# Patient Record
Sex: FEMALE | Race: BLACK OR AFRICAN AMERICAN | NOT HISPANIC OR LATINO | Employment: OTHER
[De-identification: names, ages, dates, MRNs, and addresses within clinical notes are randomized per-mention and may not be internally consistent; named-entity substitution may affect disease eponyms.]

---

## 2017-11-12 ENCOUNTER — HEALTH MAINTENANCE LETTER (OUTPATIENT)
Age: 67
End: 2017-11-12

## 2018-11-19 ENCOUNTER — HEALTH MAINTENANCE LETTER (OUTPATIENT)
Age: 68
End: 2018-11-19

## 2019-05-14 ENCOUNTER — OFFICE VISIT (OUTPATIENT)
Dept: URGENT CARE | Facility: URGENT CARE | Age: 69
End: 2019-05-14
Payer: MEDICARE

## 2019-05-14 VITALS
OXYGEN SATURATION: 95 % | BODY MASS INDEX: 56.61 KG/M2 | DIASTOLIC BLOOD PRESSURE: 96 MMHG | HEART RATE: 67 BPM | WEIGHT: 293 LBS | SYSTOLIC BLOOD PRESSURE: 140 MMHG

## 2019-05-14 DIAGNOSIS — M10.9 ACUTE GOUT OF LEFT FOOT, UNSPECIFIED CAUSE: Primary | ICD-10-CM

## 2019-05-14 DIAGNOSIS — M79.672 LEFT FOOT PAIN: ICD-10-CM

## 2019-05-14 LAB
BASOPHILS # BLD AUTO: 0 10E9/L (ref 0–0.2)
BASOPHILS NFR BLD AUTO: 0.2 %
DIFFERENTIAL METHOD BLD: ABNORMAL
EOSINOPHIL # BLD AUTO: 0.1 10E9/L (ref 0–0.7)
EOSINOPHIL NFR BLD AUTO: 1.5 %
ERYTHROCYTE [DISTWIDTH] IN BLOOD BY AUTOMATED COUNT: 13.4 % (ref 10–15)
HCT VFR BLD AUTO: 35.7 % (ref 35–47)
HGB BLD-MCNC: 11.5 G/DL (ref 11.7–15.7)
LYMPHOCYTES # BLD AUTO: 2.2 10E9/L (ref 0.8–5.3)
LYMPHOCYTES NFR BLD AUTO: 25.9 %
MCH RBC QN AUTO: 28.2 PG (ref 26.5–33)
MCHC RBC AUTO-ENTMCNC: 32.2 G/DL (ref 31.5–36.5)
MCV RBC AUTO: 88 FL (ref 78–100)
MONOCYTES # BLD AUTO: 0.7 10E9/L (ref 0–1.3)
MONOCYTES NFR BLD AUTO: 7.6 %
NEUTROPHILS # BLD AUTO: 5.5 10E9/L (ref 1.6–8.3)
NEUTROPHILS NFR BLD AUTO: 64.8 %
PLATELET # BLD AUTO: 276 10E9/L (ref 150–450)
RBC # BLD AUTO: 4.08 10E12/L (ref 3.8–5.2)
URATE SERPL-MCNC: 8.2 MG/DL (ref 2.6–6)
WBC # BLD AUTO: 8.5 10E9/L (ref 4–11)

## 2019-05-14 PROCEDURE — 85025 COMPLETE CBC W/AUTO DIFF WBC: CPT | Performed by: PHYSICIAN ASSISTANT

## 2019-05-14 PROCEDURE — 84550 ASSAY OF BLOOD/URIC ACID: CPT | Performed by: PHYSICIAN ASSISTANT

## 2019-05-14 PROCEDURE — 99203 OFFICE O/P NEW LOW 30 MIN: CPT | Performed by: PHYSICIAN ASSISTANT

## 2019-05-14 PROCEDURE — 36415 COLL VENOUS BLD VENIPUNCTURE: CPT | Performed by: PHYSICIAN ASSISTANT

## 2019-05-14 RX ORDER — ATORVASTATIN CALCIUM 40 MG/1
40 TABLET, FILM COATED ORAL DAILY
Status: ON HOLD | COMMUNITY
Start: 2018-11-02

## 2019-05-14 RX ORDER — INDOMETHACIN 50 MG/1
50 CAPSULE ORAL
Qty: 30 CAPSULE | Refills: 0 | Status: ON HOLD | OUTPATIENT
Start: 2019-05-14 | End: 2024-07-28

## 2019-05-14 RX ORDER — AMLODIPINE BESYLATE 5 MG/1
5 TABLET ORAL
Status: ON HOLD | COMMUNITY
Start: 2018-11-30 | End: 2024-07-28

## 2019-05-14 NOTE — PROGRESS NOTES
Patient presents with:  Musculoskeletal Problem: L foot, x 5 days, swollen, discomfort, painful, pain level 8/10, denies bruising, falling     No recent travel    SUBJECTIVE:  Chief Complaint   Patient presents with     Musculoskeletal Problem     L foot, x 5 days, swollen, discomfort, painful, pain level 8/10, denies bruising, falling      Agatha Rodriguez is a 68 year old female presents with a chief complaint of left foot pain and swelling for the past 3 days.  Ibuprofen helps but then pain returns.    No trauma.   No fevers.    Feels warm.   Swells considerably during day.      Takes Maxide for HTN, has for some time.      SH: here with her PCA this evening.      FH: diabetes  Breast Cancer    Past Medical History:   Diagnosis Date     Asthma      CAD (coronary artery disease)     angina     GERD (gastroesophageal reflux disease)      Hypertension      Obesity      Primary osteoarthritis of both knees      Sleep apnea     uses CPAP     Patient Active Problem List   Diagnosis     Pain in a tooth or teeth     CAD (coronary artery disease)     Hypertension     GERD (gastroesophageal reflux disease)     Sleep apnea     Primary osteoarthritis of both knees     Obesity     Social History     Tobacco Use     Smoking status: Never Smoker     Smokeless tobacco: Never Used   Substance Use Topics     Alcohol use: No       ROS:  CONSTITUTIONAL:NEGATIVE for fever, chills, change in weight  INTEGUMENTARY/SKIN: NEGATIVE for worrisome rashes, moles or lesions  MUSCULOSKELETAL: as per HPI  NEURO: NEGATIVE for weakness, dizziness or paresthesias  ENDOCRINE: NEGATIVE for temperature intolerance, skin/hair changes  HEME/ALLERGY/IMMUNE: NEGATIVE for bleeding problems  Review of systems negative except as stated above.    EXAM:   BP (!) 140/96   Pulse 67   Wt (!) 356 lb 1.6 oz (161.5 kg)   SpO2 95%   Breastfeeding? No   BMI 56.61 kg/m    Gen: healthy,alert,no distress  Extremity: left foot: erythema and warmth at 1st MTP.      There is not compromise to the distal circulation.  Pulses are +2 and CRT is brisk  GENERAL APPEARANCE: healthy, alert and no distress  SKIN: no suspicious lesions or rashes  NEURO: Normal strength and tone, sensory exam grossly normal, mentation intact and speech normal    Results for orders placed or performed in visit on 05/14/19   Uric acid   Result Value Ref Range    Uric Acid 8.2 (H) 2.6 - 6.0 mg/dL   CBC with platelets differential   Result Value Ref Range    WBC 8.5 4.0 - 11.0 10e9/L    RBC Count 4.08 3.8 - 5.2 10e12/L    Hemoglobin 11.5 (L) 11.7 - 15.7 g/dL    Hematocrit 35.7 35.0 - 47.0 %    MCV 88 78 - 100 fl    MCH 28.2 26.5 - 33.0 pg    MCHC 32.2 31.5 - 36.5 g/dL    RDW 13.4 10.0 - 15.0 %    Platelet Count 276 150 - 450 10e9/L    % Neutrophils 64.8 %    % Lymphocytes 25.9 %    % Monocytes 7.6 %    % Eosinophils 1.5 %    % Basophils 0.2 %    Absolute Neutrophil 5.5 1.6 - 8.3 10e9/L    Absolute Lymphocytes 2.2 0.8 - 5.3 10e9/L    Absolute Monocytes 0.7 0.0 - 1.3 10e9/L    Absolute Eosinophils 0.1 0.0 - 0.7 10e9/L    Absolute Basophils 0.0 0.0 - 0.2 10e9/L    Diff Method Automated Method      (M10.9) Acute gout of left foot, unspecified cause  (primary encounter diagnosis)  Comment:   Plan: indomethacin (INDOCIN) 50 MG capsule,         acetaminophen-codeine (TYLENOL #3) 300-30 MG         tablet          Handout on gout given and reviewed.  Use codeine with caution.      (M79.672) Left foot pain  Comment:   Plan: Uric acid, CBC with platelets differential          Patient expresses understanding and agreement with the assessment and plan as above.

## 2019-05-15 NOTE — PATIENT INSTRUCTIONS
Patient Education     Gout    Gout is an inflammation of a joint due to a build-up of gout crystals in the joint fluid. This occurs when there is an excess of uric acid (a normal waste product) in the body. Uric acid builds up in the body when the kidneys are unable to filter enough of it from the blood. This may occur with age. It is also associated with kidney disease. Gout occurs more often in people with obesity, diabetes, high blood pressure, or high levels of fats in the blood. It may run in families. Gout tends to come and go. A flare up of gout is called an attack. Drinking alcohol or eating certain foods (such as shellfish or foods with additives such as high-fructose corn syrup) may increase uric acid levels in the blood and cause a gout attack.  During a gout attack, the affected joint may become a hot, red, swollen and painful. If you have had one attack of gout, you are likely to have another. An attack of gout can be treated with medicine. If these attacks become frequent, a daily medicine may be prescribed to help the kidneys remove uric acid from the body.  Home care  During a gout attack:    Rest painful joints. If gout affects the joints of your foot or leg, you may want to use crutches for the first few days to keep from bearing weight on the affected joint.    When sitting or lying down, raise the painful joint to a level higher than your heart.    Apply an ice pack (ice cubes in a plastic bag wrapped in a thin towel) over the injured area for 20 minutes every 1 to 2 hours the first day for pain relief. Continue this 3 to 4 times a day for swelling and pain.    Avoid alcohol and foods listed below (see Preventing attacks) during a gout attack. Drink extra fluid to help flush the uric acid through your kidneys.    If you were prescribed a medicine to treat gout, take it as your healthcare provider has instructed. Don't skip doses.    Take anti-inflammatory medicine as directed.     If pain  medicines have been prescribed, take them exactly as directed.    Preventing attacks    Minimize or avoid alcohol use. Excess alcohol intake can cause a gout attack.    Limit these foods and beverages:  ? Organ meats, such as kidneys and liver  ? Certain seafoods (anchovies, sardines, shrimp, scallops, herring, mackerel)  ? Wild game, meat extracts and meat gravies  ? Foods and beverages sweetened with high-fructose corn syrup, such as sodas    Eat a healthy diet including low-fat and nonfat dairy, whole grains, and vegetables.    If you are overweight, talk to your healthcare provider about a weight reduction plan. Avoid fasting or extreme low calorie diets (less than 900 calories per day). This will increase uric acid levels in the body.    If you have diabetes or high blood pressure, work with your doctor to manage these conditions.    Protect the joint from injury. Trauma can trigger a gout attack.  Follow-up care  Follow up with your healthcare provider, or as advised.   When to seek medical advice  Call your healthcare provider if you have any of the following:    Fever over 100.4 F (38. C) with worsening joint pain    Increasing redness around the joint    Pain developing in another joint    Repeated vomiting, abdominal pain, or blood in the vomit or stool (black or red color)  Date Last Reviewed: 3/1/2017    9908-1895 [employer]. [address] [place] [address], [place] [address]. All rights reserved. This information is not intended as a substitute for professional medical care. Always follow your healthcare professional's instructions.           Patient Education     Gout Diet  Gout is a painful condition caused by an excess of uric acid, a waste product made by the body. Uric acid forms crystals that collect in the joints. The immune response to these crystals brings on symptoms of joint pain and swelling. This is called a gout attack. Often, medications and diet changes are combined to manage gout.  Below are some guidelines for changing your diet to help you manage gout and prevent attacks. Your healthcare provider will help you determine the best eating plan for you.  Eating to manage gout  Weight loss for those who are overweight may help reduce gout attacks.  Eat less of these foods  Eating too many foods containing purines may raise the levels of uric acid in your body. This raises your risk for a gout attack. Try to limit these foods and drinks:    Alcohol, such as beer and red wine. You may be told to avoid alcohol completely.    Soft drinks that contain sugar or high fructose corn syrup    Certain fish, including anchovies, sardines, fish eggs, and herring    Shellfish    Certain meats, such as red meat, hot dogs, luncheon meats, and turkey    Organ meats, such as liver, kidneys, and sweetbreads    Legumes, such as dried beans and peas    Other high fat foods such as gravy, whole milk, and high fat cheeses    Vegetables such as asparagus, cauliflower, spinach, and mushrooms used to be thought to contribute to an increased risk for a gout attack, but recent studies show that high purine vegetables don't increase the risk for a gout attack.  Eat more of these foods  Other foods may be helpful for people with gout. Add some of these foods to your diet:    Cherries contain chemicals that may lower uric acid.    Omega fatty acids. These are found in some fatty fish such as salmon, certain oils (flax, olive, or nut), and nuts themselves. Omega fatty acids may help prevent inflammation due to gout.    Dairy products that are low-fat or fat-free, such as cheese and yogurt    Complex carbohydrate foods, including whole grains, brown rice, oats, and beans    Coffee, in moderation    Water, approximately 64 ounces per day  Follow-up care  Follow up with your healthcare provider as advised.  When to seek medical advice  Call your healthcare provider right away if any of these occur:    Return of gout symptoms,  usually at night:    Severe pain, swelling, and heat in a joint, especially the base of the big toe    Affected joint is hard to move    Skin of the affected joint is purple or red    Fever of 100.4 F (38 C) or higher    Pain that doesn't get better even with prescribed medicine   Date Last Reviewed: 6/1/2018 2000-2018 [employer]. [address] [place] [address]. All rights reserved. This information is not intended as a substitute for professional medical care. Always follow your healthcare professional's instructions.           Patient Education     Treating Gout Attacks     Raising the joint above the level of your heart can help reduce gout symptoms.     Gout is a disease that affects the joints. It is caused by excess uric acid in your blood that may lead to crystals forming in your joints. Left untreated, it can lead to painful foot and joint deformities and even kidney problems. But, by treating gout early, you can relieve pain and help prevent future problems. Gout can usually be treated with medicine and proper diet. In severe cases, surgery may be needed.  Gout attacks are painful and often happen more than once. Taking medicines may reduce pain and prevent attacks in the future. There are also some things you can do at home to relieve symptoms.  Medicines for gout  Your healthcare provider may prescribe a daily medicine to reduce levels of uric acid. Reducing your uric acid levels may help prevent gout attacks. Allopurinol is one commonly used medicine taken daily to reduce uric acid levels. Other daily medicines used to reduce uric acid levels include febuxostat, lesinurad, and probencid. Other medicines can help relieve pain and swelling during an acute attack. Medicines such as NSAIDs (nonsteroidal anti-inflammatory medicines), steroids, and colchicine may be prescribed for intermittent use to relieve an acute gout attack. Be sure to take your medicine as directed.  What you  can do  Below are some things you can do at home to relieve gout symptoms. Your healthcare provider may have other tips.    Rest the painful joint as much as you can.    Raise the painful joint so it is at a level higher than your heart.    Use ice for 10 minutes every 1 to 2 hours as possible.  How can I prevent gout?  With a little effort, you may be able to prevent gout attacks in the future. Here are some things you can do:    Don't eat foods high in purines  ? Certain meats (red meat, processed meat, turkey)  ? Organ meats (kidney, liver, sweetbread)  ? Shellfish (lobster, crab, shrimp, scallop, mussel)  ? Certain fish (anchovy, sardine, herring, mackerel)    Take any medicines prescribed by your healthcare provider.    Lose weight if you need to.    Reduce high fructose corn syrup in meals and drinks.    Reduce or cut out alcohol, particularly beer, but also red wine and spirits.    Control blood pressure, diabetes, and cholesterol.    Drink plenty of water to help flush uric acid from your body.  Date Last Reviewed: 4/1/2018 2000-2018 [employer]. [address] [place] [address], [place] [address]. All rights reserved. This information is not intended as a substitute for professional medical care. Always follow your healthcare professional's instructions.

## 2021-07-13 ENCOUNTER — IMMUNIZATION (OUTPATIENT)
Dept: NURSING | Facility: CLINIC | Age: 71
End: 2021-07-13
Payer: MEDICARE

## 2021-07-13 PROCEDURE — 0001A PR COVID VAC PFIZER DIL RECON 30 MCG/0.3 ML IM: CPT

## 2021-07-13 PROCEDURE — 91300 PR COVID VAC PFIZER DIL RECON 30 MCG/0.3 ML IM: CPT

## 2021-08-03 ENCOUNTER — IMMUNIZATION (OUTPATIENT)
Dept: NURSING | Facility: CLINIC | Age: 71
End: 2021-08-03
Attending: INTERNAL MEDICINE
Payer: MEDICARE

## 2021-08-03 PROCEDURE — 0002A PR COVID VAC PFIZER DIL RECON 30 MCG/0.3 ML IM: CPT

## 2021-08-03 PROCEDURE — 91300 PR COVID VAC PFIZER DIL RECON 30 MCG/0.3 ML IM: CPT

## 2024-07-27 ENCOUNTER — APPOINTMENT (OUTPATIENT)
Dept: GENERAL RADIOLOGY | Facility: CLINIC | Age: 74
DRG: 291 | End: 2024-07-27
Attending: EMERGENCY MEDICINE
Payer: MEDICARE

## 2024-07-27 ENCOUNTER — HOSPITAL ENCOUNTER (INPATIENT)
Facility: CLINIC | Age: 74
LOS: 1 days | Discharge: HOME | DRG: 291 | End: 2024-07-30
Attending: EMERGENCY MEDICINE | Admitting: INTERNAL MEDICINE
Payer: MEDICARE

## 2024-07-27 DIAGNOSIS — I51.89 DIASTOLIC DYSFUNCTION: ICD-10-CM

## 2024-07-27 DIAGNOSIS — I10 BENIGN ESSENTIAL HYPERTENSION: Primary | ICD-10-CM

## 2024-07-27 DIAGNOSIS — E04.1 THYROID NODULE: ICD-10-CM

## 2024-07-27 DIAGNOSIS — R60.0 BILATERAL LOWER EXTREMITY EDEMA: ICD-10-CM

## 2024-07-27 DIAGNOSIS — J45.901 EXACERBATION OF ASTHMA, UNSPECIFIED ASTHMA SEVERITY, UNSPECIFIED WHETHER PERSISTENT: ICD-10-CM

## 2024-07-27 DIAGNOSIS — I25.10 CAD (CORONARY ARTERY DISEASE): ICD-10-CM

## 2024-07-27 DIAGNOSIS — R06.02 SHORTNESS OF BREATH: ICD-10-CM

## 2024-07-27 LAB
ALBUMIN SERPL BCG-MCNC: 3.9 G/DL (ref 3.5–5.2)
ALP SERPL-CCNC: 68 U/L (ref 40–150)
ALT SERPL W P-5'-P-CCNC: 15 U/L (ref 0–50)
ANION GAP SERPL CALCULATED.3IONS-SCNC: 10 MMOL/L (ref 7–15)
AST SERPL W P-5'-P-CCNC: 24 U/L (ref 0–45)
ATRIAL RATE - MUSE: 69 BPM
BASOPHILS # BLD AUTO: 0 10E3/UL (ref 0–0.2)
BASOPHILS NFR BLD AUTO: 0 %
BILIRUB DIRECT SERPL-MCNC: <0.2 MG/DL (ref 0–0.3)
BILIRUB SERPL-MCNC: 0.2 MG/DL
BUN SERPL-MCNC: 18 MG/DL (ref 8–23)
CALCIUM SERPL-MCNC: 9.4 MG/DL (ref 8.8–10.4)
CHLORIDE SERPL-SCNC: 103 MMOL/L (ref 98–107)
CREAT SERPL-MCNC: 0.93 MG/DL (ref 0.51–0.95)
D DIMER PPP FEU-MCNC: 0.91 UG/ML FEU (ref 0–0.5)
DIASTOLIC BLOOD PRESSURE - MUSE: NORMAL MMHG
EGFRCR SERPLBLD CKD-EPI 2021: 65 ML/MIN/1.73M2
EOSINOPHIL # BLD AUTO: 0.1 10E3/UL (ref 0–0.7)
EOSINOPHIL NFR BLD AUTO: 1 %
ERYTHROCYTE [DISTWIDTH] IN BLOOD BY AUTOMATED COUNT: 15.9 % (ref 10–15)
GLUCOSE SERPL-MCNC: 121 MG/DL (ref 70–99)
HCO3 SERPL-SCNC: 29 MMOL/L (ref 22–29)
HCT VFR BLD AUTO: 33.4 % (ref 35–47)
HGB BLD-MCNC: 10.4 G/DL (ref 11.7–15.7)
HOLD SPECIMEN: NORMAL
IMM GRANULOCYTES # BLD: 0 10E3/UL
IMM GRANULOCYTES NFR BLD: 0 %
INTERPRETATION ECG - MUSE: NORMAL
LYMPHOCYTES # BLD AUTO: 2.5 10E3/UL (ref 0.8–5.3)
LYMPHOCYTES NFR BLD AUTO: 26 %
MCH RBC QN AUTO: 25.9 PG (ref 26.5–33)
MCHC RBC AUTO-ENTMCNC: 31.1 G/DL (ref 31.5–36.5)
MCV RBC AUTO: 83 FL (ref 78–100)
MONOCYTES # BLD AUTO: 0.8 10E3/UL (ref 0–1.3)
MONOCYTES NFR BLD AUTO: 8 %
NEUTROPHILS # BLD AUTO: 6.4 10E3/UL (ref 1.6–8.3)
NEUTROPHILS NFR BLD AUTO: 65 %
NRBC # BLD AUTO: 0 10E3/UL
NRBC BLD AUTO-RTO: 0 /100
NT-PROBNP SERPL-MCNC: 355 PG/ML (ref 0–900)
P AXIS - MUSE: 69 DEGREES
PLATELET # BLD AUTO: 227 10E3/UL (ref 150–450)
POTASSIUM SERPL-SCNC: 3.9 MMOL/L (ref 3.4–5.3)
PR INTERVAL - MUSE: 236 MS
PROT SERPL-MCNC: 7.3 G/DL (ref 6.4–8.3)
QRS DURATION - MUSE: 104 MS
QT - MUSE: 444 MS
QTC - MUSE: 475 MS
R AXIS - MUSE: -22 DEGREES
RBC # BLD AUTO: 4.01 10E6/UL (ref 3.8–5.2)
SODIUM SERPL-SCNC: 142 MMOL/L (ref 135–145)
SYSTOLIC BLOOD PRESSURE - MUSE: NORMAL MMHG
T AXIS - MUSE: 37 DEGREES
TROPONIN T SERPL HS-MCNC: 12 NG/L
VENTRICULAR RATE- MUSE: 69 BPM
WBC # BLD AUTO: 9.8 10E3/UL (ref 4–11)

## 2024-07-27 PROCEDURE — 94640 AIRWAY INHALATION TREATMENT: CPT

## 2024-07-27 PROCEDURE — 83880 ASSAY OF NATRIURETIC PEPTIDE: CPT | Performed by: EMERGENCY MEDICINE

## 2024-07-27 PROCEDURE — 84484 ASSAY OF TROPONIN QUANT: CPT | Performed by: EMERGENCY MEDICINE

## 2024-07-27 PROCEDURE — 71046 X-RAY EXAM CHEST 2 VIEWS: CPT

## 2024-07-27 PROCEDURE — 93005 ELECTROCARDIOGRAM TRACING: CPT

## 2024-07-27 PROCEDURE — 85025 COMPLETE CBC W/AUTO DIFF WBC: CPT | Performed by: EMERGENCY MEDICINE

## 2024-07-27 PROCEDURE — 999N000157 HC STATISTIC RCP TIME EA 10 MIN

## 2024-07-27 PROCEDURE — 36415 COLL VENOUS BLD VENIPUNCTURE: CPT | Performed by: EMERGENCY MEDICINE

## 2024-07-27 PROCEDURE — 85379 FIBRIN DEGRADATION QUANT: CPT | Performed by: EMERGENCY MEDICINE

## 2024-07-27 PROCEDURE — 250N000009 HC RX 250: Performed by: EMERGENCY MEDICINE

## 2024-07-27 PROCEDURE — 99285 EMERGENCY DEPT VISIT HI MDM: CPT | Mod: 25

## 2024-07-27 PROCEDURE — 80048 BASIC METABOLIC PNL TOTAL CA: CPT | Performed by: EMERGENCY MEDICINE

## 2024-07-27 PROCEDURE — 96374 THER/PROPH/DIAG INJ IV PUSH: CPT | Mod: 59

## 2024-07-27 PROCEDURE — 250N000011 HC RX IP 250 OP 636: Performed by: EMERGENCY MEDICINE

## 2024-07-27 PROCEDURE — 82040 ASSAY OF SERUM ALBUMIN: CPT | Performed by: EMERGENCY MEDICINE

## 2024-07-27 PROCEDURE — 80053 COMPREHEN METABOLIC PANEL: CPT | Performed by: EMERGENCY MEDICINE

## 2024-07-27 RX ORDER — IPRATROPIUM BROMIDE AND ALBUTEROL SULFATE 2.5; .5 MG/3ML; MG/3ML
3 SOLUTION RESPIRATORY (INHALATION) ONCE
Status: COMPLETED | OUTPATIENT
Start: 2024-07-27 | End: 2024-07-27

## 2024-07-27 RX ORDER — FUROSEMIDE 10 MG/ML
40 INJECTION INTRAMUSCULAR; INTRAVENOUS ONCE
Status: COMPLETED | OUTPATIENT
Start: 2024-07-27 | End: 2024-07-27

## 2024-07-27 RX ADMIN — FUROSEMIDE 40 MG: 10 INJECTION, SOLUTION INTRAMUSCULAR; INTRAVENOUS at 23:03

## 2024-07-27 RX ADMIN — IPRATROPIUM BROMIDE AND ALBUTEROL SULFATE 3 ML: .5; 3 SOLUTION RESPIRATORY (INHALATION) at 23:03

## 2024-07-27 ASSESSMENT — ACTIVITIES OF DAILY LIVING (ADL)
ADLS_ACUITY_SCORE: 35

## 2024-07-27 ASSESSMENT — COLUMBIA-SUICIDE SEVERITY RATING SCALE - C-SSRS
6. HAVE YOU EVER DONE ANYTHING, STARTED TO DO ANYTHING, OR PREPARED TO DO ANYTHING TO END YOUR LIFE?: NO
2. HAVE YOU ACTUALLY HAD ANY THOUGHTS OF KILLING YOURSELF IN THE PAST MONTH?: NO
1. IN THE PAST MONTH, HAVE YOU WISHED YOU WERE DEAD OR WISHED YOU COULD GO TO SLEEP AND NOT WAKE UP?: NO

## 2024-07-28 ENCOUNTER — APPOINTMENT (OUTPATIENT)
Dept: CARDIOLOGY | Facility: CLINIC | Age: 74
DRG: 291 | End: 2024-07-28
Attending: INTERNAL MEDICINE
Payer: MEDICARE

## 2024-07-28 ENCOUNTER — APPOINTMENT (OUTPATIENT)
Dept: CT IMAGING | Facility: CLINIC | Age: 74
DRG: 291 | End: 2024-07-28
Attending: EMERGENCY MEDICINE
Payer: MEDICARE

## 2024-07-28 PROBLEM — E04.1 THYROID NODULE: Status: ACTIVE | Noted: 2024-07-28

## 2024-07-28 PROBLEM — E66.01 MORBID OBESITY WITH BMI OF 50.0-59.9, ADULT (H): Status: ACTIVE | Noted: 2024-07-28

## 2024-07-28 PROBLEM — R94.31 PROLONGED Q-T INTERVAL ON ECG: Status: ACTIVE | Noted: 2024-07-28

## 2024-07-28 PROBLEM — I51.89 DIASTOLIC DYSFUNCTION: Status: ACTIVE | Noted: 2024-07-28

## 2024-07-28 PROBLEM — R06.02 SHORTNESS OF BREATH: Status: ACTIVE | Noted: 2024-07-28

## 2024-07-28 PROBLEM — R60.0 BILATERAL LOWER EXTREMITY EDEMA: Status: ACTIVE | Noted: 2024-07-28

## 2024-07-28 LAB
ANION GAP SERPL CALCULATED.3IONS-SCNC: 10 MMOL/L (ref 7–15)
BUN SERPL-MCNC: 15.7 MG/DL (ref 8–23)
CALCIUM SERPL-MCNC: 9.7 MG/DL (ref 8.8–10.4)
CHLORIDE SERPL-SCNC: 102 MMOL/L (ref 98–107)
CREAT SERPL-MCNC: 0.86 MG/DL (ref 0.51–0.95)
EGFRCR SERPLBLD CKD-EPI 2021: 71 ML/MIN/1.73M2
GLUCOSE BLDC GLUCOMTR-MCNC: 227 MG/DL (ref 70–99)
GLUCOSE SERPL-MCNC: 158 MG/DL (ref 70–99)
HCO3 SERPL-SCNC: 30 MMOL/L (ref 22–29)
LVEF ECHO: NORMAL
MAGNESIUM SERPL-MCNC: 1.8 MG/DL (ref 1.7–2.3)
POTASSIUM SERPL-SCNC: 3 MMOL/L (ref 3.4–5.3)
SODIUM SERPL-SCNC: 142 MMOL/L (ref 135–145)
TROPONIN T SERPL HS-MCNC: 13 NG/L
TSH SERPL DL<=0.005 MIU/L-ACNC: 3.13 UIU/ML (ref 0.3–4.2)

## 2024-07-28 PROCEDURE — 99223 1ST HOSP IP/OBS HIGH 75: CPT | Mod: AI | Performed by: INTERNAL MEDICINE

## 2024-07-28 PROCEDURE — G0378 HOSPITAL OBSERVATION PER HR: HCPCS

## 2024-07-28 PROCEDURE — 99207 PR APP CREDIT; MD BILLING SHARED VISIT: CPT | Performed by: PHYSICIAN ASSISTANT

## 2024-07-28 PROCEDURE — 36415 COLL VENOUS BLD VENIPUNCTURE: CPT | Performed by: INTERNAL MEDICINE

## 2024-07-28 PROCEDURE — 99222 1ST HOSP IP/OBS MODERATE 55: CPT | Mod: 25 | Performed by: INTERNAL MEDICINE

## 2024-07-28 PROCEDURE — 94640 AIRWAY INHALATION TREATMENT: CPT

## 2024-07-28 PROCEDURE — 250N000012 HC RX MED GY IP 250 OP 636 PS 637: Performed by: INTERNAL MEDICINE

## 2024-07-28 PROCEDURE — 250N000011 HC RX IP 250 OP 636: Performed by: INTERNAL MEDICINE

## 2024-07-28 PROCEDURE — 999N000157 HC STATISTIC RCP TIME EA 10 MIN

## 2024-07-28 PROCEDURE — 99418 PROLNG IP/OBS E/M EA 15 MIN: CPT | Performed by: INTERNAL MEDICINE

## 2024-07-28 PROCEDURE — 255N000002 HC RX 255 OP 636: Performed by: INTERNAL MEDICINE

## 2024-07-28 PROCEDURE — 93306 TTE W/DOPPLER COMPLETE: CPT | Mod: 26 | Performed by: INTERNAL MEDICINE

## 2024-07-28 PROCEDURE — 93005 ELECTROCARDIOGRAM TRACING: CPT

## 2024-07-28 PROCEDURE — 250N000009 HC RX 250: Performed by: INTERNAL MEDICINE

## 2024-07-28 PROCEDURE — 84443 ASSAY THYROID STIM HORMONE: CPT | Performed by: PHYSICIAN ASSISTANT

## 2024-07-28 PROCEDURE — 250N000011 HC RX IP 250 OP 636: Performed by: EMERGENCY MEDICINE

## 2024-07-28 PROCEDURE — 250N000013 HC RX MED GY IP 250 OP 250 PS 637: Performed by: INTERNAL MEDICINE

## 2024-07-28 PROCEDURE — 83735 ASSAY OF MAGNESIUM: CPT | Performed by: INTERNAL MEDICINE

## 2024-07-28 PROCEDURE — 82962 GLUCOSE BLOOD TEST: CPT

## 2024-07-28 PROCEDURE — 84484 ASSAY OF TROPONIN QUANT: CPT | Performed by: PHYSICIAN ASSISTANT

## 2024-07-28 PROCEDURE — 999N000208 ECHOCARDIOGRAM COMPLETE

## 2024-07-28 PROCEDURE — 94660 CPAP INITIATION&MGMT: CPT

## 2024-07-28 PROCEDURE — 250N000013 HC RX MED GY IP 250 OP 250 PS 637: Performed by: PHYSICIAN ASSISTANT

## 2024-07-28 PROCEDURE — 5A09357 ASSISTANCE WITH RESPIRATORY VENTILATION, LESS THAN 24 CONSECUTIVE HOURS, CONTINUOUS POSITIVE AIRWAY PRESSURE: ICD-10-PCS | Performed by: INTERNAL MEDICINE

## 2024-07-28 PROCEDURE — 80048 BASIC METABOLIC PNL TOTAL CA: CPT | Performed by: INTERNAL MEDICINE

## 2024-07-28 PROCEDURE — C8929 TTE W OR WO FOL WCON,DOPPLER: HCPCS

## 2024-07-28 PROCEDURE — 93010 ELECTROCARDIOGRAM REPORT: CPT | Performed by: INTERNAL MEDICINE

## 2024-07-28 PROCEDURE — 250N000009 HC RX 250: Performed by: EMERGENCY MEDICINE

## 2024-07-28 PROCEDURE — 71275 CT ANGIOGRAPHY CHEST: CPT | Mod: MA

## 2024-07-28 RX ORDER — LOSARTAN POTASSIUM 100 MG/1
100 TABLET ORAL DAILY
Status: DISCONTINUED | OUTPATIENT
Start: 2024-07-28 | End: 2024-07-30 | Stop reason: HOSPADM

## 2024-07-28 RX ORDER — ALBUTEROL SULFATE 0.83 MG/ML
2.5 SOLUTION RESPIRATORY (INHALATION) 4 TIMES DAILY
Status: DISCONTINUED | OUTPATIENT
Start: 2024-07-28 | End: 2024-07-30 | Stop reason: HOSPADM

## 2024-07-28 RX ORDER — LOSARTAN POTASSIUM 100 MG/1
100 TABLET ORAL DAILY
Status: ON HOLD | COMMUNITY

## 2024-07-28 RX ORDER — PROCHLORPERAZINE 25 MG
12.5 SUPPOSITORY, RECTAL RECTAL EVERY 12 HOURS PRN
Status: DISCONTINUED | OUTPATIENT
Start: 2024-07-28 | End: 2024-07-28

## 2024-07-28 RX ORDER — ACETAMINOPHEN 650 MG/1
650 SUPPOSITORY RECTAL EVERY 4 HOURS PRN
Status: DISCONTINUED | OUTPATIENT
Start: 2024-07-28 | End: 2024-07-30 | Stop reason: HOSPADM

## 2024-07-28 RX ORDER — ASPIRIN 81 MG/1
81 TABLET ORAL DAILY
Status: DISCONTINUED | OUTPATIENT
Start: 2024-07-28 | End: 2024-07-30 | Stop reason: HOSPADM

## 2024-07-28 RX ORDER — PROCHLORPERAZINE MALEATE 5 MG
5 TABLET ORAL EVERY 6 HOURS PRN
Status: DISCONTINUED | OUTPATIENT
Start: 2024-07-28 | End: 2024-07-28

## 2024-07-28 RX ORDER — ALBUTEROL SULFATE 90 UG/1
2 AEROSOL, METERED RESPIRATORY (INHALATION) EVERY 6 HOURS PRN
Status: ON HOLD | COMMUNITY

## 2024-07-28 RX ORDER — TRIAMTERENE AND HYDROCHLOROTHIAZIDE 75; 50 MG/1; MG/1
1 TABLET ORAL DAILY
Status: ON HOLD | COMMUNITY
End: 2024-07-30

## 2024-07-28 RX ORDER — DOCUSATE SODIUM 100 MG/1
100 CAPSULE, LIQUID FILLED ORAL 2 TIMES DAILY PRN
Status: DISCONTINUED | OUTPATIENT
Start: 2024-07-28 | End: 2024-07-30 | Stop reason: HOSPADM

## 2024-07-28 RX ORDER — MAGNESIUM HYDROXIDE/ALUMINUM HYDROXICE/SIMETHICONE 120; 1200; 1200 MG/30ML; MG/30ML; MG/30ML
30 SUSPENSION ORAL EVERY 4 HOURS PRN
Status: DISCONTINUED | OUTPATIENT
Start: 2024-07-28 | End: 2024-07-30 | Stop reason: HOSPADM

## 2024-07-28 RX ORDER — AMLODIPINE BESYLATE 10 MG/1
10 TABLET ORAL DAILY
Status: DISCONTINUED | OUTPATIENT
Start: 2024-07-28 | End: 2024-07-30 | Stop reason: HOSPADM

## 2024-07-28 RX ORDER — TRIAMTERENE AND HYDROCHLOROTHIAZIDE 75; 50 MG/1; MG/1
1 TABLET ORAL DAILY
Status: DISCONTINUED | OUTPATIENT
Start: 2024-07-28 | End: 2024-07-28

## 2024-07-28 RX ORDER — PROCHLORPERAZINE 25 MG
12.5 SUPPOSITORY, RECTAL RECTAL EVERY 12 HOURS PRN
Status: DISCONTINUED | OUTPATIENT
Start: 2024-07-28 | End: 2024-07-30 | Stop reason: HOSPADM

## 2024-07-28 RX ORDER — TRIAMTERENE AND HYDROCHLOROTHIAZIDE 75; 50 MG/1; MG/1
1 TABLET ORAL DAILY
Status: DISCONTINUED | OUTPATIENT
Start: 2024-07-29 | End: 2024-07-28

## 2024-07-28 RX ORDER — ONDANSETRON 4 MG/1
4 TABLET, ORALLY DISINTEGRATING ORAL EVERY 6 HOURS PRN
Status: DISCONTINUED | OUTPATIENT
Start: 2024-07-28 | End: 2024-07-28 | Stop reason: ALTCHOICE

## 2024-07-28 RX ORDER — ONDANSETRON 2 MG/ML
4 INJECTION INTRAMUSCULAR; INTRAVENOUS EVERY 6 HOURS PRN
Status: DISCONTINUED | OUTPATIENT
Start: 2024-07-28 | End: 2024-07-28 | Stop reason: ALTCHOICE

## 2024-07-28 RX ORDER — NIFEDIPINE 30 MG/1
30 TABLET, EXTENDED RELEASE ORAL DAILY
Status: DISCONTINUED | OUTPATIENT
Start: 2024-07-28 | End: 2024-07-28

## 2024-07-28 RX ORDER — AMOXICILLIN 250 MG
1 CAPSULE ORAL 2 TIMES DAILY PRN
Status: DISCONTINUED | OUTPATIENT
Start: 2024-07-28 | End: 2024-07-30 | Stop reason: HOSPADM

## 2024-07-28 RX ORDER — POLYETHYLENE GLYCOL 3350 17 G/17G
17 POWDER, FOR SOLUTION ORAL 2 TIMES DAILY PRN
Status: DISCONTINUED | OUTPATIENT
Start: 2024-07-28 | End: 2024-07-30 | Stop reason: HOSPADM

## 2024-07-28 RX ORDER — MULTIPLE VITAMINS W/ MINERALS TAB 9MG-400MCG
1 TAB ORAL DAILY
Status: DISCONTINUED | OUTPATIENT
Start: 2024-07-28 | End: 2024-07-30 | Stop reason: HOSPADM

## 2024-07-28 RX ORDER — NITROGLYCERIN 0.4 MG/1
0.4 TABLET SUBLINGUAL EVERY 5 MIN PRN
Status: DISCONTINUED | OUTPATIENT
Start: 2024-07-28 | End: 2024-07-30 | Stop reason: HOSPADM

## 2024-07-28 RX ORDER — POTASSIUM CHLORIDE 1500 MG/1
40 TABLET, EXTENDED RELEASE ORAL ONCE
Status: COMPLETED | OUTPATIENT
Start: 2024-07-28 | End: 2024-07-28

## 2024-07-28 RX ORDER — AMOXICILLIN 250 MG
2 CAPSULE ORAL 2 TIMES DAILY PRN
Status: DISCONTINUED | OUTPATIENT
Start: 2024-07-28 | End: 2024-07-30 | Stop reason: HOSPADM

## 2024-07-28 RX ORDER — CARVEDILOL 12.5 MG/1
12.5 TABLET ORAL 2 TIMES DAILY WITH MEALS
Status: DISCONTINUED | OUTPATIENT
Start: 2024-07-28 | End: 2024-07-28

## 2024-07-28 RX ORDER — NITROGLYCERIN 0.4 MG/1
TABLET SUBLINGUAL
Status: COMPLETED
Start: 2024-07-28 | End: 2024-07-28

## 2024-07-28 RX ORDER — PREDNISONE 20 MG/1
40 TABLET ORAL DAILY
Status: DISCONTINUED | OUTPATIENT
Start: 2024-07-28 | End: 2024-07-30 | Stop reason: HOSPADM

## 2024-07-28 RX ORDER — IOPAMIDOL 755 MG/ML
83 INJECTION, SOLUTION INTRAVASCULAR ONCE
Status: COMPLETED | OUTPATIENT
Start: 2024-07-28 | End: 2024-07-28

## 2024-07-28 RX ORDER — METOPROLOL SUCCINATE 100 MG/1
200 TABLET, EXTENDED RELEASE ORAL DAILY
Status: DISCONTINUED | OUTPATIENT
Start: 2024-07-28 | End: 2024-07-29

## 2024-07-28 RX ORDER — ALBUTEROL SULFATE 90 UG/1
2 AEROSOL, METERED RESPIRATORY (INHALATION) EVERY 6 HOURS PRN
Status: DISCONTINUED | OUTPATIENT
Start: 2024-07-28 | End: 2024-07-30 | Stop reason: HOSPADM

## 2024-07-28 RX ORDER — FUROSEMIDE 10 MG/ML
60 INJECTION INTRAMUSCULAR; INTRAVENOUS ONCE
Status: COMPLETED | OUTPATIENT
Start: 2024-07-28 | End: 2024-07-28

## 2024-07-28 RX ORDER — PROCHLORPERAZINE MALEATE 5 MG
5 TABLET ORAL EVERY 6 HOURS PRN
Status: DISCONTINUED | OUTPATIENT
Start: 2024-07-28 | End: 2024-07-30 | Stop reason: HOSPADM

## 2024-07-28 RX ORDER — METOPROLOL SUCCINATE 200 MG/1
200 TABLET, EXTENDED RELEASE ORAL DAILY
Status: ON HOLD | COMMUNITY
End: 2024-07-30

## 2024-07-28 RX ORDER — LIDOCAINE 40 MG/G
CREAM TOPICAL
Status: DISCONTINUED | OUTPATIENT
Start: 2024-07-28 | End: 2024-07-30 | Stop reason: HOSPADM

## 2024-07-28 RX ORDER — GLIPIZIDE 2.5 MG/1
2.5 TABLET, EXTENDED RELEASE ORAL DAILY
Status: ON HOLD | COMMUNITY

## 2024-07-28 RX ORDER — FUROSEMIDE 10 MG/ML
40 INJECTION INTRAMUSCULAR; INTRAVENOUS ONCE
Status: COMPLETED | OUTPATIENT
Start: 2024-07-28 | End: 2024-07-28

## 2024-07-28 RX ORDER — ACETAMINOPHEN 325 MG/1
650 TABLET ORAL EVERY 4 HOURS PRN
Status: DISCONTINUED | OUTPATIENT
Start: 2024-07-28 | End: 2024-07-30 | Stop reason: HOSPADM

## 2024-07-28 RX ORDER — LOSARTAN POTASSIUM 100 MG/1
100 TABLET ORAL DAILY
Status: DISCONTINUED | OUTPATIENT
Start: 2024-07-28 | End: 2024-07-28

## 2024-07-28 RX ORDER — PANTOPRAZOLE SODIUM 40 MG/1
40 TABLET, DELAYED RELEASE ORAL
Status: DISCONTINUED | OUTPATIENT
Start: 2024-07-29 | End: 2024-07-30 | Stop reason: HOSPADM

## 2024-07-28 RX ORDER — ATORVASTATIN CALCIUM 40 MG/1
40 TABLET, FILM COATED ORAL DAILY
Status: DISCONTINUED | OUTPATIENT
Start: 2024-07-28 | End: 2024-07-30 | Stop reason: HOSPADM

## 2024-07-28 RX ORDER — AMLODIPINE BESYLATE 10 MG/1
10 TABLET ORAL DAILY
Status: ON HOLD | COMMUNITY
End: 2024-10-01

## 2024-07-28 RX ADMIN — NITROGLYCERIN 0.4 MG: 0.4 TABLET SUBLINGUAL at 10:03

## 2024-07-28 RX ADMIN — FUROSEMIDE 60 MG: 10 INJECTION, SOLUTION INTRAMUSCULAR; INTRAVENOUS at 16:03

## 2024-07-28 RX ADMIN — AMLODIPINE BESYLATE 10 MG: 10 TABLET ORAL at 16:03

## 2024-07-28 RX ADMIN — ASPIRIN 81 MG: 81 TABLET, COATED ORAL at 16:03

## 2024-07-28 RX ADMIN — ALUMINUM HYDROXIDE, MAGNESIUM HYDROXIDE, AND SIMETHICONE 30 ML: 200; 200; 20 SUSPENSION ORAL at 10:36

## 2024-07-28 RX ADMIN — NITROGLYCERIN 0.4 MG: 0.4 TABLET SUBLINGUAL at 10:26

## 2024-07-28 RX ADMIN — Medication 1 TABLET: at 18:41

## 2024-07-28 RX ADMIN — HUMAN ALBUMIN MICROSPHERES AND PERFLUTREN 9 ML: 10; .22 INJECTION, SOLUTION INTRAVENOUS at 10:36

## 2024-07-28 RX ADMIN — SODIUM CHLORIDE 100 ML: 9 INJECTION, SOLUTION INTRAVENOUS at 00:36

## 2024-07-28 RX ADMIN — METOPROLOL SUCCINATE 200 MG: 100 TABLET, EXTENDED RELEASE ORAL at 18:40

## 2024-07-28 RX ADMIN — LOSARTAN POTASSIUM 100 MG: 100 TABLET, FILM COATED ORAL at 16:03

## 2024-07-28 RX ADMIN — ALBUTEROL SULFATE 2.5 MG: 2.5 SOLUTION RESPIRATORY (INHALATION) at 19:48

## 2024-07-28 RX ADMIN — ALBUTEROL SULFATE 2 PUFF: 108 INHALANT RESPIRATORY (INHALATION) at 09:01

## 2024-07-28 RX ADMIN — FUROSEMIDE 40 MG: 10 INJECTION, SOLUTION INTRAMUSCULAR; INTRAVENOUS at 09:00

## 2024-07-28 RX ADMIN — ATORVASTATIN CALCIUM 40 MG: 40 TABLET, FILM COATED ORAL at 18:41

## 2024-07-28 RX ADMIN — FLUTICASONE FUROATE 1 PUFF: 200 POWDER RESPIRATORY (INHALATION) at 18:42

## 2024-07-28 RX ADMIN — POTASSIUM CHLORIDE 40 MEQ: 1500 TABLET, EXTENDED RELEASE ORAL at 16:03

## 2024-07-28 RX ADMIN — ALBUTEROL SULFATE 2.5 MG: 2.5 SOLUTION RESPIRATORY (INHALATION) at 12:25

## 2024-07-28 RX ADMIN — NITROGLYCERIN 0.4 MG: 0.4 TABLET SUBLINGUAL at 10:16

## 2024-07-28 RX ADMIN — IOPAMIDOL 83 ML: 755 INJECTION, SOLUTION INTRAVENOUS at 00:36

## 2024-07-28 RX ADMIN — ALBUTEROL SULFATE 2 PUFF: 108 INHALANT RESPIRATORY (INHALATION) at 20:41

## 2024-07-28 RX ADMIN — PREDNISONE 40 MG: 20 TABLET ORAL at 12:14

## 2024-07-28 ASSESSMENT — ACTIVITIES OF DAILY LIVING (ADL)
ADLS_ACUITY_SCORE: 35
ADLS_ACUITY_SCORE: 29
ADLS_ACUITY_SCORE: 35
ADLS_ACUITY_SCORE: 29
ADLS_ACUITY_SCORE: 24
ADLS_ACUITY_SCORE: 35
ADLS_ACUITY_SCORE: 29
ADLS_ACUITY_SCORE: 35
ADLS_ACUITY_SCORE: 29
ADLS_ACUITY_SCORE: 35
ADLS_ACUITY_SCORE: 29

## 2024-07-28 NOTE — ED PROVIDER NOTES
Emergency Department Note      History of Present Illness     Chief Complaint   Leg Swelling      HPI   Agatha Rodriguez is a 73 year old female who presents with swelling of the bilateral lower legs and mild chest pain for approximately 3 days. Patient reports similar symptoms several years ago. Denies ever being diagnosed with congestive heart failure and denies taking Lasix. Reports taking hydrochlorothiazide. She explains having a recent medication change for her asthma inhalers. Notes her new inhalers do not help alleviate her symptoms as well as her old ones. She notes history of myocardial infarction, with no stent placed. She reports the swelling is worsening her chronic arthritic bilateral knee pain. Reports increased urination. Denies vomiting, diarrhea, or fever. Wears BIPAP at nighttime.     Independent Historian   None    Review of External Notes   Reviewed patient's office visit from 11/4/2022, patient has a history of persistent asthma, diabetes, hypertension, hyperlipidemia, previous MI, and osteoarthritis of both knees.    Past Medical History     Medical History and Problem List   Asthma  Coronary artery disease  GERD  Hypertension  Obesity  Osteoarthritis of both knees  Sleep apnea    Medications   Norvasc  Asa  Lipitor  Coreg  Colace  Indocin  Cozaar  Adalat  Nitrostat  Protonix    Surgical History   Hysterectomy    Physical Exam     Patient Vitals for the past 24 hrs:   BP Temp Temp src Pulse Resp SpO2 Weight   07/28/24 0142 (!) 181/118 -- -- 63 20 97 % --   07/27/24 2330 -- -- -- 68 29 100 % --   07/27/24 2315 (!) 184/100 -- -- 72 21 100 % --   07/27/24 2230 (!) 168/94 -- -- 63 (!) 33 95 % --   07/27/24 2145 (!) 168/104 -- -- 80 27 98 % --   07/27/24 2103 (!) 171/80 96.8  F (36  C) Temporal 80 24 96 % (!) 158.8 kg (350 lb)     Physical Exam  General: Well-nourished, resting comfortably when I enter the room  Eyes: Pupils equal, conjunctivae pink no scleral icterus or conjunctival  injection  ENT:  Moist mucus membranes  Respiratory:  Lungs clear to auscultation bilaterally, no crackles/rubs/wheezes.  Good air movement  CV: Normal rate and rhythm, no murmurs  GI:  Abdomen soft and non-distended.  No tenderness, guarding or rebound  Skin: Warm, dry.  No rashes or petechiae  Musculoskeletal: Bilateral peripheral edema or calf tenderness  Neuro: Alert and oriented to person/place/time  Psychiatric: Normal affect    Diagnostics     Lab Results   Labs Ordered and Resulted from Time of ED Arrival to Time of ED Departure   BASIC METABOLIC PANEL - Abnormal       Result Value    Sodium 142      Potassium 3.9      Chloride 103      Carbon Dioxide (CO2) 29      Anion Gap 10      Urea Nitrogen 18.0      Creatinine 0.93      GFR Estimate 65      Calcium 9.4      Glucose 121 (*)    CBC WITH PLATELETS AND DIFFERENTIAL - Abnormal    WBC Count 9.8      RBC Count 4.01      Hemoglobin 10.4 (*)     Hematocrit 33.4 (*)     MCV 83      MCH 25.9 (*)     MCHC 31.1 (*)     RDW 15.9 (*)     Platelet Count 227      % Neutrophils 65      % Lymphocytes 26      % Monocytes 8      % Eosinophils 1      % Basophils 0      % Immature Granulocytes 0      NRBCs per 100 WBC 0      Absolute Neutrophils 6.4      Absolute Lymphocytes 2.5      Absolute Monocytes 0.8      Absolute Eosinophils 0.1      Absolute Basophils 0.0      Absolute Immature Granulocytes 0.0      Absolute NRBCs 0.0     D DIMER QUANTITATIVE - Abnormal    D-Dimer Quantitative 0.91 (*)    TROPONIN T, HIGH SENSITIVITY - Normal    Troponin T, High Sensitivity 12     HEPATIC FUNCTION PANEL - Normal    Protein Total 7.3      Albumin 3.9      Bilirubin Total 0.2      Alkaline Phosphatase 68      AST 24      ALT 15      Bilirubin Direct <0.20     NT PROBNP INPATIENT - Normal    N terminal Pro BNP Inpatient 355         Imaging   CT Chest Pulmonary Embolism w Contrast   Final Result   IMPRESSION:   1.  Negative for pulmonary embolism.      2.  Mild mosaic attenuation  pattern in the lungs. This can be seen with air trapping in the setting of small airways disease but is nonspecific. Infiltrate or edema can also give this appearance.      3.  Cardiac enlargement. No effusions.      4.  Small hiatal hernia.      5.  Indeterminate 1.8 cm low-attenuation nodule left lobe of the thyroid gland. Routine follow-up thyroid ultrasound recommended for further evaluation.      Chest XR,  PA & LAT   Final Result   IMPRESSION: Heart size and pulmonary vascularity upper limits of normal with no bryant evidence for active CHF/volume overload. Moderate multilevel hypertrophic changes in the thoracic spine. No inflammatory infiltrates or pneumothorax.        EKG   ECG taken at 2117, ECG read at 2123  Sinus rhythm with 1st degree AV block with premature atrial complexes. Low voltage QRS. Borderline ECG.   Rate 69 bpm. IL interval 236 ms. QRS duration 104 ms. QT/QTc 444/475 ms. P-R-T axes 69 -22 37.    Independent Interpretation   Chest x-ray does not show any signs of consolidation.    ED Course      Medications Administered   Medications   furosemide (LASIX) injection 40 mg (40 mg Intravenous $Given 7/27/24 2303)   ipratropium - albuterol 0.5 mg/2.5 mg/3 mL (DUONEB) neb solution 3 mL (3 mLs Nebulization $Given 7/27/24 2303)   iopamidol (ISOVUE-370) solution 83 mL (83 mLs Intravenous $Given 7/28/24 0036)   Saline Flush (100 mLs Intravenous $Given 7/28/24 0036)       Procedures   Procedures     Discussion of Management   Spoke with Dr. Castellanos for admission    ED Course   ED Course as of 07/28/24 0144   Sat Jul 27, 2024 2144 I obtained history and examined the patient as noted above.     2310 I rechecked and updated the patient.     2353 I rechecked and updated the patient.        Optional/Additional Documentation  None    Medical Decision Making / Diagnosis     CMS Diagnoses: None    MIPS   MIPS:   CT for PE was ordered because the patient had an abnormal d-dimer.       LARRY Rodriguez is a  73 year old female with a history of osteoarthritis of both knees, asthma, diabetes, hypertension, hyperlipidemia, previous MI without stent placement presents to the emergency department with a complaint of lower extremity swelling with shortness of breath when she tries to lay down.  She reports that her asthma medication is not helping her symptoms.  She is on hydrochlorothiazide, but no Lasix.  No previous diagnosis of congestive heart failure.  On exam patient is sitting up in the bed with her legs dangling down.  Her bilateral legs do have pitting edema.  They are equal, no signs of DVT or cellulitis.  Patient is complaining of bilateral knee pain, she states she has arthritis and this is not uncommon for her.  This pain is her normal pain.her knee joints are not swollen, red, hot.  I have low suspicion for septic joint.  Lung sounds are clear, I do not hear any wheezing.  Workup is overall reassuring.  BNP is 355, she does not have a previous for comparison.  Kidney function is within acceptable limits.  She is not hypoxic.  She is not significantly anemic.  Chest x-ray shows heart size and pulmonary vascularity upper limits of normal with no bryant evidence for active CHF/volume overload.  Age-adjusted D-dimer is positive, CT PE scan is ordered.  No PE.  Small airway disease, which could also be edema was seen on CT.  Clinically the patient appears fluid overloaded with concern for CHF.  I am going to give her 40 mg of Lasix.  DuoNeb treatment did not help her symptoms.  I do think she needs an echocardiogram.  Patient is extremely tachypneic when she tries to lay down or when she gets out of the bed.  She has had a significant urine output since giving her the Lasix.  I did offer the patient outpatient echo and a prescription for Lasix.  Patient would like to stay in the hospital overnight for the echo and further diuresis.    Disposition   The patient was admitted to the hospital.     Diagnosis      ICD-10-CM    1. Shortness of breath  R06.02       2. Bilateral lower extremity edema  R60.0       3. Thyroid nodule  E04.1            Discharge Medications   New Prescriptions    No medications on file         Scribe Disclosure:  I, Lucretia Regan, am serving as a scribe at 9:59 PM on 7/27/2024 to document services personally performed by Kathleen Gale MD based on my observations and the provider's statements to me.        Kathleen Gale MD  07/28/24 [id number]

## 2024-07-28 NOTE — PROGRESS NOTES
RECEIVING UNIT ED HANDOFF REVIEW    ED Nurse Handoff Report was reviewed by: Yvonne Catalan RN on July 28, 2024 at 4:45 AM

## 2024-07-28 NOTE — PROGRESS NOTES
Admission    Patient arrives to room 611 via cart from ED.  Care plan note: Summary: SOB and LE Edema  DATE & TIME: 2300-0730    Cognitive Concerns/ Orientation : A&Ox4   BEHAVIOR & AGGRESSION TOOL COLOR: Green  ABNL VS/O2: vss on RA x hypertension   MOBILITY: [facility]  PAIN MANAGMENT: Denies pain this shift  DIET: combination diet   BOWEL/BLADDER: Ray B/B  ABNL LAB/BG: HGB 10.4, D-dimer 0.91  DRAIN/DEVICES: 2 PIV SL  TELEMETRY RHYTHM: Sinus rhythm with PAC  SKIN: WDL, dryness on legs   TESTS/PROCEDURES: Chest CT and X-ray  D/C DAY/GOALS/PLACE: pending improvement   OTHER IMPORTANT INFO: on BIPAP when sleeping Pt would like to have BIPAP on when sleeping today. Pt home med container in locker #2, should be sent home with her PCA when she leaves.        Inpatient nursing criteria listed below were met:    Did you put disposition on whiteboard and in sticky note: Yes  Full skin assessment done (add LDA if skin issue present). Initials of 2nd RN :Yes KB  Isolation education started/completed NA  Patient allergies verified with patient: NA  Fall Risk? (Care plan updated, Education given and documented) Yes  Primary Care Plan initiated: Yes  Home medications documented in belongings flowsheet: Yes  Patient belongings documented in belongings flowsheet: Yes  Reminder note (belongings/ medications) placed in discharge instructions:Yes  Admission profile/ required documentation complete: Yes  If patient is a 72 hour hold/Commitment are belongings removed from room and locked up? NA

## 2024-07-28 NOTE — CODE/RAPID RESPONSE
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    Chart briefly reviewed.  RRT called today by RN due to patient's complaints of chest discomfort.  However in further discussion with the patient she is actually had chest discomfort midsternal for greater than a week described as a heaviness or pressure, mild to moderate and at present it is not changed.  This been present since she arrived.  Use of inhalers has not helped nor diuresis.  She is dyspneic when walking back from the bathroom.  Chest symptoms are unchanged.  Not at particular anxious.  Mildly hypertensive 180/100s.  Ankle edema with question if amlodipine contributing.  Being treated for heart failure but unclear true etiology.    Working diagnosis/Impression:  GREEN, chest pressure, midsternal, >1wk  Hx of possible silent MI remote, >2005  HTN, poorly control  DM2, HLD,  DYLAN on BiPAP  Morbid obesity, BMI 53  Asthma  QTc mildly elevated 496.    Differential diagnosis considered following (not exclusive):  HF- Of note while it is thought likely she is heart failure, noting her ankle edema, her BNP is 355 and that may be falsely decreased to her morbid obesity.  CT chest PE does not show any coronary artery disease.  Lexiscan 2019 was negative for inducible ischemia.CT did have some small mosaic attenuation, possible mild  significant edema  ACS is unlikely.  ECG w/o significant anterior infarct signs outside of isolated minimal ST depression in aVL. No reciprocal changes.   Prizmetals angina? - hx angina per chart  PE-unlikely with negative CT chest PA  Pulmonary hypertension-in  DDx, await echo  Anxiety/mood-doubt   GERD possible, does take H2 blocker, imaging shows a small hiatal hernia.  Asthma-unlikely, not wheezing,  Hypothyroidism, doubt but does have a thyroid nodule, will check TSH.    INTERVENTIONS:  - ECG w/o acute ischemia  - Trop x1, trend  - Echo pending- will help  -Nitroglycerin trialed SL, x 2  -O2-on room air at this time  - consult Cards- D.w Cardiology Fellow  -Diuresis per cardiology/hospitalist  -Trial GI cocktail if above does not help  -PPI per hospitalist  -Albuterol prn wheeze, unclear needs QID if no whz  -Prednisone per hospitalist  - Check TSH , thyroid nodule w/u per PCP    At the end of the RRT, pt appears stable, unchanged chest symptoms.with BP stable but pending first nitro,     Disposition-stay in room, continue close monitoring    Discussed with Dr Kayley leggett/hospitalist attending provider in detail who concurs with the above plan     Code Status: Full Code    Allergies   No Known Allergies    Last 24H PRN:     albuterol (PROVENTIL HFA/VENTOLIN HFA) inhaler, 2 puff at 07/28/24 0901    nitroGLYcerin (NITROSTAT) sublingual tablet 0.4 mg, 0.4 mg at 07/28/24 1026    sodium chloride (PF) 0.9% PF flush 3 mL, 3 mL at 07/28/24 0909    Subjective:  Interval History     Patient affirms midsternal chest pain rated mild at most 5 out of 10.  Unchanged for at least a week.  Minimally increased at times.  She is dyspneic walking but that has not changed either.  Denies any other areas of chest pain.  No resting shortness of breath at this time.  No abdominal symptoms.  Ankle swelling is unchanged.  No other new symptoms were endorsed.    PMH further discussed-regarding CAD diagnosis, she reports that when she was in [place] prior to 2005 she was told she may have had an MI and an earlier EKG indicating a silent MI.  She has had no ACS MI events or previous symptoms otherwise.  Patient's BP is elevated but she denies any new symptoms contributing, does not feel anxious,  mild chest pain is unchanged and her breathing difficulties have not changed significantly here.  Patient does endorse a history of GERD but states that she takes famotidine at home.  Does not appear she is on a PPI. She has been taking her losartan carvedilol Imdur.  Though she has nitro at home available as needed chest pain, she has not taken it for at least a year.  She is on amlodipine with concern that is contributing to her edema.  Patient reports that she uses her albuterol MDI at home relatively frequently, some days only once or twice but other days up to 10 times in a day.  A few days over the past week.  But at present her breathing really has not worsened.     Exam::  Physical Exam   Vital Signs with Ranges:  Vitals:    07/28/24 0504 07/28/24 0739 07/28/24 0938 07/28/24 0950   BP: (!) 174/91 (!) 156/77 (!) 190/103 (!) 184/101   BP Location: Right arm Left arm Right arm Right arm   Patient Position: Sitting  Sitting Sitting   Cuff Size: Adult Regular  Adult Large Adult Large   Pulse: 71 65 74    Resp: 22 20     Temp: 97.9  F (36.6  C) 98.4  F (36.9  C)     TempSrc: Oral Oral     SpO2: 98% 94% 98%    Weight: (!) 153.9 kg (339 lb 4.8 oz)        Body mass index is 53.94 kg/m .    Lines, PIV , /102, recheck 184/104  Constitutional: vs as above and/or per EMR  General: Patient was in the restroom as I arrived, she walked back and was noted to be dyspneic but rapidly returned to baseline non labored breathing, adult pt sitting upright in bed without acute distress  HEENT: NC/AT,    Neck: w/o JVD visible (neck folds confound),  CV: S1S2, w/o obvious murmur  Resp: on room air, chest rise unlabored, lungs clr , w/o rhonchi, rales, whz or other abnormality   GI:bowel, soft, nontender, nondistended  Musculoskeletal: MAEW,  1-2+ diffuse lower edema  Skin: no obvious rashes on exposed skin  Lymph: defer  Neuro: non focal,  Psych: Calm, pleasant, interactive, good historian     Labs/Imaging  Data     CBC with  Diff:  Recent Labs   Lab Test 07/27/24 2117 05/14/19  1847   WBC 9.8 8.5   HGB 10.4* 11.5*   MCV 83 88    276        Comprehensive Metabolic Panel:  Recent Labs   Lab 07/27/24 2211 07/27/24 2117   NA  --  142   POTASSIUM  --  3.9   CHLORIDE  --  103   CO2  --  29   ANIONGAP  --  10   GLC  --  121*   BUN  --  18.0   CR  --  0.93   GFRESTIMATED  --  65   AVNI  --  9.4   PROTTOTAL 7.3  --    ALBUMIN 3.9  --    BILITOTAL 0.2  --    ALKPHOS 68  --    AST 24  --    ALT 15  --      ECG:   Interpreted by Branden Watson PA-C,   ECG Time obtained: 0 934  Sinus, with first-degree AV block , no ectopy, question if there is subtle 1 mm ST depression in aVL but no reciprocal changes.  No Q waves, 493 mildly elevated QTc interval,    Appears stable vs 7/27 comparison tracing,       IMAGING recent:       CT Chest Pulmonary Embolism w Contrast  Result Date: 7/28/2024  ... No CT evidence of right heart strain. LUNGS AND PLEURA: Mild mosaic attenuation pattern in the lungs. This is nonspecific but can be seen in the setting of small airways disease with air trapping. Infiltrate or edema can also give this appearance. No pleural effusions. ... CORONARY ARTERY CALCIFICATION: None.   IMPRESSION: 1.  Negative for pulmonary embolism. 2.  Mild mosaic attenuation pattern in the lungs. This can be seen with air trapping in the setting of small airways disease but is nonspecific. Infiltrate or edema can also give this appearance. 3.  Cardiac enlargement. No effusions. 4.  Small hiatal hernia. 5.  Indeterminate 1.8 cm low-attenuation nodule left lobe of the thyroid gland. Routine follow-up thyroid ultrasound recommended for further evaluation.    Chest XR,  PA & LAT: 7/27/2024  IMPRESSION: Heart size and pulmonary vascularity upper limits of normal with no bryant evidence for active CHF/volume overload. Moderate multilevel hypertrophic changes in the thoracic spine. No inflammatory infiltrates or pneumothorax.    Time Spent on  this Encounter   Separate than the time spent by the hospitalist around today, I spent 30 minutes on the unit/floor managing the care of this patient.  With above differential including acute chest pain possibly related to cardiac or respiratory cause.  Over 50% of my time was spent counseling the patient and/or coordinating care regarding services listed in this note.      [facility] House Officer/CARLY  Branden Watson PA-C    [facility]  Securely message with the Vocera Web Console (learn more here)  Text page via [employer] Paging/Directory

## 2024-07-28 NOTE — PLAN OF CARE
Summary: SOB and LE Edema  DATE & TIME: 2300-0730    Cognitive Concerns/ Orientation : A&Ox4   BEHAVIOR & AGGRESSION TOOL COLOR: Green  ABNL VS/O2: vss on RA x hypertension   MOBILITY: SBA BGW  PAIN MANAGMENT: Denies pain this shift  DIET: combination diet   BOWEL/BLADDER: Ray B/B up to bathroom  ABNL LAB/BG: HGB 10.4, D-dimer 0.91  DRAIN/DEVICES: 2 PIV SL  TELEMETRY RHYTHM: Sinus rhythm with PAC  SKIN: WDL, dryness on legs   TESTS/PROCEDURES: Chest CT and X-ray  D/C DAY/GOALS/PLACE: pending improvement   OTHER IMPORTANT INFO: on BIPAP when sleeping Pt would like to have BIPAP on when sleeping today. Pt home med container in locker #2, should be sent home with her PCA when she leaves.

## 2024-07-28 NOTE — PHARMACY-ADMISSION MEDICATION HISTORY
Pharmacy Intern Admission Medication History    Admission medication history is complete. The information provided in this note is only as accurate as the sources available at the time of the update.    Information Source(s): Patient and CareEverywhere/SureScripts via in-person    Pertinent Information:   Glipizide, metformin, and arnuity ellipta inhaler have all been delivered to her house, but the patient has not taken them yet.   Docusate calcium- patient did not recognize this name, however, reports she takes an OTC stool softener as needed  Acid controller- patient stated she takes a prescription acid reducer every day. Could not give me a name and unable to verify with surescripts.     Changes made to PTA medication list:  Added: Metoprolol, triamterene-hydrochlorothiazide, glipizide, metformin, albuterol inhaler, arnuity ellipta, amlodipine 10mg  Deleted: Amlodipine 5mg, albuterol nebulizer, beclomethasone, carvedilol, indomethacin, nifedipine, percocet, docusate sodium, protonix  Changed: Losartan 25 mg to 100 mg    Allergies reviewed with patient and updates made in EHR: yes    Medication History Completed By: Gayathri Corbett 7/28/2024 8:57 AM    PTA Med List   Medication Sig Last Dose    albuterol (PROAIR HFA/PROVENTIL HFA/VENTOLIN HFA) 108 (90 Base) MCG/ACT inhaler Inhale 2 puffs into the lungs every 6 hours as needed for shortness of breath, wheezing or cough Unknown at PRN    amLODIPine (NORVASC) 10 MG tablet Take 10 mg by mouth daily Past Week at Fri    aspirin 81 MG tablet Take 1 tablet by mouth daily. Past Week at Fri    atorvastatin (LIPITOR) 40 MG tablet Take 40 mg by mouth Past Week at Fri    Docusate Calcium (STOOL SOFTENER PO) Take by mouth as needed Unknown    Famotidine (ACID CONTROLLER PO) Take by mouth daily Past Week at Fri    fluticasone (ARNUITY ELLIPTA) 200 MCG/ACT inhaler Inhale 1 puff into the lungs daily  at Not yet started    glipiZIDE (GLUCOTROL XL) 2.5 MG 24 hr tablet Take 2.5 mg  by mouth daily  at Not yet started    ibuprofen (ADVIL,MOTRIN) 800 MG tablet Take 1 tablet by mouth every 8 hours as needed. Unknown at PRN    losartan (COZAAR) 100 MG tablet Take 100 mg by mouth daily Past Week at Fri    metFORMIN (GLUCOPHAGE) 500 MG tablet Take 500 mg by mouth 2 times daily (with meals) Past Week at Not yet started    metoprolol succinate ER (TOPROL XL) 200 MG 24 hr tablet Take 200 mg by mouth daily Past Week at Fri    Multiple Vitamin (MULTIVITAMIN OR) Take 1 tablet by mouth daily Past Week at Fri    nitroGLYCERIN (NITROSTAT) 0.4 MG SL tablet Place 1 tablet under the tongue every 5 minutes as needed for chest pain. Unknown at PRN    triamterene-HCTZ (MAXZIDE) 75-50 MG tablet Take 1 tablet by mouth daily Past Week at Fri

## 2024-07-28 NOTE — PROVIDER NOTIFICATION
MD Notification    Notified Person: MD    Notified Person Name: Dr. Zeny Zaldivar    Notification Date/Time: 7/28/24 0704    Notification Interaction: Edgardo  Purpose of Notification:   Pt C/O SOB and has some wheezing, Pt uses an albuterol inhaler at home for asthma and wheezing. Can she have an albuterol inhaler order while she's here    Orders Received: pending    Comments:

## 2024-07-28 NOTE — PLAN OF CARE
Goal Outcome Evaluation:    DATE & TIME: 7/28/24  [id number]  Cognitive Concerns/Orientation: A&Ox4   BEHAVIOR & AGGRESSION TOOL COLOR: Green  ABNL VS/O2: VSS on RA, ex HTN. BiPAP when sleeping.   LS dim, clear. +SOB/GREEN.  **RRT was called this AM for pt endorsing chest tightness/heaviness; improved with nitroglycerin SL** PRN Maalox also given (Hx GERD).  MOBILITY: SBA w/ walker. Denied dizziness or lightheadedness.  PAIN MANAGMENT: Denied pain.  DIET: 2g Na  BOWEL/BLADDER: Continent B/B. Up to bathroom, AUO, on IV lasix. LBM today.  ABNL LAB/BG: HGB 10.4, D-dimer 0.91. K 3.0. Troponin 13. TSH 3.13.  DRAIN/DEVICES: PIVx2 SL  TELEMETRY RHYTHM: SR 1st degree AV Block w/ freq PACs, infrequent PVCs, and nonspecific ST abnormality.  SKIN: Dryness. 2+/3+ BLE edema. Generalized edema.  TESTS/PROCEDURES: 12 lead EKG & ECHO done this AM for CP.  D/C DAY/GOALS/PLACE: Pending clinical stability.  OTHER IMPORTANT INFO: Hx asthma--DuoNebs and prednisone now scheduled. Pt home med container in locker #2, should be sent home with her PCA when she leaves.  PCA at bedside.

## 2024-07-28 NOTE — PROGRESS NOTES
No charge note today.  Chart reviewed.  Patient seen and examined.  Patient complains of ongoing shortness of breath with chest heaviness and leg swelling.  Chest CT done on admission showed negative for PE.  Mild mosaic attenuation pattern in the lungs.  This can be seen with air trapping in the setting of small airway disease but is nonspecific.  Infiltrate or edema can also give this appearance.  RRT was called this morning for chest heaviness which has been going on for several weeks chronic symptoms.  EKG was negative.  Cardiology consultation requested regarding further evaluation management  Patient's chest heaviness could be caused by her underlying asthma so DuoNebs 4 times daily and prednisone 40 mg p.o. daily for 5 days ordered    Continue current cares    Zeny Zaldivar MD   Page [phone](7AM-6PM)

## 2024-07-28 NOTE — ED NOTES
[facility]  ED Nurse Handoff Report    ED Chief complaint: Leg Swelling      ED Diagnosis:   Final diagnoses:   Shortness of breath   Bilateral lower extremity edema   Thyroid nodule       Code Status: deferred to admitting    Allergies: No Known Allergies    Patient Story:   States 2 days of CP also pain in both knees with swelling to bilateral lower leg swelling also past 2 days. C/o SOB worse when laying down. Had recent change to her home medications for asthma. States Hx of MI in past. Possible CHF. Denies blood thinners.     Focused Assessment:    Neuro: Alert, oriented x 3  Respiratory:diminished lung sounds, on room air   Cardiology:  wnl   Gastrointestinal: soft, non tender  Genitourinary/Renal:  wnl  Musculoskeletal: moves all extremities   Skin: Intact skin   Lines: 18g rac, 20g lac    Labs Ordered and Resulted from Time of ED Arrival to Time of ED Departure   BASIC METABOLIC PANEL - Abnormal       Result Value    Sodium 142      Potassium 3.9      Chloride 103      Carbon Dioxide (CO2) 29      Anion Gap 10      Urea Nitrogen 18.0      Creatinine 0.93      GFR Estimate 65      Calcium 9.4      Glucose 121 (*)    CBC WITH PLATELETS AND DIFFERENTIAL - Abnormal    WBC Count 9.8      RBC Count 4.01      Hemoglobin 10.4 (*)     Hematocrit 33.4 (*)     MCV 83      MCH 25.9 (*)     MCHC 31.1 (*)     RDW 15.9 (*)     Platelet Count 227      % Neutrophils 65      % Lymphocytes 26      % Monocytes 8      % Eosinophils 1      % Basophils 0      % Immature Granulocytes 0      NRBCs per 100 WBC 0      Absolute Neutrophils 6.4      Absolute Lymphocytes 2.5      Absolute Monocytes 0.8      Absolute Eosinophils 0.1      Absolute Basophils 0.0      Absolute Immature Granulocytes 0.0      Absolute NRBCs 0.0     D DIMER QUANTITATIVE - Abnormal    D-Dimer Quantitative 0.91 (*)    TROPONIN T, HIGH SENSITIVITY - Normal    Troponin T, High Sensitivity 12     HEPATIC FUNCTION PANEL - Normal    Protein Total 7.3       Albumin 3.9      Bilirubin Total 0.2      Alkaline Phosphatase 68      AST 24      ALT 15      Bilirubin Direct <0.20     NT PROBNP INPATIENT - Normal    N terminal Pro BNP Inpatient 355          CT Chest Pulmonary Embolism w Contrast   Final Result   IMPRESSION:   1.  Negative for pulmonary embolism.      2.  Mild mosaic attenuation pattern in the lungs. This can be seen with air trapping in the setting of small airways disease but is nonspecific. Infiltrate or edema can also give this appearance.      3.  Cardiac enlargement. No effusions.      4.  Small hiatal hernia.      5.  Indeterminate 1.8 cm low-attenuation nodule left lobe of the thyroid gland. Routine follow-up thyroid ultrasound recommended for further evaluation.      Chest XR,  PA & LAT   Final Result   IMPRESSION: Heart size and pulmonary vascularity upper limits of normal with no bryant evidence for active CHF/volume overload. Moderate multilevel hypertrophic changes in the thoracic spine. No inflammatory infiltrates or pneumothorax.            Treatments and/or interventions provided:      Medications   furosemide (LASIX) injection 40 mg (40 mg Intravenous $Given 7/27/24 2303)   ipratropium - albuterol 0.5 mg/2.5 mg/3 mL (DUONEB) neb solution 3 mL (3 mLs Nebulization $Given 7/27/24 2303)   iopamidol (ISOVUE-370) solution 83 mL (83 mLs Intravenous $Given 7/28/24 0036)   Saline Flush (100 mLs Intravenous $Given 7/28/24 0036)        Patient's response to treatments and/or interventions:   Resting comfortably    To be done/followed up on inpatient unit:    See any in-patient orders    Does this patient have any cognitive concerns?:  na    Activity level - Baseline/Home:    Stand with Assist    Activity Level - Current:     Stand with Assist    Patient's Preferred language: English     Needed?: No    Isolation: None  Infection: Not Applicable  Patient tested for COVID 19 prior to admission: NO    Bariatric?: Yes    Vital Signs:   Vitals:     07/27/24 2230 07/27/24 2315 07/27/24 2330 07/28/24 0142   BP: (!) 168/94 (!) 184/100  (!) 181/118   Pulse: 63 72 68 63   Resp: (!) 33 21 29 20   Temp:       TempSrc:       SpO2: 95% 100% 100% 97%   Weight:           Cardiac Rhythm:     Was the PSS-3 completed:   Yes    Family Comments: na    For the majority of the shift this patient's behavior was Green.   Behavioral interventions performed were na    ED NURSE PHONE NUMBER: [phone]

## 2024-07-28 NOTE — H&P
[facility]    History and Physical - Hospitalist Service       Date of Admission:  7/27/2024     Assessment & Plan      Agatha Rodriguez is a 73 year old female with a history of Htn, asthma, DYLAN, CAD, GERD who is admitted on 7/27/2024 with progressive bilateral leg swelling and shortness of breath.     Shortness of breath  Bilateral lower extremity edema  Symptoms have been much worse over the past few days, clinically seems consistent with CHF but note that BNP within normal limits at 355. CT shows mosaic attenuation of the lungs which could indicate edema. Note reported angina but I don't see a history of stent. Lexiscan 2019 was negative for inducible ischemia with an EF of 60%. Other considerations include lymphedema, pulmonary hypertension, RAD. CTPE study negative for PE.  -received lasix 40 mg IV in the ED, will repeat once in the morning  -TTE  -cardiac monitoring  -if work up negative may need to consider discontinuing amlodipine     Asthma  No wheezing on exam currently. She was having some wheezing earlier, question if this is wheezing from pulmonary edema.   -continue PTA Qvar    Hypertension  Angina  Dyslipidemia  -continue PTA coreg, losartan, Imdur and lipitor  -tentatively continue PTA amlodipine noting it can cause lower extremity edema    GERD  -continue PTA protonix    DYLAN  -Bipap per home settings    Anemia  Hgb of 10.4, hgb in 2021 normal around 12 with normal iron studies at that time. Colonoscopy in 2022 showed diverticulosis. No reports of blood loss.   -should follow up with PCP to trend and discuss further work up    1.8 cm nodule of left lobe of the thyroid  -recommended non urgent thyroid US for further evaluation       Diet:  2g sodium  DVT Prophylaxis: Pneumatic Compression Devices  Code Status:  Full, discussed with the patient    Disposition: Obs for TTE, likely discharge to home on Sunday unless needs to stay longer for diuresis with diagnosis of  CHF.    Clinically Significant Risk Factors Present on Admission                # Drug Induced Platelet Defect: home medication list includes an antiplatelet medication     # Hypertension: Noted on problem list    # Anemia: based on hgb <11                 Mariano Castellanos,   Hospitalist Service  [facility]  Securely message with [employer] (more info)  Text page via [facility] Paging/Directory     ______________________________________________________________________    Chief Complaint   Leg swelling and shortness of breath    History is obtained from the patient and ED physician    History of Present Illness   Agatha Rodriguez is a 73 year old female who has a history of asthma, CAD, Htn, DYLAN who presents to the ED with concerns of shortness of breath and leg swelling. Patient notes that she has intermittently had leg swelling in the past, at one point may have been told she had edema or lymphedema but no past history of CHF. She notes that over the past several days she has been having issues with progressive leg swelling and also some shortness of breath. She has been wheezing and has asthma but notes they recently gave her a different type of inhaler and she feels like it wasn't helping as much. She does not have a cough, nor fevers or chest pain. She has felt more short of breath, particularly when trying to get around her home. She does not take lasix.     In the ED she had a CXR that showed heart size and pulmonary vasculature was in the upper limits of normal. D-dimer elevated to 0.91 so CTPE study done showing mild mosaic attentuations pattern in the lungs which could be air trapping, infiltrate or edema. BNP with normal limits at 355. She was given 40 mg of IV lasix. She was also given a duoneb which did not help her symptoms significantly. She has not had a TTE done but I do see a lexiscan done in 2019 which was negative for inducible ischemic and had an EF of 60%.       Past  Medical History    Past Medical History:   Diagnosis Date    Asthma     CAD (coronary artery disease)     angina    GERD (gastroesophageal reflux disease)     Hypertension     Obesity     Primary osteoarthritis of both knees     Sleep apnea     uses CPAP       Past Surgical History   Past Surgical History:   Procedure Laterality Date    HYSTERECTOMY         Prior to Admission Medications   Prior to Admission Medications   Prescriptions Last Dose Informant Patient Reported? Taking?   Multiple Vitamin (MULTIVITAMIN OR)   Yes No   Sig: Take  by mouth daily.   NIFEdipine (ADALAT CC) 60 MG 24 hr tablet   No No   Sig: Take 1 tablet by mouth daily.   Patient not taking: Reported on 2019   Pantoprazole Sodium 40 MG tablet   No No   Sig: Take 1 packet by mouth daily.   Patient not taking: Reported on 2019   Respiratory Therapy Supplies (NEBULIZER COMPRESSOR) KIT   No No   Si each 4 times daily   Respiratory Therapy Supplies (NEBULIZER/ADULT MASK) KIT   No No   Si each 4 times daily   albuterol (2.5 MG/3ML) 0.083% nebulizer solution   No No   Sig: Take 1 vial (2.5 mg) by nebulization 4 times daily   amLODIPine (NORVASC) 5 MG tablet   Yes No   Sig: Take 5 mg by mouth   aspirin 81 MG tablet   No No   Sig: Take 1 tablet by mouth daily.   Patient not taking: Reported on 2019   atorvastatin (LIPITOR) 40 MG tablet   Yes No   Sig: Take 40 mg by mouth   beclomethasone (QVAR) 40 MCG/ACT inhaler   No No   Sig: Inhale 2 puffs into the lungs 2 times daily.   Patient not taking: Reported on 2019   carvedilol (COREG) 12.5 MG tablet   No No   Sig: Take 1 tablet by mouth 2 times daily (with meals).   docusate sodium (COLACE) 100 MG capsule   No No   Sig: Take 1 capsule by mouth 2 times daily.   ibuprofen (ADVIL,MOTRIN) 800 MG tablet   No No   Sig: Take 1 tablet by mouth every 8 hours as needed.   indomethacin (INDOCIN) 50 MG capsule   No No   Sig: Take 1 capsule (50 mg) by mouth 3 times daily (with meals)    losartan (COZAAR) 25 MG tablet   No No   Sig: Take 1 tablet by mouth daily.   Patient not taking: Reported on 5/14/2019   nitroGLYCERIN (NITROSTAT) 0.4 MG SL tablet   No No   Sig: Place 1 tablet under the tongue every 5 minutes as needed for chest pain.   Patient not taking: Reported on 5/14/2019   oxyCODONE-acetaminophen (PERCOCET) 5-325 MG per tablet   No No   Sig: Take 1 tablet by mouth every 6 hours as needed for pain.   Patient not taking: Reported on 5/14/2019      Facility-Administered Medications: None        Review of Systems    The 10 point Review of Systems is negative other than noted in the HPI or here.      Physical Exam   Vital Signs: Temp: 96.8  F (36  C) Temp src: Temporal BP: (!) 156/71 Pulse: 66   Resp: 20 SpO2: 94 % O2 Device: None (Room air)    Weight: 350 lbs 0 oz    Gen: lying in bed, appears mildly short of breath  CV: RRR, no m/r/g  Pulm: no wheezing, no appreciable crackles  GI: +BS, soft, NT/ND  Lymph: 2+ pitting edema of BLE      Medical Decision Making       60 MINUTES SPENT BY ME on the date of service doing chart review, history, exam, documentation & further activities per the note.      Data     I have personally reviewed the following data over the past 24 hrs:    9.8  \   10.4 (L)   / 227     142 103 18.0 /  121 (H)   3.9 29 0.93 \     ALT: 15 AST: 24 AP: 68 TBILI: 0.2   ALB: 3.9 TOT PROTEIN: 7.3 LIPASE: N/A     Trop: 12 BNP: 355     INR:  N/A PTT:  N/A   D-dimer:  0.91 (H) Fibrinogen:  N/A       Imaging results reviewed over the past 24 hrs:   Recent Results (from the past 24 hour(s))   Chest XR,  PA & LAT    Narrative    EXAM: XR CHEST 2 VIEWS  LOCATION: [facility]  DATE: 7/27/2024    INDICATION: chest pain, shortness of breath  COMPARISON: None.      Impression    IMPRESSION: Heart size and pulmonary vascularity upper limits of normal with no bryant evidence for active CHF/volume overload. Moderate multilevel hypertrophic changes in the thoracic spine.  No inflammatory infiltrates or pneumothorax.   CT Chest Pulmonary Embolism w Contrast    Narrative    EXAM: CT CHEST PULMONARY EMBOLISM W CONTRAST  LOCATION: [facility]  DATE: 7/28/2024    INDICATION: Positive D-dimer, shortness of breath.  COMPARISON: None.  TECHNIQUE: CT chest pulmonary angiogram during arterial phase injection of IV contrast. Multiplanar reformats and MIP reconstructions were performed. Dose reduction techniques were used.   CONTRAST: 83 mL Isovue 370.    FINDINGS:  ANGIOGRAM CHEST: Pulmonary arteries are normal caliber and negative for pulmonary emboli. Thoracic aorta is negative for dissection. No CT evidence of right heart strain.    LUNGS AND PLEURA: Mild mosaic attenuation pattern in the lungs. This is nonspecific but can be seen in the setting of small airways disease with air trapping. Infiltrate or edema can also give this appearance. No pleural effusions.    MEDIASTINUM/AXILLAE: No adenopathy. Cardiac enlargement. No pericardial effusion. Normal caliber thoracic aorta. Small hiatal hernia. 1.8 cm low-attenuation nodule left lobe of the thyroid.    CORONARY ARTERY CALCIFICATION: None.    UPPER ABDOMEN: Small benign-appearing cyst in the right hepatic lobe.    MUSCULOSKELETAL: Moderate hypertrophic and degenerative changes in the spine.      Impression    IMPRESSION:  1.  Negative for pulmonary embolism.    2.  Mild mosaic attenuation pattern in the lungs. This can be seen with air trapping in the setting of small airways disease but is nonspecific. Infiltrate or edema can also give this appearance.    3.  Cardiac enlargement. No effusions.    4.  Small hiatal hernia.    5.  Indeterminate 1.8 cm low-attenuation nodule left lobe of the thyroid gland. Routine follow-up thyroid ultrasound recommended for further evaluation.

## 2024-07-28 NOTE — ED TRIAGE NOTES
States 2 days of CP also pain in both knees with swelling to bilateral lower leg swelling also past 2 days. C/o SOB worse when laying down. Had recent change to her home medications for asthma. States Hx of MI in past. Possible CHF. Denies blood thinners.      Triage Assessment (Adult)       Row Name 07/27/24 8727          Triage Assessment    Airway WDL WDL        Respiratory WDL    Respiratory WDL WDL        Skin Circulation/Temperature WDL    Skin Circulation/Temperature WDL X  lower leg swelling        Cardiac WDL    Cardiac WDL X;chest pain        Peripheral/Neurovascular WDL    Peripheral Neurovascular WDL X  bilaterl lower leg swelling        Cognitive/Neuro/Behavioral WDL    Cognitive/Neuro/Behavioral WDL WDL

## 2024-07-28 NOTE — CONSULTS
[facility]    Electrophysiology Consultation     Date of Admission:  7/27/2024  Date of Consult: 07/28/24    Assessment & Plan   Agatha Rodriguez is a 73 year old female who was admitted on 7/27/2024. We were asked to see the patient for chest pain and shortness of breath.  Her presenting EKG shows sinus rhythm with no significant ST-T changes.  Chest x-ray and symptoms are consistent with heart failure exacerbation and fluid overload.  Echo from this morning shows preserved LV function, EF 60 to 65%, no wall motion abnormalities.    Acute diastolic heart failure exacerbation may be triggered by asthma, her new inhaler which does not seem to be working as well for her, and elevated blood pressures.  She reports a 20 pound weight gain likely due to fluid overload.  She has noted some improvement in her lower extremity edema since receiving Lasix.  Her blood pressures remain elevated this morning as she has not received any of her usual blood pressure medications.  Hypokalemia, K 3.0 this morning.  Will order replacement.      - Resume home BP medications  - Continue diuresis, will give additional IV furosemide 60 mg now  - Replace potassium  - Please monitor strict I/O and daily weight      Delgado Rainey MD          Code Status    Full Code    Reason for Consult   Reason for consult: Consult performed at the request of the attending physician, Mariano Castellanos DO, for evaluation of chest pain      Chief Complaint   Shortness of breath and lower extremity swelling    History is obtained from the patient and EPIC chart.      History of Present Illness   Agatha Rodriguez is a 73 year old female with type 2 diabetes, hypertension, hyperlipidemia, asthma, severe DYLAN on BiPAP, who presents with worsening shortness of breath and bilateral lower extremity edema.  Patient reports worsening symptoms over the past several days and has associated chest heaviness.  She reports a 20 pound weight gain over a  couple of days.  She has also been wheezing more and was given a different inhaler for her asthma which she felt was not helping as much.  She wonders if the change in her asthma medication may have exacerbated all of this.    She notes that her symptoms have been worse since she has been in the hospital last night and notes that the hospital BiPAP mask/machine is not working as well for her.  She does feel her leg swelling has improved since receiving Lasix, but is still not back to normal.  She is eager to go home because she feels even worse today after spending the night here and is frustrated that she has not received any of her usual medications.  She reports no recent illnesses.  She has been on stable blood pressure medications and notes that her blood pressures have been normal at home.  Her home blood pressure medications include metoprolol succinate 200 mg daily, losartan 100 mg daily, amlodipine 10 mg daily, triamterene-hydrochlorothiazide 75-50 mg daily.  She notes that she has been on Lasix in the past but has not needed to take it recently.    Blood pressures have been elevated since admission, up to 190/103 this morning.  Her presenting chest x-ray showed pulmonary congestion.  CT chest was negative for PE, but did show possible air trapping, infiltrate or edema.  Was given IV Lasix 40 mg.    She had a stress test through [facility] in 2019 for evaluation of atypical chest pain which was negative for ischemia.            Past Medical History   I have reviewed this patient's medical history and updated it with pertinent information if needed.   Past Medical History:   Diagnosis Date    Asthma     CAD (coronary artery disease)     angina    GERD (gastroesophageal reflux disease)     Hypertension     Myocardial infarction, silent (H) 2005    Dx w silent Mi in [place] ~ 2005    Obesity     Primary osteoarthritis of both knees     Sleep apnea     uses CPAP       Past Surgical History   I have reviewed  this patient's surgical history and updated it with pertinent information if needed.  Past Surgical History:   Procedure Laterality Date    HYSTERECTOMY         Prior to Admission Medications   Prior to Admission Medications   Prescriptions Last Dose Informant Patient Reported? Taking?   Docusate Calcium (STOOL SOFTENER PO) Unknown  Yes Yes   Sig: Take by mouth as needed   Famotidine (ACID CONTROLLER PO) Past Week at Fri  Yes Yes   Sig: Take by mouth daily   Multiple Vitamin (MULTIVITAMIN OR) Past Week at Fri  Yes Yes   Sig: Take 1 tablet by mouth daily   Respiratory Therapy Supplies (NEBULIZER COMPRESSOR) KIT   No No   Si each 4 times daily   Respiratory Therapy Supplies (NEBULIZER/ADULT MASK) KIT   No No   Si each 4 times daily   albuterol (PROAIR HFA/PROVENTIL HFA/VENTOLIN HFA) 108 (90 Base) MCG/ACT inhaler Unknown at PRN  Yes Yes   Sig: Inhale 2 puffs into the lungs every 6 hours as needed for shortness of breath, wheezing or cough   amLODIPine (NORVASC) 10 MG tablet Past Week at Fri  Yes Yes   Sig: Take 10 mg by mouth daily   aspirin 81 MG tablet Past Week at Fri  No Yes   Sig: Take 1 tablet by mouth daily.   atorvastatin (LIPITOR) 40 MG tablet Past Week at Fri  Yes Yes   Sig: Take 40 mg by mouth   fluticasone (ARNUITY ELLIPTA) 200 MCG/ACT inhaler  at Not yet started  Yes Yes   Sig: Inhale 1 puff into the lungs daily   glipiZIDE (GLUCOTROL XL) 2.5 MG 24 hr tablet  at Not yet started  Yes Yes   Sig: Take 2.5 mg by mouth daily   ibuprofen (ADVIL,MOTRIN) 800 MG tablet Unknown at PRN  No Yes   Sig: Take 1 tablet by mouth every 8 hours as needed.   losartan (COZAAR) 100 MG tablet Past Week at Fri  Yes Yes   Sig: Take 100 mg by mouth daily   metFORMIN (GLUCOPHAGE) 500 MG tablet Past Week at Not yet started  Yes Yes   Sig: Take 500 mg by mouth 2 times daily (with meals)   metoprolol succinate ER (TOPROL XL) 200 MG 24 hr tablet Past Week at Fri  Yes Yes   Sig: Take 200 mg by mouth daily   nitroGLYCERIN  (NITROSTAT) 0.4 MG SL tablet Unknown at PRN  No Yes   Sig: Place 1 tablet under the tongue every 5 minutes as needed for chest pain.   triamterene-HCTZ (MAXZIDE) 75-50 MG tablet Past Week at Fri  Yes Yes   Sig: Take 1 tablet by mouth daily      Facility-Administered Medications: None         Medications   Current Facility-Administered Medications   Medication Dose Route Frequency Provider Last Rate Last Admin     Current Facility-Administered Medications   Medication Dose Route Frequency Provider Last Rate Last Admin    sodium chloride (PF) 0.9% PF flush 3 mL  3 mL Intracatheter Q8H Mariano Castellanos DO           Allergies   No Known Allergies    Social History   I have updated and reviewed the following Social History Narrative:   Social History     Social History Narrative    Not on file        Family History   I have reviewed this patient's family history and updated it with pertinent information if needed.   Family History   Problem Relation Age of Onset    Diabetes Mother     Breast Cancer Mother        Review of Systems   A complete 10-point review of systems was done and is negative with the exceptions noted in HPI.      Physical Exam   Temp: 98.4  F (36.9  C) Temp src: Oral BP: (!) 184/101 Pulse: 74   Resp: 20 SpO2: 98 % O2 Device: None (Room air)    Vital Signs with Ranges  Temp:  [96.8  F (36  C)-98.4  F (36.9  C)] 98.4  F (36.9  C)  Pulse:  [61-80] 74  Resp:  [20-33] 20  BP: (126-190)/() 184/101  SpO2:  [94 %-100 %] 98 %  339 lbs 4.8 oz    General: Pleasant, no acute distress  Resp: Breathing comfortably on RA  Card: Regular rate and rhythm   Extremities: Bilateral LE edema   Neuro: Awake, alert, answers questions appropriately       Diagnostics:  I personally reviewed the patient's EKG, lab work, and pertinent imaging    Recent Labs   Lab Test 07/27/24  2117 05/14/19  1847   WBC 9.8 8.5   HGB 10.4* 11.5*   HCT 33.4* 35.7   MCV 83 88    276     --    CO2 29  --    BUN 18.0  --    CR 0.93   --        Last Comprehensive Metabolic Panel:  Lab Results   Component Value Date     07/27/2024    POTASSIUM 3.9 07/27/2024    CHLORIDE 103 07/27/2024    CO2 29 07/27/2024    ANIONGAP 10 07/27/2024     (H) 07/27/2024    BUN 18.0 07/27/2024    CR 0.93 07/27/2024    GFRESTIMATED 65 07/27/2024    AVNI 9.4 07/27/2024         EKG  7/28/2024-sinus rhythm with occasional PACs, rate 77 bpm  EKG 7/27/2024-sinus rhythm with first-degree AV block, rate 69 bpm.  No significant ST-T changes.      Stress test 3/8/2019 report from [facility]  Stress results:   There was resting hypertension with an appropriate blood   pressure response to stress.   There was no chest pain during stress.   ECG conclusions:   The stress ECG was negative for ischemia.   Perfusion imaging:   There were no perfusion defects.   Gated SPECT:   The calculated left ventricular ejection fraction was 60 %.   Left ventricular ejection fraction was within normal limits   by visual estimate.   There was no diagnostic evidence for left ventricular   regional abnormality.   Impressions   Normal study. Myocardial perfusion imaging was normal at   rest and with stress. Left ventricular systolic function   was normal. Based on this study, the annual cardiovascular   mortality rate is low (less than 1%).       Results for orders placed or performed during the hospital encounter of 07/27/24 (from the past 24 hour(s))   CBC with platelets + differential    Narrative    The following orders were created for panel order CBC with platelets + differential.  Procedure                               Abnormality         Status                     ---------                               -----------         ------                     CBC with platelets and d...[[id number]]  Abnormal            Final result                 Please view results for these tests on the individual orders.   Basic metabolic panel   Result Value Ref Range    Sodium 142 135 - 145 mmol/L     Potassium 3.9 3.4 - 5.3 mmol/L    Chloride 103 98 - 107 mmol/L    Carbon Dioxide (CO2) 29 22 - 29 mmol/L    Anion Gap 10 7 - 15 mmol/L    Urea Nitrogen 18.0 8.0 - 23.0 mg/dL    Creatinine 0.93 0.51 - 0.95 mg/dL    GFR Estimate 65 >60 mL/min/1.73m2    Calcium 9.4 8.8 - 10.4 mg/dL    Glucose 121 (H) 70 - 99 mg/dL   [place] Draw    Narrative    The following orders were created for panel order [place] Draw.  Procedure                               Abnormality         Status                     ---------                               -----------         ------                     Extra Blue Top Tube[[id number]]                              Final result                 Please view results for these tests on the individual orders.   CBC with platelets and differential   Result Value Ref Range    WBC Count 9.8 4.0 - 11.0 10e3/uL    RBC Count 4.01 3.80 - 5.20 10e6/uL    Hemoglobin 10.4 (L) 11.7 - 15.7 g/dL    Hematocrit 33.4 (L) 35.0 - 47.0 %    MCV 83 78 - 100 fL    MCH 25.9 (L) 26.5 - 33.0 pg    MCHC 31.1 (L) 31.5 - 36.5 g/dL    RDW 15.9 (H) 10.0 - 15.0 %    Platelet Count 227 150 - 450 10e3/uL    % Neutrophils 65 %    % Lymphocytes 26 %    % Monocytes 8 %    % Eosinophils 1 %    % Basophils 0 %    % Immature Granulocytes 0 %    NRBCs per 100 WBC 0 <1 /100    Absolute Neutrophils 6.4 1.6 - 8.3 10e3/uL    Absolute Lymphocytes 2.5 0.8 - 5.3 10e3/uL    Absolute Monocytes 0.8 0.0 - 1.3 10e3/uL    Absolute Eosinophils 0.1 0.0 - 0.7 10e3/uL    Absolute Basophils 0.0 0.0 - 0.2 10e3/uL    Absolute Immature Granulocytes 0.0 <=0.4 10e3/uL    Absolute NRBCs 0.0 10e3/uL   Extra Blue Top Tube   Result Value Ref Range    Hold Specimen JI    D dimer quantitative   Result Value Ref Range    D-Dimer Quantitative 0.91 (H) 0.00 - 0.50 ug/mL FEU    Narrative    This D-dimer assay is intended for use in conjunction with a clinical pretest probability assessment model to exclude pulmonary embolism (PE) and deep venous thrombosis (DVT) in  outpatients suspected of PE or DVT. The cut-off value is 0.50 ug/mL FEU.    For patients 50 years of age or older, the application of age-adjusted cut-off values for D-Dimer may increase the specificity without significant effect on sensitivity. The literature suggested calculation age adjusted cut-off in ug/L = age in years x 10 ug/L. The results in this laboratory are reported as ug/mL rather than ug/L. The calculation for age adjusted cut off in ug/mL= age in years x 0.01 ug/mL. For example, the cut off for a 76 year old male is 76 x 0.01 ug/mL = 0.76 ug/mL (760 ug/L).    M Glenny et al. Age adjusted D-dimer cut-off levels to rule out pulmonary embolism: The ADJUST-PE Study. RUSTY 2014;311:6548-5355.; HJ Haresh et al. Diagnostic accuracy of conventional or age adjusted D-dimer cutoff values in older patients with suspected venous thromboembolism. Systemic review and meta-analysis. BMJ 2013:346:f2492.   EKG 12 lead   Result Value Ref Range    Systolic Blood Pressure  mmHg    Diastolic Blood Pressure  mmHg    Ventricular Rate 69 BPM    Atrial Rate 69 BPM    WA Interval 236 ms    QRS Duration 104 ms     ms    QTc 475 ms    P Axis 69 degrees    R AXIS -22 degrees    T Axis 37 degrees    Interpretation ECG       Sinus rhythm with 1st degree A-V block with Premature atrial complexes  Low voltage QRS  Borderline ECG  When compared with ECG of 17-Feb-2012 12:23,  Premature atrial complexes are now Present  Nonspecific T wave abnormality, improved in Lateral leads  Confirmed by GENERATED REPORT, COMPUTER ([id number]),  Sailaja Cerrato ([id number]) on 7/27/2024 11:36:10 PM     Chest XR,  PA & LAT    Narrative    EXAM: XR CHEST 2 VIEWS  LOCATION: [facility]  DATE: 7/27/2024    INDICATION: chest pain, shortness of breath  COMPARISON: None.      Impression    IMPRESSION: Heart size and pulmonary vascularity upper limits of normal with no bryant evidence for active CHF/volume overload. Moderate  multilevel hypertrophic changes in the thoracic spine. No inflammatory infiltrates or pneumothorax.   Troponin T, High Sensitivity (now)   Result Value Ref Range    Troponin T, High Sensitivity 12 <=14 ng/L   Hepatic panel   Result Value Ref Range    Protein Total 7.3 6.4 - 8.3 g/dL    Albumin 3.9 3.5 - 5.2 g/dL    Bilirubin Total 0.2 <=1.2 mg/dL    Alkaline Phosphatase 68 40 - 150 U/L    AST 24 0 - 45 U/L    ALT 15 0 - 50 U/L    Bilirubin Direct <0.20 0.00 - 0.30 mg/dL   Nt probnp inpatient   Result Value Ref Range    N terminal Pro BNP Inpatient 355 0 - 900 pg/mL   CT Chest Pulmonary Embolism w Contrast    Narrative    EXAM: CT CHEST PULMONARY EMBOLISM W CONTRAST  LOCATION: [facility]  DATE: 7/28/2024    INDICATION: Positive D-dimer, shortness of breath.  COMPARISON: None.  TECHNIQUE: CT chest pulmonary angiogram during arterial phase injection of IV contrast. Multiplanar reformats and MIP reconstructions were performed. Dose reduction techniques were used.   CONTRAST: 83 mL Isovue 370.    FINDINGS:  ANGIOGRAM CHEST: Pulmonary arteries are normal caliber and negative for pulmonary emboli. Thoracic aorta is negative for dissection. No CT evidence of right heart strain.    LUNGS AND PLEURA: Mild mosaic attenuation pattern in the lungs. This is nonspecific but can be seen in the setting of small airways disease with air trapping. Infiltrate or edema can also give this appearance. No pleural effusions.    MEDIASTINUM/AXILLAE: No adenopathy. Cardiac enlargement. No pericardial effusion. Normal caliber thoracic aorta. Small hiatal hernia. 1.8 cm low-attenuation nodule left lobe of the thyroid.    CORONARY ARTERY CALCIFICATION: None.    UPPER ABDOMEN: Small benign-appearing cyst in the right hepatic lobe.    MUSCULOSKELETAL: Moderate hypertrophic and degenerative changes in the spine.      Impression    IMPRESSION:  1.  Negative for pulmonary embolism.    2.  Mild mosaic attenuation pattern in  the lungs. This can be seen with air trapping in the setting of small airways disease but is nonspecific. Infiltrate or edema can also give this appearance.    3.  Cardiac enlargement. No effusions.    4.  Small hiatal hernia.    5.  Indeterminate 1.8 cm low-attenuation nodule left lobe of the thyroid gland. Routine follow-up thyroid ultrasound recommended for further evaluation.   EKG 12-lead, tracing only   Result Value Ref Range    Systolic Blood Pressure  mmHg    Diastolic Blood Pressure  mmHg    Ventricular Rate 77 BPM    Atrial Rate 77 BPM    GA Interval 240 ms    QRS Duration 102 ms     ms    QTc 493 ms    P Axis 54 degrees    R AXIS -3 degrees    T Axis 98 degrees    Interpretation ECG       Sinus rhythm with 1st degree A-V block with Premature atrial complexes  Possible Anterior infarct , age undetermined  Abnormal ECG  When compared with ECG of 27-Jul-2024 21:17,  Nonspecific T wave abnormality, worse in Lateral leads           Clinically Significant Risk Factors Present on Admission                # Drug Induced Platelet Defect: home medication list includes an antiplatelet medication   # Hypertension: Noted on problem list    # Anemia: based on hgb <11               Hypokalemia and Fluid overload, unspecified

## 2024-07-29 ENCOUNTER — APPOINTMENT (OUTPATIENT)
Dept: NUCLEAR MEDICINE | Facility: CLINIC | Age: 74
DRG: 291 | End: 2024-07-29
Attending: INTERNAL MEDICINE
Payer: MEDICARE

## 2024-07-29 ENCOUNTER — APPOINTMENT (OUTPATIENT)
Dept: CARDIOLOGY | Facility: CLINIC | Age: 74
DRG: 291 | End: 2024-07-29
Attending: INTERNAL MEDICINE
Payer: MEDICARE

## 2024-07-29 LAB
ANION GAP SERPL CALCULATED.3IONS-SCNC: 7 MMOL/L (ref 7–15)
ATRIAL RATE - MUSE: 67 BPM
ATRIAL RATE - MUSE: 77 BPM
BUN SERPL-MCNC: 15.6 MG/DL (ref 8–23)
CALCIUM SERPL-MCNC: 9.3 MG/DL (ref 8.8–10.4)
CHLORIDE SERPL-SCNC: 100 MMOL/L (ref 98–107)
CREAT SERPL-MCNC: 0.79 MG/DL (ref 0.51–0.95)
DIASTOLIC BLOOD PRESSURE - MUSE: NORMAL MMHG
DIASTOLIC BLOOD PRESSURE - MUSE: NORMAL MMHG
EGFRCR SERPLBLD CKD-EPI 2021: 79 ML/MIN/1.73M2
GLUCOSE SERPL-MCNC: 167 MG/DL (ref 70–99)
HCO3 SERPL-SCNC: 31 MMOL/L (ref 22–29)
INTERPRETATION ECG - MUSE: NORMAL
INTERPRETATION ECG - MUSE: NORMAL
P AXIS - MUSE: 54 DEGREES
P AXIS - MUSE: 65 DEGREES
POTASSIUM SERPL-SCNC: 3 MMOL/L (ref 3.4–5.3)
POTASSIUM SERPL-SCNC: 3.8 MMOL/L (ref 3.4–5.3)
PR INTERVAL - MUSE: 240 MS
PR INTERVAL - MUSE: 242 MS
QRS DURATION - MUSE: 102 MS
QRS DURATION - MUSE: 106 MS
QT - MUSE: 436 MS
QT - MUSE: 462 MS
QTC - MUSE: 488 MS
QTC - MUSE: 493 MS
R AXIS - MUSE: -3 DEGREES
R AXIS - MUSE: 14 DEGREES
SODIUM SERPL-SCNC: 138 MMOL/L (ref 135–145)
SYSTOLIC BLOOD PRESSURE - MUSE: NORMAL MMHG
SYSTOLIC BLOOD PRESSURE - MUSE: NORMAL MMHG
T AXIS - MUSE: 67 DEGREES
T AXIS - MUSE: 98 DEGREES
VENTRICULAR RATE- MUSE: 67 BPM
VENTRICULAR RATE- MUSE: 77 BPM

## 2024-07-29 PROCEDURE — G1010 CDSM STANSON: HCPCS | Performed by: INTERNAL MEDICINE

## 2024-07-29 PROCEDURE — 258N000003 HC RX IP 258 OP 636: Performed by: INTERNAL MEDICINE

## 2024-07-29 PROCEDURE — 120N000001 HC R&B MED SURG/OB

## 2024-07-29 PROCEDURE — 78452 HT MUSCLE IMAGE SPECT MULT: CPT | Mod: ME

## 2024-07-29 PROCEDURE — 93016 CV STRESS TEST SUPVJ ONLY: CPT | Performed by: INTERNAL MEDICINE

## 2024-07-29 PROCEDURE — 99232 SBSQ HOSP IP/OBS MODERATE 35: CPT | Mod: FS | Performed by: NURSE PRACTITIONER

## 2024-07-29 PROCEDURE — 84132 ASSAY OF SERUM POTASSIUM: CPT | Performed by: INTERNAL MEDICINE

## 2024-07-29 PROCEDURE — 250N000009 HC RX 250: Performed by: INTERNAL MEDICINE

## 2024-07-29 PROCEDURE — 80048 BASIC METABOLIC PNL TOTAL CA: CPT | Performed by: INTERNAL MEDICINE

## 2024-07-29 PROCEDURE — A9500 TC99M SESTAMIBI: HCPCS | Performed by: INTERNAL MEDICINE

## 2024-07-29 PROCEDURE — 343N000001 HC RX 343: Performed by: INTERNAL MEDICINE

## 2024-07-29 PROCEDURE — 999N000157 HC STATISTIC RCP TIME EA 10 MIN

## 2024-07-29 PROCEDURE — 94640 AIRWAY INHALATION TREATMENT: CPT | Mod: 76

## 2024-07-29 PROCEDURE — 36415 COLL VENOUS BLD VENIPUNCTURE: CPT | Performed by: INTERNAL MEDICINE

## 2024-07-29 PROCEDURE — 250N000013 HC RX MED GY IP 250 OP 250 PS 637: Performed by: INTERNAL MEDICINE

## 2024-07-29 PROCEDURE — 250N000013 HC RX MED GY IP 250 OP 250 PS 637: Performed by: NURSE PRACTITIONER

## 2024-07-29 PROCEDURE — 250N000011 HC RX IP 250 OP 636: Performed by: INTERNAL MEDICINE

## 2024-07-29 PROCEDURE — 999N000049 HC STATISTIC ECHO STRESS OR NM NPI

## 2024-07-29 PROCEDURE — 93010 ELECTROCARDIOGRAM REPORT: CPT | Performed by: INTERNAL MEDICINE

## 2024-07-29 PROCEDURE — 94640 AIRWAY INHALATION TREATMENT: CPT

## 2024-07-29 PROCEDURE — 250N000012 HC RX MED GY IP 250 OP 636 PS 637: Performed by: INTERNAL MEDICINE

## 2024-07-29 PROCEDURE — G0378 HOSPITAL OBSERVATION PER HR: HCPCS

## 2024-07-29 PROCEDURE — 93005 ELECTROCARDIOGRAM TRACING: CPT

## 2024-07-29 PROCEDURE — 99233 SBSQ HOSP IP/OBS HIGH 50: CPT | Performed by: INTERNAL MEDICINE

## 2024-07-29 PROCEDURE — 93018 CV STRESS TEST I&R ONLY: CPT | Performed by: INTERNAL MEDICINE

## 2024-07-29 PROCEDURE — 78452 HT MUSCLE IMAGE SPECT MULT: CPT | Mod: 26 | Performed by: INTERNAL MEDICINE

## 2024-07-29 RX ORDER — CAFFEINE 200 MG
200 TABLET ORAL
Status: ACTIVE | OUTPATIENT
Start: 2024-07-29 | End: 2024-07-29

## 2024-07-29 RX ORDER — AMINOPHYLLINE 25 MG/ML
50-100 INJECTION, SOLUTION INTRAVENOUS
Status: DISCONTINUED | OUTPATIENT
Start: 2024-07-29 | End: 2024-07-30 | Stop reason: HOSPADM

## 2024-07-29 RX ORDER — DIAZEPAM 5 MG
5 TABLET ORAL EVERY 30 MIN PRN
Status: CANCELLED | OUTPATIENT
Start: 2024-07-29

## 2024-07-29 RX ORDER — CARVEDILOL 25 MG/1
25 TABLET ORAL 2 TIMES DAILY WITH MEALS
Status: DISCONTINUED | OUTPATIENT
Start: 2024-07-29 | End: 2024-07-30 | Stop reason: HOSPADM

## 2024-07-29 RX ORDER — POTASSIUM CHLORIDE 1500 MG/1
40 TABLET, EXTENDED RELEASE ORAL ONCE
Status: COMPLETED | OUTPATIENT
Start: 2024-07-29 | End: 2024-07-29

## 2024-07-29 RX ORDER — CAFFEINE CITRATE 20 MG/ML
60 SOLUTION INTRAVENOUS
Status: ACTIVE | OUTPATIENT
Start: 2024-07-29 | End: 2024-07-29

## 2024-07-29 RX ORDER — HYDRALAZINE HYDROCHLORIDE 50 MG/1
50 TABLET, FILM COATED ORAL EVERY 8 HOURS SCHEDULED
Status: DISCONTINUED | OUTPATIENT
Start: 2024-07-29 | End: 2024-07-30 | Stop reason: HOSPADM

## 2024-07-29 RX ORDER — REGADENOSON 0.08 MG/ML
0.4 INJECTION, SOLUTION INTRAVENOUS ONCE
Status: COMPLETED | OUTPATIENT
Start: 2024-07-29 | End: 2024-07-29

## 2024-07-29 RX ORDER — NITROGLYCERIN 0.4 MG/1
0.4 TABLET SUBLINGUAL EVERY 5 MIN PRN
Status: CANCELLED | OUTPATIENT
Start: 2024-07-29 | End: 2024-07-29

## 2024-07-29 RX ORDER — POTASSIUM CHLORIDE 1500 MG/1
20 TABLET, EXTENDED RELEASE ORAL ONCE
Status: COMPLETED | OUTPATIENT
Start: 2024-07-29 | End: 2024-07-29

## 2024-07-29 RX ORDER — LIDOCAINE 40 MG/G
CREAM TOPICAL
Status: CANCELLED | OUTPATIENT
Start: 2024-07-29

## 2024-07-29 RX ORDER — LORAZEPAM 0.5 MG/1
0.5 TABLET ORAL EVERY 10 MIN PRN
Status: CANCELLED | OUTPATIENT
Start: 2024-07-29

## 2024-07-29 RX ORDER — HYDRALAZINE HYDROCHLORIDE 25 MG/1
25 TABLET, FILM COATED ORAL EVERY 8 HOURS SCHEDULED
Status: DISCONTINUED | OUTPATIENT
Start: 2024-07-29 | End: 2024-07-29

## 2024-07-29 RX ORDER — SODIUM CHLORIDE 9 MG/ML
INJECTION, SOLUTION INTRAVENOUS CONTINUOUS
Status: ACTIVE | OUTPATIENT
Start: 2024-07-29 | End: 2024-07-29

## 2024-07-29 RX ORDER — LIDOCAINE 40 MG/G
CREAM TOPICAL
Status: DISCONTINUED | OUTPATIENT
Start: 2024-07-29 | End: 2024-07-29

## 2024-07-29 RX ORDER — FUROSEMIDE 20 MG
20 TABLET ORAL DAILY
Status: DISCONTINUED | OUTPATIENT
Start: 2024-07-29 | End: 2024-07-30 | Stop reason: HOSPADM

## 2024-07-29 RX ORDER — NITROGLYCERIN 0.4 MG/1
0.4 TABLET SUBLINGUAL EVERY 5 MIN PRN
Status: DISCONTINUED | OUTPATIENT
Start: 2024-07-29 | End: 2024-07-29

## 2024-07-29 RX ORDER — ALBUTEROL SULFATE 90 UG/1
2 AEROSOL, METERED RESPIRATORY (INHALATION) EVERY 5 MIN PRN
Status: DISCONTINUED | OUTPATIENT
Start: 2024-07-29 | End: 2024-07-30 | Stop reason: HOSPADM

## 2024-07-29 RX ADMIN — ALBUTEROL SULFATE 2.5 MG: 2.5 SOLUTION RESPIRATORY (INHALATION) at 14:31

## 2024-07-29 RX ADMIN — PREDNISONE 40 MG: 20 TABLET ORAL at 11:22

## 2024-07-29 RX ADMIN — Medication 1 TABLET: at 11:22

## 2024-07-29 RX ADMIN — FUROSEMIDE 20 MG: 20 TABLET ORAL at 13:48

## 2024-07-29 RX ADMIN — REGADENOSON 0.4 MG: 0.08 INJECTION, SOLUTION INTRAVENOUS at 10:46

## 2024-07-29 RX ADMIN — HYDRALAZINE HYDROCHLORIDE 25 MG: 25 TABLET ORAL at 13:48

## 2024-07-29 RX ADMIN — POTASSIUM CHLORIDE 40 MEQ: 1500 TABLET, EXTENDED RELEASE ORAL at 17:20

## 2024-07-29 RX ADMIN — POTASSIUM CHLORIDE 20 MEQ: 1500 TABLET, EXTENDED RELEASE ORAL at 18:31

## 2024-07-29 RX ADMIN — METOPROLOL SUCCINATE 200 MG: 100 TABLET, EXTENDED RELEASE ORAL at 11:22

## 2024-07-29 RX ADMIN — ASPIRIN 81 MG: 81 TABLET, COATED ORAL at 11:22

## 2024-07-29 RX ADMIN — CARVEDILOL 25 MG: 25 TABLET, FILM COATED ORAL at 18:31

## 2024-07-29 RX ADMIN — ALBUTEROL SULFATE 2.5 MG: 2.5 SOLUTION RESPIRATORY (INHALATION) at 20:31

## 2024-07-29 RX ADMIN — ATORVASTATIN CALCIUM 40 MG: 40 TABLET, FILM COATED ORAL at 18:31

## 2024-07-29 RX ADMIN — Medication 35.7 MILLICURIE: at 10:45

## 2024-07-29 RX ADMIN — ALBUTEROL SULFATE 2 PUFF: 108 INHALANT RESPIRATORY (INHALATION) at 02:15

## 2024-07-29 RX ADMIN — LOSARTAN POTASSIUM 100 MG: 100 TABLET, FILM COATED ORAL at 11:22

## 2024-07-29 RX ADMIN — HYDRALAZINE HYDROCHLORIDE 50 MG: 50 TABLET ORAL at 22:19

## 2024-07-29 RX ADMIN — SODIUM CHLORIDE: 9 INJECTION, SOLUTION INTRAVENOUS at 11:21

## 2024-07-29 RX ADMIN — AMLODIPINE BESYLATE 10 MG: 10 TABLET ORAL at 11:22

## 2024-07-29 RX ADMIN — ALBUTEROL SULFATE 2.5 MG: 2.5 SOLUTION RESPIRATORY (INHALATION) at 08:02

## 2024-07-29 ASSESSMENT — ACTIVITIES OF DAILY LIVING (ADL)
ADLS_ACUITY_SCORE: 29
EQUIPMENT_CURRENTLY_USED_AT_HOME: WALKER, ROLLING
ADLS_ACUITY_SCORE: 29
FALL_HISTORY_WITHIN_LAST_SIX_MONTHS: NO
ADLS_ACUITY_SCORE: 29
CHANGE_IN_FUNCTIONAL_STATUS_SINCE_ONSET_OF_CURRENT_ILLNESS/INJURY: NO
ADLS_ACUITY_SCORE: 29
DOING_ERRANDS_INDEPENDENTLY_DIFFICULTY: YES
ADLS_ACUITY_SCORE: 29

## 2024-07-29 NOTE — PLAN OF CARE
Summary: SOB and LE Edema  DATE & TIME: 7/28/24-7/29/24, [id number]  Cognitive Concerns/Orientation: A&Ox4   BEHAVIOR & AGGRESSION TOOL COLOR: Green  ABNL VS/O2: VSS on RA, ex HTN( BP meds started). BiPAP at bedtime. Patient refusing hospital BIPAP overnight. Patient will bring home BIPAP today. LS dim, expiratory wheezing. +SOB/GREEN.  MOBILITY: SBA w/ walker.   PAIN MANAGMENT: Denied pain.  DIET: 2g Na, no caffeine. NPO at 0400  BOWEL/BLADDER: Continent B/B. Up to bathroom. On strict intake and output.  ABNL LAB/BG:  K 3.0 recheck in AM  DRAIN/DEVICES: PIVx2 SL  TELEMETRY RHYTHM: NSR  SKIN: Dryness. 2+/3+ BLE edema.   TESTS/PROCEDURES: Plan for NM Lexiscan stress test tomorrow. NPO at least 3 hrs before procedure NPO from 0400.  D/C DAY/GOALS/PLACE: Pending clinical stability.  OTHER IMPORTANT INFO: Cardiology following. Hx asthma-. SOB/GREEN. On schedule neb treatment and prednisone. Patient PCA at bedside.

## 2024-07-29 NOTE — PLAN OF CARE
Goal Outcome Evaluation:  Summary: SOB and LE Edema  DATE & TIME: 7/29/24, [id number]  Cognitive Concerns/Orientation: A&Ox4   BEHAVIOR & AGGRESSION TOOL COLOR: Green  ABNL VS/O2: VSS on RA, ex HTN - on scheduled BP meds. BiPAP at bedtime. Patient refusing hospital BIPAP overnight.   MOBILITY: SBA w/ walker.   PAIN MANAGMENT: Denied pain.  DIET: 2g Na. Patient may need to be NPO a few hours prior to second part of stress test tomorrow. AM nurse tomorrow to call nuclear med to see as nuclear med was closed for the day.   BOWEL/BLADDER: Continent B/B. Up to bathroom. On strict intake and output.  ABNL LAB/BG:  K 3.0 - to be replaced and recheck @ 2200   DRAIN/DEVICES: R PIV SL   TELEMETRY RHYTHM: SR w. 1st degree AV block   SKIN: Dryness. 2+/3+ BLE edema.   TESTS/PROCEDURES: 2 day NM Lexiscan stress test started today  D/C DAY/GOALS/PLACE: Pending clinical stability.  OTHER IMPORTANT INFO: Cardiology following. Hx asthma-. SOB/GREEN. On schedule neb treatment and prednisone. Lasix started. Patient PCA at bedside. Patient needs standing weight

## 2024-07-29 NOTE — PROVIDER NOTIFICATION
"Patient has Lexiscan stress test schedule tomorrow. patient is on Aspirin. Paged Dr Mckinley via  requesting him to hold Aspirin.  Dr Mckinley replied back, \"Aspirin does not need to be held'.  "

## 2024-07-29 NOTE — UTILIZATION REVIEW
Admission Status; Secondary Review Determination    Under the authority of the Utilization Management Committee, the utilization review process indicated a secondary review on the above patient. The review outcome is based on review of the medical records, discussions with staff, and applying clinical experience noted on the date of the review.    (x) Inpatient Status Appropriate - This patient's medical care is consistent with medical management for inpatient care and reasonable inpatient medical practice.    RATIONALE FOR DETERMINATION    A 73-year-old female with a history of hypertension, asthma, DYLAN, CAD, and GERD was admitted on 7/27/2024 with progressive bilateral leg swelling and shortness of breath. CT shows mosaic attenuation suggestive of edema, and a CTPE study was negative for PE. TTE revealed preserved LV function with EF 60-65% and no wall motion abnormalities. She also has asthma, hypertension, dyslipidemia, GERD, DYLAN, anemia (Hgb 10.4), and a 1.8 cm thyroid nodule. She reports a 20-pound weight gain likely due to fluid overload. Hypokalemia (K 3.0) was noted, and potassium replacement is ordered. She continues to have chest heaviness requiring RRT yesterday. She is on IV diuresis, and is being  followed by cardiology service with a plan for Lexiscan today.     At the time of admission with the information available to the attending physician more than 2 nights Hospital complex care was anticipated, based on patient risk of adverse outcome if treated as outpatient and complex care required. Inpatient admission is appropriate based on the Medicare guideline     Dr. Zaldivar informed of the change in status.     This document was produced using voice recognition software    The information on this document is developed by the utilization review team in order for the business office to ensure compliance. This only denotes the appropriateness of proper admission status and does not reflect the quality of  care rendered.    The definitions of Inpatient Status and Observation Status used in making the determination above are those provided in the CMS Coverage Manual, Chapter 1 and Chapter 6, section 70.4.    Sincerely,      Flavio Gonzalez MD FACP  Utilization Management    [facility].

## 2024-07-29 NOTE — PROGRESS NOTES
Lexiscan Stress: Pt tolerated well. VSS. Pt denied chest pain or pressure prior to injection. After injection pt reported chest pressure 3/10, similar to recent chest pressure. Pressure subsided one minute into recovery.    1100 Pt stable post stress test.

## 2024-07-29 NOTE — PROGRESS NOTES
[facility]    Medicine Progress Note - Hospitalist Service    Date of Admission:  7/27/2024    Assessment & Plan   Agatha Rodriguez is a 73 year old female with a history of Htn, asthma, DYLAN, CAD, GERD who is admitted on 7/27/2024 with progressive bilateral leg swelling and shortness of breath.      Shortness of breath  Possible asthma exacerbation  Possible acute diastolic CHF exacerbation  Bilateral lower extremity edema  Symptoms have been much worse over the past few days, clinically seems consistent with CHF but note that BNP within normal limits at 355. CT shows mosaic attenuation of the lungs which could indicate edema. Note reported angina but I don't see a history of stent. Lexiscan 2019 was negative for inducible ischemia with an EF of 60%. Other considerations include lymphedema, pulmonary hypertension, RAD. CTPE study negative for PE.  -received lasix 40 mg IV in the ED, will repeat once in the morning  Cardiology consultation requested.  Patient was given 60 mg of Lasix IV on 7/28/2024  Serial tropes remain normal 12-13  TSH normal at 3.13  Patient was complaining of chest heaviness with her history of asthma so she was started on DuoNebs 4 times daily and prednisone 40 mg p.o. daily for 5 days  Lexiscan stress test ordered to evaluate for ischemic heart disease.  Lexiscan test has  to be done over 2 days.  First part done on 7/29/2024  Echo showed EF normal at 60 to 65%.  No regional wall motion abnormalities.  No significant valvular disease.  It showed grade 1 LV diastolic dysfunction  Start patient on Lasix 20 mg p.o. daily on 7/29/2024    Patient's needs to discharge on a nebulizer and DuoNeb solution prescription on discharge    Hypertensive urgency  Blood pressure is running high systolics in the 160s  Restart PTA meds Toprol- mg p.o. daily, Norvasc 10 mg p.o. daily  Added hydralazine 25 mg 3 times daily for better blood pressure control on 7/29/2024  Lasix 20 mg  p.o. daily     Possible asthma exacerbation   -continue PTA Qvar  DuoNebs 4 times daily  Prednisone 40 mg p.o. daily for 5 days ordered  Needs to discharge with a nebulizer     Hypertension  Angina  Dyslipidemia  -continue PTA Toprol-XL   continue PTA amlodipine   Added hydralazine 25 mg 3 times daily     GERD  -continue PTA protonix     DYLAN  -Bipap per home settings     Anemia  Hgb of 10.4, hgb in 2021 normal around 12 with normal iron studies at that time. Colonoscopy in 2022 showed diverticulosis. No reports of blood loss.   -should follow up with PCP to trend and discuss further work up     1.8 cm nodule of left lobe of the thyroid  -recommended non urgent thyroid US for further evaluation    Disposition: Obs for TTE, likely discharge to home on Sunday unless needs to stay longer for diuresis with diagnosis of CHF.     Clinically Significant Risk Factors Present on Admission                 # Drug Induced Platelet Defect: home medication list includes an antiplatelet medication      # Hypertension: Noted on problem list    # Anemia: based on hgb <11                   Diet: 2 Gram Sodium Diet    DVT Prophylaxis: Pneumatic Compression Devices  Becker Catheter: Not present  Lines: None     Cardiac Monitoring: ACTIVE order. Indication: Acute decompensated heart failure (48 hours)  Code Status: Full Code      Clinically Significant Risk Factors Present on Admission        # Hypokalemia: Lowest K = 3 mmol/L in last 2 days, will replace as needed         # Drug Induced Platelet Defect: home medication list includes an antiplatelet medication   # Hypertension: Noted on problem list                    Disposition Plan     Medically Ready for Discharge: Anticipated in 2-4 Days after Lexiscan stress test 8 days done and respiratory status is better       Discussed with bedside RN patient      Zeny Zaldivar MD  Hospitalist Service  [facility]  Securely message with [employer] (more info)  Text page via  [employer] Paging/Directory   ______________________________________________________________________    Interval History   Patient is resting comfortably in bed.  Show denies any shortness of breath or chest heaviness currently.  Underwent Lexiscan stress test first part.  Needs to have second part tomorrow.  Blood pressure is running on the higher side.  No other acute issues    Physical Exam   Vital Signs: Temp: 98.3  F (36.8  C) Temp src: Oral BP: (!) 161/84 Pulse: 61   Resp: 19 SpO2: 100 % O2 Device: None (Room air)    Weight: 351 lbs 10.14 oz    General Appearance: Alert awake, not in acute distress  Respiratory: Clear to auscultation bilaterally  Cardiovascular: Normal rate rhythm regular  GI: Soft, nontender nondistended bowel sounds positive  Skin: Warm and dry  Other: No clubbing cyanosis or edema    Medical Decision Making       52 MINUTES SPENT BY ME on the date of service doing chart review, history, exam, documentation & further activities per the note.      Data     I have personally reviewed the following data over the past 24 hrs:    N/A  \   N/A   / N/A     138 100 15.6 /  167 (H)   3.0 (L) 31 (H) 0.79 \     Trop: N/A BNP: N/A     TSH: N/A T4: N/A A1C: N/A       Imaging results reviewed over the past 24 hrs:   Recent Results (from the past 24 hour(s))   NM Lexiscan stress test (nuc card)   Result Value    Target     Baseline Systolic     Baseline Diastolic BP 95    Last Stress Systolic     Last Stress Diastolic BP 77    Baseline HR 69    Max HR  73    Max Predicted HR  49    Rate Pressure Product 11,242.0

## 2024-07-29 NOTE — PLAN OF CARE
Goal Outcome Evaluation:                      Summary: SOB and LE Edema  DATE & TIME: 7/28/24, pm shift  Cognitive Concerns/Orientation: A&Ox4   BEHAVIOR & AGGRESSION TOOL COLOR: Green  ABNL VS/O2: VSS on RA, ex HTN( BP meds started). BiPAP at bedtime. Patient refusing hospital BIPAP. Patient will bring home BIPAP tomorrow. LS dim, clear. +SOB/GREEN.  MOBILITY: SBA w/ walker.   PAIN MANAGMENT: Denied pain.  DIET: 2g Na, no caffeine.  BOWEL/BLADDER: Continent B/B. Up to bathroom. Gave one time dose of iv Lasix this shift. On strict intake and output.  ABNL LAB/BG:  K 3.0 ( gave one time dose of potassium ordered by MD. BMP check tomorrow).  DRAIN/DEVICES: PIVx2 SL  TELEMETRY RHYTHM: NSR  SKIN: Dryness. 2+/3+ BLE edema.   TESTS/PROCEDURES: Plan for NM Lexiscan stress test tomorrow. NPO at least 3 hrs before procedure.  D/C DAY/GOALS/PLACE: Pending clinical stability.  OTHER IMPORTANT INFO: Cardiology following. Hx asthma-. SOB/GREEN. On schedule neb treatment and prednisone. Patient PCA at bedside.

## 2024-07-29 NOTE — PROGRESS NOTES
[facility]  Cardiology Progress Note  Date of Service: 07/29/2024  Date of Admission: 7/27/2024  Primary Cardiologist: will be Dr. Rainey    Summary    Agatha Rodriguez is a 73 year old female with a history of Htn, asthma, DYLAN, CAD, GERD who is admitted on 7/27/2024 with progressive bilateral leg swelling and shortness of breath.   Cardiology was consulted for chest pain and shortness of breath.    Asssessment:    Shortness of breath  Bilateral lower extremity edema  HFpEF with exacerbation, EF 60-65%.  RRT 7/28 for chest pain - chest pain symptoms unchanged despite inhaler and diuresis.  Midsternal discomfort present for greater than 1-week (described as heaviness/pressure).  , may be falsely underestimated 2/2 obesity.  CXR and syx c/w HF exacerbation and fluid overload.  Last Lexiscan 2019 was negative for inducible ischemia with an EF of 60%.  CTPE study negative for PE.  Serial TN negative.  TTE 7/28: EF 60-65% without regional WMA, grade 1 DD.  Repeat nuclear stress test pending.  Hypertension, uncontrolled; on amlodipine, hydralazine, losartan, metoprolol  Dyslipidemia,  on Lipitor  GERD, on protonix  DYLAN, Bipap per home settings  Anemia, Hgb of 10.4, should follow up outpatient with PCP.  Morbid obesity, BMI 53     _____________________________________________________________    Plan:   2-day lexiscan stress test pending; results available tomorrow.  Blood pressure remains uncontrolled.    Recommend stopping Toprol XL in favor of Carvedilol 25 mg daily.  Increase Hydralazine to 50 mg TID.  _____________________________________________________________    Plan of care and the majority of MDM was formulated under direction and guidance of Dr. Ocampo.    Thank you for the opportunity to participate in the care of Ms. Agatha Jacobsoner.  Please feel free to reach out with any additional questions.    FABIEN Mustafa, CNP   Nurse Practitioner  [facility]  Care  Pager: [phone]  Phone: [phone]  Text Page (8am - 5pm, M-F)    Physical Exam   Temp: 98.3  F (36.8  C) Temp src: Oral BP: (!) 156/82 Pulse: 60   Resp: 19 SpO2: 98 % O2 Device: None (Room air)      Vitals:    07/28/24 0504 07/29/24 0623 07/29/24 1500   Weight: (!) 153.9 kg (339 lb 4.8 oz) (!) 159.5 kg (351 lb 10.1 oz) (!) 162 kg (357 lb 2.3 oz)     I/O last 3 completed shifts:  In: 895 [P.O.:895]  Out: 800 [Urine:800]    Constitutional: Appears stated age, well nourished, NAD.  Neck: Supple. Carotid pulses full and equal.  Respiratory: Non-labored. Lungs clear.  Cardiovascular: RRR, normal S1 and S2. No M/G/R.  Vascular: Peripheral pulses intact and symmetric bilaterally.  Trace ankle edema.  Skin: Warm and dry. No rashes, cyanosis, edema  Neurologic: No gross focal deficits. Alert, awake, and oriented to all spheres.  Psychiatric: Affect appropriate. Mentation normal.    Data   Recent Labs   Lab 07/29/24  1010 07/28/24  1820 07/28/24  1008 07/27/24  2211 07/27/24  2117   WBC  --   --   --   --  9.8   HGB  --   --   --   --  10.4*   MCV  --   --   --   --  83   PLT  --   --   --   --  227     --  142  --  142   POTASSIUM 3.0*  --  3.0*  --  3.9   CHLORIDE 100  --  102  --  103   CO2 31*  --  30*  --  29   BUN 15.6  --  15.7  --  18.0   CR 0.79  --  0.86  --  0.93   ANIONGAP 7  --  10  --  10   AVNI 9.3  --  9.7  --  9.4   * 227* 158*  --  121*   ALBUMIN  --   --   --  3.9  --    PROTTOTAL  --   --   --  7.3  --    BILITOTAL  --   --   --  0.2  --    ALKPHOS  --   --   --  68  --    ALT  --   --   --  15  --    AST  --   --   --  24  --        Recent Labs   Lab 07/27/24 2117   WBC 9.8   HGB 10.4*   HCT 33.4*   MCV 83        Recent Labs   Lab 07/29/24  1010 07/28/24  1820 07/28/24  1008 07/27/24 2117     --  142 142   POTASSIUM 3.0*  --  3.0* 3.9   CHLORIDE 100  --  102 103   CO2 31*  --  30* 29   ANIONGAP 7  --  10 10   * 227* 158* 121*   BUN 15.6  --  15.7 18.0   CR  0.79  --  0.86 0.93   GFRESTIMATED 79  --  71 65   AVNI 9.3  --  9.7 9.4        Patient Active Problem List   Diagnosis    Pain in a tooth or teeth    CAD (coronary artery disease)    Hypertension    GERD (gastroesophageal reflux disease)    Sleep apnea    Primary osteoarthritis of both knees    Obesity    Shortness of breath    Thyroid nodule    Bilateral lower extremity edema    Myocardial infarction, silent (H)    Morbid obesity with BMI of 50.0-59.9, adult (H)    Prolonged Q-T interval on ECG    Diastolic dysfunction     Medications   Current Facility-Administered Medications   Medication Dose Route Frequency Provider Last Rate Last Admin     Current Facility-Administered Medications   Medication Dose Route Frequency Provider Last Rate Last Admin    albuterol (PROVENTIL) neb solution 2.5 mg  2.5 mg Nebulization 4x Daily Zeny Zaldivar MD   2.5 mg at 07/29/24 1431    amLODIPine (NORVASC) tablet 10 mg  10 mg Oral Daily Delgado Rainey MD   10 mg at 07/29/24 1122    aspirin EC tablet 81 mg  81 mg Oral Daily Delgado Rainey MD   81 mg at 07/29/24 1122    atorvastatin (LIPITOR) tablet 40 mg  40 mg Oral Daily Zeny Zaldivar MD   40 mg at 07/28/24 1841    fluticasone (ARNUITY ELLIPTA) 200 MCG/ACT inhaler 1 puff  1 puff Inhalation Daily Zeny Zaldivar MD   1 puff at 07/28/24 1842    furosemide (LASIX) tablet 20 mg  20 mg Oral Daily Zeny Zaldivar MD   20 mg at 07/29/24 1348    hydrALAZINE (APRESOLINE) tablet 25 mg  25 mg Oral Q8H SOSA Zeny Zaldivar MD   25 mg at 07/29/24 1348    losartan (COZAAR) tablet 100 mg  100 mg Oral Daily Zeny Zaldivar MD   100 mg at 07/29/24 1122    metoprolol succinate ER (TOPROL XL) 24 hr tablet 200 mg  200 mg Oral Daily Delgado Rainey MD   200 mg at 07/29/24 1122    multivitamin w/minerals (THERA-VIT-M) tablet 1 tablet  1 tablet Oral Daily Zeny Zaldivar MD   1 tablet at 07/29/24 1122    pantoprazole (PROTONIX) EC tablet 40 mg  40 mg Oral QAM AC Zeny Zaldivar MD        potassium chloride liv ER  (KLOR-CON M20) CR tablet 20 mEq  20 mEq Oral Once Zeny Zaldivar MD        potassium chloride liv ER (KLOR-CON M20) CR tablet 40 mEq  40 mEq Oral Once Zeny Zaldivar MD        predniSONE (DELTASONE) tablet 40 mg  40 mg Oral Daily Zeny Zaldivar MD   40 mg at 24 1122    sodium chloride (PF) 0.9% PF flush 3 mL  3 mL Intracatheter Q8H Anthony Castellanos DO   3 mL at 24 205       Data   Last 24 hours labs personally reviewed.  Echo:   Recent Results (from the past 4320 hour(s))   Echocardiogram Complete   Result Value    LVEF  60-65%    Narrative    [id number]  [id number]  [id number]^JAYNA^ANTHONY     [facility]  Echocardiography Laboratory  [address] [place], [place] [address]     Name: ION KIMBROUGH  MRN: [id number]  : 1950  Study Date: 2024 10:02 AM  Age: 73 yrs  Gender: Female  Patient Location: [facility]  Reason For Study: CHF  Ordering Physician: MD Adwoa Snyder  Performed By: Renuka Rolle     BSA: 2.5 m2  Height: 66 in  Weight: 339 lb  HR: 78  BP: 174/91 mmHg  ______________________________________________________________________________  Procedure  Complete Portable Echo Adult. Optison (NDC #0211-4381) given intravenously.  ______________________________________________________________________________  Interpretation Summary     Technically difficult imaging  Left ventricular systolic function is normal.  The visual ejection fraction is 60-65%.  The left ventricle is normal in size.  No regional wall motion abnormalities noted.  Doppler interrogation does not demonstrate signficant setnosis or  insufficiency involving cardiac valves     No old studies for compariosn  ______________________________________________________________________________  Left Ventricle  The left ventricle is normal in size. There is normal left ventricular wall  thickness. Left ventricular systolic function is normal. The visual ejection  fraction is 60-65%. Grade I or early diastolic  dysfunction. No regional wall  motion abnormalities noted.     Mitral Valve  Calcified mitral apparatus. There is no mitral regurgitation noted. There is  no mitral valve stenosis.     Tricuspid Valve  The tricuspid valve is not well visualized, but is grossly normal. No  tricuspid regurgitation. Right ventricular systolic pressure could not be  approximated due to inadequate tricuspid regurgitation. There is no tricuspid  stenosis.     Aortic Valve  The aortic valve is not well visualized. No aortic regurgitation is present.  No aortic stenosis is present.     Pulmonic Valve  The pulmonic valve is not well visualized.     Vessels  The aortic root is normal size. Normal size ascending aorta. Inferior vena  cava not well visualized for estimation of right atrial pressure.     Pericardium  The pericardium appears normal. There is no pleural effusion.     Rhythm  Sinus rhythm was noted.     ______________________________________________________________________________  MMode/2D Measurements & Calculations  IVSd: 1.0 cm  LVIDd: 4.8 cm  LVIDs: 3.0 cm  LVPWd: 1.0 cm  FS: 38.1 %  LV mass(C)d: 170.2 grams  LV mass(C)dI: 68.0 grams/m2  Ao root diam: 3.0 cm  LA dimension: 4.1 cm  asc Aorta Diam: 3.4 cm     LA/Ao: 1.3  LVOT diam: 2.3 cm  LVOT area: 4.1 cm2  Ao root diam index Ht(cm/m): 1.8  Ao root diam index BSA (cm/m2): 1.2  Asc Ao diam index BSA (cm/m2): 1.4  Asc Ao diam index Ht(cm/m): 2.0  LA Volume (BP): 109.0 ml  LA Volume Index (BP): 43.6 ml/m2  RWT: 0.42     Doppler Measurements & Calculations  MV E max khang: 82.3 cm/sec  MV A max khang: 92.2 cm/sec  MV E/A: 0.89  MV dec time: 0.25 sec  Ao V2 max: 164.0 cm/sec  Ao max P.0 mmHg  Ao V2 mean: 114.0 cm/sec  Ao mean P.0 mmHg  Ao V2 VTI: 34.1 cm  KRISTA(I,D): 3.0 cm2  KRISTA(V,D): 2.9 cm2  LV V1 max P.4 mmHg  LV V1 max: 116.0 cm/sec  LV V1 VTI: 24.7 cm  SV(LVOT): 101.6 ml  SI(LVOT): 40.6 ml/m2  AV Khang Ratio (DI): 0.71  KRISTA Index (cm2/m2): 1.2  E/E' avg: 15.0  Lateral  E/e': 8.9  [facility] E/e': 21.0  RV S Khang: 12.5 cm/sec     ______________________________________________________________________________  Report approved by: Dr. Harvey Alexander 07/28/2024 11:02 AM

## 2024-07-30 ENCOUNTER — APPOINTMENT (OUTPATIENT)
Dept: NUCLEAR MEDICINE | Facility: CLINIC | Age: 74
DRG: 291 | End: 2024-07-30
Attending: INTERNAL MEDICINE
Payer: MEDICARE

## 2024-07-30 VITALS
SYSTOLIC BLOOD PRESSURE: 131 MMHG | BODY MASS INDEX: 47.09 KG/M2 | TEMPERATURE: 97.2 F | RESPIRATION RATE: 17 BRPM | DIASTOLIC BLOOD PRESSURE: 64 MMHG | WEIGHT: 293 LBS | HEIGHT: 66 IN | OXYGEN SATURATION: 98 % | HEART RATE: 60 BPM

## 2024-07-30 LAB
ANION GAP SERPL CALCULATED.3IONS-SCNC: 6 MMOL/L (ref 7–15)
BUN SERPL-MCNC: 15.6 MG/DL (ref 8–23)
CALCIUM SERPL-MCNC: 9.9 MG/DL (ref 8.8–10.4)
CHLORIDE SERPL-SCNC: 102 MMOL/L (ref 98–107)
CREAT SERPL-MCNC: 0.84 MG/DL (ref 0.51–0.95)
CV BLOOD PRESSURE: 55 MMHG
CV STRESS MAX HR HE: 73
EGFRCR SERPLBLD CKD-EPI 2021: 73 ML/MIN/1.73M2
ERYTHROCYTE [DISTWIDTH] IN BLOOD BY AUTOMATED COUNT: 15.6 % (ref 10–15)
GLUCOSE SERPL-MCNC: 136 MG/DL (ref 70–99)
HCO3 SERPL-SCNC: 32 MMOL/L (ref 22–29)
HCT VFR BLD AUTO: 35.7 % (ref 35–47)
HGB BLD-MCNC: 11.1 G/DL (ref 11.7–15.7)
MCH RBC QN AUTO: 25.7 PG (ref 26.5–33)
MCHC RBC AUTO-ENTMCNC: 31.1 G/DL (ref 31.5–36.5)
MCV RBC AUTO: 83 FL (ref 78–100)
NUC STRESS EJECTION FRACTION: 50 %
PLATELET # BLD AUTO: 319 10E3/UL (ref 150–450)
POTASSIUM SERPL-SCNC: 3.7 MMOL/L (ref 3.4–5.3)
RATE PRESSURE PRODUCT: NORMAL
RBC # BLD AUTO: 4.32 10E6/UL (ref 3.8–5.2)
SODIUM SERPL-SCNC: 140 MMOL/L (ref 135–145)
STRESS ECHO BASELINE DIASTOLIC HE: 95
STRESS ECHO BASELINE HR: 69 BPM
STRESS ECHO BASELINE SYSTOLIC BP: 158
STRESS ECHO CALCULATED PERCENT HR: 49 %
STRESS ECHO LAST STRESS DIASTOLIC BP: 77
STRESS ECHO LAST STRESS SYSTOLIC BP: 154
STRESS ECHO TARGET HR: 148
STRESS/REST PERFUSION RATIO: 1.02
WBC # BLD AUTO: 9.9 10E3/UL (ref 4–11)

## 2024-07-30 PROCEDURE — 343N000001 HC RX 343: Performed by: INTERNAL MEDICINE

## 2024-07-30 PROCEDURE — 99232 SBSQ HOSP IP/OBS MODERATE 35: CPT | Mod: 25 | Performed by: NURSE PRACTITIONER

## 2024-07-30 PROCEDURE — 85041 AUTOMATED RBC COUNT: CPT | Performed by: INTERNAL MEDICINE

## 2024-07-30 PROCEDURE — 36415 COLL VENOUS BLD VENIPUNCTURE: CPT | Performed by: INTERNAL MEDICINE

## 2024-07-30 PROCEDURE — A9500 TC99M SESTAMIBI: HCPCS | Performed by: INTERNAL MEDICINE

## 2024-07-30 PROCEDURE — 250N000012 HC RX MED GY IP 250 OP 636 PS 637: Performed by: INTERNAL MEDICINE

## 2024-07-30 PROCEDURE — 250N000013 HC RX MED GY IP 250 OP 250 PS 637: Performed by: NURSE PRACTITIONER

## 2024-07-30 PROCEDURE — 94640 AIRWAY INHALATION TREATMENT: CPT

## 2024-07-30 PROCEDURE — 250N000013 HC RX MED GY IP 250 OP 250 PS 637: Performed by: INTERNAL MEDICINE

## 2024-07-30 PROCEDURE — 80048 BASIC METABOLIC PNL TOTAL CA: CPT | Performed by: INTERNAL MEDICINE

## 2024-07-30 PROCEDURE — 250N000009 HC RX 250: Performed by: INTERNAL MEDICINE

## 2024-07-30 PROCEDURE — 99239 HOSP IP/OBS DSCHRG MGMT >30: CPT | Performed by: INTERNAL MEDICINE

## 2024-07-30 PROCEDURE — 96376 TX/PRO/DX INJ SAME DRUG ADON: CPT

## 2024-07-30 PROCEDURE — 999N000157 HC STATISTIC RCP TIME EA 10 MIN

## 2024-07-30 PROCEDURE — 99207 PR NO BILLABLE SERVICE THIS VISIT: CPT | Performed by: INTERNAL MEDICINE

## 2024-07-30 RX ORDER — CARVEDILOL 25 MG/1
25 TABLET ORAL 2 TIMES DAILY WITH MEALS
Qty: 90 TABLET | Refills: 0 | Status: ON HOLD | OUTPATIENT
Start: 2024-07-30

## 2024-07-30 RX ORDER — ALBUTEROL SULFATE 0.83 MG/ML
2.5 SOLUTION RESPIRATORY (INHALATION) EVERY 4 HOURS PRN
Qty: 90 ML | Refills: 0 | Status: ON HOLD | OUTPATIENT
Start: 2024-07-30

## 2024-07-30 RX ORDER — PREDNISONE 20 MG/1
40 TABLET ORAL DAILY
Qty: 4 TABLET | Refills: 0 | Status: SHIPPED | OUTPATIENT
Start: 2024-07-31 | End: 2024-08-02

## 2024-07-30 RX ORDER — HYDRALAZINE HYDROCHLORIDE 50 MG/1
50 TABLET, FILM COATED ORAL EVERY 8 HOURS
Qty: 90 TABLET | Refills: 0 | Status: ON HOLD | OUTPATIENT
Start: 2024-07-30

## 2024-07-30 RX ORDER — FUROSEMIDE 20 MG
20 TABLET ORAL DAILY
Qty: 90 TABLET | Refills: 0 | Status: ON HOLD | OUTPATIENT
Start: 2024-07-31

## 2024-07-30 RX ADMIN — Medication 42.8 MILLICURIE: at 07:05

## 2024-07-30 RX ADMIN — CARVEDILOL 25 MG: 25 TABLET, FILM COATED ORAL at 09:39

## 2024-07-30 RX ADMIN — PREDNISONE 40 MG: 20 TABLET ORAL at 09:38

## 2024-07-30 RX ADMIN — SENNOSIDES AND DOCUSATE SODIUM 1 TABLET: 8.6; 5 TABLET ORAL at 14:14

## 2024-07-30 RX ADMIN — PANTOPRAZOLE SODIUM 40 MG: 40 TABLET, DELAYED RELEASE ORAL at 09:38

## 2024-07-30 RX ADMIN — AMLODIPINE BESYLATE 10 MG: 10 TABLET ORAL at 09:40

## 2024-07-30 RX ADMIN — Medication 1 TABLET: at 09:38

## 2024-07-30 RX ADMIN — ASPIRIN 81 MG: 81 TABLET, COATED ORAL at 09:38

## 2024-07-30 RX ADMIN — LOSARTAN POTASSIUM 100 MG: 100 TABLET, FILM COATED ORAL at 09:40

## 2024-07-30 RX ADMIN — ALBUTEROL SULFATE 2.5 MG: 2.5 SOLUTION RESPIRATORY (INHALATION) at 11:31

## 2024-07-30 RX ADMIN — FLUTICASONE FUROATE 1 PUFF: 200 POWDER RESPIRATORY (INHALATION) at 09:47

## 2024-07-30 RX ADMIN — FUROSEMIDE 20 MG: 20 TABLET ORAL at 09:39

## 2024-07-30 ASSESSMENT — ACTIVITIES OF DAILY LIVING (ADL)
ADLS_ACUITY_SCORE: 29

## 2024-07-30 NOTE — PROGRESS NOTES
[facility]    Medicine Progress Note - Hospitalist Service    Date of Admission:  7/27/2024    Assessment & Plan   Agatha Rodriguez is a 73 year old female with a history of Htn, asthma, DYLAN, CAD, GERD who is admitted on 7/27/2024 with progressive bilateral leg swelling and shortness of breath.      Shortness of breath  Possible asthma exacerbation  Possible acute diastolic CHF exacerbation  Bilateral lower extremity edema  Symptoms have been much worse over the past few days, clinically seems consistent with CHF but note that BNP within normal limits at 355. CT shows mosaic attenuation of the lungs which could indicate edema. Note reported angina but I don't see a history of stent. Lexiscan 2019 was negative for inducible ischemia with an EF of 60%. Other considerations include lymphedema, pulmonary hypertension, RAD. CTPE study negative for PE.  -received lasix 40 mg IV in the ED, will repeat once in the morning  Cardiology consultation requested.  Patient was given 60 mg of Lasix IV on 7/28/2024  Serial tropes remain normal 12-13  TSH normal at 3.13  Patient was complaining of chest heaviness with her history of asthma so she was started on DuoNebs 4 times daily and prednisone 40 mg p.o. daily for 5 days  Lexiscan stress test ordered to evaluate for ischemic heart disease.  Lexiscan test completed on 7/30/2024.  Results currently pending  Echo showed EF normal at 60 to 65%.  No regional wall motion abnormalities.  No significant valvular disease.  It showed grade 1 LV diastolic dysfunction  Start patient on Lasix 20 mg p.o. daily on 7/29/2024    Patient's needs to discharge on a nebulizer and DuoNeb solution prescription on discharge    Cardiology switched her Toprol-XL to Coreg 25 mg p.o. twice daily, hydralazine dosage increased to 50 mg 3 times daily for better blood pressure control    Hypertensive urgency  Blood pressure is running high systolics in the 160s  Restart PTA meds  Toprol- mg p.o. daily, Norvasc 10 mg p.o. daily  Added hydralazine 25 mg 3 times daily for better blood pressure control on 7/29/2024  Lasix 20 mg p.o. daily  Hydralazine dosage increased to 50 mg 3 times daily.  Toprol-XL switched to Coreg 25 mg p.o. twice daily per cardiology  Blood pressure better controlled now     Possible asthma exacerbation   -continue PTA Qvar  DuoNebs 4 times daily  Prednisone 40 mg p.o. daily for 5 days ordered  Needs to discharge with a nebulizer     Hypertension  Angina  Dyslipidemia  -continue current regimen as per cardiology   continue PTA amlodipine   Added hydralazine 50 mg 3 times daily.  Switch Toprol-XL to Coreg     GERD  -continue PTA protonix     DYLAN  -Bipap per home settings     Anemia  Hgb of 10.4, hgb in 2021 normal around 12 with normal iron studies at that time. Colonoscopy in 2022 showed diverticulosis. No reports of blood loss.   -should follow up with PCP to trend and discuss further work up     1.8 cm nodule of left lobe of the thyroid  -recommended non urgent thyroid US for further evaluation    Disposition: Obs for TTE, likely discharge to home on Sunday unless needs to stay longer for diuresis with diagnosis of CHF.     Clinically Significant Risk Factors Present on Admission                 # Drug Induced Platelet Defect: home medication list includes an antiplatelet medication      # Hypertension: Noted on problem list    # Anemia: based on hgb <11                   Diet: 2 Gram Sodium Diet    DVT Prophylaxis: Pneumatic Compression Devices  Becker Catheter: Not present  Lines: None     Cardiac Monitoring: ACTIVE order. Indication: Acute decompensated heart failure (48 hours)  Code Status: Full Code      Clinically Significant Risk Factors        # Hypokalemia: Lowest K = 3 mmol/L in last 2 days, will replace as needed           # Hypertension: Noted on problem list            # Severe Obesity: Estimated body mass index is 57.12 kg/m  as calculated from the  "following:    Height as of this encounter: 1.68 m (5' 6.14\").    Weight as of this encounter: 161.2 kg (355 lb 6.4 oz)., PRESENT ON ADMISSION            Disposition Plan     Medically Ready for Discharge: When okay with cardiology.  Once the Lexiscan stress test results are available.     Discussed with bedside RN patient      Zeny Zaldivar MD  Hospitalist Service  [facility]  Securely message with [employer] (more info)  Text page via [employer] Paging/Directory   ______________________________________________________________________    Interval History   Patient is resting comfortably in bed.   denies any shortness of breath or chest heaviness currently.  Underwent Lexiscan stress test results pending.  .  Blood pressure better controlled now.  No other acute issues    Physical Exam   Vital Signs: Temp: 97.2  F (36.2  C) Temp src: Oral BP: 131/64 Pulse: 60   Resp: 17 SpO2: 98 % O2 Device: None (Room air)    Weight: 355 lbs 6.4 oz    General Appearance: Alert awake, not in acute distress  Respiratory: Clear to auscultation bilaterally  Cardiovascular: Normal rate rhythm regular  GI: Soft, nontender nondistended bowel sounds positive  Skin: Warm and dry  Other: No clubbing cyanosis or edema    Medical Decision Making       52 MINUTES SPENT BY ME on the date of service doing chart review, history, exam, documentation & further activities per the note.      Data     I have personally reviewed the following data over the past 24 hrs:    9.9  \   11.1 (L)   / 319     140 102 15.6 /  136 (H)   3.7 32 (H) 0.84 \       Imaging results reviewed over the past 24 hrs:   Recent Results (from the past 24 hour(s))   NM Lexiscan stress test (nuc card)   Result Value    Target     Baseline Systolic     Baseline Diastolic BP 95    Last Stress Systolic     Last Stress Diastolic BP 77    Baseline HR 69    Max HR  73    Max Predicted HR  49    Rate Pressure Product 11,242.0     "

## 2024-07-30 NOTE — PROGRESS NOTES
DATE & TIME:7/30/24, 8319-3270  Cognitive Concerns/ Orientation:A/O x4  BEHAVIOR & AGGRESSION TOOL COLOR:Green, calm and cooperative  ABNL VS/O2:VSS on RA ex htn  MOBILITY:SBA with walker  PAIN MANAGMENT:Denies  DIET:2g Na-NPO currently  BOWEL/BLADDER:Continent of B/B  DRAIN/DEVICES:R PIV SL  TELEMETRY RHYTHM:SR with first degree AV block  SKIN:  2+ BLE edema  TESTS/PROCEDURES:Lexiscan stress test today  D/C DATE: Pending  OTHER IMPORTANT INFO: Patient PCA at bedside.

## 2024-07-30 NOTE — PROGRESS NOTES
[facility]  Cardiology Progress Note  Date of Service: 07/30/2024  Date of Admission: 7/27/2024  Primary Cardiologist: will be Dr. Ocampo    Summary    Agatha Rodriguez is a 73 year old female with a history of Htn, asthma, DYLAN, CAD, GERD who is admitted on 7/27/2024 with progressive bilateral leg swelling and shortness of breath.   Cardiology was consulted for chest pain and shortness of breath.    Interval: Patient feeling well today.  No further episodes of chest pressure reported.  She has been up ambulating in the halls. BP improved.  Discussed results of nuclear study.    Asssessment:    Shortness of breath  Bilateral lower extremity edema  HFpEF with exacerbation, EF 60-65%.  RRT 7/28 for chest pain - chest pain symptoms unchanged despite inhaler and diuresis.  Midsternal discomfort present for greater than 1-week (described as heaviness/pressure).  , may be falsely underestimated 2/2 obesity.  CXR and syx c/w HF exacerbation and fluid overload.  Serial TN negative.  TTE 7/28: EF 60-65% without regional WMA, grade 1 DD.  Repeat nuclear stress test with possible area of small non-transmural infarct with mild gloria-infarct ischemia.  Specificity reduced 2/2 body habitus and suboptimal imaging quality.  Hypertension, improving; on amlodipine, hydralazine, losartan, carvedilol  Dyslipidemia,  on Lipitor  GERD, on protonix  DYLAN, Bipap per home settings  Anemia, Hgb of 10.4, should follow up outpatient with PCP.  Morbid obesity, BMI 53  _____________________________________________________________    Plan:   Patient doing well.  Discussed findings of Lexiscan stress test, and offered additional testing with outpatient stress MRI for further evaluation, and she would like to proceed.  Will arrange outpatient stress cardiac MRI.  At discharge, please send on the following cardiac medication regimen:  Amlodipine 10 mg/d.  Carvedilol 25 mg/d.  Furosemide 20 mg/d.  Hydralazine 50 mg  TID.  Losartan 100 mg/d.  Atorvastatin 40 mg/d.  Outpatient cardiology clinic follow up will be arranged.  Will discuss cMRI results then.  Ok to discharge to home today from a cardiac standpoint.  _____________________________________________________________    Plan of care and the majority of MDM was formulated under direction and guidance of Dr. Ocampo.    35 Minutes spent in coordination of care, and discussion with the patient and/or family regarding diagnostic results, prognosis, symptom management, risks and benefits of management options, and development of the plan of care of above.    Thank you for the opportunity to participate in the care of Ms. Agatha Rodriguez.  Please feel free to reach out with any additional questions.    FABIEN Mustafa, CNP   Nurse Practitioner  [facility]  Pager: [phone]  Phone: [phone]  Text Page (8am - 5pm, M-F)    Physical Exam   Temp: 97.2  F (36.2  C) Temp src: Oral BP: 131/64 Pulse: 60   Resp: 17 SpO2: 98 % O2 Device: None (Room air)      Vitals:    07/29/24 0623 07/29/24 1500 07/30/24 0144   Weight: (!) 159.5 kg (351 lb 10.1 oz) (!) 162 kg (357 lb 2.3 oz) (!) 161.2 kg (355 lb 6.4 oz)     I/O last 3 completed shifts:  In: 560 [P.O.:560]  Out: 450 [Urine:450]    Constitutional: Appears stated age, well nourished, NAD.  Neck: Supple. Carotid pulses full and equal.  Respiratory: Non-labored. Lungs clear.  Cardiovascular: RRR, normal S1 and S2. No M/G/R.  Vascular: Peripheral pulses intact and symmetric bilaterally.  Trace ankle edema.  Skin: Warm and dry. No rashes, cyanosis, edema  Neurologic: No gross focal deficits. Alert, awake, and oriented to all spheres.  Psychiatric: Affect appropriate. Mentation normal.    Data   Recent Labs   Lab 07/30/24  1030 07/29/24  2216 07/29/24  1010 07/28/24  1820 07/28/24  1008 07/27/24 2211 07/27/24 2117   WBC 9.9  --   --   --   --   --  9.8   HGB 11.1*  --   --   --   --   --  10.4*   MCV 83  --   --    --   --   --  83     --   --   --   --   --  227     --  138  --  142  --  142   POTASSIUM 3.7 3.8 3.0*  --  3.0*  --  3.9   CHLORIDE 102  --  100  --  102  --  103   CO2 32*  --  31*  --  30*  --  29   BUN 15.6  --  15.6  --  15.7  --  18.0   CR 0.84  --  0.79  --  0.86  --  0.93   ANIONGAP 6*  --  7  --  10  --  10   AVNI 9.9  --  9.3  --  9.7  --  9.4   *  --  167* 227* 158*  --  121*   ALBUMIN  --   --   --   --   --  3.9  --    PROTTOTAL  --   --   --   --   --  7.3  --    BILITOTAL  --   --   --   --   --  0.2  --    ALKPHOS  --   --   --   --   --  68  --    ALT  --   --   --   --   --  15  --    AST  --   --   --   --   --  24  --        Recent Labs   Lab 07/30/24  1030 07/27/24  2117   WBC 9.9 9.8   HGB 11.1* 10.4*   HCT 35.7 33.4*   MCV 83 83    227     Recent Labs   Lab 07/30/24  1030 07/29/24  2216 07/29/24  1010 07/28/24  1820 07/28/24  1008     --  138  --  142   POTASSIUM 3.7 3.8 3.0*  --  3.0*   CHLORIDE 102  --  100  --  102   CO2 32*  --  31*  --  30*   ANIONGAP 6*  --  7  --  10   *  --  167* 227* 158*   BUN 15.6  --  15.6  --  15.7   CR 0.84  --  0.79  --  0.86   GFRESTIMATED 73  --  79  --  71   AVNI 9.9  --  9.3  --  9.7        Patient Active Problem List   Diagnosis    Pain in a tooth or teeth    CAD (coronary artery disease)    Hypertension    GERD (gastroesophageal reflux disease)    Sleep apnea    Primary osteoarthritis of both knees    Obesity    Shortness of breath    Thyroid nodule    Bilateral lower extremity edema    Myocardial infarction, silent (H)    Morbid obesity with BMI of 50.0-59.9, adult (H)    Prolonged Q-T interval on ECG    Diastolic dysfunction     Medications   Current Facility-Administered Medications   Medication Dose Route Frequency Provider Last Rate Last Admin     Current Facility-Administered Medications   Medication Dose Route Frequency Provider Last Rate Last Admin    albuterol (PROVENTIL) neb solution 2.5 mg  2.5 mg  Nebulization 4x Daily Zeny Zaldivar MD   2.5 mg at 24 1131    amLODIPine (NORVASC) tablet 10 mg  10 mg Oral Daily Delgado Rainey MD   10 mg at 24 0940    aspirin EC tablet 81 mg  81 mg Oral Daily Delgado Rainey MD   81 mg at 24 0938    atorvastatin (LIPITOR) tablet 40 mg  40 mg Oral Daily Zeny Zaldivar MD   40 mg at 24 1831    carvedilol (COREG) tablet 25 mg  25 mg Oral BID w/meals Bisi Tran APRN CNP   25 mg at 24 0939    fluticasone (ARNUITY ELLIPTA) 200 MCG/ACT inhaler 1 puff  1 puff Inhalation Daily Zeny Zaldivar MD   1 puff at 24 0947    furosemide (LASIX) tablet 20 mg  20 mg Oral Daily Zeny Zaldivar MD   20 mg at 24 0939    hydrALAZINE (APRESOLINE) tablet 50 mg  50 mg Oral Q8H [facility] Bisi Tran APRN CNP   50 mg at 24 2219    losartan (COZAAR) tablet 100 mg  100 mg Oral Daily Zeny Zaldivar MD   100 mg at 24 0940    multivitamin w/minerals (THERA-VIT-M) tablet 1 tablet  1 tablet Oral Daily Zeny Zaldivar MD   1 tablet at 24 0938    pantoprazole (PROTONIX) EC tablet 40 mg  40 mg Oral QAM AC Zeny Zaldivar MD   40 mg at 24 0938    predniSONE (DELTASONE) tablet 40 mg  40 mg Oral Daily Zeny Zaldivar MD   40 mg at 24 0938    sodium chloride (PF) 0.9% PF flush 3 mL  3 mL Intracatheter Q8H Anthony Castellanos DO   3 mL at 24 2220       Data   Last 24 hours labs personally reviewed.  Echo:   Recent Results (from the past 4320 hour(s))   Echocardiogram Complete   Result Value    LVEF  60-65%    Narrative    [id number]  [id number]  [id number]  [id number]^JAYNA^ANTHONY     [facility]  Echocardiography Laboratory  [address] [place] [id number]     Name: ION KIMBROUGH  MRN: [id number]  : 1950  Study Date: 2024 10:02 AM  Age: 73 yrs  Gender: Female  Patient Location: [facility]  Reason For Study: CHF  Ordering Physician: MD Adwoa Snyder  Performed By: Renuka Rolle     BSA: 2.5 m2  Height: 66  in  Weight: 339 lb  HR: 78  BP: 174/91 mmHg  ______________________________________________________________________________  Procedure  Complete Portable Echo Adult. Optison (NDC #[id number]) given intravenously.  ______________________________________________________________________________  Interpretation Summary     Technically difficult imaging  Left ventricular systolic function is normal.  The visual ejection fraction is 60-65%.  The left ventricle is normal in size.  No regional wall motion abnormalities noted.  Doppler interrogation does not demonstrate signficant setnosis or  insufficiency involving cardiac valves     No old studies for compariosn  ______________________________________________________________________________  Left Ventricle  The left ventricle is normal in size. There is normal left ventricular wall  thickness. Left ventricular systolic function is normal. The visual ejection  fraction is 60-65%. Grade I or early diastolic dysfunction. No regional wall  motion abnormalities noted.     Mitral Valve  Calcified mitral apparatus. There is no mitral regurgitation noted. There is  no mitral valve stenosis.     Tricuspid Valve  The tricuspid valve is not well visualized, but is grossly normal. No  tricuspid regurgitation. Right ventricular systolic pressure could not be  approximated due to inadequate tricuspid regurgitation. There is no tricuspid  stenosis.     Aortic Valve  The aortic valve is not well visualized. No aortic regurgitation is present.  No aortic stenosis is present.     Pulmonic Valve  The pulmonic valve is not well visualized.     Vessels  The aortic root is normal size. Normal size ascending aorta. Inferior vena  cava not well visualized for estimation of right atrial pressure.     Pericardium  The pericardium appears normal. There is no pleural effusion.     Rhythm  Sinus rhythm was noted.      ______________________________________________________________________________  MMode/2D Measurements & Calculations  IVSd: 1.0 cm  LVIDd: 4.8 cm  LVIDs: 3.0 cm  LVPWd: 1.0 cm  FS: 38.1 %  LV mass(C)d: 170.2 grams  LV mass(C)dI: 68.0 grams/m2  Ao root diam: 3.0 cm  LA dimension: 4.1 cm  asc Aorta Diam: 3.4 cm     LA/Ao: 1.3  LVOT diam: 2.3 cm  LVOT area: 4.1 cm2  Ao root diam index Ht(cm/m): 1.8  Ao root diam index BSA (cm/m2): 1.2  Asc Ao diam index BSA (cm/m2): 1.4  Asc Ao diam index Ht(cm/m): 2.0  LA Volume (BP): 109.0 ml  LA Volume Index (BP): 43.6 ml/m2  RWT: 0.42     Doppler Measurements & Calculations  MV E max khang: 82.3 cm/sec  MV A max khang: 92.2 cm/sec  MV E/A: 0.89  MV dec time: 0.25 sec  Ao V2 max: 164.0 cm/sec  Ao max P.0 mmHg  Ao V2 mean: 114.0 cm/sec  Ao mean P.0 mmHg  Ao V2 VTI: 34.1 cm  KRISTA(I,D): 3.0 cm2  KRISTA(V,D): 2.9 cm2  LV V1 max P.4 mmHg  LV V1 max: 116.0 cm/sec  LV V1 VTI: 24.7 cm  SV(LVOT): 101.6 ml  SI(LVOT): 40.6 ml/m2  AV Khang Ratio (DI): 0.71  KRISTA Index (cm2/m2): 1.2  E/E' avg: 15.0  Lateral E/e': 8.9  Medial E/e': 21.0  RV S Khang: 12.5 cm/sec     ______________________________________________________________________________  Report approved by: Dr. Harvey Alexander 2024 11:02 AM

## 2024-07-30 NOTE — PLAN OF CARE
Discharge    Patient discharged to home via wheelchair with PCA and self.    Care plan note  DATE & TIME:7/30/24, [id number]  Cognitive Concerns/ Orientation: A & O x 4  BEHAVIOR & AGGRESSION TOOL COLOR: Green, calm and cooperative.  ABNL VS/O2: VSS on RA, used appropriate size BP cuff today.   MOBILITY: IND w/ walker.   PAIN MANAGMENT: Denies  DIET: 2 gm Sodium   BOWEL/BLADDER: Continent of B/B  DRAIN/DEVICES: PIV SL and removed for discharge.   TELEMETRY RHYTHM: SR with first degree AV block  SKIN: +2  BLE edema  TESTS/PROCEDURES: Lexiscan stress test competed today.   D/C DATE: This afternoon.   OTHER IMPORTANT INFO: Patient PCA present at end of shift. Stable, no changes. Doing well. Set up home nebulizer machine for pt. Discharged with no questions.     Listed belongings gathered and given to patient (including from security/pharmacy). Yes  Care Plan and Patient education resolved: Yes  Prescriptions if needed, hard copies sent with patient  NA  Medication Bin checked and emptied on discharge Yes  SW/care coordinator/charge RN aware of discharge: Yes

## 2024-07-30 NOTE — PLAN OF CARE
Summary: SOB and LE Edema  DATE & TIME: 7/29/24, [id number]  Cognitive Concerns/Orientation: A&Ox4, calm and cooperative, pleasant  BEHAVIOR & AGGRESSION TOOL COLOR: Green  ABNL VS/O2: VSS on RA, ex HTN (SBP 140s-160s) - on scheduled BP meds. BiPAP at bedtime. Patient refusing hospital BIPAP overnight.   MOBILITY: IND w/ walker.in room, SBA w/walker in hallways  PAIN MANAGMENT: Denied pain.  DIET: 2g Na, good appetite  BOWEL/BLADDER: Continent B/B. Up to bathroom. On strict intake and output.  ABNL LAB/BG:  K 3.0, replaced, recheck  DRAIN/DEVICES: R PIV SL   TELEMETRY RHYTHM: SR w. 1st degree AV block   SKIN: Dryness. 2+ BLE edema  TESTS/PROCEDURES: Lexiscan stress test done in AM (had day 1/2)  D/C DAY/GOALS/PLACE: Pending clinical stability.  OTHER IMPORTANT INFO: Cardiology following. Hx asthma-. SOB/GREEN. On schedule neb treatment and prednisone. Lasix started. Patient PCA at bedside. Started on new BP meds,

## 2024-07-31 ENCOUNTER — TELEPHONE (OUTPATIENT)
Dept: CARDIOLOGY | Facility: CLINIC | Age: 74
End: 2024-07-31
Payer: MEDICARE

## 2024-07-31 NOTE — TELEPHONE ENCOUNTER
Patient was admitted to [facility] on 7/27/24 with progressive bilateral leg swelling and shortness of breath. Cardiology was consulted for chest pain and shortness of breath.     PMH: HTN, asthma, DYLAN, CAD, GERD.    7/28/24: Echo showed EF of 60-65% without regional WMA, grade 1 DD.    7/30/24: NM Lexiscan with possible area of small non-transmural infarct with mild gloria-infarct ischemia. Specificity reduced 2/2 body habitus and suboptimal imaging quality.    Will arrange outpatient stress cardiac MRI.     Pt was started on Coreg, Lasix, Hydralazine, Prednisone. PTA Metoprolol and Maxzide were discontinued at time of discharge.    Pt is scheduled for a cMRI on 8/22/24 at 0930, and an OV on 9/20/24 at 0830 with CARLY Ilana Reinoso at our [place] Office.    Writer attempted to call pt for a cardiology post discharge phone call, but no answer. VM left to call back with any non emergent cardiac related or medication questions. Reminder left of appt's as scheduled above. Writer's phone number was provided. GALE Phan RN.

## 2024-07-31 NOTE — DISCHARGE SUMMARY
[facility]  Hospitalist Discharge Summary      Date of Admission:  7/27/2024  Date of Discharge:  7/30/2024  5:57 PM  Discharging Provider: Zeny Zaldivar MD  Discharge Service: Hospitalist Service    Discharge Diagnoses   Agatha Rodriguez is a 73 year old female with a history of Htn, asthma, DYLAN, CAD, GERD who is admitted on 7/27/2024 with progressive bilateral leg swelling and shortness of breath.      Shortness of breath  Possible asthma exacerbation  Possible acute diastolic CHF exacerbation  Bilateral lower extremity edema  Symptoms have been much worse over the past few days, clinically seems consistent with CHF but note that BNP within normal limits at 355. CT shows mosaic attenuation of the lungs which could indicate edema. Note reported angina but I don't see a history of stent. Lexiscan 2019 was negative for inducible ischemia with an EF of 60%. Other considerations include lymphedema, pulmonary hypertension, RAD. CTPE study negative for PE.  -received lasix 40 mg IV in the ED, will repeat once in the morning  Cardiology consultation requested.  Patient was given 60 mg of Lasix IV on 7/28/2024  Serial tropes remain normal 12-13  TSH normal at 3.13  Patient was complaining of chest heaviness with her history of asthma so she was started on DuoNebs 4 times daily and prednisone 40 mg p.o. daily for 5 days  Lexiscan stress test ordered to evaluate for ischemic heart disease.  Lexiscan test completed on 7/30/2024.    Echo showed EF normal at 60 to 65%.  No regional wall motion abnormalities.  No significant valvular disease.  It showed grade 1 LV diastolic dysfunction  Start patient on Lasix 20 mg p.o. daily on 7/29/2024     Patient's needs to discharge on a nebulizer and DuoNeb solution prescription on discharge     Cardiology switched her Toprol-XL to Coreg 25 mg p.o. twice daily, hydralazine dosage increased to 50 mg 3 times daily for better blood pressure control. BP well  controlled now     Chanel scan results showed abnormal stress test on 7/30/24     The nuclear stress test is probably abnormal. Specificity reduced due to suboptimal study quality related to patient body habitus.    There is a small area of nontransmural infarction in the distal anteroseptal and apex segment(s) of the left ventricle associated with a small area of a mild degree of gloria-infarct ischemia.    Stress to rest cavity ratio is 1.02.  TID is absent.    Left ventricular function is low normal.    The left ventricular ejection fraction at rest is 55%.  The left ventricular ejection fraction at stress is 50%.    LV cavity size enlarged.    There is no prior study for comparison.      Cardiology reviewed the stress test results with patient and they recommended outpatient cardiac MRI stress test for further evaluation.  Shortness of breath much improved currently.  Patient feels so much better since admission and is planning on discharge in stable condition with outpatient follow-up with cardiology closely     Hypertensive urgency  Blood pressure is running high systolics in the 160s  Restart PTA meds Toprol- mg p.o. daily, Norvasc 10 mg p.o. daily  Added hydralazine 25 mg 3 times daily for better blood pressure control on 7/29/2024  Lasix 20 mg p.o. daily  Hydralazine dosage increased to 50 mg 3 times daily.  Toprol-XL switched to Coreg 25 mg p.o. twice daily per cardiology  Blood pressure better controlled now     Possible asthma exacerbation   -continue PTA Qvar  DuoNebs 4 times daily  Prednisone 40 mg p.o. daily for 5 days ordered  Needs to discharge with a nebulizer  Symptoms much improved currently     Hypertension  Angina  Dyslipidemia  -continue current regimen as per cardiology   continue PTA amlodipine   Added hydralazine 50 mg 3 times daily.  Switch Toprol-XL to Coreg     GERD  -continue PTA protonix     DYLAN  -Bipap per home settings     Anemia  Hgb of 10.4, hgb in 2021 normal around 12 with  "normal iron studies at that time. Colonoscopy in 2022 showed diverticulosis. No reports of blood loss.   -should follow up with PCP to trend and discuss further work up     1.8 cm nodule of left lobe of the thyroid  -recommended non urgent thyroid US for further evaluation     Disposition: Obs for TTE, likely discharge to home on Sunday unless needs to stay longer for diuresis with diagnosis of CHF.     Clinically Significant Risk Factors Present on Admission                 # Drug Induced Platelet Defect: home medication list includes an antiplatelet medication      # Hypertension: Noted on problem list    # Anemia: based on hgb <11                    Diet: 2 Gram Sodium Diet    DVT Prophylaxis: Pneumatic Compression Devices  Becker Catheter: Not present  Lines: None     Cardiac Monitoring: ACTIVE order. Indication: Acute decompensated heart failure (48 hours)  Code Status: Full Code          Clinically Significant Risk Factors         # Hypokalemia: Lowest K = 3 mmol/L in last 2 days, will replace as needed           # Hypertension: Noted on problem list             # Severe Obesity: Estimated body mass index is 57.12 kg/m  as calculated from the following:    Height as of this encounter: 1.68 m (5' 6.14\").    Weight as of this encounter: 161.2 kg (355 lb 6.4 oz)., PRESENT ON ADMISSION          Discharged to home in stable condition       Clinically Significant Risk Factors     # Severe Obesity: Estimated body mass index is 57.12 kg/m  as calculated from the following:    Height as of this encounter: 1.68 m (5' 6.14\").    Weight as of this encounter: 161.2 kg (355 lb 6.4 oz).       Follow-ups Needed After Discharge   Follow-up Appointments     Follow-up and recommended labs and tests       Follow up with primary care provider, Radha Earl, within 7 days for   hospital follow- up.  The following labs/tests are recommended: CBC, BMP   in 1week.            Unresulted Labs Ordered in the Past 30 Days of this Admission  "      No orders found from 6/27/2024 to 7/28/2024.            Discharge Disposition   Discharged to home  Condition at discharge: Stable        Consultations This Hospital Stay   CARDIOLOGY IP CONSULT    Code Status   Full code    Time Spent on this Encounter   I, Zeny Zaldivar MD, personally saw the patient today and spent greater than 30 minutes discharging this patient.       Zeny Zaldivar MD  [place] [address] [facility]  [address] [place] [address]  Phone: [phone]  ______________________________________________________________________    Physical Exam   Vital Signs:                    Weight: 355 lbs 6.4 oz  General Appearance:  Alert awake, not in acute distress  Respiratory: Clear to auscultation bilaterally  Cardiovascular: Normal rate rhythm regular  GI: Soft, nontender nondistended bowel sounds positive  Skin: Warm and dry  Other:  No clubbing cyanosis or edema          Primary Care Physician   Radha Earl    Discharge Orders      Follow-Up with Cardiology CARLY      Reason for your hospital stay    SOB secondary to fluid overload and asthma     Follow-up and recommended labs and tests     Follow up with primary care provider, Radha Earl, within 7 days for hospital follow- up.  The following labs/tests are recommended: CBC, BMP in 1week.     Activity    Your activity upon discharge: activity as tolerated     Miscellaneous DME Order    DME Documentation:   Describe the reason for need to support medical necessity: Portable Nebulizer    I, the undersigned, certify that the above prescribed supplies are medically necessary for this patient and is both reasonable and necessary in reference to accepted standards of medical and necessary in reference to accepted standards of medical practice in the treatment of this patient's condition and is not prescribed as a convenience.     Diet    Follow this diet upon discharge: Orders Placed This Encounter      2 Gram Sodium Diet      MRI Cardiac w/contrast and stress       Significant Results and Procedures   Most Recent 3 CBC's:  Recent Labs   Lab Test 07/30/24  1030 07/27/24 2117 05/14/19  1847   WBC 9.9 9.8 8.5   HGB 11.1* 10.4* 11.5*   MCV 83 83 88    227 276     Most Recent 3 BMP's:  Recent Labs   Lab Test 07/30/24  1030 07/29/24  2216 07/29/24  1010 07/28/24  1820 07/28/24  1008     --  138  --  142   POTASSIUM 3.7 3.8 3.0*  --  3.0*   CHLORIDE 102  --  100  --  102   CO2 32*  --  31*  --  30*   BUN 15.6  --  15.6  --  15.7   CR 0.84  --  0.79  --  0.86   ANIONGAP 6*  --  7  --  10   AVNI 9.9  --  9.3  --  9.7   *  --  167* 227* 158*     Most Recent 2 LFT's:  Recent Labs   Lab Test 07/27/24  2211   AST 24   ALT 15   ALKPHOS 68   BILITOTAL 0.2     Most Recent 3 INR's:No lab results found.  Most Recent INR's and Anticoagulation Dosing History:  Anticoagulation Dose History           No data to display              Most Recent 3 Creatinines:  Recent Labs   Lab Test 07/30/24 1030 07/29/24  1010 07/28/24  1008   CR 0.84 0.79 0.86     Most Recent 3 Hemoglobins:  Recent Labs   Lab Test 07/30/24  1030 07/27/24 2117 05/14/19  1847   HGB 11.1* 10.4* 11.5*     Most Recent 3 Troponin's:No lab results found.  Most Recent 3 BNP's:  Recent Labs   Lab Test 07/27/24  2211   NTBNPI 355     Most Recent D-dimer:  Recent Labs   Lab Test 07/27/24 2117   DD 0.91*     Most Recent Cholesterol Panel:No lab results found.  7-Day Micro Results       No results found for the last 168 hours.          Most Recent TSH and T4:  Recent Labs   Lab Test 07/28/24  1008   TSH 3.13     Most Recent Hemoglobin A1c:No lab results found.  Most Recent 6 glucoses:  Recent Labs   Lab Test 07/30/24  1030 07/29/24  1010 07/28/24  1820 07/28/24  1008 07/27/24  2117   * 167* 227* 158* 121*     Most Recent Urinalysis:No lab results found.  Most Recent ABG:No lab results found.  Most Recent ESR & CRP:No lab results found.  Most Recent Anemia Panel:  Recent  Labs   Lab Test 07/30/24  1030   WBC 9.9   HGB 11.1*   HCT 35.7   MCV 83        Most Recent CPK:No lab results found.,   Results for orders placed or performed during the hospital encounter of 07/27/24   Chest XR,  PA & LAT    Narrative    EXAM: XR CHEST 2 VIEWS  LOCATION: [facility]  DATE: 7/27/2024    INDICATION: chest pain, shortness of breath  COMPARISON: None.      Impression    IMPRESSION: Heart size and pulmonary vascularity upper limits of normal with no bryant evidence for active CHF/volume overload. Moderate multilevel hypertrophic changes in the thoracic spine. No inflammatory infiltrates or pneumothorax.   CT Chest Pulmonary Embolism w Contrast    Narrative    EXAM: CT CHEST PULMONARY EMBOLISM W CONTRAST  LOCATION: [facility]  DATE: 7/28/2024    INDICATION: Positive D-dimer, shortness of breath.  COMPARISON: None.  TECHNIQUE: CT chest pulmonary angiogram during arterial phase injection of IV contrast. Multiplanar reformats and MIP reconstructions were performed. Dose reduction techniques were used.   CONTRAST: 83 mL Isovue 370.    FINDINGS:  ANGIOGRAM CHEST: Pulmonary arteries are normal caliber and negative for pulmonary emboli. Thoracic aorta is negative for dissection. No CT evidence of right heart strain.    LUNGS AND PLEURA: Mild mosaic attenuation pattern in the lungs. This is nonspecific but can be seen in the setting of small airways disease with air trapping. Infiltrate or edema can also give this appearance. No pleural effusions.    MEDIASTINUM/AXILLAE: No adenopathy. Cardiac enlargement. No pericardial effusion. Normal caliber thoracic aorta. Small hiatal hernia. 1.8 cm low-attenuation nodule left lobe of the thyroid.    CORONARY ARTERY CALCIFICATION: None.    UPPER ABDOMEN: Small benign-appearing cyst in the right hepatic lobe.    MUSCULOSKELETAL: Moderate hypertrophic and degenerative changes in the spine.      Impression     IMPRESSION:  1.  Negative for pulmonary embolism.    2.  Mild mosaic attenuation pattern in the lungs. This can be seen with air trapping in the setting of small airways disease but is nonspecific. Infiltrate or edema can also give this appearance.    3.  Cardiac enlargement. No effusions.    4.  Small hiatal hernia.    5.  Indeterminate 1.8 cm low-attenuation nodule left lobe of the thyroid gland. Routine follow-up thyroid ultrasound recommended for further evaluation.   Echocardiogram Complete     Value    LVEF  60-65%    Narrative    [id number]  [id number]  [id number]^JAYNA^ANTHONY     [facility]  Echocardiography Laboratory  [address] [place], [place] [address]     Name: ION KIMBROUGH  MRN: [id number]  : 1950  Study Date: 2024 10:02 AM  Age: 73 yrs  Gender: Female  Patient Location: [facility]  Reason For Study: CHF  Ordering Physician: MD Adwoa Snyder  Performed By: Renuka Rolle     BSA: 2.5 m2  Height: 66 in  Weight: 339 lb  HR: 78  BP: 174/91 mmHg  ______________________________________________________________________________  Procedure  Complete Portable Echo Adult. Optison (NDC #[id number]) given intravenously.  ______________________________________________________________________________  Interpretation Summary     Technically difficult imaging  Left ventricular systolic function is normal.  The visual ejection fraction is 60-65%.  The left ventricle is normal in size.  No regional wall motion abnormalities noted.  Doppler interrogation does not demonstrate signficant setnosis or  insufficiency involving cardiac valves     No old studies for compariosn  ______________________________________________________________________________  Left Ventricle  The left ventricle is normal in size. There is normal left ventricular wall  thickness. Left ventricular systolic function is normal. The visual ejection  fraction is 60-65%. Grade I or early diastolic dysfunction. No  regional wall  motion abnormalities noted.     Mitral Valve  Calcified mitral apparatus. There is no mitral regurgitation noted. There is  no mitral valve stenosis.     Tricuspid Valve  The tricuspid valve is not well visualized, but is grossly normal. No  tricuspid regurgitation. Right ventricular systolic pressure could not be  approximated due to inadequate tricuspid regurgitation. There is no tricuspid  stenosis.     Aortic Valve  The aortic valve is not well visualized. No aortic regurgitation is present.  No aortic stenosis is present.     Pulmonic Valve  The pulmonic valve is not well visualized.     Vessels  The aortic root is normal size. Normal size ascending aorta. Inferior vena  cava not well visualized for estimation of right atrial pressure.     Pericardium  The pericardium appears normal. There is no pleural effusion.     Rhythm  Sinus rhythm was noted.     ______________________________________________________________________________  MMode/2D Measurements & Calculations  IVSd: 1.0 cm  LVIDd: 4.8 cm  LVIDs: 3.0 cm  LVPWd: 1.0 cm  FS: 38.1 %  LV mass(C)d: 170.2 grams  LV mass(C)dI: 68.0 grams/m2  Ao root diam: 3.0 cm  LA dimension: 4.1 cm  asc Aorta Diam: 3.4 cm     LA/Ao: 1.3  LVOT diam: 2.3 cm  LVOT area: 4.1 cm2  Ao root diam index Ht(cm/m): 1.8  Ao root diam index BSA (cm/m2): 1.2  Asc Ao diam index BSA (cm/m2): 1.4  Asc Ao diam index Ht(cm/m): 2.0  LA Volume (BP): 109.0 ml  LA Volume Index (BP): 43.6 ml/m2  RWT: 0.42     Doppler Measurements & Calculations  MV E max khang: 82.3 cm/sec  MV A max khang: 92.2 cm/sec  MV E/A: 0.89  MV dec time: 0.25 sec  Ao V2 max: 164.0 cm/sec  Ao max P.0 mmHg  Ao V2 mean: 114.0 cm/sec  Ao mean P.0 mmHg  Ao V2 VTI: 34.1 cm  KRISTA(I,D): 3.0 cm2  KRISTA(V,D): 2.9 cm2  LV V1 max P.4 mmHg  LV V1 max: 116.0 cm/sec  LV V1 VTI: 24.7 cm  SV(LVOT): 101.6 ml  SI(LVOT): 40.6 ml/m2  AV Khang Ratio (DI): 0.71  KRISTA Index (cm2/m2): 1.2  E/E' avg: 15.0  Lateral E/e': 8.9  Medial  E/e': 21.0  RV S Khang: 12.5 cm/sec     ______________________________________________________________________________  Report approved by: Dr. Harvey Alexander 07/28/2024 11:02 AM         NM Lexiscan stress test (nuc card)     Value    Target     Baseline Systolic     Baseline Diastolic BP 95    Last Stress Systolic     Last Stress Diastolic BP 77    Baseline HR 69    Max HR  73    Max Predicted HR  49    Rate Pressure Product 11,242.0    BP 55    Left Ventricular EF 50    Stress/rest perfusion ratio 1.02    Narrative      The nuclear stress test is probably abnormal. Specificity reduced due   to suboptimal study quality related to patient body habitus.    There is a small area of nontransmural infarction in the distal   anteroseptal and apex segment(s) of the left ventricle associated with a   small area of a mild degree of gloria-infarct ischemia.    Stress to rest cavity ratio is 1.02.  TID is absent.    Left ventricular function is low normal.    The left ventricular ejection fraction at rest is 55%.  The left   ventricular ejection fraction at stress is 50%.    LV cavity size enlarged.    There is no prior study for comparison.         Discharge Medications   Discharge Medication List as of 7/30/2024  4:00 PM        START taking these medications    Details   albuterol (PROVENTIL) (2.5 MG/3ML) 0.083% neb solution Take 1 vial (2.5 mg) by nebulization every 4 hours as needed for shortness of breath, wheezing or cough, Disp-90 mL, R-0, E-Prescribe      carvedilol (COREG) 25 MG tablet Take 1 tablet (25 mg) by mouth 2 times daily (with meals), Disp-90 tablet, R-0, E-PrescribeFuture refills by PCP Dr. Radha Earl with phone number [phone].      furosemide (LASIX) 20 MG tablet Take 1 tablet (20 mg) by mouth daily, Disp-90 tablet, R-0, E-Prescribe      hydrALAZINE (APRESOLINE) 50 MG tablet Take 1 tablet (50 mg) by mouth every 8 hours, Disp-90 tablet, R-0, E-Prescribe      predniSONE (DELTASONE) 20  MG tablet Take 2 tablets (40 mg) by mouth daily for 2 days, Disp-4 tablet, R-0, E-Prescribe           CONTINUE these medications which have NOT CHANGED    Details   albuterol (PROAIR HFA/PROVENTIL HFA/VENTOLIN HFA) 108 (90 Base) MCG/ACT inhaler Inhale 2 puffs into the lungs every 6 hours as needed for shortness of breath, wheezing or cough, Historical      amLODIPine (NORVASC) 10 MG tablet Take 10 mg by mouth daily, Historical      aspirin 81 MG tablet Take 1 tablet by mouth daily., 1 tablet = 81 mg, Oral, DAILY Starting 2/17/2012, Until Discontinued, Disp-100 tablet, R-3, Fax      atorvastatin (LIPITOR) 40 MG tablet Take 40 mg by mouth daily, Historical      Docusate Calcium (STOOL SOFTENER PO) Take by mouth as needed, Historical      Famotidine (ACID CONTROLLER PO) Take by mouth daily, Historical      fluticasone (ARNUITY ELLIPTA) 200 MCG/ACT inhaler Inhale 1 puff into the lungs daily, Historical      glipiZIDE (GLUCOTROL XL) 2.5 MG 24 hr tablet Take 2.5 mg by mouth daily, Historical      ibuprofen (ADVIL,MOTRIN) 800 MG tablet Take 1 tablet by mouth every 8 hours as needed., 800 mg, Oral, EVERY 8 HOURS PRN Starting 2/17/2012, Until Discontinued, Disp-100 tablet, R-1, Fax      losartan (COZAAR) 100 MG tablet Take 100 mg by mouth daily, Historical      metFORMIN (GLUCOPHAGE) 500 MG tablet Take 500 mg by mouth 2 times daily (with meals), Historical      Multiple Vitamin (MULTIVITAMIN OR) Take 1 tablet by mouth daily, Historical      nitroGLYCERIN (NITROSTAT) 0.4 MG SL tablet Place 1 tablet under the tongue every 5 minutes as needed for chest pain., 0.4 mg, Sublingual, EVERY 5 MIN PRN Starting 2/17/2012, Until Discontinued, Disp-90 tablet, R-12, Fax      !! Respiratory Therapy Supplies (NEBULIZER COMPRESSOR) KIT 1 each 4 times daily, Disp-1 kit, R-0, Local Print      !! Respiratory Therapy Supplies (NEBULIZER/ADULT MASK) KIT 1 each 4 times daily, Disp-1 kit, R-0, Local Print       !! - Potential duplicate medications  found. Please discuss with provider.        STOP taking these medications       metoprolol succinate ER (TOPROL XL) 200 MG 24 hr tablet Comments:   Reason for Stopping:         triamterene-HCTZ (MAXZIDE) 75-50 MG tablet Comments:   Reason for Stopping:             Allergies   No Known Allergies

## 2024-09-16 ENCOUNTER — HOSPITAL ENCOUNTER (EMERGENCY)
Facility: CLINIC | Age: 74
Discharge: HOME | End: 2024-09-17
Attending: EMERGENCY MEDICINE | Admitting: EMERGENCY MEDICINE
Payer: MEDICARE

## 2024-09-16 DIAGNOSIS — E87.6 HYPOKALEMIA: ICD-10-CM

## 2024-09-16 DIAGNOSIS — K21.9 GASTROESOPHAGEAL REFLUX DISEASE, UNSPECIFIED WHETHER ESOPHAGITIS PRESENT: ICD-10-CM

## 2024-09-16 PROCEDURE — 93005 ELECTROCARDIOGRAM TRACING: CPT

## 2024-09-16 PROCEDURE — 99285 EMERGENCY DEPT VISIT HI MDM: CPT | Mod: 25

## 2024-09-16 ASSESSMENT — COLUMBIA-SUICIDE SEVERITY RATING SCALE - C-SSRS
1. IN THE PAST MONTH, HAVE YOU WISHED YOU WERE DEAD OR WISHED YOU COULD GO TO SLEEP AND NOT WAKE UP?: NO
6. HAVE YOU EVER DONE ANYTHING, STARTED TO DO ANYTHING, OR PREPARED TO DO ANYTHING TO END YOUR LIFE?: NO
2. HAVE YOU ACTUALLY HAD ANY THOUGHTS OF KILLING YOURSELF IN THE PAST MONTH?: NO

## 2024-09-17 ENCOUNTER — APPOINTMENT (OUTPATIENT)
Dept: GENERAL RADIOLOGY | Facility: CLINIC | Age: 74
End: 2024-09-17
Attending: EMERGENCY MEDICINE
Payer: MEDICARE

## 2024-09-17 VITALS
RESPIRATION RATE: 18 BRPM | HEART RATE: 80 BPM | OXYGEN SATURATION: 95 % | DIASTOLIC BLOOD PRESSURE: 80 MMHG | SYSTOLIC BLOOD PRESSURE: 190 MMHG | TEMPERATURE: 98.7 F

## 2024-09-17 LAB
ALBUMIN SERPL BCG-MCNC: 3.9 G/DL (ref 3.5–5.2)
ALP SERPL-CCNC: 65 U/L (ref 40–150)
ALT SERPL W P-5'-P-CCNC: 13 U/L (ref 0–50)
ANION GAP SERPL CALCULATED.3IONS-SCNC: 10 MMOL/L (ref 7–15)
AST SERPL W P-5'-P-CCNC: 18 U/L (ref 0–45)
ATRIAL RATE - MUSE: 75 BPM
BASOPHILS # BLD AUTO: 0 10E3/UL (ref 0–0.2)
BASOPHILS NFR BLD AUTO: 0 %
BILIRUB SERPL-MCNC: 1.2 MG/DL
BUN SERPL-MCNC: 10.7 MG/DL (ref 8–23)
CALCIUM SERPL-MCNC: 9.4 MG/DL (ref 8.8–10.4)
CHLORIDE SERPL-SCNC: 100 MMOL/L (ref 98–107)
CREAT SERPL-MCNC: 0.87 MG/DL (ref 0.51–0.95)
DIASTOLIC BLOOD PRESSURE - MUSE: NORMAL MMHG
EGFRCR SERPLBLD CKD-EPI 2021: 70 ML/MIN/1.73M2
EOSINOPHIL # BLD AUTO: 0.1 10E3/UL (ref 0–0.7)
EOSINOPHIL NFR BLD AUTO: 1 %
ERYTHROCYTE [DISTWIDTH] IN BLOOD BY AUTOMATED COUNT: 14.6 % (ref 10–15)
GLUCOSE SERPL-MCNC: 130 MG/DL (ref 70–99)
HCO3 SERPL-SCNC: 30 MMOL/L (ref 22–29)
HCT VFR BLD AUTO: 33.6 % (ref 35–47)
HGB BLD-MCNC: 10.2 G/DL (ref 11.7–15.7)
IMM GRANULOCYTES # BLD: 0 10E3/UL
IMM GRANULOCYTES NFR BLD: 0 %
INTERPRETATION ECG - MUSE: NORMAL
LIPASE SERPL-CCNC: 17 U/L (ref 13–60)
LYMPHOCYTES # BLD AUTO: 1.8 10E3/UL (ref 0.8–5.3)
LYMPHOCYTES NFR BLD AUTO: 19 %
MCH RBC QN AUTO: 24.9 PG (ref 26.5–33)
MCHC RBC AUTO-ENTMCNC: 30.4 G/DL (ref 31.5–36.5)
MCV RBC AUTO: 82 FL (ref 78–100)
MONOCYTES # BLD AUTO: 1 10E3/UL (ref 0–1.3)
MONOCYTES NFR BLD AUTO: 11 %
NEUTROPHILS # BLD AUTO: 6.5 10E3/UL (ref 1.6–8.3)
NEUTROPHILS NFR BLD AUTO: 69 %
NRBC # BLD AUTO: 0 10E3/UL
NRBC BLD AUTO-RTO: 0 /100
NT-PROBNP SERPL-MCNC: 719 PG/ML (ref 0–900)
P AXIS - MUSE: 41 DEGREES
PLATELET # BLD AUTO: 342 10E3/UL (ref 150–450)
POTASSIUM SERPL-SCNC: 2.9 MMOL/L (ref 3.4–5.3)
PR INTERVAL - MUSE: 230 MS
PROT SERPL-MCNC: 7.2 G/DL (ref 6.4–8.3)
QRS DURATION - MUSE: 100 MS
QT - MUSE: 430 MS
QTC - MUSE: 480 MS
R AXIS - MUSE: -29 DEGREES
RBC # BLD AUTO: 4.09 10E6/UL (ref 3.8–5.2)
SODIUM SERPL-SCNC: 140 MMOL/L (ref 135–145)
SYSTOLIC BLOOD PRESSURE - MUSE: NORMAL MMHG
T AXIS - MUSE: 70 DEGREES
TROPONIN T SERPL HS-MCNC: 12 NG/L
VENTRICULAR RATE- MUSE: 75 BPM
WBC # BLD AUTO: 9.4 10E3/UL (ref 4–11)

## 2024-09-17 PROCEDURE — 85025 COMPLETE CBC W/AUTO DIFF WBC: CPT | Performed by: EMERGENCY MEDICINE

## 2024-09-17 PROCEDURE — 250N000009 HC RX 250: Performed by: EMERGENCY MEDICINE

## 2024-09-17 PROCEDURE — 84484 ASSAY OF TROPONIN QUANT: CPT | Performed by: EMERGENCY MEDICINE

## 2024-09-17 PROCEDURE — 83690 ASSAY OF LIPASE: CPT | Performed by: EMERGENCY MEDICINE

## 2024-09-17 PROCEDURE — 83880 ASSAY OF NATRIURETIC PEPTIDE: CPT | Performed by: EMERGENCY MEDICINE

## 2024-09-17 PROCEDURE — 96374 THER/PROPH/DIAG INJ IV PUSH: CPT

## 2024-09-17 PROCEDURE — 94640 AIRWAY INHALATION TREATMENT: CPT

## 2024-09-17 PROCEDURE — 250N000013 HC RX MED GY IP 250 OP 250 PS 637: Performed by: EMERGENCY MEDICINE

## 2024-09-17 PROCEDURE — 250N000011 HC RX IP 250 OP 636: Performed by: EMERGENCY MEDICINE

## 2024-09-17 PROCEDURE — 80053 COMPREHEN METABOLIC PANEL: CPT | Performed by: EMERGENCY MEDICINE

## 2024-09-17 PROCEDURE — 96375 TX/PRO/DX INJ NEW DRUG ADDON: CPT

## 2024-09-17 PROCEDURE — 71046 X-RAY EXAM CHEST 2 VIEWS: CPT

## 2024-09-17 PROCEDURE — 36415 COLL VENOUS BLD VENIPUNCTURE: CPT | Performed by: EMERGENCY MEDICINE

## 2024-09-17 RX ORDER — IPRATROPIUM BROMIDE AND ALBUTEROL SULFATE 2.5; .5 MG/3ML; MG/3ML
3 SOLUTION RESPIRATORY (INHALATION) ONCE
Status: COMPLETED | OUTPATIENT
Start: 2024-09-17 | End: 2024-09-17

## 2024-09-17 RX ORDER — FAMOTIDINE 20 MG/1
20 TABLET, FILM COATED ORAL DAILY
Qty: 30 TABLET | Refills: 0 | Status: ON HOLD | OUTPATIENT
Start: 2024-09-17 | End: 2024-10-17

## 2024-09-17 RX ORDER — METHYLPREDNISOLONE SODIUM SUCCINATE 125 MG/2ML
125 INJECTION, POWDER, LYOPHILIZED, FOR SOLUTION INTRAMUSCULAR; INTRAVENOUS ONCE
Status: COMPLETED | OUTPATIENT
Start: 2024-09-17 | End: 2024-09-17

## 2024-09-17 RX ORDER — FUROSEMIDE 10 MG/ML
60 INJECTION INTRAMUSCULAR; INTRAVENOUS ONCE
Status: COMPLETED | OUTPATIENT
Start: 2024-09-17 | End: 2024-09-17

## 2024-09-17 RX ORDER — MAGNESIUM HYDROXIDE/ALUMINUM HYDROXICE/SIMETHICONE 120; 1200; 1200 MG/30ML; MG/30ML; MG/30ML
15 SUSPENSION ORAL ONCE
Status: COMPLETED | OUTPATIENT
Start: 2024-09-17 | End: 2024-09-17

## 2024-09-17 RX ORDER — FAMOTIDINE 20 MG/1
20 TABLET, FILM COATED ORAL ONCE
Status: COMPLETED | OUTPATIENT
Start: 2024-09-17 | End: 2024-09-17

## 2024-09-17 RX ORDER — POTASSIUM CHLORIDE 1500 MG/1
20 TABLET, EXTENDED RELEASE ORAL ONCE
Status: COMPLETED | OUTPATIENT
Start: 2024-09-17 | End: 2024-09-17

## 2024-09-17 RX ADMIN — FUROSEMIDE 60 MG: 10 INJECTION, SOLUTION INTRAMUSCULAR; INTRAVENOUS at 00:25

## 2024-09-17 RX ADMIN — POTASSIUM CHLORIDE 20 MEQ: 1500 TABLET, EXTENDED RELEASE ORAL at 01:52

## 2024-09-17 RX ADMIN — IPRATROPIUM BROMIDE AND ALBUTEROL SULFATE 3 ML: .5; 3 SOLUTION RESPIRATORY (INHALATION) at 00:23

## 2024-09-17 RX ADMIN — FAMOTIDINE 20 MG: 20 TABLET, FILM COATED ORAL at 02:51

## 2024-09-17 RX ADMIN — METHYLPREDNISOLONE SODIUM SUCCINATE 125 MG: 125 INJECTION, POWDER, FOR SOLUTION INTRAMUSCULAR; INTRAVENOUS at 00:24

## 2024-09-17 RX ADMIN — ALUMINUM HYDROXIDE, MAGNESIUM HYDROXIDE, AND SIMETHICONE 15 ML: 200; 200; 20 SUSPENSION ORAL at 01:58

## 2024-09-17 ASSESSMENT — ACTIVITIES OF DAILY LIVING (ADL)
ADLS_ACUITY_SCORE: 38

## 2024-09-17 NOTE — ED TRIAGE NOTES
"Patient BIBA for eval of chest pain that has caused SOB. Pt states she hasn't felt well in \"3 weeks\". Pt states she has 9.5/10 chest pain that is causing her SOB. Pt arrives tachypneic, hypoxic, and hypertensive. 5L Oxymask applied. Per pt, she was just hospitalized in July for \"the same thing\".     Triage Assessment (Adult)       Row Name 09/16/24 [id number]          Triage Assessment    Airway WDL WDL        Respiratory WDL    Respiratory WDL X;rhythm/pattern     Rhythm/Pattern, Respiratory pursed lip breathing;shallow;shortness of breath        Skin Circulation/Temperature WDL    Skin Circulation/Temperature WDL WDL        Cardiac WDL    Cardiac WDL X;chest pain  hypertensive     Cardiac Rhythm NSR        Chest Pain Assessment    Chest Pain Location midsternal;anterior chest, right     Chest Pain Radiation abdomen     Character pressure;sharp     Precipitating Factors at rest     Alleviating Factors nothing        Peripheral/Neurovascular WDL    Peripheral Neurovascular WDL X  edema bilateral extremities        Cognitive/Neuro/Behavioral WDL    Cognitive/Neuro/Behavioral WDL WDL                     "

## 2024-09-17 NOTE — ED NOTES
Bed: ED01  Expected date:   Expected time:   Means of arrival:   Comments:  Jeanette [age]F chest pain, difficulty breathing, duoneb given ETA [id number]

## 2024-09-17 NOTE — DISCHARGE INSTRUCTIONS
Your potassium was slightly low so we gave you potassium replacement pills.  Your chest pain may be related to acid reflux.  We want you to start a medication to help reduce acid production in the stomach.  We want you to follow-up with your primary care provider to be reevaluated for this and have your potassium levels rechecked.  Please return the emergency department if you develop any new or concerning symptoms.

## 2024-09-17 NOTE — ED PROVIDER NOTES
Emergency Department Note      History of Present Illness     Chief Complaint   Shortness of Breath and Chest Pain    HPI   Agatha Rodriguez is a 74 year old female with a history of asthma, CAD, hypertension and GERD presenting with chest pain and shortness of breath all day yesterday (9/16/24) that is worsening. Agatha notes feeling abnormal for the past three weeks. The patient is barely able to walk across the room. She has nebulizer treatments and inhalers at home. No oxygen use at home. The patient notes an intermittent dry cough with clearing clear mucus. She endorses fatigue, constipation, diarrhea. The patient denies fever, nausea, vomiting, bloody stool, dysuria, and hematuria. The patient reports history of a hysterectomy, but denies history of a cholecystectomy. Of note, the patient takes furosemide, but notes the swelling has not improved. No current blood thinner use.    Independent Historian   PCA as detailed above.    Review of External Notes   I reviewed the discharge summary note from 7/30/24.     Past Medical History     Medical History and Problem List   Asthma  Coronary artery disease  GERD  Hypertension  Obesity  Osteoarthritis of both knees  Sleep apnea     Medications   Norvasc  Asa  Lipitor  Coreg  Colace  Indocin  Cozaar  Adalat  Nitrostat  Protonix     Surgical History   Hysterectomy    Physical Exam     Patient Vitals for the past 24 hrs:   BP Temp Temp src Pulse Resp SpO2   09/17/24 0040 -- -- -- 80 -- 96 %   09/17/24 0035 (!) 146/120 -- -- -- -- --   09/17/24 0002 -- -- -- -- (!) 34 99 %   09/16/24 2357 -- 98.7  F (37.1  C) Temporal -- -- --   09/16/24 2356 (!) 176/135 -- -- 72 (!) 32 (!) 83 %     Physical Exam  General: No acute distress  Head: No obvious trauma to head.  Ears, Nose, Throat:  External ears normal.  Nose normal.  No pharyngeal erythema, swelling or exudate.  Midline uvula. Moist mucus membranes.  Eyes:  Conjunctivae clear.   Neck: Normal range of motion.  Neck  supple.   CV: Regular rate and rhythm.  No murmurs.      Respiratory: No wheezing or crackles. On 5 left via oxymask. Decreased breath sounds diffusely.   Gastrointestinal: Soft.  No distension. There is no tenderness.  There is no rigidity, no rebound and no guarding.   Musculoskeletal: Normal range of motion. Non tender extremities to palpations. 2+ pitting edema in the bilateral lower extremities up to the knee.   Neuro: Alert. Moving all extremities appropriately.  Normal speech.    Skin: Skin is warm and dry.  No rash noted.   Psych: Normal mood and affect. Behavior is normal.      Diagnostics     Lab Results   Labs Ordered and Resulted from Time of ED Arrival to Time of ED Departure   COMPREHENSIVE METABOLIC PANEL - Abnormal       Result Value    Sodium 140      Potassium 2.9 (*)     Carbon Dioxide (CO2) 30 (*)     Anion Gap 10      Urea Nitrogen 10.7      Creatinine 0.87      GFR Estimate 70      Calcium 9.4      Chloride 100      Glucose 130 (*)     Alkaline Phosphatase 65      AST 18      ALT 13      Protein Total 7.2      Albumin 3.9      Bilirubin Total 1.2     CBC WITH PLATELETS AND DIFFERENTIAL - Abnormal    WBC Count 9.4      RBC Count 4.09      Hemoglobin 10.2 (*)     Hematocrit 33.6 (*)     MCV 82      MCH 24.9 (*)     MCHC 30.4 (*)     RDW 14.6      Platelet Count 342      % Neutrophils 69      % Lymphocytes 19      % Monocytes 11      % Eosinophils 1      % Basophils 0      % Immature Granulocytes 0      NRBCs per 100 WBC 0      Absolute Neutrophils 6.5      Absolute Lymphocytes 1.8      Absolute Monocytes 1.0      Absolute Eosinophils 0.1      Absolute Basophils 0.0      Absolute Immature Granulocytes 0.0      Absolute NRBCs 0.0     LIPASE - Normal    Lipase 17     TROPONIN T, HIGH SENSITIVITY - Normal    Troponin T, High Sensitivity 12     NT PROBNP INPATIENT - Normal    N terminal Pro BNP Inpatient 719       Imaging   Chest XR,  PA & LAT   Final Result   IMPRESSION: No consolidation, edema,  pleural effusion, or pneumothorax. Mildly enlarged cardiac silhouette, stable.        EKG   ECG taken at 0000, ECG read at 0229  Sinus rhythm with 1st degree AV block with occasional premature ventricular complexes and premature atrial complexes  Possible left atrial enlargement   Borderline ECG   No significant change as compared to prior, dated 7/29/24.  Rate 75 bpm. IL interval 230 ms. QRS duration 100 ms. QT/QTc 430/480 ms. P-R-T axes 41 -29 70.    Independent Interpretation   I independently interpreted the chest XR, negative for infiltrates, pleural effusion or pneumothorax.     ED Course      Medications Administered   Medications   famotidine (PEPCID) tablet 20 mg (has no administration in time range)   furosemide (LASIX) injection 60 mg (60 mg Intravenous $Given 9/17/24 0025)   ipratropium - albuterol 0.5 mg/2.5 mg/3 mL (DUONEB) neb solution 3 mL (3 mLs Nebulization $Given 9/17/24 0023)   methylPREDNISolone Na Suc (solu-MEDROL) injection 125 mg (125 mg Intravenous $Given 9/17/24 0024)   potassium chloride liv ER (KLOR-CON M20) CR tablet 20 mEq (20 mEq Oral $Given 9/17/24 0152)   alum & mag hydroxide-simethicone (MAALOX) suspension 15 mL (15 mLs Oral $Given 9/17/24 0158)     Procedures   None    Discussion of Management   None    ED Course   ED Course as of 09/17/24 0231   Tue Sep 17, 2024   0013 I obtained the history and examined the patient as noted above.      0149 I rechecked and updated the patient.      0219 I rechecked and updated the patient. She felt improved after receiving a GI cocktail.       Additional Documentation  None    Medical Decision Making / Diagnosis     CMS Diagnoses: None    MIPS       None    MDM   Agatha Rodriguez is a 74 year old female presenting with shortness of breath, chest pain and lower extremity edema.  She is given a DuoNeb treatment and Solu-Medrol.  EKG shows no ischemic changes.  Troponin is not elevated.  Potassium is low and she is given oral repletion.  BNP is not  elevated.  She is able to be weaned to room air.  She continues to endorse chest pain.  She is given a GI cocktail and her chest pain improves.  Seems that her symptoms are at least partially related to acid reflux.  She is given a prescription for Pepcid.  She continues to remain stable on room air.  She is instructed to follow-up with her primary care provider.  Return precautions are given and she verbalizes understanding.  She is discharged home in stable condition.        Disposition   The patient was discharged.     Diagnosis     ICD-10-CM    1. Gastroesophageal reflux disease, unspecified whether esophagitis present  K21.9       2. Hypokalemia  E87.6          Discharge Medications   New Prescriptions    BECLOMETHASONE HFA (QVAR REDIHALER) 40 MCG/ACT INHALER    Inhale 1 puff into the lungs 2 times daily.    FAMOTIDINE (PEPCID) 20 MG TABLET    Take 1 tablet (20 mg) by mouth daily.     Scribe Disclosure:  Kathleen CEBALLOS, am serving as a scribe at 12:08 AM on 9/17/2024 to document services personally performed by Adan Suggs MD based on my observations and the provider's statements to me.        Adan Suggs MD  09/18/24 [id number]

## 2024-09-30 ENCOUNTER — ANCILLARY PROCEDURE (OUTPATIENT)
Dept: GENERAL RADIOLOGY | Facility: CLINIC | Age: 74
End: 2024-09-30
Attending: PHYSICIAN ASSISTANT
Payer: MEDICARE

## 2024-09-30 ENCOUNTER — OFFICE VISIT (OUTPATIENT)
Dept: URGENT CARE | Facility: URGENT CARE | Age: 74
End: 2024-09-30
Payer: MEDICARE

## 2024-09-30 ENCOUNTER — HOSPITAL ENCOUNTER (INPATIENT)
Facility: CLINIC | Age: 74
End: 2024-09-30
Attending: EMERGENCY MEDICINE | Admitting: HOSPITALIST
Payer: MEDICARE

## 2024-09-30 VITALS
WEIGHT: 293 LBS | SYSTOLIC BLOOD PRESSURE: 166 MMHG | BODY MASS INDEX: 57.05 KG/M2 | TEMPERATURE: 97.5 F | OXYGEN SATURATION: 96 % | DIASTOLIC BLOOD PRESSURE: 81 MMHG | HEART RATE: 83 BPM

## 2024-09-30 DIAGNOSIS — R60.9 DEPENDENT EDEMA: ICD-10-CM

## 2024-09-30 DIAGNOSIS — T88.7XXA MEDICATION SIDE EFFECTS: ICD-10-CM

## 2024-09-30 DIAGNOSIS — R06.02 SHORTNESS OF BREATH: ICD-10-CM

## 2024-09-30 DIAGNOSIS — R60.1 ANASARCA: ICD-10-CM

## 2024-09-30 DIAGNOSIS — J90 PLEURAL EFFUSION: ICD-10-CM

## 2024-09-30 DIAGNOSIS — I50.9 CONGESTIVE HEART FAILURE, UNSPECIFIED HF CHRONICITY, UNSPECIFIED HEART FAILURE TYPE (H): ICD-10-CM

## 2024-09-30 DIAGNOSIS — R06.02 SOB (SHORTNESS OF BREATH): Primary | ICD-10-CM

## 2024-09-30 DIAGNOSIS — I51.89 DIASTOLIC DYSFUNCTION: Primary | ICD-10-CM

## 2024-09-30 DIAGNOSIS — I50.31 ACUTE DIASTOLIC HEART FAILURE (H): ICD-10-CM

## 2024-09-30 DIAGNOSIS — N18.2 CKD (CHRONIC KIDNEY DISEASE) STAGE 2, GFR 60-89 ML/MIN: ICD-10-CM

## 2024-09-30 DIAGNOSIS — R59.1 LYMPHADENOPATHY: ICD-10-CM

## 2024-09-30 DIAGNOSIS — I10 BENIGN ESSENTIAL HYPERTENSION: ICD-10-CM

## 2024-09-30 DIAGNOSIS — R52 PAIN: ICD-10-CM

## 2024-09-30 DIAGNOSIS — E87.6 HYPOKALEMIA: ICD-10-CM

## 2024-09-30 DIAGNOSIS — I48.3 TYPICAL ATRIAL FLUTTER (H): ICD-10-CM

## 2024-09-30 DIAGNOSIS — J93.9 PNEUMOTHORAX, UNSPECIFIED TYPE: ICD-10-CM

## 2024-09-30 DIAGNOSIS — I48.91 ATRIAL FIBRILLATION, UNSPECIFIED TYPE (H): ICD-10-CM

## 2024-09-30 DIAGNOSIS — G47.00 INSOMNIA, UNSPECIFIED TYPE: ICD-10-CM

## 2024-09-30 DIAGNOSIS — J98.09 MAINSTEM BRONCHIAL STENOSIS: ICD-10-CM

## 2024-09-30 DIAGNOSIS — R06.02 SOB (SHORTNESS OF BREATH): ICD-10-CM

## 2024-09-30 LAB
ALBUMIN SERPL-MCNC: 3.8 G/DL (ref 3.4–5)
ALBUMIN UR-MCNC: 100 MG/DL
ALP SERPL-CCNC: 62 U/L (ref 40–150)
ALT SERPL W P-5'-P-CCNC: 28 U/L (ref 0–50)
ANION GAP SERPL CALCULATED.3IONS-SCNC: 3 MMOL/L (ref 3–14)
APPEARANCE UR: CLEAR
AST SERPL W P-5'-P-CCNC: 36 U/L (ref 0–45)
BACTERIA #/AREA URNS HPF: ABNORMAL /HPF
BASOPHILS # BLD AUTO: 0 10E3/UL (ref 0–0.2)
BASOPHILS NFR BLD AUTO: 0 %
BILIRUB SERPL-MCNC: 0.6 MG/DL (ref 0.2–1.3)
BILIRUB UR QL STRIP: NEGATIVE
BUN SERPL-MCNC: 15 MG/DL (ref 7–30)
CALCIUM SERPL-MCNC: 9.9 MG/DL (ref 8.5–10.1)
CHLORIDE BLD-SCNC: 107 MMOL/L (ref 94–109)
CO2 SERPL-SCNC: 34 MMOL/L (ref 20–32)
COLOR UR AUTO: YELLOW
CREAT SERPL-MCNC: 1.3 MG/DL (ref 0.52–1.04)
EGFRCR SERPLBLD CKD-EPI 2021: 43 ML/MIN/1.73M2
EOSINOPHIL # BLD AUTO: 0 10E3/UL (ref 0–0.7)
EOSINOPHIL NFR BLD AUTO: 0 %
ERYTHROCYTE [DISTWIDTH] IN BLOOD BY AUTOMATED COUNT: 15.2 % (ref 10–15)
FLUAV RNA SPEC QL NAA+PROBE: NEGATIVE
FLUBV RNA RESP QL NAA+PROBE: NEGATIVE
GLUCOSE BLD-MCNC: 150 MG/DL (ref 70–99)
GLUCOSE UR STRIP-MCNC: NEGATIVE MG/DL
HCT VFR BLD AUTO: 37.4 % (ref 35–47)
HGB BLD-MCNC: 11.1 G/DL (ref 11.7–15.7)
HGB UR QL STRIP: NEGATIVE
IMM GRANULOCYTES # BLD: 0 10E3/UL
IMM GRANULOCYTES NFR BLD: 0 %
KETONES UR STRIP-MCNC: NEGATIVE MG/DL
LEUKOCYTE ESTERASE UR QL STRIP: NEGATIVE
LYMPHOCYTES # BLD AUTO: 3 10E3/UL (ref 0.8–5.3)
LYMPHOCYTES NFR BLD AUTO: 30 %
MCH RBC QN AUTO: 24.3 PG (ref 26.5–33)
MCHC RBC AUTO-ENTMCNC: 29.7 G/DL (ref 31.5–36.5)
MCV RBC AUTO: 82 FL (ref 78–100)
MONOCYTES # BLD AUTO: 0.9 10E3/UL (ref 0–1.3)
MONOCYTES NFR BLD AUTO: 9 %
NEUTROPHILS # BLD AUTO: 5.9 10E3/UL (ref 1.6–8.3)
NEUTROPHILS NFR BLD AUTO: 60 %
NITRATE UR QL: NEGATIVE
NT-PROBNP SERPL-MCNC: 1759 PG/ML (ref 0–900)
PH UR STRIP: 6 [PH] (ref 5–7)
PLATELET # BLD AUTO: 407 10E3/UL (ref 150–450)
POTASSIUM BLD-SCNC: 3.5 MMOL/L (ref 3.4–5.3)
PROT SERPL-MCNC: 8.1 G/DL (ref 6.8–8.8)
RBC # BLD AUTO: 4.57 10E6/UL (ref 3.8–5.2)
RBC #/AREA URNS AUTO: ABNORMAL /HPF
RSV RNA SPEC NAA+PROBE: NEGATIVE
SARS-COV-2 RNA RESP QL NAA+PROBE: NEGATIVE
SODIUM SERPL-SCNC: 144 MMOL/L (ref 135–145)
SP GR UR STRIP: 1.02 (ref 1–1.03)
SQUAMOUS #/AREA URNS AUTO: ABNORMAL /LPF
TROPONIN T SERPL HS-MCNC: 16 NG/L
UROBILINOGEN UR STRIP-ACNC: 0.2 E.U./DL
WBC # BLD AUTO: 9.8 10E3/UL (ref 4–11)
WBC #/AREA URNS AUTO: ABNORMAL /HPF
YEAST #/AREA URNS HPF: ABNORMAL /HPF

## 2024-09-30 PROCEDURE — 99223 1ST HOSP IP/OBS HIGH 75: CPT | Mod: AI | Performed by: HOSPITALIST

## 2024-09-30 PROCEDURE — 87637 SARSCOV2&INF A&B&RSV AMP PRB: CPT | Performed by: EMERGENCY MEDICINE

## 2024-09-30 PROCEDURE — 81001 URINALYSIS AUTO W/SCOPE: CPT | Performed by: PHYSICIAN ASSISTANT

## 2024-09-30 PROCEDURE — 36415 COLL VENOUS BLD VENIPUNCTURE: CPT | Performed by: EMERGENCY MEDICINE

## 2024-09-30 PROCEDURE — 80053 COMPREHEN METABOLIC PANEL: CPT | Performed by: PHYSICIAN ASSISTANT

## 2024-09-30 PROCEDURE — 250N000011 HC RX IP 250 OP 636: Performed by: EMERGENCY MEDICINE

## 2024-09-30 PROCEDURE — 96374 THER/PROPH/DIAG INJ IV PUSH: CPT

## 2024-09-30 PROCEDURE — 210N000002 HC R&B HEART CARE

## 2024-09-30 PROCEDURE — 36415 COLL VENOUS BLD VENIPUNCTURE: CPT | Performed by: PHYSICIAN ASSISTANT

## 2024-09-30 PROCEDURE — 83880 ASSAY OF NATRIURETIC PEPTIDE: CPT | Performed by: PHYSICIAN ASSISTANT

## 2024-09-30 PROCEDURE — 99207 PR INPT ADMISSION FROM CLINIC: CPT | Performed by: PHYSICIAN ASSISTANT

## 2024-09-30 PROCEDURE — 85025 COMPLETE CBC W/AUTO DIFF WBC: CPT | Performed by: PHYSICIAN ASSISTANT

## 2024-09-30 PROCEDURE — 93005 ELECTROCARDIOGRAM TRACING: CPT

## 2024-09-30 PROCEDURE — 250N000009 HC RX 250: Performed by: EMERGENCY MEDICINE

## 2024-09-30 PROCEDURE — 99285 EMERGENCY DEPT VISIT HI MDM: CPT | Mod: 25

## 2024-09-30 PROCEDURE — 71046 X-RAY EXAM CHEST 2 VIEWS: CPT | Mod: TC | Performed by: RADIOLOGY

## 2024-09-30 PROCEDURE — 94640 AIRWAY INHALATION TREATMENT: CPT

## 2024-09-30 PROCEDURE — 84484 ASSAY OF TROPONIN QUANT: CPT | Performed by: EMERGENCY MEDICINE

## 2024-09-30 PROCEDURE — 83880 ASSAY OF NATRIURETIC PEPTIDE: CPT | Performed by: EMERGENCY MEDICINE

## 2024-09-30 RX ORDER — IPRATROPIUM BROMIDE AND ALBUTEROL SULFATE 2.5; .5 MG/3ML; MG/3ML
3 SOLUTION RESPIRATORY (INHALATION) ONCE
Status: COMPLETED | OUTPATIENT
Start: 2024-09-30 | End: 2024-09-30

## 2024-09-30 RX ORDER — FUROSEMIDE 10 MG/ML
40 INJECTION INTRAMUSCULAR; INTRAVENOUS ONCE
Status: COMPLETED | OUTPATIENT
Start: 2024-09-30 | End: 2024-09-30

## 2024-09-30 RX ORDER — FERROUS SULFATE 325(65) MG
325 TABLET ORAL
Status: ON HOLD | COMMUNITY
Start: 2024-09-02

## 2024-09-30 RX ORDER — POTASSIUM CHLORIDE 1.5 G/1.58G
20 POWDER, FOR SOLUTION ORAL 2 TIMES DAILY
Qty: 5 PACKET | Refills: 0 | Status: SHIPPED | OUTPATIENT
Start: 2024-09-30 | End: 2024-09-30

## 2024-09-30 RX ORDER — POTASSIUM CHLORIDE 750 MG/1
1 CAPSULE, EXTENDED RELEASE ORAL DAILY
Status: ON HOLD | COMMUNITY
Start: 2024-09-02

## 2024-09-30 RX ORDER — POTASSIUM CHLORIDE 1.5 G/1.58G
20 POWDER, FOR SOLUTION ORAL 2 TIMES DAILY
Qty: 3 PACKET | Refills: 0 | Status: ON HOLD | OUTPATIENT
Start: 2024-09-30

## 2024-09-30 RX ORDER — TRAMADOL HYDROCHLORIDE 50 MG/1
TABLET ORAL
Status: ON HOLD | COMMUNITY
Start: 2024-01-26 | End: 2024-10-01

## 2024-09-30 RX ORDER — FUROSEMIDE 40 MG
40 TABLET ORAL DAILY
Qty: 5 TABLET | Refills: 0 | Status: SHIPPED | OUTPATIENT
Start: 2024-09-30 | End: 2024-09-30

## 2024-09-30 RX ORDER — TRIAMTERENE AND HYDROCHLOROTHIAZIDE 75; 50 MG/1; MG/1
1 TABLET ORAL DAILY
Status: ON HOLD | COMMUNITY
Start: 2024-04-08

## 2024-09-30 RX ORDER — FUROSEMIDE 40 MG
40 TABLET ORAL DAILY
Qty: 3 TABLET | Refills: 0 | Status: ON HOLD | OUTPATIENT
Start: 2024-09-30

## 2024-09-30 RX ADMIN — IPRATROPIUM BROMIDE AND ALBUTEROL SULFATE 3 ML: .5; 3 SOLUTION RESPIRATORY (INHALATION) at 22:31

## 2024-09-30 RX ADMIN — FUROSEMIDE 40 MG: 10 INJECTION, SOLUTION INTRAMUSCULAR; INTRAVENOUS at 23:54

## 2024-09-30 ASSESSMENT — COLUMBIA-SUICIDE SEVERITY RATING SCALE - C-SSRS
2. HAVE YOU ACTUALLY HAD ANY THOUGHTS OF KILLING YOURSELF IN THE PAST MONTH?: NO
1. IN THE PAST MONTH, HAVE YOU WISHED YOU WERE DEAD OR WISHED YOU COULD GO TO SLEEP AND NOT WAKE UP?: NO
6. HAVE YOU EVER DONE ANYTHING, STARTED TO DO ANYTHING, OR PREPARED TO DO ANYTHING TO END YOUR LIFE?: NO

## 2024-09-30 ASSESSMENT — ACTIVITIES OF DAILY LIVING (ADL)
ADLS_ACUITY_SCORE: 38
ADLS_ACUITY_SCORE: 38

## 2024-10-01 ENCOUNTER — APPOINTMENT (OUTPATIENT)
Dept: CARDIOLOGY | Facility: CLINIC | Age: 74
DRG: 291 | End: 2024-10-01
Attending: HOSPITALIST
Payer: MEDICARE

## 2024-10-01 ENCOUNTER — APPOINTMENT (OUTPATIENT)
Dept: OCCUPATIONAL THERAPY | Facility: CLINIC | Age: 74
DRG: 291 | End: 2024-10-01
Attending: HOSPITALIST
Payer: MEDICARE

## 2024-10-01 LAB
ANION GAP SERPL CALCULATED.3IONS-SCNC: 12 MMOL/L (ref 7–15)
ATRIAL RATE - MUSE: 365 BPM
BASOPHILS # BLD AUTO: 0.1 10E3/UL (ref 0–0.2)
BASOPHILS NFR BLD AUTO: 1 %
BUN SERPL-MCNC: 15.5 MG/DL (ref 8–23)
CALCIUM SERPL-MCNC: 9.4 MG/DL (ref 8.8–10.4)
CHLORIDE SERPL-SCNC: 103 MMOL/L (ref 98–107)
CREAT SERPL-MCNC: 0.95 MG/DL (ref 0.51–0.95)
DIASTOLIC BLOOD PRESSURE - MUSE: NORMAL MMHG
EGFRCR SERPLBLD CKD-EPI 2021: 63 ML/MIN/1.73M2
EOSINOPHIL # BLD AUTO: 0.1 10E3/UL (ref 0–0.7)
EOSINOPHIL NFR BLD AUTO: 1 %
ERYTHROCYTE [DISTWIDTH] IN BLOOD BY AUTOMATED COUNT: 15.2 % (ref 10–15)
ERYTHROCYTE [DISTWIDTH] IN BLOOD BY AUTOMATED COUNT: 15.5 % (ref 10–15)
GLUCOSE BLDC GLUCOMTR-MCNC: 107 MG/DL (ref 70–99)
GLUCOSE BLDC GLUCOMTR-MCNC: 137 MG/DL (ref 70–99)
GLUCOSE BLDC GLUCOMTR-MCNC: 139 MG/DL (ref 70–99)
GLUCOSE BLDC GLUCOMTR-MCNC: 176 MG/DL (ref 70–99)
GLUCOSE BLDC GLUCOMTR-MCNC: 99 MG/DL (ref 70–99)
GLUCOSE SERPL-MCNC: 144 MG/DL (ref 70–99)
HCO3 SERPL-SCNC: 29 MMOL/L (ref 22–29)
HCT VFR BLD AUTO: 34.6 % (ref 35–47)
HCT VFR BLD AUTO: 34.8 % (ref 35–47)
HGB BLD-MCNC: 10.2 G/DL (ref 11.7–15.7)
HGB BLD-MCNC: 10.5 G/DL (ref 11.7–15.7)
IMM GRANULOCYTES # BLD: 0 10E3/UL
IMM GRANULOCYTES NFR BLD: 0 %
INTERPRETATION ECG - MUSE: NORMAL
LVEF ECHO: NORMAL
LYMPHOCYTES # BLD AUTO: 2 10E3/UL (ref 0.8–5.3)
LYMPHOCYTES NFR BLD AUTO: 24 %
MAGNESIUM SERPL-MCNC: 1.5 MG/DL (ref 1.7–2.3)
MAGNESIUM SERPL-MCNC: 2 MG/DL (ref 1.7–2.3)
MCH RBC QN AUTO: 24.1 PG (ref 26.5–33)
MCH RBC QN AUTO: 24.8 PG (ref 26.5–33)
MCHC RBC AUTO-ENTMCNC: 29.3 G/DL (ref 31.5–36.5)
MCHC RBC AUTO-ENTMCNC: 30.3 G/DL (ref 31.5–36.5)
MCV RBC AUTO: 82 FL (ref 78–100)
MCV RBC AUTO: 82 FL (ref 78–100)
MONOCYTES # BLD AUTO: 0.8 10E3/UL (ref 0–1.3)
MONOCYTES NFR BLD AUTO: 10 %
NEUTROPHILS # BLD AUTO: 5.3 10E3/UL (ref 1.6–8.3)
NEUTROPHILS NFR BLD AUTO: 64 %
NRBC # BLD AUTO: 0 10E3/UL
NRBC BLD AUTO-RTO: 0 /100
NT-PROBNP SERPL-MCNC: 1523 PG/ML (ref 0–900)
P AXIS - MUSE: NORMAL DEGREES
PLATELET # BLD AUTO: 310 10E3/UL (ref 150–450)
PLATELET # BLD AUTO: 319 10E3/UL (ref 150–450)
POTASSIUM SERPL-SCNC: 3 MMOL/L (ref 3.4–5.3)
POTASSIUM SERPL-SCNC: 3.4 MMOL/L (ref 3.4–5.3)
PR INTERVAL - MUSE: NORMAL MS
QRS DURATION - MUSE: 96 MS
QT - MUSE: 324 MS
QTC - MUSE: 420 MS
R AXIS - MUSE: -12 DEGREES
RBC # BLD AUTO: 4.23 10E6/UL (ref 3.8–5.2)
RBC # BLD AUTO: 4.24 10E6/UL (ref 3.8–5.2)
SODIUM SERPL-SCNC: 144 MMOL/L (ref 135–145)
SYSTOLIC BLOOD PRESSURE - MUSE: NORMAL MMHG
T AXIS - MUSE: 127 DEGREES
TROPONIN T SERPL HS-MCNC: 15 NG/L
TROPONIN T SERPL HS-MCNC: 17 NG/L
TROPONIN T SERPL HS-MCNC: 17 NG/L
TSH SERPL DL<=0.005 MIU/L-ACNC: 2.85 UIU/ML (ref 0.3–4.2)
VENTRICULAR RATE- MUSE: 101 BPM
WBC # BLD AUTO: 8.3 10E3/UL (ref 4–11)
WBC # BLD AUTO: 8.8 10E3/UL (ref 4–11)

## 2024-10-01 PROCEDURE — 999N000128 HC STATISTIC PERIPHERAL IV START W/O US GUIDANCE

## 2024-10-01 PROCEDURE — 97165 OT EVAL LOW COMPLEX 30 MIN: CPT | Mod: GO | Performed by: OCCUPATIONAL THERAPIST

## 2024-10-01 PROCEDURE — 99223 1ST HOSP IP/OBS HIGH 75: CPT | Mod: 25 | Performed by: INTERNAL MEDICINE

## 2024-10-01 PROCEDURE — 210N000002 HC R&B HEART CARE

## 2024-10-01 PROCEDURE — 85027 COMPLETE CBC AUTOMATED: CPT | Performed by: INTERNAL MEDICINE

## 2024-10-01 PROCEDURE — 99233 SBSQ HOSP IP/OBS HIGH 50: CPT | Performed by: HOSPITALIST

## 2024-10-01 PROCEDURE — 83735 ASSAY OF MAGNESIUM: CPT | Performed by: HOSPITALIST

## 2024-10-01 PROCEDURE — 84443 ASSAY THYROID STIM HORMONE: CPT | Performed by: HOSPITALIST

## 2024-10-01 PROCEDURE — 93306 TTE W/DOPPLER COMPLETE: CPT | Mod: 26 | Performed by: INTERNAL MEDICINE

## 2024-10-01 PROCEDURE — 80048 BASIC METABOLIC PNL TOTAL CA: CPT | Performed by: HOSPITALIST

## 2024-10-01 PROCEDURE — 97535 SELF CARE MNGMENT TRAINING: CPT | Mod: GO | Performed by: OCCUPATIONAL THERAPIST

## 2024-10-01 PROCEDURE — 250N000013 HC RX MED GY IP 250 OP 250 PS 637: Performed by: HOSPITALIST

## 2024-10-01 PROCEDURE — 84132 ASSAY OF SERUM POTASSIUM: CPT | Performed by: HOSPITALIST

## 2024-10-01 PROCEDURE — 84484 ASSAY OF TROPONIN QUANT: CPT | Performed by: EMERGENCY MEDICINE

## 2024-10-01 PROCEDURE — 36415 COLL VENOUS BLD VENIPUNCTURE: CPT | Performed by: EMERGENCY MEDICINE

## 2024-10-01 PROCEDURE — 85025 COMPLETE CBC W/AUTO DIFF WBC: CPT | Performed by: HOSPITALIST

## 2024-10-01 PROCEDURE — 250N000011 HC RX IP 250 OP 636: Performed by: INTERNAL MEDICINE

## 2024-10-01 PROCEDURE — 250N000011 HC RX IP 250 OP 636: Performed by: HOSPITALIST

## 2024-10-01 PROCEDURE — 250N000009 HC RX 250: Performed by: HOSPITALIST

## 2024-10-01 PROCEDURE — 84484 ASSAY OF TROPONIN QUANT: CPT | Performed by: HOSPITALIST

## 2024-10-01 PROCEDURE — 36415 COLL VENOUS BLD VENIPUNCTURE: CPT | Performed by: HOSPITALIST

## 2024-10-01 PROCEDURE — 255N000002 HC RX 255 OP 636: Performed by: HOSPITALIST

## 2024-10-01 PROCEDURE — 999N000208 ECHOCARDIOGRAM COMPLETE

## 2024-10-01 RX ORDER — GUAIFENESIN/DEXTROMETHORPHAN 100-10MG/5
10 SYRUP ORAL EVERY 4 HOURS PRN
Status: DISPENSED | OUTPATIENT
Start: 2024-10-01

## 2024-10-01 RX ORDER — METOPROLOL TARTRATE 1 MG/ML
5 INJECTION, SOLUTION INTRAVENOUS EVERY 4 HOURS PRN
Status: ACTIVE | OUTPATIENT
Start: 2024-10-01

## 2024-10-01 RX ORDER — ALBUTEROL SULFATE 90 UG/1
2 AEROSOL, METERED RESPIRATORY (INHALATION) EVERY 6 HOURS PRN
Status: ACTIVE | OUTPATIENT
Start: 2024-10-01

## 2024-10-01 RX ORDER — MAGNESIUM SULFATE HEPTAHYDRATE 40 MG/ML
4 INJECTION, SOLUTION INTRAVENOUS ONCE
Status: COMPLETED | OUTPATIENT
Start: 2024-10-01 | End: 2024-10-01

## 2024-10-01 RX ORDER — ACETAMINOPHEN 325 MG/1
650 TABLET ORAL EVERY 4 HOURS PRN
Status: DISPENSED | OUTPATIENT
Start: 2024-10-01

## 2024-10-01 RX ORDER — FAMOTIDINE 20 MG/1
20 TABLET, FILM COATED ORAL DAILY
Status: DISPENSED | OUTPATIENT
Start: 2024-10-01

## 2024-10-01 RX ORDER — FUROSEMIDE 10 MG/ML
20 INJECTION INTRAMUSCULAR; INTRAVENOUS ONCE
Status: COMPLETED | OUTPATIENT
Start: 2024-10-01 | End: 2024-10-01

## 2024-10-01 RX ORDER — DEXTROSE MONOHYDRATE 25 G/50ML
25-50 INJECTION, SOLUTION INTRAVENOUS
Status: ACTIVE | OUTPATIENT
Start: 2024-10-01

## 2024-10-01 RX ORDER — HYDRALAZINE HYDROCHLORIDE 50 MG/1
50 TABLET, FILM COATED ORAL EVERY 8 HOURS
Status: DISPENSED | OUTPATIENT
Start: 2024-10-01

## 2024-10-01 RX ORDER — AMLODIPINE BESYLATE 10 MG/1
10 TABLET ORAL DAILY
Status: DISCONTINUED | OUTPATIENT
Start: 2024-10-01 | End: 2024-10-02

## 2024-10-01 RX ORDER — LEVALBUTEROL INHALATION SOLUTION 1.25 MG/3ML
1.25 SOLUTION RESPIRATORY (INHALATION)
Status: DISPENSED | OUTPATIENT
Start: 2024-10-01

## 2024-10-01 RX ORDER — POTASSIUM CHLORIDE 1500 MG/1
20 TABLET, EXTENDED RELEASE ORAL 2 TIMES DAILY
Status: DISCONTINUED | OUTPATIENT
Start: 2024-10-01 | End: 2024-10-03

## 2024-10-01 RX ORDER — LIDOCAINE 40 MG/G
CREAM TOPICAL
Status: ACTIVE | OUTPATIENT
Start: 2024-10-01

## 2024-10-01 RX ORDER — AMOXICILLIN 250 MG
2 CAPSULE ORAL 2 TIMES DAILY PRN
Status: DISCONTINUED | OUTPATIENT
Start: 2024-10-01 | End: 2024-10-03

## 2024-10-01 RX ORDER — ACETAMINOPHEN 650 MG/1
650 SUPPOSITORY RECTAL EVERY 4 HOURS PRN
Status: ACTIVE | OUTPATIENT
Start: 2024-10-01

## 2024-10-01 RX ORDER — ONDANSETRON 2 MG/ML
4 INJECTION INTRAMUSCULAR; INTRAVENOUS EVERY 6 HOURS PRN
Status: DISCONTINUED | OUTPATIENT
Start: 2024-10-01 | End: 2024-10-05

## 2024-10-01 RX ORDER — TRAMADOL HYDROCHLORIDE 50 MG/1
50 TABLET ORAL ONCE
Status: COMPLETED | OUTPATIENT
Start: 2024-10-01 | End: 2024-10-01

## 2024-10-01 RX ORDER — CARVEDILOL 25 MG/1
25 TABLET ORAL 2 TIMES DAILY WITH MEALS
Status: DISCONTINUED | OUTPATIENT
Start: 2024-10-01 | End: 2024-10-04

## 2024-10-01 RX ORDER — POTASSIUM CHLORIDE 1500 MG/1
20 TABLET, EXTENDED RELEASE ORAL ONCE
Status: COMPLETED | OUTPATIENT
Start: 2024-10-01 | End: 2024-10-01

## 2024-10-01 RX ORDER — ONDANSETRON 4 MG/1
4 TABLET, ORALLY DISINTEGRATING ORAL EVERY 6 HOURS PRN
Status: DISCONTINUED | OUTPATIENT
Start: 2024-10-01 | End: 2024-10-05

## 2024-10-01 RX ORDER — LANOLIN ALCOHOL/MO/W.PET/CERES
3 CREAM (GRAM) TOPICAL
Status: DISPENSED | OUTPATIENT
Start: 2024-10-01

## 2024-10-01 RX ORDER — POTASSIUM CHLORIDE 1500 MG/1
20 TABLET, EXTENDED RELEASE ORAL 2 TIMES DAILY
Status: DISCONTINUED | OUTPATIENT
Start: 2024-10-01 | End: 2024-10-01

## 2024-10-01 RX ORDER — POTASSIUM CHLORIDE 1.5 G/1.58G
20 POWDER, FOR SOLUTION ORAL 2 TIMES DAILY
Status: DISCONTINUED | OUTPATIENT
Start: 2024-10-01 | End: 2024-10-01

## 2024-10-01 RX ORDER — ATORVASTATIN CALCIUM 40 MG/1
40 TABLET, FILM COATED ORAL DAILY
Status: DISPENSED | OUTPATIENT
Start: 2024-10-01

## 2024-10-01 RX ORDER — NICOTINE POLACRILEX 4 MG
15-30 LOZENGE BUCCAL
Status: ACTIVE | OUTPATIENT
Start: 2024-10-01

## 2024-10-01 RX ORDER — POTASSIUM CHLORIDE 1500 MG/1
40 TABLET, EXTENDED RELEASE ORAL ONCE
Status: COMPLETED | OUTPATIENT
Start: 2024-10-01 | End: 2024-10-01

## 2024-10-01 RX ORDER — FUROSEMIDE 10 MG/ML
60 INJECTION INTRAMUSCULAR; INTRAVENOUS
Status: DISCONTINUED | OUTPATIENT
Start: 2024-10-01 | End: 2024-10-06

## 2024-10-01 RX ORDER — HEPARIN SODIUM 10000 [USP'U]/100ML
0-5000 INJECTION, SOLUTION INTRAVENOUS CONTINUOUS
Status: DISCONTINUED | OUTPATIENT
Start: 2024-10-01 | End: 2024-10-04

## 2024-10-01 RX ORDER — CALCIUM CARBONATE 500 MG/1
1000 TABLET, CHEWABLE ORAL 4 TIMES DAILY PRN
Status: DISPENSED | OUTPATIENT
Start: 2024-10-01

## 2024-10-01 RX ORDER — AMOXICILLIN 250 MG
1 CAPSULE ORAL 2 TIMES DAILY PRN
Status: DISCONTINUED | OUTPATIENT
Start: 2024-10-01 | End: 2024-10-03

## 2024-10-01 RX ADMIN — POTASSIUM CHLORIDE 20 MEQ: 1500 TABLET, EXTENDED RELEASE ORAL at 16:09

## 2024-10-01 RX ADMIN — POTASSIUM CHLORIDE 40 MEQ: 1500 TABLET, EXTENDED RELEASE ORAL at 13:37

## 2024-10-01 RX ADMIN — FUROSEMIDE 20 MG: 10 INJECTION, SOLUTION INTRAMUSCULAR; INTRAVENOUS at 03:35

## 2024-10-01 RX ADMIN — ATORVASTATIN CALCIUM 40 MG: 40 TABLET, FILM COATED ORAL at 08:49

## 2024-10-01 RX ADMIN — LEVALBUTEROL HYDROCHLORIDE 1.25 MG: 1.25 SOLUTION RESPIRATORY (INHALATION) at 08:49

## 2024-10-01 RX ADMIN — HEPARIN SODIUM 1200 UNITS/HR: 10000 INJECTION, SOLUTION INTRAVENOUS at 16:47

## 2024-10-01 RX ADMIN — FAMOTIDINE 20 MG: 20 TABLET, FILM COATED ORAL at 08:49

## 2024-10-01 RX ADMIN — TRAMADOL HYDROCHLORIDE 50 MG: 50 TABLET, COATED ORAL at 02:47

## 2024-10-01 RX ADMIN — GUAIFENESIN SYRUP AND DEXTROMETHORPHAN 10 ML: 100; 10 SYRUP ORAL at 02:47

## 2024-10-01 RX ADMIN — FLUTICASONE FUROATE 1 PUFF: 200 POWDER RESPIRATORY (INHALATION) at 10:25

## 2024-10-01 RX ADMIN — MAGNESIUM SULFATE HEPTAHYDRATE 4 G: 40 INJECTION, SOLUTION INTRAVENOUS at 13:37

## 2024-10-01 RX ADMIN — CARVEDILOL 25 MG: 25 TABLET, FILM COATED ORAL at 18:13

## 2024-10-01 RX ADMIN — HYDRALAZINE HYDROCHLORIDE 50 MG: 50 TABLET ORAL at 08:49

## 2024-10-01 RX ADMIN — FUROSEMIDE 60 MG: 10 INJECTION, SOLUTION INTRAMUSCULAR; INTRAVENOUS at 08:48

## 2024-10-01 RX ADMIN — GUAIFENESIN SYRUP AND DEXTROMETHORPHAN 10 ML: 100; 10 SYRUP ORAL at 18:21

## 2024-10-01 RX ADMIN — HUMAN ALBUMIN MICROSPHERES AND PERFLUTREN 9 ML: 10; .22 INJECTION, SOLUTION INTRAVENOUS at 16:22

## 2024-10-01 RX ADMIN — HYDRALAZINE HYDROCHLORIDE 50 MG: 50 TABLET ORAL at 16:09

## 2024-10-01 RX ADMIN — POTASSIUM CHLORIDE 20 MEQ: 1.5 POWDER, FOR SOLUTION ORAL at 08:49

## 2024-10-01 RX ADMIN — AMLODIPINE BESYLATE 10 MG: 10 TABLET ORAL at 08:52

## 2024-10-01 RX ADMIN — FUROSEMIDE 60 MG: 10 INJECTION, SOLUTION INTRAMUSCULAR; INTRAVENOUS at 13:38

## 2024-10-01 RX ADMIN — POTASSIUM CHLORIDE 20 MEQ: 1500 TABLET, EXTENDED RELEASE ORAL at 20:12

## 2024-10-01 RX ADMIN — CARVEDILOL 25 MG: 25 TABLET, FILM COATED ORAL at 08:49

## 2024-10-01 ASSESSMENT — ACTIVITIES OF DAILY LIVING (ADL)
ADLS_ACUITY_SCORE: 44
ADLS_ACUITY_SCORE: 38
ADLS_ACUITY_SCORE: 36
ADLS_ACUITY_SCORE: 45
ADLS_ACUITY_SCORE: 44
ADLS_ACUITY_SCORE: 44
ADLS_ACUITY_SCORE: 38
ADLS_ACUITY_SCORE: 38
ADLS_ACUITY_SCORE: 44
DEPENDENT_IADLS:: CLEANING;COOKING;LAUNDRY;SHOPPING;MEAL PREPARATION;TRANSPORTATION
ADLS_ACUITY_SCORE: 44
ADLS_ACUITY_SCORE: 38
ADLS_ACUITY_SCORE: 44
ADLS_ACUITY_SCORE: 45
ADLS_ACUITY_SCORE: 45
ADLS_ACUITY_SCORE: 35
ADLS_ACUITY_SCORE: 44
ADLS_ACUITY_SCORE: 44
ADLS_ACUITY_SCORE: 38

## 2024-10-01 NOTE — ED PROVIDER NOTES
Emergency Department Note      History of Present Illness     Chief Complaint   Shortness of Breath      HPI   Agatha Rodriguez is a 74 year old female with history of asthma, hyperlipidemia, hypertension, type 2 diabetes and CAD who presents for shortness of breath. She reports off and on shortness of breath for 2 months that is worse today. She also had onset of cough today that has worsened throughout the day. Went to  today and chest xray should fluid in the lungs. Reports increased shortness of breath with movement. Reports tight chest pain that feels sore from work of breathing. Reports this feels different than asthma. Denies nausea, vomiting, diarrhea, fever, bloody cough, recent surgery, long travel, new leg pain, use of blood thinners. Reports normal leg swelling but less than usual today. Reports normal weight to be 340-350 but is 376 in the ED. Reports being seen on the 17th without a diagnoses. Was given nebulizer from previous hospital visit.       Independent Historian   None      Past Medical History     Medical History and Problem List   Cataracts (B)  Diverticulitis of large intestine  Gout  Hld  Type 2 diabetes  CAD  GERD  Hypertension   Morbid obesity  MI, silent  DYLAN  Asthma  Lower extremity edema (B)  Diastaltic dysfunction  Primary osteoarthritis of both knees  Thyroid nodule    Medications   Albuterol  Norvasc  Aspirin  Lipitor  Coreg  Stool softener  Pepcid  Ferosul  Arnuity ellipta  Lasix  Glucotrol  Apresoline  Cozaar  Glucophage  Multi Vit  Nitrostat  Klor-con  Nebulizer compressor  Ultram  Maxzide     Surgical History   Hysterectomy     Physical Exam     Patient Vitals for the past 24 hrs:   BP Temp Temp src Pulse Resp SpO2 Weight   10/01/24 0028 -- -- -- 99 21 98 % --   10/01/24 0025 -- -- -- 86 26 98 % --   10/01/24 0024 -- -- -- 97 24 97 % --   09/30/24 2354 -- -- -- 94 30 96 % --   09/30/24 2353 -- -- -- 84 10 95 % --   09/30/24 2352 -- -- -- 96 15 96 % --   09/30/24 2225 (!)  148/106 -- -- 102 26 92 % --   09/30/24 2200 -- -- -- -- -- -- (!) 170.7 kg (376 lb 6.4 oz)   09/30/24 2158 (!) 143/90 97.8  F (36.6  C) Oral 87 24 96 % --     Physical Exam  Eyes:  The pupils are equal and round    Conjunctivae and sclerae are normal  ENT:    The nose is normal    Pinnae are normal    The oropharynx is normal  CV:  Irregular rhythm    Edema in bilateral lower extremities  Resp:  Lungs are clear    Non-labored    No rales    No wheezing   GI:  Abdomen is soft, there is no rigidity    No distension    No rebound tenderness   MS:  Normal muscular tone    No asymmetric leg swelling  Skin:  No rash or acute skin lesions noted  Neuro:   Awake, alert.      Speech is normal and fluent.    Face is symmetric.     Moves all extremities      Diagnostics     Lab Results   Labs Ordered and Resulted from Time of ED Arrival to Time of ED Departure   TROPONIN T, HIGH SENSITIVITY - Abnormal       Result Value    Troponin T, High Sensitivity 16 (*)    NT PROBNP INPATIENT - Abnormal    N terminal Pro BNP Inpatient 1,759 (*)    TROPONIN T, HIGH SENSITIVITY - Abnormal    Troponin T, High Sensitivity 15 (*)    INFLUENZA A/B, RSV, & SARS-COV2 PCR - Normal    Influenza A PCR Negative      Influenza B PCR Negative      RSV PCR Negative      SARS CoV2 PCR Negative         Imaging   Clinic XRAY CHEST, See EMR    EKG   ECG taken at 2210, ECG read at 2227  Atrial flutter with variable AV block   Nonspecific T wave abnormality  Abnormal ECG  Rate 101 bpm. PA interval * ms. QRS duration 96 ms. QT/QTc 324/420 ms. P-R-T axes * -12 127.    Independent Interpretation   None    ED Course      Medications Administered   Medications   ipratropium - albuterol 0.5 mg/2.5 mg/3 mL (DUONEB) neb solution 3 mL (3 mLs Nebulization $Given 9/30/24 2231)   furosemide (LASIX) injection 40 mg (40 mg Intravenous $Given 9/30/24 2354)       Procedures   Procedures     Discussion of Management   Admitting Hospitalist, Dr. Siddiqui    ED Course   ED  Course as of 10/01/24 0105   Mon Sep 30, 2024   2217 I obtained history and examined the patient as noted above     6016 I rechecked the patient and explained findings.       Additional Documentation  None    Medical Decision Making / Diagnosis     CMS Diagnoses: None    MIPS       None    MDM   Agatha Rodriguez is a 74 year old female who presents to the emergency department with concerns about difficulty breathing.  Patient notes that symptoms have been progressing over the past couple of months, but worse over the past few days.  She is seen in the emergency department about 2 weeks ago.  She went to urgent care earlier in the day today where she is found to have pleural effusions and referred to the emergency department.  Here her weight has been found to be 376 pounds.  She reports her baseline is near 340 pounds.  She has edema in her lower extremities.  There are some crackles at her bases.  EKG shows atrial flutter.  No history of atrial flutter noted.  BNP is elevated when compared to prior.  Creatinine is also somewhat elevated.  Discussed with hospitalist and most of her symptoms seem to coincide more with the CHF style exacerbations so we will initiate Lasix.  40 mg ordered IV initially.  Patient was given a nebulizer here and noted that it did help her while she was receiving the nebulizer but did not noticed any persistent benefit.  COVID testing was negative.  White blood cell count was normal.  Troponin is mildly elevated but given duration of symptoms low suspicion for ACS.  Repeat troponin is pending.  Patient admitted to the cardiac telemetry unit.    Disposition   The patient was admitted to the hospital.     Diagnosis     ICD-10-CM    1. Anasarca  R60.1       2. Acute diastolic heart failure (H)  I50.31       3. Typical atrial flutter (H)  I48.3              Scribe Disclosure:  I, Chase Cordon, am serving as a scribe at 10:22 PM on 9/30/2024 to document services personally performed by  Kavin hPelan MD based on my observations and the provider's statements to me.        Kavin Phelan MD  10/01/24 0109

## 2024-10-01 NOTE — PROGRESS NOTES
Assessment & Plan     SOB (shortness of breath)    Patient was hospitalized in July with CHF and leg swelling, SOB  She was in the ED 2 weeks ago for the same thing    She is having fluid overload with leg edema and SOB    Chest xray Pos for pleural effusions bilaterally Joe Alarcon, CARLA PA-C      Congestive heart failure, unspecified HF chronicity, unspecified heart failure type (H)    Patient has chronic anemia  She has some CKD  She has fluid on her lungs and legs are tight and swollen  She is being sent to the ED now for IV lasix and possible admission        Dependent edema    She will need IV lasix and possible admission  Her legs are tight and swollen bilaterally to her knees  - furosemide (LASIX) 40 MG tablet  Dispense: 3 tablet; Refill: 0    Medication side effects    She has a hx of hypokalemia  Today this is normal    - potassium chloride (KLOR-CON) 20 MEQ packet  Dispense: 3 packet; Refill: 0    Pleural effusion    Sent to the ED for IV lasix     Due to patients worsening symptoms and new findings of bilateral pleural effusions she is being sent to the ED for IV lasix treatment    No follow-ups on file.    Joe Alarcon, [facility], PA-C  [facility] Snider is a 74 year old female who presents to clinic today for the following health issues:  Chief Complaint   Patient presents with    Shortness of Breath     Pt states she has been having shortness of breath since July and its getting worse. She has had CT DONE @7/28 AND CHEST XRAY 9/17.She was told to see cardiology and she wants to keep it with [facility].       HPI  Review of Systems  Constitutional, HEENT, cardiovascular, pulmonary, GI, , musculoskeletal, neuro, skin, endocrine and psych systems are negative, except as otherwise noted.      Objective    BP (!) 166/81   Pulse 83   Temp 97.5  F (36.4  C) (Tympanic)   Wt (!) 161 kg (355 lb)   SpO2 96%   BMI 57.05 kg/m    Physical Exam   GENERAL: alert  and no distress  EYES: Eyes grossly normal to inspection, PERRL and conjunctivae and sclerae normal  HENT: ear canals and TM's normal, nose and mouth without ulcers or lesions  NECK: no adenopathy, no asymmetry, masses, or scars  RESP: pos for decreased breath sounds  CV: regular rate and rhythm, normal S1 S2, no S3 or S4, no murmur, click or rub, no peripheral edema  ABDOMEN: soft, nontender, no hepatosplenomegaly, no masses and bowel sounds normal  MS: pos for bilateral leg edema, up to the knees bilaterally  SKIN: pos fro 2 + pitting edema up to her knees bilaterally  NEURO: Normal strength and tone, mentation intact and speech normal  PSYCH: mentation appears normal, affect normal/bright      Results for orders placed or performed in visit on 09/30/24   XR Chest 2 Views     Status: None    Narrative    EXAM: XR CHEST 2 VIEWS  LOCATION: [facility]  DATE: 9/30/2024    INDICATION:  SOB (shortness of breath), Congestive heart failure, unspecified HF chronicity, unspecified heart failure type (H)  COMPARISON: 9/17/2024      Impression    IMPRESSION: Heart is mildly enlarged, unchanged. Trace bilateral effusions with bibasilar atelectasis. Lungs are otherwise clear.   Results for orders placed or performed in visit on 09/30/24   UA with Microscopic reflex to Culture     Status: Abnormal    Specimen: Urine, Clean Catch   Result Value Ref Range    Color Urine Yellow Colorless, Straw, Light Yellow, Yellow    Appearance Urine Clear Clear    Glucose Urine Negative Negative mg/dL    Bilirubin Urine Negative Negative    Ketones Urine Negative Negative mg/dL    Specific Gravity Urine 1.020 1.003 - 1.035    Blood Urine Negative Negative    pH Urine 6.0 5.0 - 7.0    Protein Albumin Urine 100 (A) Negative mg/dL    Urobilinogen Urine 0.2 0.2, 1.0 E.U./dL    Nitrite Urine Negative Negative    Leukocyte Esterase Urine Negative Negative   Comprehensive metabolic panel (BMP + Alb, Alk Phos, ALT, AST, Total. Bili,  TP)     Status: Abnormal   Result Value Ref Range    Sodium 144 135 - 145 mmol/L    Potassium 3.5 3.4 - 5.3 mmol/L    Chloride 107 94 - 109 mmol/L    Carbon Dioxide (CO2) 34 (H) 20 - 32 mmol/L    Anion Gap 3 3 - 14 mmol/L    Urea Nitrogen 15 7 - 30 mg/dL    Creatinine 1.30 (H) 0.52 - 1.04 mg/dL    GFR Estimate 43 (L) >60 mL/min/1.73m2    Calcium 9.9 8.5 - 10.1 mg/dL    Glucose 150 (H) 70 - 99 mg/dL    Alkaline Phosphatase 62 40 - 150 U/L    AST 36 0 - 45 U/L    ALT 28 0 - 50 U/L    Protein Total 8.1 6.8 - 8.8 g/dL    Albumin 3.8 3.4 - 5.0 g/dL    Bilirubin Total 0.6 0.2 - 1.3 mg/dL   CBC with platelets and differential     Status: Abnormal   Result Value Ref Range    WBC Count 9.8 4.0 - 11.0 10e3/uL    RBC Count 4.57 3.80 - 5.20 10e6/uL    Hemoglobin 11.1 (L) 11.7 - 15.7 g/dL    Hematocrit 37.4 35.0 - 47.0 %    MCV 82 78 - 100 fL    MCH 24.3 (L) 26.5 - 33.0 pg    MCHC 29.7 (L) 31.5 - 36.5 g/dL    RDW 15.2 (H) 10.0 - 15.0 %    Platelet Count 407 150 - 450 10e3/uL    % Neutrophils 60 %    % Lymphocytes 30 %    % Monocytes 9 %    % Eosinophils 0 %    % Basophils 0 %    % Immature Granulocytes 0 %    Absolute Neutrophils 5.9 1.6 - 8.3 10e3/uL    Absolute Lymphocytes 3.0 0.8 - 5.3 10e3/uL    Absolute Monocytes 0.9 0.0 - 1.3 10e3/uL    Absolute Eosinophils 0.0 0.0 - 0.7 10e3/uL    Absolute Basophils 0.0 0.0 - 0.2 10e3/uL    Absolute Immature Granulocytes 0.0 <=0.4 10e3/uL   UA Microscopic with Reflex to Culture     Status: Abnormal   Result Value Ref Range    Bacteria Urine Moderate (A) None Seen /HPF    RBC Urine 0-2 0-2 /HPF /HPF    WBC Urine 0-5 0-5 /HPF /HPF    Squamous Epithelials Urine Moderate (A) None Seen /LPF    Hyphal Yeast Urine Few (A) None Seen /HPF    Narrative    Urine Culture not indicated   CBC with platelets and differential     Status: Abnormal    Narrative    The following orders were created for panel order CBC with platelets and differential.  Procedure                               Abnormality          Status                     ---------                               -----------         ------                     CBC with platelets and d...[[id number]]  Abnormal            Final result                 Please view results for these tests on the individual orders.

## 2024-10-01 NOTE — CONSULTS
Care Management Initial Consult    General Information  Assessment completed with: Patient,    Type of CM/SW Visit: Initial Assessment    Primary Care Provider verified and updated as needed:     Readmission within the last 30 days: no previous admission in last 30 days      Reason for Consult: discharge planning  Advance Care Planning:     (given honoring choices)       Communication Assessment  Patient's communication style: spoken language (English or Bilingual)    Hearing Difficulty or Deaf: no   Wear Glasses or Blind: yes    Cognitive  Cognitive/Neuro/Behavioral: WDL                      Living Environment:   People in home: other relative(s)  Yudelka  Current living Arrangements: apartment      Able to return to prior arrangements: yes       Family/Social Support:  Care provided by: self, other (see comments) (neice/PCA)  Provides care for: no one, unable/limited ability to care for self  Marital Status:   Support system: PCA, Children          Description of Support System: Supportive, Involved    Support Assessment: Adequate family and caregiver support    Current Resources:   Patient receiving home care services:          Community Resources:    Equipment currently used at home: cane, straight, walker, rolling, glucometer, shower chair  Supplies currently used at home:      Employment/Financial:  Employment Status: retired        Financial Concerns:             Does the patient's insurance plan have a 3 day qualifying hospital stay waiver?  No    Lifestyle & Psychosocial Needs:  Social Determinants of Health     Food Insecurity: Low Risk  (10/1/2024)    Food Insecurity     Within the past 12 months, did you worry that your food would run out before you got money to buy more?: No     Within the past 12 months, did the food you bought just not last and you didn t have money to get more?: No   Depression: Not at risk (4/8/2024)    Received from [facility]    PHQ-2     PHQ-2 Score: 1   Housing  Stability: Low Risk  (10/1/2024)    Housing Stability     Do you have housing? : Yes     Are you worried about losing your housing?: No   Tobacco Use: Low Risk  (9/30/2024)    Patient History     Smoking Tobacco Use: Never     Smokeless Tobacco Use: Never     Passive Exposure: Not on file   Financial Resource Strain: Low Risk  (10/1/2024)    Financial Resource Strain     Within the past 12 months, have you or your family members you live with been unable to get utilities (heat, electricity) when it was really needed?: No   Alcohol Use: Not on file   Transportation Needs: Low Risk  (10/1/2024)    Transportation Needs     Within the past 12 months, has lack of transportation kept you from medical appointments, getting your medicines, non-medical meetings or appointments, work, or from getting things that you need?: No   Physical Activity: Not on file   Interpersonal Safety: Low Risk  (10/1/2024)    Interpersonal Safety     Do you feel physically and emotionally safe where you currently live?: Yes     Within the past 12 months, have you been hit, slapped, kicked or otherwise physically hurt by someone?: No     Within the past 12 months, have you been humiliated or emotionally abused in other ways by your partner or ex-partner?: No   Stress: Not on file   Social Connections: Not on file   Health Literacy: Not on file       Functional Status:  Prior to admission patient needed assistance:   Dependent ADLs:: Ambulation-walker  Dependent IADLs:: Cleaning, Cooking, Laundry, Shopping, Meal Preparation, Transportation       Mental Health Status:  Mental Health Status: No Current Concerns       Chemical Dependency Status:  Chemical Dependency Status: No Current Concerns             Values/Beliefs:  Spiritual, Cultural Beliefs, Oriental orthodox Practices, Values that affect care: yes  Description of Beliefs that Will Affect Care: Jehova witness - no blood transfusion            Discussed  Partnership in Safe Discharge Planning   document with patient/family: No    Additional Information:  Writer met with patient and introduced role in discharge planning. Patient lives in an apartment with an elevator, she does use a walker. She lives with her Niece/PCA who assists with bathing, transportation, meals, shopping and some medication assistance. She receives 63hr/wk of PCA services. She does own a car, and does still drive, though her son and niece also assist with transportation.She does have a scale at home, advised her to weight herself daily, she admits she does not. She is diabetic, and does have a glucometer and supplies at home. She would prefer to not have in home care, electing instead to go to outpatient therapy. She does not have a healthcare directive, writer provided her with the [facility] Choices packet. She also prefers to make her own follow up appointments.      Maddy Honeycutt RN Care Coordinator  [facility]

## 2024-10-01 NOTE — PLAN OF CARE
Patient Name: Agatha Rodriguez  MRN: [id number]  Date of Admission: 9/30/2024  Reason for Admission: SOB, CHF exacerbation, new onset atrial flutter   Level of Care: Med Surg    BP (!) 169/116   Pulse 90   Temp 98.4  F (36.9  C) (Oral)   Resp 18   Wt (!) 168 kg (370 lb 6 oz)   SpO2 100%   BMI 59.52 kg/m         Pain: Chronic knee pain. Managed with tramadol    CV Surgery Patient: No    Assessment    Resp: Dyspnea on exertion. On 2 L oxygen for the night d/t SOB/apnea  Telemetry: Aflutter CVR/RVR  Neuro: Alert and oriented x4.  GI/: Continent. Purewick placed  Skin/Wounds: Refused skin assessment  Lines/Drains: PIV - SL  Activity: 2 walker and gait belt. Not OOB this shift  Sleep: Awake all shift  Abnormal Labs: Pending    Aggression Stop Light: Green          Patient Care Plan: Patient was admitted from the ED around 0200. Robitussin given for cough. Continue with plan of care

## 2024-10-01 NOTE — PLAN OF CARE
Goal Outcome Evaluation:      Plan of Care Reviewed With: patient, family        Up A-2 to pivot to commode. A&O x 4. VSS, O2 stable on 2L NC. Dyspneic with exertion. Frequent non-productive cough. Voiding spontaneously without difficulty, purewick in place, adequate output. Tolerating current diet, good appetite. Potassium and magnesium replaced, recheck this evening. Hep gtt per orders, hep10A check scheduled for 2220. Plan of care ongoing, niece at bedside. Pt to be NPO at midnight for possible cardioversion on 10/2.

## 2024-10-01 NOTE — PROGRESS NOTES
RECEIVING UNIT ED HANDOFF REVIEW    ED Nurse Handoff Report was reviewed by: Gonzalo Marx RN on October 1, 2024 at 12:37 AM

## 2024-10-01 NOTE — CONSULTS
[facility]    Cardiology Consultation     Date of Admission:  9/30/2024    Assessment & Plan     This is a 74 year old woman with past medical history that is most notable for severe morbid obesity, DYLAN, asthma, hypertension, and chronic diastolic CHF here for acute on chronic HFpEF and new onset atrial flutter (unclear duration).     1. Atrial flutter  -start heparin infusion  -will consider NICOLA/DCCV tomorrow (pt wishes to try medical therapy first)  -continue coreg, monitor on telemetry. Currently better controlled.     2. HFpEF exacerbation, acute on chronic.   -Echocardiogram pending   -contniue lasix 60 IV bid   -monitor I's/Os daily weights   -electrolyte monitoring  -based on echo findings will consider cath this admission (left and/or right)     Pls make nPO after midnight in case of cardioversion.    High complexity       Kirti Mcdonald MD    Primary Care Physician   Carli Nayak    Reason for Consult   HFpEF   AFl    History of Present Illness     This is a 74 year old woman with past medical history that is most notable for severe morbid obesity, DYLAN, asthma, hypertension, and chronic diastolic CHF who presents with leg edema, and shortness of breath, as well as  palpitations found to have new atrial flutter. Cardiology consulted for management. Recent stress test in 2019 was negative for ischemia. CT showed pulmonary edema. TN flat and BNP 1759. CXR with effusions. She reports SOB now for several months.Denies chest pain. No syncope.     Past Medical History   Past Medical History:   Diagnosis Date    (HFpEF) heart failure with preserved ejection fraction (H) 07/2024    Asthma     CAD (coronary artery disease)     Diverticulitis of intestine without perforation or abscess 02/17/2017    GERD (gastroesophageal reflux disease)     Gout of right foot 2015    Hypertension     Morbid obesity with BMI of 50.0-59.9, adult (H) 07/28/2024    Myocardial infarction, silent (H) 2005     Dx w silent Mi in [place] ~ 2005    Obesity     Primary osteoarthritis of both knees     Sleep apnea     uses CPAP    Type 2 diabetes mellitus (H)        Past Surgical History   Past Surgical History:   Procedure Laterality Date    HYSTERECTOMY           Prior to Admission Medications   Prior to Admission Medications   Prescriptions Last Dose Informant Patient Reported? Taking?   Docusate Calcium (STOOL SOFTENER PO) prn at prn  Yes Yes   Sig: Take 1 tablet by mouth daily as needed.   Multiple Vitamin (MULTIVITAMIN OR) 2024 at 1700  Yes Yes   Sig: Take 1 tablet by mouth daily   Respiratory Therapy Supplies (NEBULIZER COMPRESSOR) KIT   No No   Si each 4 times daily   Respiratory Therapy Supplies (NEBULIZER/ADULT MASK) KIT   No No   Si each 4 times daily   albuterol (PROAIR HFA/PROVENTIL HFA/VENTOLIN HFA) 108 (90 Base) MCG/ACT inhaler prn at prn  Yes Yes   Sig: Inhale 2 puffs into the lungs every 6 hours as needed for shortness of breath, wheezing or cough   albuterol (PROVENTIL) (2.5 MG/3ML) 0.083% neb solution prn at prn  No Yes   Sig: Take 1 vial (2.5 mg) by nebulization every 4 hours as needed for shortness of breath, wheezing or cough   aspirin 81 MG tablet 2024 at 1700  No Yes   Sig: Take 1 tablet by mouth daily.   atorvastatin (LIPITOR) 40 MG tablet 2024 at 1700  Yes Yes   Sig: Take 40 mg by mouth daily   carvedilol (COREG) 25 MG tablet 2024 at 1700  No Yes   Sig: Take 1 tablet (25 mg) by mouth 2 times daily (with meals)   famotidine (PEPCID) 20 MG tablet Not Taking  No No   Sig: Take 1 tablet (20 mg) by mouth daily.   Patient not taking: Reported on 10/1/2024   ferrous sulfate (FEROSUL) 325 (65 Fe) MG tablet 2024 at 1700  Yes Yes   Sig: Take 325 mg by mouth Every Mon, Wed, Fri Morning.   fluticasone (ARNUITY ELLIPTA) 200 MCG/ACT inhaler 2024 at 1700  Yes Yes   Sig: Inhale 1 puff into the lungs daily   furosemide (LASIX) 20 MG tablet 2024 at 1700  No Yes   Sig: Take 1  tablet (20 mg) by mouth daily   furosemide (LASIX) 40 MG tablet Hasn't started  No No   Sig: Take 1 tablet (40 mg) by mouth daily.   glipiZIDE (GLUCOTROL XL) 2.5 MG 24 hr tablet 9/30/2024 at 1700  Yes Yes   Sig: Take 2.5 mg by mouth daily   hydrALAZINE (APRESOLINE) 50 MG tablet 9/30/2024 at 1700  No Yes   Sig: Take 1 tablet (50 mg) by mouth every 8 hours   Patient taking differently: Take 75 mg by mouth daily.   ibuprofen (ADVIL,MOTRIN) 800 MG tablet prn at prn  No Yes   Sig: Take 1 tablet by mouth every 8 hours as needed.   Patient taking differently: Take 200 mg by mouth every 8 hours as needed.   losartan (COZAAR) 100 MG tablet 9/30/2024 at 1700  Yes Yes   Sig: Take 100 mg by mouth daily   metFORMIN (GLUCOPHAGE) 500 MG tablet 9/30/2024 at 1700  Yes Yes   Sig: Take 500 mg by mouth 2 times daily (with meals)   nitroGLYCERIN (NITROSTAT) 0.4 MG SL tablet prn at prn  No Yes   Sig: Place 1 tablet under the tongue every 5 minutes as needed for chest pain.   potassium chloride (KLOR-CON) 20 MEQ packet Hasn't started  No No   Sig: Take 20 mEq by mouth 2 times daily.   potassium chloride ER (MICRO-K) 10 MEQ CR capsule 9/30/2024 at 1700  Yes Yes   Sig: Take 1 capsule by mouth daily.   triamterene-HCTZ (MAXZIDE) 75-50 MG tablet 9/30/2024 at 1700  Yes Yes   Sig: Take 1 tablet by mouth daily.      Facility-Administered Medications: None     Current Facility-Administered Medications   Medication Dose Route Frequency Provider Last Rate Last Admin    acetaminophen (TYLENOL) tablet 650 mg  650 mg Oral Q4H PRN Bandar Siddiqui MD        Or    acetaminophen (TYLENOL) Suppository 650 mg  650 mg Rectal Q4H PRN Bandar Siddiqui MD        albuterol (PROVENTIL HFA/VENTOLIN HFA) inhaler  2 puff Inhalation Q6H PRN Cynthia Vu MD        amLODIPine (NORVASC) tablet 10 mg  10 mg Oral Daily Cynthia Vu MD   10 mg at 10/01/24 0852    atorvastatin (LIPITOR) tablet 40 mg  40 mg Oral Daily Cynthia Vu MD   40 mg at  10/01/24 0849    benzocaine-menthol (CHLORASEPTIC) 6-10 MG lozenge 1 lozenge  1 lozenge Buccal Q1H PRBandar Geronimo MD        calcium carbonate (TUMS) chewable tablet 1,000 mg  1,000 mg Oral 4x Daily PRN Bandar Siddiqui MD        carvedilol (COREG) tablet 25 mg  25 mg Oral BID w/meals Cynthia Vu MD   25 mg at 10/01/24 0849    Continuing beta blocker from home medication list OR beta blocker order already placed during this visit   Does not apply DOES NOT GO TO Bandar Beltran MD        glucose gel 15-30 g  15-30 g Oral Q15 Min PRN Bandar Siddiqui MD        Or    dextrose 50 % injection 25-50 mL  25-50 mL Intravenous Q15 Min PRBandar Geronimo MD        Or    glucagon injection 1 mg  1 mg Subcutaneous Q15 Min PRBandar Geronimo MD        famotidine (PEPCID) tablet 20 mg  20 mg Oral Daily Cynthia Vu MD   20 mg at 10/01/24 0849    fluticasone (ARNUITY ELLIPTA) 200 MCG/ACT inhaler 1 puff  1 puff Inhalation Daily Cynthia Vu MD   1 puff at 10/01/24 1025    furosemide (LASIX) injection 60 mg  60 mg Intravenous bid 08 & 14 Bandar Siddiqui MD   60 mg at 10/01/24 1338    guaiFENesin-dextromethorphan (ROBITUSSIN DM) 100-10 MG/5ML syrup 10 mL  10 mL Oral Q4H PRN Bandar Siddiqui MD   10 mL at 10/01/24 0247    hydrALAZINE (APRESOLINE) tablet 50 mg  50 mg Oral Q8H Cynthia Vu MD   50 mg at 10/01/24 0849    insulin aspart (NovoLOG) injection (RAPID ACTING)  1-7 Units Subcutaneous TID AC Bandar Siddiqui MD        insulin aspart (NovoLOG) injection (RAPID ACTING)  1-5 Units Subcutaneous At Bedtime Bandar Siddiqui MD        levalbuterol (XOPENEX) neb solution 1.25 mg  1.25 mg Nebulization Q2H PRN Bandar Siddiqui MD   1.25 mg at 10/01/24 0849    lidocaine (LMX4) cream   Topical Q1H PRN Bandar Siddiqui MD        lidocaine 1 % 0.1-1 mL  0.1-1 mL Other Q1H PRN Bandar Siddiqui MD        melatonin tablet 3 mg  3  mg Oral At Bedtime PRN Bandar Siddiqui MD        metoprolol (LOPRESSOR) injection 5 mg  5 mg Intravenous Q4H PRN Bandar Siddiqui MD        No anticoagulants IF patient has had acute trauma/surgery or recent intracranial, GI or urinary tract bleeding.    Other DOES NOT GO TO Bandar Beltran MD        ondansetron (ZOFRAN ODT) ODT tab 4 mg  4 mg Oral Q6H PRN Bandar Siddiqui MD        Or    ondansetron (ZOFRAN) injection 4 mg  4 mg Intravenous Q6H PRN Bandar Siddiqui MD        potassium chloride liv ER (KLOR-CON M20) CR tablet 20 mEq  20 mEq Oral Once Cynthia Vu MD        potassium chloride liv ER (KLOR-CON M20) CR tablet 20 mEq  20 mEq Oral BID Cynthia Vu MD        Reason ACE/ARB/ARNI order not selected   Other DOES NOT GO TO Bandar Beltran MD        senna-docusate (SENOKOT-S/PERICOLACE) 8.6-50 MG per tablet 1 tablet  1 tablet Oral BID PRN Bandar Siddiqui MD        Or    senna-docusate (SENOKOT-S/PERICOLACE) 8.6-50 MG per tablet 2 tablet  2 tablet Oral BID PRBandar Geronimo MD        sodium chloride (PF) 0.9% PF flush 3 mL  3 mL Intracatheter Q8H Bandar Siddiqui MD        sodium chloride (PF) 0.9% PF flush 3 mL  3 mL Intracatheter q1 min prBandar Geronimo MD   3 mL at 10/01/24 0849     Current Facility-Administered Medications   Medication Dose Route Frequency Provider Last Rate Last Admin    acetaminophen (TYLENOL) tablet 650 mg  650 mg Oral Q4H PRN Bandar Siddiqui MD        Or    acetaminophen (TYLENOL) Suppository 650 mg  650 mg Rectal Q4H PRBandar Geronimo MD        albuterol (PROVENTIL HFA/VENTOLIN HFA) inhaler  2 puff Inhalation Q6H PRN Cynthia Vu MD        amLODIPine (NORVASC) tablet 10 mg  10 mg Oral Daily Cynthia Vu MD   10 mg at 10/01/24 0852    atorvastatin (LIPITOR) tablet 40 mg  40 mg Oral Daily Cynthia Vu MD   40 mg at 10/01/24 0849    benzocaine-menthol  (CHLORASEPTIC) 6-10 MG lozenge 1 lozenge  1 lozenge Buccal Q1H PRN Bandar Siddiqui MD        calcium carbonate (TUMS) chewable tablet 1,000 mg  1,000 mg Oral 4x Daily PRN Bandar Siddiqui MD        carvedilol (COREG) tablet 25 mg  25 mg Oral BID w/meals Cynthia Vu MD   25 mg at 10/01/24 0849    Continuing beta blocker from home medication list OR beta blocker order already placed during this visit   Does not apply DOES NOT GO TO Bandar Beltran MD        glucose gel 15-30 g  15-30 g Oral Q15 Min PRBandar Geronimo MD        Or    dextrose 50 % injection 25-50 mL  25-50 mL Intravenous Q15 Min PRBandar Geronimo MD        Or    glucagon injection 1 mg  1 mg Subcutaneous Q15 Min PRBandar Geronimo MD        famotidine (PEPCID) tablet 20 mg  20 mg Oral Daily Cynthia Vu MD   20 mg at 10/01/24 0849    fluticasone (ARNUITY ELLIPTA) 200 MCG/ACT inhaler 1 puff  1 puff Inhalation Daily Cynthia Vu MD   1 puff at 10/01/24 1025    furosemide (LASIX) injection 60 mg  60 mg Intravenous bid 08 & 14 Bandar Siddiqui MD   60 mg at 10/01/24 1338    guaiFENesin-dextromethorphan (ROBITUSSIN DM) 100-10 MG/5ML syrup 10 mL  10 mL Oral Q4H PRN Bandar Siddiqui MD   10 mL at 10/01/24 0247    hydrALAZINE (APRESOLINE) tablet 50 mg  50 mg Oral Q8H Cynthia Vu MD   50 mg at 10/01/24 0849    insulin aspart (NovoLOG) injection (RAPID ACTING)  1-7 Units Subcutaneous TID AC Bandar Siddiqui MD        insulin aspart (NovoLOG) injection (RAPID ACTING)  1-5 Units Subcutaneous At Bedtime Bandar Siddiqui MD        levalbuterol (XOPENEX) neb solution 1.25 mg  1.25 mg Nebulization Q2H PRN Bandar Siddiqui MD   1.25 mg at 10/01/24 0849    lidocaine (LMX4) cream   Topical Q1H PRN Bandar Siddiqui MD        lidocaine 1 % 0.1-1 mL  0.1-1 mL Other Q1H PRN Bandar Siddiqui MD        melatonin tablet 3 mg  3 mg Oral At Bedtime PRN Chen,  Bandar Bauman MD        metoprolol (LOPRESSOR) injection 5 mg  5 mg Intravenous Q4H PRN Bandar Siddiqui MD        No anticoagulants IF patient has had acute trauma/surgery or recent intracranial, GI or urinary tract bleeding.    Other DOES NOT GO TO Bandar Beltran MD        ondansetron (ZOFRAN ODT) ODT tab 4 mg  4 mg Oral Q6H PRN Bandar Siddiqui MD        Or    ondansetron (ZOFRAN) injection 4 mg  4 mg Intravenous Q6H PRBandar Geronimo MD        potassium chloride liv ER (KLOR-CON M20) CR tablet 20 mEq  20 mEq Oral Once Cynthia Vu MD        potassium chloride liv ER (KLOR-CON M20) CR tablet 20 mEq  20 mEq Oral BID Cynthia Vu MD        Reason ACE/ARB/ARNI order not selected   Other DOES NOT GO TO Bandar Beltran MD        senna-docusate (SENOKOT-S/PERICOLACE) 8.6-50 MG per tablet 1 tablet  1 tablet Oral BID PRN Bandar Siddiqui MD        Or    senna-docusate (SENOKOT-S/PERICOLACE) 8.6-50 MG per tablet 2 tablet  2 tablet Oral BID PRBandar Geronimo MD        sodium chloride (PF) 0.9% PF flush 3 mL  3 mL Intracatheter Q8H Bandar Siddiqui MD        sodium chloride (PF) 0.9% PF flush 3 mL  3 mL Intracatheter q1 min prBandar Geronimo MD   3 mL at 10/01/24 0849     Allergies   No Known Allergies    Social History    reports that she has never smoked. She has never used smokeless tobacco. She reports that she does not drink alcohol and does not use drugs.      Family History   I have reviewed this patient's family history and updated it with pertinent information if needed.  Family History   Problem Relation Age of Onset    Diabetes Mother     Breast Cancer Mother           Review of Systems   A comprehensive review of system was performed and is negative other than that noted in the HPI or here.     Physical Exam   Vital Signs with Ranges  Temp:  [97.5  F (36.4  C)-98.4  F (36.9  C)] 97.6  F (36.4  C)  Pulse:  [] 76  Resp:   "[10-31] 31  BP: (113-172)/() 113/76  SpO2:  [92 %-100 %] 100 %  Wt Readings from Last 4 Encounters:   10/01/24 (!) 168 kg (370 lb 6 oz)   09/30/24 (!) 161 kg (355 lb)   07/30/24 (!) 161.2 kg (355 lb 6.4 oz)   05/14/19 (!) 161.5 kg (356 lb 1.6 oz)     I/O last 3 completed shifts:  In: -   Out: 3100 [Urine:3100]      Vitals: /76 (BP Location: Right arm)   Pulse 76   Temp 97.6  F (36.4  C) (Oral)   Resp (!) 31   Wt (!) 168 kg (370 lb 6 oz)   SpO2 100%   BMI 59.52 kg/m      Physical Exam:   General - Alert and oriented to time place and person in no acute distress  Eyes - No scleral icterus  HEENT - Neck supple, moist mucous membranes  Cardiovascular - irr irr   Extremities - There is 2+ edema  Respiratory - crackles at bases  Skin - No pallor or cyanosis  Gastrointestinal - Non tender and non distended without rebound or guarding  Psych - Appropriate affect   Neurological - No gross motor neurological focal deficits    No lab results found in last 7 days.    Invalid input(s): \"TROPONINIES\"    Recent Labs   Lab 10/01/24  1126 10/01/24  1110 10/01/24  0745 10/01/24  0228 09/30/24  1824   WBC  --  8.3  --   --  9.8   HGB  --  10.2*  --   --  11.1*   MCV  --  82  --   --  82   PLT  --  310  --   --  407   NA  --  144  --   --  144   POTASSIUM  --  3.0*  --   --  3.5   CHLORIDE  --  103  --   --  107   CO2  --  29  --   --  34*   BUN  --  15.5  --   --  15   CR  --  0.95  --   --  1.30*   GFRESTIMATED  --  63  --   --  43*   ANIONGAP  --  12  --   --  3   AVNI  --  9.4  --   --  9.9   * 144* 107*   < > 150*   ALBUMIN  --   --   --   --  3.8   PROTTOTAL  --   --   --   --  8.1   BILITOTAL  --   --   --   --  0.6   ALKPHOS  --   --   --   --  62   ALT  --   --   --   --  28   AST  --   --   --   --  36    < > = values in this interval not displayed.     No results for input(s): \"CHOL\", \"HDL\", \"LDL\", \"TRIG\", \"CHOLHDLRATIO\" in the last 15498 hours.  Recent Labs   Lab 10/01/24  1110 09/30/24  1824   WBC " "8.3 9.8   HGB 10.2* 11.1*   HCT 34.8* 37.4   MCV 82 82    407     No results for input(s): \"PH\", \"PHV\", \"PO2\", \"PO2V\", \"SAT\", \"PCO2\", \"PCO2V\", \"HCO3\", \"HCO3V\" in the last 168 hours.  Recent Labs   Lab 09/30/24  2229   NTBNPI 1,759*     No results for input(s): \"DD\" in the last 168 hours.  No results for input(s): \"SED\", \"CRP\" in the last 168 hours.  Recent Labs   Lab 10/01/24  1110 09/30/24  1824    407     Recent Labs   Lab 10/01/24  0024   TSH 2.85     Recent Labs   Lab 09/30/24  1802   COLOR Yellow   APPEARANCE Clear   URINEGLC Negative   URINEBILI Negative   URINEKETONE Negative   SG 1.020   UBLD Negative   URINEPH 6.0   PROTEIN 100*   UROBILINOGEN 0.2   NITRITE Negative   LEUKEST Negative   RBCU 0-2   WBCU 0-5       Imaging:  Recent Results (from the past 48 hour(s))   XR Chest 2 Views    Narrative    EXAM: XR CHEST 2 VIEWS  LOCATION: [facility]  DATE: 9/30/2024    INDICATION:  SOB (shortness of breath), Congestive heart failure, unspecified HF chronicity, unspecified heart failure type (H)  COMPARISON: 9/17/2024      Impression    IMPRESSION: Heart is mildly enlarged, unchanged. Trace bilateral effusions with bibasilar atelectasis. Lungs are otherwise clear.       Echo:  No results found for this or any previous visit (from the past 4320 hour(s)).    Clinically Significant Risk Factors Present on Admission        # Hypokalemia: Lowest K = 3 mmol/L in last 2 days, will replace as needed     # Hypomagnesemia: Lowest Mg = 1.5 mg/dL in last 2 days, will replace as needed     # Drug Induced Platelet Defect: home medication list includes an antiplatelet medication   # Hypertension: Noted on problem list    # Anemia: based on hgb <11                 Cardiomyopathy      Fluid overload, unspecified                   "

## 2024-10-01 NOTE — PLAN OF CARE
Goal Outcome Evaluation:      Plan of Care Reviewed With: patient          Outcome Evaluation: CC team following for discharge needs

## 2024-10-01 NOTE — PHARMACY-ADMISSION MEDICATION HISTORY
Pharmacist Admission Medication History    Admission medication history is complete. The information provided in this note is only as accurate as the sources available at the time of the update.    Information Source(s): Patient and CareEverywhere/SureScripts via in-person    Pertinent Information:   Patient reports taking all of her medications at 1700 (even BID/TID meds) though unable to clarify during interview how patient takes carvedilol (she stated she takes 1 tablet twice daily but later states she takes all of her pills at one time).  Famotidine filled 9/17/24; patient does not recognize this medication or think she is taking (marked as such)  Patient reports Arnuity is not effective for her; she has been trying to get Qvar filled but unable to coordinate with prior authorization/pharmacy.  Furosemide 40 mg daily x3 and Kcl 20 mEq BID x3 days ordered per urgent care 9/30/24. Patient hasn't picked up or started taking yet.   See notes below re: metformin/triamterene-HCTZ    Changes made to PTA medication list:  Added: None  Deleted: amlodipine (stopped 7/2024), tramadol  Changed:   Hydralazine 25 mg TID -> 75 mg daily (educated patient on importance of TID dosing)    Allergies reviewed with patient and updates made in EHR: yes    Medication History Completed By: Gayathri Gray [facility] 10/1/2024 10:13 AM    PTA Med List   Medication Sig Note Last Dose    albuterol (PROAIR HFA/PROVENTIL HFA/VENTOLIN HFA) 108 (90 Base) MCG/ACT inhaler Inhale 2 puffs into the lungs every 6 hours as needed for shortness of breath, wheezing or cough  prn at prn    albuterol (PROVENTIL) (2.5 MG/3ML) 0.083% neb solution Take 1 vial (2.5 mg) by nebulization every 4 hours as needed for shortness of breath, wheezing or cough 10/1/2024: No fill history; pt states refills needed prn at prn    aspirin 81 MG tablet Take 1 tablet by mouth daily.  9/30/2024 at 1700    atorvastatin (LIPITOR) 40 MG tablet Take 40 mg by mouth daily  9/30/2024 at  1700    carvedilol (COREG) 25 MG tablet Take 1 tablet (25 mg) by mouth 2 times daily (with meals) 10/1/2024: Patient states she takes all of her medications at 1700 (even medications scheduled for multiple times per day). Unclear if she takes this once or twice daily. 9/30/2024 at 1700    Docusate Calcium (STOOL SOFTENER PO) Take 1 tablet by mouth daily as needed.  prn at prn    ferrous sulfate (FEROSUL) 325 (65 Fe) MG tablet Take 325 mg by mouth Every Mon, Wed, Fri Morning.  9/30/2024 at 1700    fluticasone (ARNUITY ELLIPTA) 200 MCG/ACT inhaler Inhale 1 puff into the lungs daily 10/1/2024: Patient states not effective; trying to get betamethasone (Qvar) filled instead. 9/30/2024 at 1700    furosemide (LASIX) 20 MG tablet Take 1 tablet (20 mg) by mouth daily  9/30/2024 at 1700    glipiZIDE (GLUCOTROL XL) 2.5 MG 24 hr tablet Take 2.5 mg by mouth daily  9/30/2024 at 1700    hydrALAZINE (APRESOLINE) 50 MG tablet Take 1 tablet (50 mg) by mouth every 8 hours (Patient taking differently: Take 75 mg by mouth daily.)  9/30/2024 at 1700    ibuprofen (ADVIL,MOTRIN) 800 MG tablet Take 1 tablet by mouth every 8 hours as needed. (Patient taking differently: Take 200 mg by mouth every 8 hours as needed.)  prn at prn    losartan (COZAAR) 100 MG tablet Take 100 mg by mouth daily  9/30/2024 at 1700    metFORMIN (GLUCOPHAGE) 500 MG tablet Take 500 mg by mouth 2 times daily (with meals) 10/1/2024: Unclear how patient is taking - she says she wasn't taking for awhile d/t GI intolerability but has since resumed. She doesn't know how many she is taking. Rx written for 1000 mg BID. 9/30/2024 at 1700    Multiple Vitamin (MULTIVITAMIN OR) Take 1 tablet by mouth daily  9/30/2024 at 1700    nitroGLYCERIN (NITROSTAT) 0.4 MG SL tablet Place 1 tablet under the tongue every 5 minutes as needed for chest pain.  prn at prn    potassium chloride ER (MICRO-K) 10 MEQ CR capsule Take 1 capsule by mouth daily.  9/30/2024 at 1700    triamterene-HCTZ  (MAXZIDE) 75-50 MG tablet Take 1 tablet by mouth daily. 10/1/2024: Last filled 3/22/24 for 90 day supply 9/30/2024 at 1700

## 2024-10-01 NOTE — PROGRESS NOTES
10/01/24 1535   Appointment Info   Signing Clinician's Name / Credentials (OT) Nimco Host, OTR/L   Living Environment   People in Home other relative(s)  (niece)   Current Living Arrangements apartment   Home Accessibility no concerns   Living Environment Comments Pt lives in apartment w/ niece who is her full time PCA.   Self-Care   Usual Activity Tolerance moderate   Current Activity Tolerance poor   Regular Exercise No   Equipment Currently Used at Home cane, straight;walker, rolling;glucometer;shower chair   Fall history within last six months no   Activity/Exercise/Self-Care Comment Independent w/ basic ADLs w/ 4WW and cane. Niece/PCA, Natalya, assists as needed for ADLs and assists w/ all IADLs   Instrumental Activities of Daily Living (IADL)   IADL Comments pt reports she still can drive but very seldom   General Information   Onset of Illness/Injury or Date of Surgery 09/30/24   Referring Physician Bandar Siddiqui MD   Patient/Family Therapy Goal Statement (OT) return home   Additional Occupational Profile Info/Pertinent History of Current Problem Agatha Rodriguez is a markedly pleasant 74 year old woman with past medical history that is most notable for severe morbid obesity, DYLAN, asthma, hypertension, and chronic diastolic CHF, among others; who presents with progressive dyspnea and anasarca, with acute palpitations; and is found to have suspected acute diastolic CHF exacerbation and new onset atrial flutter, as well as ULYSSES.   Existing Precautions/Restrictions cardiac;fall;oxygen therapy device and L/min   Cognitive Status Examination   Orientation Status orientation to person, place and time   Cognitive Status Comments oriented x4, pleasant. possible decreased insight into deficits, will continue to assess   Pain Assessment   Patient Currently in Pain No   Strength Comprehensive (MMT)   Comment, General Manual Muscle Testing (MMT) Assessment generalized weakness   Bed Mobility   Bed Mobility  rolling left;rolling right   Rolling Left Frederick (Bed Mobility) contact guard   Rolling Right Frederick (Bed Mobility) contact guard   Assistive Device (Bed Mobility) bed rails   Clinical Impression   Criteria for Skilled Therapeutic Interventions Met (OT) Yes, treatment indicated   OT Diagnosis decreased ADL independence   OT Problem List-Impairments impacting ADL problems related to;activity tolerance impaired;cognition;mobility;strength   Assessment of Occupational Performance 1-3 Performance Deficits   Identified Performance Deficits functional mobility, activity david   Planned Therapy Interventions (OT) ADL retraining;cognition;progressive activity/exercise;home program guidelines;risk factor education;strengthening;transfer training   Clinical Decision Making Complexity (OT) problem focused assessment/low complexity   Risk & Benefits of therapy have been explained patient;caregiver   OT Total Evaluation Time   OT Eval, Low Complexity Minutes (29405) 10   OT Goals   Therapy Frequency (OT) Daily   OT Predicted Duration/Target Date for Goal Attainment 10/08/24   OT Goals Toilet Transfer/Toileting;Hygiene/Grooming;Cardiac Phase 1   OT: Hygiene/Grooming supervision/stand-by assist;while standing   OT: Toilet Transfer/Toileting Supervision/stand-by assist;toilet transfer;cleaning and garment management   OT: Understanding of cardiac education to maximize quality of life, condition management, and health outcomes Patient;Caregiver;Verbalize   OT: Perform aerobic activity with stable cardiovascular response intermittent;5 minutes;ambulation   OT: Functional/aerobic ambulation tolerance with stable cardiovascular response in order to return to home and community environment Modified independent;Rolling walker   Self-Care/Home Management   Self-Care/Home Mgmt/ADL, Compensatory, Meal Prep Minutes (63902) 24   Symptoms Noted During/After Treatment (Meal Preparation/Planning Training) fatigue   Treatment  "Detail/Skilled Intervention Pt supine in bed, niece present, agreeable to edu session. Pt reports fatigue from tests/seeing providers today. Extensive edu on early mobility, increasing activity to promote strength while hospitalized. Both pt and niece verbalized understanding and reports pt is up more throughout the day at home w/ use of 4WW or cane. Further edu completed on HF packet. See details below. Pt reports weighing herself weekly instead of daily d/t anxiety surrounding daily weights. Edu on importance of daily weights and consistency w/ weights for HF management. Pt and niece receptive. Further edu on diet and pt reports diet is \"not good\" however does not eat sodium, however reports she lives \"rib beef tips\". Pt and niece report they do not use sodium while cooking. Will continue to educate pt and niece on low sodium diet options in future sessions. RN arrived to complete nursing cares. Handoff to RN at end of session, all OT needs met.   Cardiac Education   Education Provided Diet;Diagnosis;Daily weights;Stop light tool;Signs and symptoms;Resuming home activities;Home exercise program   Education Packet Given to Patient Yes   All Patient Education Handouts Reviewed with Patient and/or Family No   OT Discharge Planning   OT Plan initiate timed amb as able, commode transfer, continue HF edu   OT Discharge Recommendation (DC Rec) home with assist  (OP PT)   OT Rationale for DC Rec Pt below baseline. Limited by decreased activity david, activity david, shortness of breath. Anticipate w/ medical management and IP OT, pt will be able to d/c home w/ previous level of assist from her PCA. Per CM/SW note, pt prefers OP therapy, would benefit from OP PT for further strengthening, mobility training.   OT Brief overview of current status Goals of therapy will be to address safe mobility and make recs for d/c to next level of care. Pt and RN will continue to follow all falls risk precautions as documented by RN staff " while hospitalized   Total Session Time   Timed Code Treatment Minutes 24   Total Session Time (sum of timed and untimed services) 34

## 2024-10-01 NOTE — H&P
[facility]    History and Physical  Hospitalist       Date of Admission:  9/30/2024  Date of Service (when I saw the patient): 09/30/24    ASSESSMENT  Agatha Rodriguez is a markedly pleasant 74 year old woman with past medical history that is most notable for severe morbid obesity, DYLAN, asthma, hypertension, and chronic diastolic CHF, among others; who presents with progressive dyspnea and anasarca, with acute palpitations; and is found to have suspected acute diastolic CHF exacerbation and new onset atrial flutter, as well as ULYSSES.    PLAN     Suspected acute diastolic CHF exacerbation and new onset atrial flutter: Of note, Ms. Rodriguez has severe obesity, as well as DYLAN, and a history of asthma; PFT's in 2012 noted mild restriction. She also has reportedly had silent MI and angina, though a prior lexiscan in 2019 was reportedly negative for inducible ischemia. More recently, she was hospitalized here from 7/28/24 through 7/30/2024 for dyspnea, accelerated hypertension, and bilateral lower extremity edema. CT of the chest showed mosaic attenuation of the lungs, suggestive of pulmonary edema. It also revealed incidentally an indeterminate 1.8 cm low-attenuation nodule of the left lobe of the thyroid gland. The scan was negative for PE. Troponin was negative and Pro-BNP was low at 355. Her QTc was mildly prolonged. Cardiology was consulted and TTE showed preserved LVEF without WMA. Acute diastolic CHF was diagnosed and IV Furosemide was started, Lexiscan showed a possible area of small non-transmural infarct with mild gloria-infarct ischemia (the specificity was noted to be reduced due to body habitus and suboptimal imaging quality). Anti-hypertensives were adjusted and she was discharged on Lasix 20 mg daily, coreg 25 mg BID, norvasc 10 mg daily, hydralazine 50 mg tid, and Lipitor. Her weight at discharge was noted to be 161.2 kg (355 pounds). Outpatient thyroid US as well as cardiac MRI were  recommended at discharge and have not yet been completed.    Now, she presents for evaluation of progressive dyspnea and anasarca, as well as new onset palpitations. In the ED, she has accelerated hypertension and tachycardia (rates ). She is not hypoxic or febrile. Her weight today is recorded at 170.7 kg (376 pounds). WBC is normal. She has ULYSSES as discussed below. Troponin T is 16 and Pro BNP is now elevated at 1759. EKG shows an irregular narrow complex rhythm with possible sawtooth pattern. CXR shows unchanged appearing cardiomegaly, with trace bilateral effusions and bibasilar atelectasis.      Overall, her symptoms seem consistent with acute diastolic CHF exacerbation, perhaps related to accelerated hypertension, as well as new onset atrial flutter, which could be a cause or an exacerbating factor of her heart failure. She is not wheezing, and acute reactive airways disease seems less likely. PE also seems less likely clinically at present. We will rule out ACS or hypothyroid.       -- Inpatient. Telemetry, serial enzymes, TTE ordered. Cardiology consulted. Diurese with 60 mg IV Lasix BID. Strict I and O's, daily weights, and low sodium diet ordered.    --  Monitor for RVR. Resume Coreg when verified. PRN IV Metoprolol ordered overnight. TSH ordered    -- Repeat CBC and BMP in AM. SW consulted for disposition planning.    ULYSSES: This could be related to cardiorenal syndrome. Monitor very closely as we diurese. Repeat BMP in AM.    Recent Labs   Lab Test 09/30/24  1824 09/17/24  0037 07/30/24  1030   CR 1.30* 0.87 0.84     Hypertension: Resume home coreg, norvasc, hydralazine when verified. Hold home cozaar for ULYSSES.    Hyperlipidemia: Resume home lipitor when verified    Type 2 Diabetes mellitus: Most recent A1c was 8.0 in 6/28/2024. On glipizide and metformin as an outpatient.    -- ISS insulin while hospitalized. Hold oral anti-diabetic medications. Resume at discharge    DYLAN: She could have obesity  "hypoventilation syndrome as well.       -- Home BIPAP continued    History of asthma: Resume home arunity elipta and albuterol when verified.    Chronic anemia: Monitor as above while hospitalized.  Recent Labs   Lab Test 09/30/24  1824 09/17/24  0037 07/30/24  1030   HGB 11.1* 10.2* 11.1*     GERD: Resume pepcid when verified    Primary osteoarthritis of the knees: Hold outpatient Motrin for ULYSSES and heart failure. Resume home tramadol when verified    Severe morbid obesity: Noted  Estimated body mass index is 60.49 kg/m  as calculated from the following:    Height as of 7/29/24: 1.68 m (5' 6.14\").    Weight as of this encounter: 170.7 kg (376 lb 6.4 oz).    I have spent 80 minutes on the date of service doing chart review, history, examination, documentation, and further activities per the note.    Chief Complaint   Dyspnea    History is obtained from the patient , her POA at the bedside, and the ED physician whom I have spoken with    History of Present Illness   Agatha Rodriguez is a markedly pleasant 74 year old woman who presents with shortness of breath. She says that over the past few weeks she has developed severe dyspnea when she tries to exert herself. It has progressed to the point that even getting up causes dyspnea. She can not walk more than a few steps before feeling wiped out. She also notes ongoing swelling in both of her lower extremities. She has noted more recently in the past 24 hours worsening of a chronic cough, non-productive, as well as new onset of intermittent racing heart beat and diffuse chest tightness. She says that recently she has deepa trying to get a new BIPAP machine at night since her old one needs replacing; though for now she says it has still been working.  She has also been advised to increase her Lasix does at home to 40 mg from 20 mg daily though she has not yet been able to get a prescription for that. She has not weighed herself at home since her last admission to the " [facility] in 7/2024. She was seen earlier today in an urgent care clinic and now presents to the ED for further evaluation. A Duoneb given in the ED has not seemed to change her symptoms. She otherwise denies fever, chills, sweats, nausea, diarrhea, or any other acute complaints.    In the ED,   09/30 2158 143/90 97.8  F (36.6  C) 87 24 96 %     CBC and CMP were notable for HGB 11.1, CO2 34, BUN 15, Cr 1.30, Glucose 150, otherwise were within the normal reference range. WBC was 9.8. Troponin T was 16. Pro-BNP was 1759. EKG showed irregular narrow complex rhythm with possible sawtooth pattern. UA was negative for pyuria or hematuria; few yeast were seen.    40 mg IV Lasix was given in the ED.    Recent Results (from the past 24 hour(s))   XR Chest 2 Views    Narrative    EXAM: XR CHEST 2 VIEWS  LOCATION: [facility]  DATE: 9/30/2024    INDICATION:  SOB (shortness of breath), Congestive heart failure, unspecified HF chronicity, unspecified heart failure type (H)  COMPARISON: 9/17/2024      Impression    IMPRESSION: Heart is mildly enlarged, unchanged. Trace bilateral effusions with bibasilar atelectasis. Lungs are otherwise clear.       PHYSICAL EXAM  Blood pressure (!) 148/106, pulse 102, temperature 97.8  F (36.6  C), temperature source Oral, resp. rate 26, weight (!) 170.7 kg (376 lb 6.4 oz), SpO2 92%, not currently breastfeeding.  Constitutional: Alert and oriented to person, place and time; no apparent distress  Respiratory: lungs have crackles at both bases to auscultation bilaterally, without wheezing  Cardiovascular: Irregular S1 S2  GI: abdomen soft non tender non distended bowel sounds positive  Musculoskeletal: severe bilateral LE edema  Neurologic: extra-ocular muscles intact; moves all four extremities  Psychiatric: appropriate affect, insight and judgment     DVT Prophylaxis: Pneumatic Compression Devices  Code Status: Full Code    Medically Ready for Discharge: Anticipated in 2-4  Days       Bandar Siddiqui MD, MD    Past Medical History    I have reviewed this patient's medical history and updated it with pertinent information if needed.   Past Medical History:   Diagnosis Date    (HFpEF) heart failure with preserved ejection fraction (H) 2024    Asthma     CAD (coronary artery disease)     Diverticulitis of intestine without perforation or abscess 2017    GERD (gastroesophageal reflux disease)     Gout of right foot     Hypertension     Morbid obesity with BMI of 50.0-59.9, adult (H) 2024    Myocardial infarction, silent (H)     Dx w silent Mi in [place] ~     Obesity     Primary osteoarthritis of both knees     Sleep apnea     uses CPAP    Type 2 diabetes mellitus (H)        Past Surgical History   I have reviewed this patient's surgical history and updated it with pertinent information if needed.  Past Surgical History:   Procedure Laterality Date    HYSTERECTOMY         Prior to Admission Medications   Prior to Admission Medications   Prescriptions Last Dose Informant Patient Reported? Taking?   Docusate Calcium (STOOL SOFTENER PO)   Yes No   Sig: Take by mouth as needed   Multiple Vitamin (MULTIVITAMIN OR)   Yes No   Sig: Take 1 tablet by mouth daily   Respiratory Therapy Supplies (NEBULIZER COMPRESSOR) KIT   No No   Si each 4 times daily   Respiratory Therapy Supplies (NEBULIZER/ADULT MASK) KIT   No No   Si each 4 times daily   albuterol (PROAIR HFA/PROVENTIL HFA/VENTOLIN HFA) 108 (90 Base) MCG/ACT inhaler   Yes No   Sig: Inhale 2 puffs into the lungs every 6 hours as needed for shortness of breath, wheezing or cough   albuterol (PROVENTIL) (2.5 MG/3ML) 0.083% neb solution   No No   Sig: Take 1 vial (2.5 mg) by nebulization every 4 hours as needed for shortness of breath, wheezing or cough   amLODIPine (NORVASC) 10 MG tablet   Yes No   Sig: Take 10 mg by mouth daily   aspirin 81 MG tablet   No No   Sig: Take 1 tablet by mouth daily.    atorvastatin (LIPITOR) 40 MG tablet   Yes No   Sig: Take 40 mg by mouth daily   carvedilol (COREG) 25 MG tablet   No No   Sig: Take 1 tablet (25 mg) by mouth 2 times daily (with meals)   famotidine (PEPCID) 20 MG tablet   No No   Sig: Take 1 tablet (20 mg) by mouth daily.   ferrous sulfate (FEROSUL) 325 (65 Fe) MG tablet   Yes No   Sig: Take 325 mg by mouth every 48 hours.   fluticasone (ARNUITY ELLIPTA) 200 MCG/ACT inhaler   Yes No   Sig: Inhale 1 puff into the lungs daily   furosemide (LASIX) 20 MG tablet   No No   Sig: Take 1 tablet (20 mg) by mouth daily   furosemide (LASIX) 40 MG tablet   No No   Sig: Take 1 tablet (40 mg) by mouth daily.   glipiZIDE (GLUCOTROL XL) 2.5 MG 24 hr tablet   Yes No   Sig: Take 2.5 mg by mouth daily   hydrALAZINE (APRESOLINE) 50 MG tablet   No No   Sig: Take 1 tablet (50 mg) by mouth every 8 hours   ibuprofen (ADVIL,MOTRIN) 800 MG tablet   No No   Sig: Take 1 tablet by mouth every 8 hours as needed.   losartan (COZAAR) 100 MG tablet   Yes No   Sig: Take 100 mg by mouth daily   metFORMIN (GLUCOPHAGE) 500 MG tablet   Yes No   Sig: Take 500 mg by mouth 2 times daily (with meals)   nitroGLYCERIN (NITROSTAT) 0.4 MG SL tablet   No No   Sig: Place 1 tablet under the tongue every 5 minutes as needed for chest pain.   potassium chloride (KLOR-CON) 20 MEQ packet   No No   Sig: Take 20 mEq by mouth 2 times daily.   potassium chloride ER (MICRO-K) 10 MEQ CR capsule   Yes No   Sig: Take 1 capsule by mouth daily.   traMADol (ULTRAM) 50 MG tablet   Yes No   triamterene-HCTZ (MAXZIDE) 75-50 MG tablet   Yes No   Sig: Take 1 tablet by mouth daily.      Facility-Administered Medications: None     Allergies   No Known Allergies    Social History   I have reviewed this patient's social history and updated it with pertinent information if needed. Agatha Rodriguez  reports that she has never smoked. She has never used smokeless tobacco. She reports that she does not drink alcohol and does not use  drugs.    Family History   Family history assessed and, except as above, is non-contributory.    Family History   Problem Relation Age of Onset    Diabetes Mother     Breast Cancer Mother        Review of Systems   The 10 point Review of Systems is negative other than noted in the HPI or here.     Primary Care Physician   Radha Earl    Data   Labs Ordered and Resulted from Time of ED Arrival to Time of ED Departure   TROPONIN T, HIGH SENSITIVITY - Abnormal       Result Value    Troponin T, High Sensitivity 16 (*)    NT PROBNP INPATIENT - Abnormal    N terminal Pro BNP Inpatient 1,759 (*)    INFLUENZA A/B, RSV, & SARS-COV2 PCR - Normal    Influenza A PCR Negative      Influenza B PCR Negative      RSV PCR Negative      SARS CoV2 PCR Negative     TROPONIN T, HIGH SENSITIVITY       Data reviewed today:  I personally reviewed the EKG tracing showing rregular narrow complex rhythm with possible sawtooth pattern and the chest x-ray image(s) showing bilateral small effusions .

## 2024-10-01 NOTE — PROVIDER NOTIFICATION
MD Notification    Notified Person: Dr. Vu    Notification Date/Time: 10/1/24 1209    Notification Interaction: Edgardo    Purpose of Notification: FYI: K 3.0 and Mag 1.5, would you like this pt on protocols? Can you please change potassium packets to oral pills for scheduled dose per pt preference. Thank you. KELLIE Birch     Orders Received: K and Mag protocols ordered.    Comments:     Stable

## 2024-10-01 NOTE — PROGRESS NOTES
[facility]    Hospitalist Progress Note    Interval History   Patient is awake and alert.  Significantly improved compared to yesterday if with dyspnea.  Needing 2 L nasal cannula.  Continues to have lower extremity edema but improved.    -Data reviewed today: I reviewed all new labs and imaging results over the last 24 hours. I personally reviewed the chest x-ray image(s) showing mild cardiomegaly with trace bilateral effusions with basilar atelectasis .  EKG shows atrial flutter    Physical Exam   Temp: 98.4  F (36.9  C) Temp src: Oral BP: (!) 169/116 Pulse: 90   Resp: 18 SpO2: 100 % O2 Device: Nasal cannula Oxygen Delivery: 2 LPM  Vitals:    09/30/24 2200 10/01/24 0529   Weight: (!) 170.7 kg (376 lb 6.4 oz) (!) 168 kg (370 lb 6 oz)     Vital Signs with Ranges  Temp:  [97.5  F (36.4  C)-98.4  F (36.9  C)] 98.4  F (36.9  C)  Pulse:  [] 90  Resp:  [10-30] 18  BP: (143-172)/() 169/116  SpO2:  [92 %-100 %] 100 %  I/O last 3 completed shifts:  In: -   Out: 1900 [Urine:1900]    Physical Exam  Constitutional:       Appearance: She is obese.   Cardiovascular:      Rate and Rhythm: Normal rate. Rhythm irregular.      Pulses: Normal pulses.      Heart sounds: Normal heart sounds.   Pulmonary:      Effort: Pulmonary effort is normal. No respiratory distress.      Breath sounds: Normal breath sounds.   Abdominal:      General: Bowel sounds are normal. There is no distension.      Tenderness: There is no abdominal tenderness. There is no guarding.      Comments: Central obesity   Musculoskeletal:      Right lower leg: Edema present.      Left lower leg: Edema present.   Skin:     General: Skin is warm and dry.   Neurological:      General: No focal deficit present.           Medications   Current Facility-Administered Medications   Medication Dose Route Frequency Provider Last Rate Last Admin    Continuing beta blocker from home medication list OR beta blocker order already placed during  this visit   Does not apply DOES NOT GO TO Bandar Beltran MD        No anticoagulants IF patient has had acute trauma/surgery or recent intracranial, GI or urinary tract bleeding.    Other DOES NOT GO TO Bandar Beltran MD        Reason ACE/ARB/ARNI order not selected   Other DOES NOT GO TO Bandar Beltran MD         Current Facility-Administered Medications   Medication Dose Route Frequency Provider Last Rate Last Admin    amLODIPine (NORVASC) tablet 10 mg  10 mg Oral Daily Cynthia Vu MD   10 mg at 10/01/24 0852    atorvastatin (LIPITOR) tablet 40 mg  40 mg Oral Daily Cynthia Vu MD   40 mg at 10/01/24 0849    carvedilol (COREG) tablet 25 mg  25 mg Oral BID w/meals Cynthia Vu MD   25 mg at 10/01/24 0849    famotidine (PEPCID) tablet 20 mg  20 mg Oral Daily Cynthia Vu MD   20 mg at 10/01/24 0849    fluticasone (ARNUITY ELLIPTA) 200 MCG/ACT inhaler 1 puff  1 puff Inhalation Daily Cynthia Vu MD   1 puff at 10/01/24 1025    furosemide (LASIX) injection 60 mg  60 mg Intravenous bid 08 & 14 Bandar Siddiqui MD   60 mg at 10/01/24 0848    hydrALAZINE (APRESOLINE) tablet 50 mg  50 mg Oral Q8H Cynthia Vu MD   50 mg at 10/01/24 0849    insulin aspart (NovoLOG) injection (RAPID ACTING)  1-7 Units Subcutaneous TID AC Bandar Siddiqui MD        insulin aspart (NovoLOG) injection (RAPID ACTING)  1-5 Units Subcutaneous At Bedtime Bandar Siddiqui MD        magnesium sulfate 4 g in 100 mL sterile water intermittent infusion  4 g Intravenous Once Cynthia Vu MD        potassium chloride liv ER (KLOR-CON M20) CR tablet 20 mEq  20 mEq Oral Once Cynthia Vu MD        potassium chloride liv ER (KLOR-CON M20) CR tablet 20 mEq  20 mEq Oral BID Cynthia Vu MD        potassium chloride liv ER (KLOR-CON M20) CR tablet 40 mEq  40 mEq Oral Once Cynthia Vu MD        sodium chloride (PF) 0.9% PF flush 3 mL  3 mL  Intracatheter Q8H Bandar Siddiqui MD           Data   Recent Labs   Lab 10/01/24  1126 10/01/24  1110 10/01/24  0745 10/01/24  0228 09/30/24  1824   WBC  --  8.3  --   --  9.8   HGB  --  10.2*  --   --  11.1*   MCV  --  82  --   --  82   PLT  --  310  --   --  407   NA  --  144  --   --  144   POTASSIUM  --  3.0*  --   --  3.5   CHLORIDE  --  103  --   --  107   CO2  --  29  --   --  34*   BUN  --  15.5  --   --  15   CR  --  0.95  --   --  1.30*   ANIONGAP  --  12  --   --  3   AVNI  --  9.4  --   --  9.9   * 144* 107*   < > 150*   ALBUMIN  --   --   --   --  3.8   PROTTOTAL  --   --   --   --  8.1   BILITOTAL  --   --   --   --  0.6   ALKPHOS  --   --   --   --  62   ALT  --   --   --   --  28   AST  --   --   --   --  36    < > = values in this interval not displayed.       Recent Results (from the past 24 hour(s))   XR Chest 2 Views    Narrative    EXAM: XR CHEST 2 VIEWS  LOCATION: [facility]  DATE: 9/30/2024    INDICATION:  SOB (shortness of breath), Congestive heart failure, unspecified HF chronicity, unspecified heart failure type (H)  COMPARISON: 9/17/2024      Impression    IMPRESSION: Heart is mildly enlarged, unchanged. Trace bilateral effusions with bibasilar atelectasis. Lungs are otherwise clear.         Assessment & Plan      Agatha Rodriguez is a markedly pleasant 74 year old woman with past medical history that is most notable for severe morbid obesity, DYLAN, asthma, hypertension, and chronic diastolic CHF, among others; who presents with progressive dyspnea and anasarca, with acute palpitations; and is found to have suspected acute diastolic CHF exacerbation and new onset atrial flutter, as well as ULYSSES.     PLAN      Suspected acute diastolic CHF exacerbation and new onset atrial flutter: Of note, Ms. Rodriguez has severe obesity, as well as DYLAN, and a history of asthma; PFT's in 2012 noted mild restriction. She also has reportedly had silent MI and angina, though a  prior lexiscan in 2019 was reportedly negative for inducible ischemia. More recently, she was hospitalized here from 7/28/24 through 7/30/2024 for dyspnea, accelerated hypertension, and bilateral lower extremity edema. CT of the chest showed mosaic attenuation of the lungs, suggestive of pulmonary edema. It also revealed incidentally an indeterminate 1.8 cm low-attenuation nodule of the left lobe of the thyroid gland. The scan was negative for PE. Troponin was negative and Pro-BNP was low at 355. Her QTc was mildly prolonged. Cardiology was consulted and TTE showed preserved LVEF without WMA. Acute diastolic CHF was diagnosed and IV Furosemide was started, Lexiscan showed a possible area of small non-transmural infarct with mild gloria-infarct ischemia (the specificity was noted to be reduced due to body habitus and suboptimal imaging quality). Anti-hypertensives were adjusted and she was discharged on Lasix 20 mg daily, coreg 25 mg BID, norvasc 10 mg daily, hydralazine 50 mg tid, and Lipitor. Her weight at discharge was noted to be 161.2 kg (355 pounds). Outpatient thyroid US as well as cardiac MRI were recommended at discharge and have not yet been completed.     Now, she presents for evaluation of progressive dyspnea and anasarca, as well as new onset palpitations. In the ED, she has accelerated hypertension and tachycardia (rates ). She is not hypoxic or febrile. Her weight today is recorded at 170.7 kg (376 pounds). WBC is normal. She has ULYSSES as discussed below. Troponin T is 16 and Pro BNP is now elevated at 1759. EKG shows an irregular narrow complex rhythm with possible sawtooth pattern. CXR shows unchanged appearing cardiomegaly, with trace bilateral effusions and bibasilar atelectasis.       Overall, her symptoms seem consistent with acute diastolic CHF exacerbation, perhaps related to accelerated hypertension, as well as new onset atrial flutter, which could be a cause or an exacerbating factor of her  "heart failure. She is not wheezing, and acute reactive airways disease seems less likely. PE also seems less likely clinically at present. We will rule out ACS or hypothyroid.     -- Inpatient. Telemetry,   -- Troponin 15---17   --TTE ordered.   --Cardiology consulted.   --Diurese with 60 mg IV Lasix BID.   --Strict I and O's, daily weights, and low sodium diet ordered.  --  Monitor for RVR.   --Resume Coreg . PRN IV Metoprolol ordered overnight.   --TSH 2.85  --On potassium and magnesium replacement protocols with diuresis  -- Question possible pulmonary hypertension with right heart dysfunction.  Question need for right heart cath.  -- [id number] score at 5 putting her at 7.2% risk of stroke.  Would need anticoagulation.  Would defer starting this until cardiology evaluation.       ULYSSES  Hypokalemia, hypomagnesemia  : This could be related to cardiorenal syndrome. Monitor very closely as we diurese.   -- Repeat BMP showed resolved ULYSSES with creatinine down to 0.95      Hypertension: Resume home coreg, norvasc, hydralazine .  Holding home Maxide and Cozaar in the setting of ULYSSES.     Hyperlipidemia: Resume home lipitor      Type 2 Diabetes mellitus: Most recent A1c was 8.0 in 6/28/2024. On glipizide and metformin as an outpatient.    -- ISS insulin while hospitalized. Hold oral anti-diabetic medications. Resume at discharge     DYLAN: She could have obesity hypoventilation syndrome as well.     -- Home BIPAP continued     History of asthma: Resume home arunity elipta and albuterol      Chronic anemia: Monitor as above while hospitalized.     GERD: Resume pepcid when verified     Primary osteoarthritis of the knees: Hold outpatient Motrin for ULYSSES and heart failure. Resume home tramadol when verified     Severe morbid obesity: Noted  Estimated body mass index is 60.49 kg/m  as calculated from the following:    Height as of 7/29/24: 1.68 m (5' 6.14\").    Weight as of this encounter: 170.7 kg (376 lb 6.4 " oz).    Clinically Significant Risk Factors Present on Admission        # Hypokalemia: Lowest K = 3 mmol/L in last 2 days, will replace as needed     # Hypomagnesemia: Lowest Mg = 1.5 mg/dL in last 2 days, will replace as needed     # Drug Induced Platelet Defect: home medication list includes an antiplatelet medication   # Hypertension: Noted on problem list    # Anemia: based on hgb <11                   DVT Prophylaxis: Pneumatic Compression Devices  Code Status: Full Code  Medically Ready for Discharge: Anticipated in 2-4 Days    Greater than 55 minutes spent on documentation review, lab review, imaging review, discussing care with bedside nurse and patient    Cynthia Vu MD, MD  [phone](p)

## 2024-10-01 NOTE — ED TRIAGE NOTES
Pt c/o Sob and CP starting a couple months ago. Pt states she was seen at  today and was told she has fluid in her lungs. Pt PCA at bedside. Pt hx CHF     Triage Assessment (Adult)       Row Name 09/30/24 3403          Triage Assessment    Airway WDL WDL        Respiratory WDL    Respiratory WDL X  Pt states sob        Skin Circulation/Temperature WDL    Skin Circulation/Temperature WDL WDL        Cardiac WDL    Cardiac WDL X;chest pain        Peripheral/Neurovascular WDL    Peripheral Neurovascular WDL WDL        Cognitive/Neuro/Behavioral WDL    Cognitive/Neuro/Behavioral WDL WDL

## 2024-10-01 NOTE — ED NOTES
[facility]    ED Nurse Handoff Report    ED Chief complaint: Shortness of Breath      ED Diagnosis:   Final diagnoses:   None       Code Status: n/a    Allergies: No Known Allergies    Patient Story:  Pt has had increased SOB/CP over the last month. Dyspnea with exertion. Pt was seen at , diagnosed with fluid in lungs.     Focused Assessment:    Pt alert and oriented x4. Ambulates with difficulty, only takes a few steps with assistance to bed. Sever dyspnea with any activity. Hx CHF.     Labs Ordered and Resulted from Time of ED Arrival to Time of ED Departure - No data to display    No orders to display         Treatments and/or interventions provided:      Medications   ipratropium - albuterol 0.5 mg/2.5 mg/3 mL (DUONEB) neb solution 3 mL (3 mLs Nebulization $Given 9/30/24 2231)       Patient's response to treatments and/or interventions:    Pt received neb in ER with minimal improvement.     To be done/followed up on inpatient unit:   See any in-patient orders    Does this patient have any cognitive concerns?:  n/a    Activity level - Baseline/Home:    Independent    Activity Level - Current:    Stand with assist x2    Patient's Preferred language: English     Needed?: No    Isolation: None and COVID r/o and special precautions  Infection: Not Applicable  COVID r/o and special precautions  Patient tested for COVID 19 prior to admission: YES    Bariatric?: Yes    Vital Signs:   Vitals:    09/30/24 2158 09/30/24 2200 09/30/24 2225   BP: (!) 143/90  (!) 148/106   Pulse: 87  102   Resp: 24  26   Temp: 97.8  F (36.6  C)     TempSrc: Oral     SpO2: 96%  92%   Weight:  (!) 170.7 kg (376 lb 6.4 oz)        Cardiac Rhythm:     Was the PSS-3 completed:   Yes  What interventions are required if any?               Family Comments: at bedside  OBS brochure/video discussed/provided to patient/family: No      For the majority of the shift this patient's behavior was Green.      ED NURSE PHONE  NUMBER: [phone]

## 2024-10-02 ENCOUNTER — ANESTHESIA EVENT (OUTPATIENT)
Dept: SURGERY | Facility: CLINIC | Age: 74
DRG: 291 | End: 2024-10-02
Payer: MEDICARE

## 2024-10-02 ENCOUNTER — HOSPITAL ENCOUNTER (OUTPATIENT)
Facility: CLINIC | Age: 74
End: 2024-10-02
Payer: MEDICARE

## 2024-10-02 LAB
ANION GAP SERPL CALCULATED.3IONS-SCNC: 15 MMOL/L (ref 7–15)
BUN SERPL-MCNC: 18.3 MG/DL (ref 8–23)
CALCIUM SERPL-MCNC: 9.3 MG/DL (ref 8.8–10.4)
CHLORIDE SERPL-SCNC: 102 MMOL/L (ref 98–107)
CREAT SERPL-MCNC: 0.93 MG/DL (ref 0.51–0.95)
EGFRCR SERPLBLD CKD-EPI 2021: 65 ML/MIN/1.73M2
GLUCOSE BLDC GLUCOMTR-MCNC: 117 MG/DL (ref 70–99)
GLUCOSE BLDC GLUCOMTR-MCNC: 126 MG/DL (ref 70–99)
GLUCOSE BLDC GLUCOMTR-MCNC: 130 MG/DL (ref 70–99)
GLUCOSE BLDC GLUCOMTR-MCNC: 138 MG/DL (ref 70–99)
GLUCOSE BLDC GLUCOMTR-MCNC: 167 MG/DL (ref 70–99)
GLUCOSE SERPL-MCNC: 127 MG/DL (ref 70–99)
HCO3 SERPL-SCNC: 27 MMOL/L (ref 22–29)
HOLD SPECIMEN: NORMAL
HOLD SPECIMEN: NORMAL
INR PPP: 1.21 (ref 0.85–1.15)
MAGNESIUM SERPL-MCNC: 2 MG/DL (ref 1.7–2.3)
POTASSIUM SERPL-SCNC: 3.5 MMOL/L (ref 3.4–5.3)
POTASSIUM SERPL-SCNC: 3.5 MMOL/L (ref 3.4–5.3)
SODIUM SERPL-SCNC: 144 MMOL/L (ref 135–145)
UFH PPP CHRO-ACNC: 0.23 IU/ML
UFH PPP CHRO-ACNC: 0.5 IU/ML

## 2024-10-02 PROCEDURE — 5A09357 ASSISTANCE WITH RESPIRATORY VENTILATION, LESS THAN 24 CONSECUTIVE HOURS, CONTINUOUS POSITIVE AIRWAY PRESSURE: ICD-10-PCS | Performed by: HOSPITALIST

## 2024-10-02 PROCEDURE — 99232 SBSQ HOSP IP/OBS MODERATE 35: CPT | Performed by: STUDENT IN AN ORGANIZED HEALTH CARE EDUCATION/TRAINING PROGRAM

## 2024-10-02 PROCEDURE — 99233 SBSQ HOSP IP/OBS HIGH 50: CPT | Mod: 25

## 2024-10-02 PROCEDURE — 84132 ASSAY OF SERUM POTASSIUM: CPT | Performed by: HOSPITALIST

## 2024-10-02 PROCEDURE — 250N000013 HC RX MED GY IP 250 OP 250 PS 637: Performed by: HOSPITALIST

## 2024-10-02 PROCEDURE — 85520 HEPARIN ASSAY: CPT | Performed by: INTERNAL MEDICINE

## 2024-10-02 PROCEDURE — 36415 COLL VENOUS BLD VENIPUNCTURE: CPT | Performed by: HOSPITALIST

## 2024-10-02 PROCEDURE — 250N000011 HC RX IP 250 OP 636: Performed by: INTERNAL MEDICINE

## 2024-10-02 PROCEDURE — 210N000002 HC R&B HEART CARE

## 2024-10-02 PROCEDURE — 250N000011 HC RX IP 250 OP 636: Performed by: HOSPITALIST

## 2024-10-02 PROCEDURE — 85610 PROTHROMBIN TIME: CPT

## 2024-10-02 PROCEDURE — 85520 HEPARIN ASSAY: CPT | Performed by: HOSPITALIST

## 2024-10-02 PROCEDURE — 83735 ASSAY OF MAGNESIUM: CPT | Performed by: INTERNAL MEDICINE

## 2024-10-02 PROCEDURE — 36415 COLL VENOUS BLD VENIPUNCTURE: CPT | Performed by: INTERNAL MEDICINE

## 2024-10-02 PROCEDURE — 80048 BASIC METABOLIC PNL TOTAL CA: CPT | Performed by: STUDENT IN AN ORGANIZED HEALTH CARE EDUCATION/TRAINING PROGRAM

## 2024-10-02 RX ORDER — MAGNESIUM SULFATE HEPTAHYDRATE 40 MG/ML
2 INJECTION, SOLUTION INTRAVENOUS
Status: ACTIVE | OUTPATIENT
Start: 2024-10-02

## 2024-10-02 RX ORDER — POTASSIUM CHLORIDE 1500 MG/1
20 TABLET, EXTENDED RELEASE ORAL
Status: COMPLETED | OUTPATIENT
Start: 2024-10-02 | End: 2024-10-03

## 2024-10-02 RX ORDER — FERROUS SULFATE 325(65) MG
325 TABLET ORAL
Status: DISPENSED | OUTPATIENT
Start: 2024-10-04

## 2024-10-02 RX ORDER — POTASSIUM CHLORIDE 1500 MG/1
40 TABLET, EXTENDED RELEASE ORAL ONCE
Status: COMPLETED | OUTPATIENT
Start: 2024-10-02 | End: 2024-10-02

## 2024-10-02 RX ORDER — POTASSIUM CHLORIDE 1500 MG/1
40 TABLET, EXTENDED RELEASE ORAL
Status: ACTIVE | OUTPATIENT
Start: 2024-10-02

## 2024-10-02 RX ORDER — LIDOCAINE 40 MG/G
CREAM TOPICAL
Status: DISCONTINUED | OUTPATIENT
Start: 2024-10-02 | End: 2024-10-02

## 2024-10-02 RX ADMIN — HYDRALAZINE HYDROCHLORIDE 50 MG: 50 TABLET ORAL at 00:05

## 2024-10-02 RX ADMIN — POTASSIUM CHLORIDE 20 MEQ: 1500 TABLET, EXTENDED RELEASE ORAL at 20:56

## 2024-10-02 RX ADMIN — ATORVASTATIN CALCIUM 40 MG: 40 TABLET, FILM COATED ORAL at 10:15

## 2024-10-02 RX ADMIN — POTASSIUM CHLORIDE 40 MEQ: 1500 TABLET, EXTENDED RELEASE ORAL at 01:40

## 2024-10-02 RX ADMIN — CARVEDILOL 25 MG: 25 TABLET, FILM COATED ORAL at 10:15

## 2024-10-02 RX ADMIN — ACETAMINOPHEN 650 MG: 325 TABLET ORAL at 20:57

## 2024-10-02 RX ADMIN — CARVEDILOL 25 MG: 25 TABLET, FILM COATED ORAL at 18:11

## 2024-10-02 RX ADMIN — POTASSIUM CHLORIDE 20 MEQ: 1500 TABLET, EXTENDED RELEASE ORAL at 10:15

## 2024-10-02 RX ADMIN — MELATONIN TAB 3 MG 3 MG: 3 TAB at 20:57

## 2024-10-02 RX ADMIN — AMLODIPINE BESYLATE 10 MG: 10 TABLET ORAL at 10:15

## 2024-10-02 RX ADMIN — HYDRALAZINE HYDROCHLORIDE 50 MG: 50 TABLET ORAL at 18:11

## 2024-10-02 RX ADMIN — FLUTICASONE FUROATE 1 PUFF: 200 POWDER RESPIRATORY (INHALATION) at 10:15

## 2024-10-02 RX ADMIN — FAMOTIDINE 20 MG: 20 TABLET, FILM COATED ORAL at 10:15

## 2024-10-02 RX ADMIN — HEPARIN SODIUM 1350 UNITS/HR: 10000 INJECTION, SOLUTION INTRAVENOUS at 09:10

## 2024-10-02 RX ADMIN — FUROSEMIDE 60 MG: 10 INJECTION, SOLUTION INTRAMUSCULAR; INTRAVENOUS at 10:14

## 2024-10-02 RX ADMIN — HYDRALAZINE HYDROCHLORIDE 50 MG: 50 TABLET ORAL at 10:15

## 2024-10-02 RX ADMIN — FUROSEMIDE 60 MG: 10 INJECTION, SOLUTION INTRAMUSCULAR; INTRAVENOUS at 14:35

## 2024-10-02 ASSESSMENT — ACTIVITIES OF DAILY LIVING (ADL)
ADLS_ACUITY_SCORE: 44
ADLS_ACUITY_SCORE: 48
ADLS_ACUITY_SCORE: 52
ADLS_ACUITY_SCORE: 44
ADLS_ACUITY_SCORE: 52
ADLS_ACUITY_SCORE: 42
ADLS_ACUITY_SCORE: 44
ADLS_ACUITY_SCORE: 48
ADLS_ACUITY_SCORE: 52
ADLS_ACUITY_SCORE: 48
ADLS_ACUITY_SCORE: 48
ADLS_ACUITY_SCORE: 52
ADLS_ACUITY_SCORE: 48
ADLS_ACUITY_SCORE: 48

## 2024-10-02 ASSESSMENT — ENCOUNTER SYMPTOMS: DYSRHYTHMIAS: 1

## 2024-10-02 NOTE — CONSULTS
[facility]    Received [facility] Consult from Dr. Siddiqui.     Reviewed Ms. Rodriguez's chart and note they are admitted for shortness of breath, swelling, palpitations in setting of atrial flutter and HFpEF. Echocardiogram shows LVEF 50%. This is their 2nd admission in the past year for concerns of heart failure.     Given above information, with an LVEF >40%, Ms. Rodriguez meets criteria for general cardiology follow up.   Appears that Ms. Rodriguez gets most of her outpatient care through [facility] and her PCP team there has referred her to their cardiology group but I don't see a visit scheduled at this time.  Defer final outpatient cardiology follow-up to inpatient cardiology/RNCC.    No future appointments.       Please call with questions.          Nicole Ivan RN BSN  [facility] RN Care Coordinator   [facility]   [phone]   [facility]: Cardiomyopathy, Optimization, Rehabilitation, Education

## 2024-10-02 NOTE — PROGRESS NOTES
[facility]    Medicine Progress Note - Hospitalist Service    Date of Admission:  9/30/2024    Assessment & Plan   Agatha Rodriguez is a 74 year old woman with history of severe morbid obesity, DYLAN, asthma, hypertension, and chronic diastolic CHF, among others; who presents with progressive dyspnea, anasarca, acute palpitations and found to have HF exacerbation and new onset atrial flutter.    Acute HFpEF exacerbation (EF 50% from 60-65% 7/2024), improving  Atrial flutter, new diagnosis   Patient presented with worsening SOB, hypervolemia without significant hypoxia and minimal response to increasing home diuretics. Admission weight 376lb, dry weight ~355lb. BNP elevated with trace b/l effusions noted on CXR. Atrial flutter also noted on EKG which represents a new diagnosis. Symptoms consistent with acute HFpEF exacberation, possibly in setting of tachyarrhythmia vs worsening hypertension vs other. No concern for ischemic etiology, TSH normal, consistently wears home BiPAP at night. Plan for ongoing IV diuresis with NICOLA cardioversion in AM. [id number] of 5.  -Cardiology following  -Continue heparin gtt overnight  -Carvedilol 25mg BID  -Tentative plan for NICOLA/DCCV in AM, NPO at midnight  -Continue active diuresis with lasix 60mg BID  -Cardiac telemetry, strict Is/Os, daily weights  -Aggressive K, Mag repletion    ULYSSES, possible cardiorenal, resolved  Hypokalemia, hypomagnesemia, resolved  Cr 1.3 on admission, baseline 0.8-0.9. Possible cardiorenal component in setting of HF exacerbation.  -Daily BMP, mag      Hypertension  -Resume home coreg, norvasc, hydralazine. Holding home losartan, hydrochlorothiazide/triamterene in the setting of ULYSSES     Type 2 diabetes mellitus w/out current use of insulin  Most recent A1c was 8.0 in 6/28/2024. PTA regimen includes glipizide 2.5 and metformin 500mg.  -SSI while admitted, continue PO meds on discharge.     DYLAN, suspected OHS  -PCA is bringing in home  "BiPAP    Hyperlipidemia - PTA statin  Hx of asthma - PTA fluticasone and albuterol   Chronic anemia, stable - PTA iron supplement  GERD - PTA pepcid  Primary OA of b/l knees     Severe morbid obesity - Noted  Estimated body mass index is 60.49 kg/m  as calculated from the following:    Height as of 7/29/24: 1.68 m (5' 6.14\").    Weight as of this encounter: 170.7 kg (376 lb 6.4 oz).        Diet: NPO per Anesthesia Guidelines for Procedure/Surgery Except for: Meds  Combination Diet Regular Diet Adult; Moderate Consistent Carb (60 g CHO per Meal) Diet; 2 gm NA Diet    DVT Prophylaxis: Heparin gtt  Becker Catheter: Not present  Lines: None     Cardiac Monitoring: ACTIVE order. Indication: Acute decompensated heart failure (48 hours)  Code Status: Full Code      Clinically Significant Risk Factors        # Hypokalemia: Lowest K = 3 mmol/L in last 2 days, will replace as needed     # Hypomagnesemia: Lowest Mg = 1.5 mg/dL in last 2 days, will replace as needed      # Coagulation Defect: INR = 1.21 (Ref range: 0.85 - 1.15) and/or PTT = N/A, will monitor for bleeding    # Hypertension: Noted on problem list                    Disposition Plan     Medically Ready for Discharge: Anticipated in 2-4 Days     Henry Blanco MD  Hospitalist Service  [facility]  Securely message with [employer] (more info)  Text page via [facility] Paging/Directory   ______________________________________________________________________    Interval History   No concerns overnight. Mild SOB but denies chest pain, abdominal pain. Hoping to have PCA bring in BiPAP. Denies significant pain. Thinking about possible NICOLA cardioversion tomorrow AM.    Physical Exam   Vital Signs: Temp: 98.4  F (36.9  C) Temp src: Oral BP: (!) 137/99 Pulse: 113   Resp: 16 SpO2: 100 % O2 Device: Nasal cannula Oxygen Delivery: 2 LPM  Weight: 367 lbs 0 oz    General: Awake, alert, NAD, appropriate interaction, sitting upright in chair  HEENT: Atraumatic, " normocephalic, EOMI, no scleral icterus  CV: Irregular rhythm, irregular rate, no murmurs, 2+ b/l CHARLIE to mid-shins, distal pulses intact, unable to assess JVD  Pulm: CTAB, breathing comfortably on 2L NC, no wheezes, faint crackles  Abd: Soft, non-tender, obese, no overlying skin changes, no palpable hepatosplenomegaly  Skin: No rashes, lesions, or wounds visualized  Neuro: AOx4, CN II-XII grossly intact, no focal deficits, moving all extremities spontaneously, speech normal    Medical Decision Making       45 MINUTES SPENT BY ME on the date of service doing chart review, history, exam, documentation & further activities per the note.      Data     I have personally reviewed the following data over the past 24 hrs:    8.8  \   10.5 (L)   / 319     144 102 18.3 /  126 (H)   3.5; 3.5 27 0.93 \     Trop: N/A BNP: N/A     INR:  1.21 (H) PTT:  N/A   D-dimer:  N/A Fibrinogen:  N/A

## 2024-10-02 NOTE — CONSULTS
"SPIRITUAL HEALTH SERVICES  SPIRITUAL ASSESSMENT Consult Note  [facility]     REFERRAL SOURCE: Assessment of spiritual and emotional needs because patient responded \"Yes\" to the question in the admission assessment, \"Do you have any spiritual or Moravian beliefs that will affect your care?\"     Introduced spiritual health services/role to Agatha. She shared that she is Pentecostal and is being supported by her own Catholic community, in [place]. Agatha gave permission to have her Anglican updated in her record to indicate Pentecostal. She expressed gratitude for the team's care and and is aware of ongoing spiritual health support and how to request it, if desired.    PLAN: No plans for follow up at this time. Please consult if needs arise.    Nieves Olmos  Associate      [facility] available 24/7 for emergent requests/referrals, either by paging the on-call  or by entering an ASAP/STAT consult in Epic (this will also page the on-call ).     "

## 2024-10-02 NOTE — ANESTHESIA PREPROCEDURE EVALUATION
Anesthesia Pre-Procedure Evaluation    Patient: Agatha Rodriguez   MRN: [id number] : 1950        Procedure : Procedure(s):  Transesophageal echocardiogram intraoperative  Anesthesia cardioversion          Past Medical History:   Diagnosis Date     (HFpEF) heart failure with preserved ejection fraction (H) 2024     Asthma      CAD (coronary artery disease)      Diverticulitis of intestine without perforation or abscess 2017     GERD (gastroesophageal reflux disease)      Gout of right foot      Hypertension      Morbid obesity with BMI of 50.0-59.9, adult (H) 2024     Myocardial infarction, silent (H)     Dx w silent Mi in [place] ~      Obesity      Primary osteoarthritis of both knees      Sleep apnea     uses CPAP     Type 2 diabetes mellitus (H)       Past Surgical History:   Procedure Laterality Date     HYSTERECTOMY        No Known Allergies   Social History     Tobacco Use     Smoking status: Never     Smokeless tobacco: Never   Substance Use Topics     Alcohol use: No      Wt Readings from Last 1 Encounters:   10/02/24 (!) 166.5 kg (367 lb)        Echo 10/1/2024  nterpretation Summary     1. The left ventricle is normal in size. The visual ejection fraction is  estimated at 50%.  2. The right ventricle is normal in structure, function and size.  3. No valve disease.     Echo 24 showed EF 60%.  ______________________________________________________________________________  Left Ventricle  The left ventricle is normal in size. There is mild concentric left  ventricular hypertrophy. The visual ejection fraction is estimated at 50%.  Left ventricular diastolic function is indeterminate. Normal left ventricular  wall motion.     Right Ventricle  The right ventricle is normal in structure, function and size.     Atria  The left atrium is moderately dilated. The right atrium is mildly dilated.  There is no atrial shunt seen.     Mitral Valve  There is no mitral regurgitation  noted.     Tricuspid Valve  There is mild (1+) tricuspid regurgitation.     Aortic Valve  The aortic valve is normal in structure and function.     Pulmonic Valve  The pulmonic valve is normal in structure and function.     Vessels  Normal ascending, transverse (arch), and descending aorta. Dilation of the  inferior vena cava is present with abnormal respiratory variation in diameter.     Pericardium  There is no pericardial effusion.     Rhythm  The rhythm was atrial fibrillation.  __________________________________________________________    EKG  Atrial flutter with variable A-V block   Nonspecific T wave abnormality   Abnormal ECG   When compared with ECG of 17-Sep-2024 00:00,   Atrial flutter has replaced Sinus rhythm   QT has shortened     NM study 7/2024  Result Text        The nuclear stress test is probably abnormal. Specificity reduced due to suboptimal study quality related to patient body habitus.     There is a small area of nontransmural infarction in the distal anteroseptal and apex segment(s) of the left ventricle associated with a small area of a mild degree of gloria-infarct ischemia.     Stress to rest cavity ratio is 1.02.  TID is absent.     Left ventricular function is low normal.     The left ventricular ejection fraction at rest is 55%.  The left ventricular ejection fraction at stress is 50%.     LV cavity size enlarged.     There is no prior study for comparison.  Anesthesia Evaluation            ROS/MED HX  ENT/Pulmonary:     (+) sleep apnea (bipap), uses CPAP,                    asthma                  Neurologic:       Cardiovascular: Comment: Hospitalized 7/28/24 to 7/30/2024 with GREEN/ accelerated HTN, bilateral lower extremity edema     (+) Dyslipidemia hypertension- -  CAD - past MI (silent 2005) - -   Taking blood thinners (heparin drip)   CHF etiology: diastolic dysfunction                 dysrhythmias, a-fib and a-flutter, Irregular Heartbeat/Palpitations,            METS/Exercise  "Tolerance:     Hematologic:       Musculoskeletal: Comment: gout  (+)  arthritis,             GI/Hepatic:     (+) GERD,                   Renal/Genitourinary: Comment: Acute kidney injury      Endo:     (+)  type II DM, Last HgA1c: 8.0,       thyroid problem, thyroid nodule,    Obesity,       Psychiatric/Substance Use:       Infectious Disease:       Malignancy:       Other:            Physical Exam    Airway        Mallampati: III   TM distance: > 3 FB   Neck ROM: full   Mouth opening: > 3 cm    Respiratory Devices and Support         Dental       (+) Modest Abnormalities - crowns, retainers, 1 or 2 missing teeth      Cardiovascular          Rhythm and rate: irregular     Pulmonary   pulmonary exam normal            OUTSIDE LABS:  CBC:   Lab Results   Component Value Date    WBC 8.8 10/01/2024    WBC 8.3 10/01/2024    HGB 10.5 (L) 10/01/2024    HGB 10.2 (L) 10/01/2024    HCT 34.6 (L) 10/01/2024    HCT 34.8 (L) 10/01/2024     10/01/2024     10/01/2024     BMP:   Lab Results   Component Value Date     10/02/2024     10/01/2024    POTASSIUM 3.5 10/02/2024    POTASSIUM 3.5 10/02/2024    CHLORIDE 102 10/02/2024    CHLORIDE 103 10/01/2024    CO2 27 10/02/2024    CO2 29 10/01/2024    BUN 18.3 10/02/2024    BUN 15.5 10/01/2024    CR 0.93 10/02/2024    CR 0.95 10/01/2024     (H) 10/02/2024     (H) 10/02/2024     COAGS:   Lab Results   Component Value Date    INR 1.21 (H) 10/02/2024     POC: No results found for: \"BGM\", \"HCG\", \"HCGS\"  HEPATIC:   Lab Results   Component Value Date    ALBUMIN 3.8 09/30/2024    PROTTOTAL 8.1 09/30/2024    ALT 28 09/30/2024    AST 36 09/30/2024    ALKPHOS 62 09/30/2024    BILITOTAL 0.6 09/30/2024     OTHER:   Lab Results   Component Value Date    AVNI 9.3 10/02/2024    MAG 2.0 10/02/2024    LIPASE 17 09/17/2024    TSH 2.85 10/01/2024       Anesthesia Plan    ASA Status:  4    NPO Status:  NPO Appropriate    Anesthesia Type: General.     - Airway: ETT "   Induction: Intravenous, Propofol.   Maintenance: Balanced.        Consents    Anesthesia Plan(s) and associated risks, benefits, and realistic alternatives discussed. Questions answered and patient/representative(s) expressed understanding.     - Discussed:     - Discussed with:  Patient            Postoperative Care            Comments:             Harvey Tavares MD    I have reviewed the pertinent notes and labs in the chart from the past 30 days and (re)examined the patient.  Any updates or changes from those notes are reflected in this note.    # Hypokalemia: Lowest K = 3 mmol/L in last 2 days, will replace as needed     # Hypomagnesemia: Lowest Mg = 1.5 mg/dL in last 2 days, will replace as needed     # Coagulation Defect: INR = 1.21 (Ref range: 0.85 - 1.15) and/or PTT = N/A, will monitor for bleeding

## 2024-10-02 NOTE — PLAN OF CARE
VSS, remains in afib--mostly controlled, no c/o pain, continues to be GREEN, diuresing on IV lasix, purewick in place with good urine output, plan for NICOLA/DCCV tomorrow afternoon--pt very anxious regarding procedure--lots of education provided- PCA updated as well, continue to monitor closely.

## 2024-10-02 NOTE — PROGRESS NOTES
[facility]  Cardiology Progress Note  Date of Service: 10/02/2024  Primary Cardiologist: Dr. Ocampo    Assessment & Plan    Agatha Rodriguez is a 74 year old female with past medical history significant for hypertension, asthma, DYLAN, CAD, GERD admitted on 9/30/2024 with progressive dyspnea and anasarca, with acute palpitations; and is found to have suspected acute diastolic CHF exacerbation and new onset atrial flutter, as well as ULYSSES     Assessment:  Atrial flutter  Anticoagulated with heparin infusion  Rates fairly controlled with carvedilol 25 mg twice daily  TSH 2.85  HFpEF with exacerbation, LVEF 50% (previously 60-65% in 7/2024)  ProBNP 1,759  CXR showed trace bilateral effusion  Echo shows mild concentric LVH  Diuresing well with IV lasix. Lower extremity edema improved. PTA on lasix 40 mg daily  Weight down 367 pounds today, 376 on admit. Previous discharge weight on 7/30/2024 355 pounds- suspect this is her dry weight  Down 3 L  Hypertension  Well controlled with carvedilol 25 mg twice daily, hydralazine 50 mg TID. PTA losartan on hold  -130s  Acute kidney injury  Cr 0.93/GFR 65, improved  Type II diabetes mellitus  DYLAN  Bipap per home settings  Asthma    Plan:   Tentatively NICOLA/DCCV tomorrow with anesthesia. Discussed with patient and she would like time to think it over and discuss with her family.   NPO after midnight  Continue lasix 60 mg twice daily  Daily weights/IOs/daily BMP/ 2L fluid restriction/2 GM sodium diet/repletion of electrolytes  Cardiology will continue to follow    Ilana Mosqueda CNP  [facility]  Pager: [phone]      Interval History   Seen sitting up in chair. No acute events overnight. Overall feeling improved. NICOLA/Cardioversion discussed    Physical Exam   Temp: 98.4  F (36.9  C) Temp src: Oral BP: (!) 137/99 Pulse: 113   Resp: 16 SpO2: 100 % O2 Device: Nasal cannula Oxygen Delivery: 2 LPM  Vitals:    09/30/24 2200 10/01/24 0529 10/02/24 0700    Weight: (!) 170.7 kg (376 lb 6.4 oz) (!) 168 kg (370 lb 6 oz) (!) 166.5 kg (367 lb)     Adult female in no apparent distress. Heart rate and rhythm are irregular. No murmur, rub, or gallop. Lung sounds are clear, slightly diminished in bases. +2 lower extremity swelling. Skin warm and dry.  Clinically Significant Risk Factors        # Hypokalemia: Lowest K = 3 mmol/L in last 2 days, will replace as needed     # Hypomagnesemia: Lowest Mg = 1.5 mg/dL in last 2 days, will replace as needed    # Coagulation Defect: INR = 1.21 (Ref range: 0.85 - 1.15) and/or PTT = N/A, will monitor for bleeding    # Hypertension: Noted on problem list                     Medications   Current Facility-Administered Medications   Medication Dose Route Frequency Provider Last Rate Last Admin    Continuing beta blocker from home medication list OR beta blocker order already placed during this visit   Does not apply DOES NOT GO TO Bandar Beltran MD        [START ON 10/3/2024] Give   of usual dose of LONG ACTING insulin AM of procedure IF diabetic   Does not apply Continuous PRN Ilana Reinoso APRN CNP        heparin 25,000 units in 0.45% NaCl 250 mL ANTICOAGULANT infusion  0-5,000 Units/hr Intravenous Continuous Kirti Mcdonald MD 13.5 mL/hr at 10/02/24 0910 1,350 Units/hr at 10/02/24 0910    [START ON 10/3/2024] May take oral meds with a sip of water, the morning of Cardioversion procedure.       Does not apply Continuous PRN Ilana Reinoso APRN CNP        No anticoagulants IF patient has had acute trauma/surgery or recent intracranial, GI or urinary tract bleeding.    Other DOES NOT GO TO Bandar Beltran MD        Reason ACE/ARB/ARNI order not selected   Other DOES NOT GO TO Bandar Beltran MD         Current Facility-Administered Medications   Medication Dose Route Frequency Provider Last Rate Last Admin    atorvastatin (LIPITOR) tablet 40 mg  40 mg Oral Daily Cynthia Vu MD   40 mg at  10/02/24 1015    carvedilol (COREG) tablet 25 mg  25 mg Oral BID w/meals Cynthia Vu MD   25 mg at 10/02/24 1015    famotidine (PEPCID) tablet 20 mg  20 mg Oral Daily Cynthia Vu MD   20 mg at 10/02/24 1015    fluticasone (ARNUITY ELLIPTA) 200 MCG/ACT inhaler 1 puff  1 puff Inhalation Daily Cynthia Vu MD   1 puff at 10/02/24 1015    furosemide (LASIX) injection 60 mg  60 mg Intravenous bid 08 & 14 Bandar Siddiqui MD   60 mg at 10/02/24 1014    hydrALAZINE (APRESOLINE) tablet 50 mg  50 mg Oral Q8H Cynthia Vu MD   50 mg at 10/02/24 1015    insulin aspart (NovoLOG) injection (RAPID ACTING)  1-7 Units Subcutaneous TID AC Bandar Siddiqui MD        insulin aspart (NovoLOG) injection (RAPID ACTING)  1-5 Units Subcutaneous At Bedtime Bandar Siddiqui MD        potassium chloride liv ER (KLOR-CON M20) CR tablet 20 mEq  20 mEq Oral BID Cynthia Vu MD   20 mEq at 10/02/24 1015    sodium chloride (PF) 0.9% PF flush 3 mL  3 mL Intracatheter Q8H Bandar Siddiqui MD           Data   Last 24 hours labs reviewed   EKG: (reviewed personally) 9/30/2024: atrial flutter with variable A-V block,     Imaging:   CXR 9/30/2024:  IMPRESSION: Heart is mildly enlarged, unchanged. Trace bilateral effusions with bibasilar atelectasis. Lungs are otherwise clear.     Tele: atrial flutter with occasional PVCs and couplets. 1 triplet. HR 80s-110s    Echo 10/1/2024:   1. The left ventricle is normal in size. The visual ejection fraction is  estimated at 50%.  2. The right ventricle is normal in structure, function and size.  3. No valve disease.     Echo 7-28-24 showed EF 60%.      Last ischemic eval:   NM Lexiscan 7/30/2024:    The nuclear stress test is probably abnormal. Specificity reduced due to suboptimal study quality related to patient body habitus.    There is a small area of nontransmural infarction in the distal anteroseptal and apex segment(s) of the left ventricle  associated with a small area of a mild degree of gloria-infarct ischemia.    Stress to rest cavity ratio is 1.02.  TID is absent.    Left ventricular function is low normal.    The left ventricular ejection fraction at rest is 55%.  The left ventricular ejection fraction at stress is 50%.    LV cavity size enlarged.    There is no prior study for comparison.    Device: n/a

## 2024-10-02 NOTE — PLAN OF CARE
Orientations: A&O x4  Vitals/Pain: VSS, dyspnea on exertion, shallow breaths, on 2L of O2 via nc, refused Bipap overnight  Tele: SR  Lines/Drains: R PIV- Heparin restarted at midnight with previous rate of 1200 unit(s)/hr; currently at 1350 unit(s)/hr, bolus given per protocol, double check with another RN  Skin/Wounds: BLE edema, kept elevated  GI/: stress incontinent at times, uses purewick at HS, pericares done  Labs: Abnormal/Trends, Electrolyte Replacement- Hep Ant-Xa check at 1330; K replaced with 40 meq per protocol, recheck at 0600 with Mg  Ambulation/Assist: A2-pivot  Sleep Quality: short intervals  Plan: NPO for possible cardioversion

## 2024-10-02 NOTE — PLAN OF CARE
VSS on room air. BLE elevated on pillows this shift. Heparin anti-Xa recheck scheduled for 2220. NPO at 0000 for possible cardiac procedure.     Goal Outcome Evaluation:      Plan of Care Reviewed With: patient    Overall Patient Progress: improving      Problem: Adult Inpatient Plan of Care  Goal: Plan of Care Review  Description: The Plan of Care Review/Shift note should be completed every shift.  The Outcome Evaluation is a brief statement about your assessment that the patient is improving, declining, or no change.  This information will be displayed automatically on your shift  note.  Outcome: Progressing  Flowsheets (Taken 10/1/2024 2202)  Plan of Care Reviewed With: patient  Overall Patient Progress: improving  Goal: Absence of Hospital-Acquired Illness or Injury  Intervention: Identify and Manage Fall Risk  Recent Flowsheet Documentation  Taken 10/1/2024 2100 by Dinorah Tan RN  Safety Promotion/Fall Prevention:   activity supervised   assistive device/personal items within reach   clutter free environment maintained   increased rounding and observation   increase visualization of patient   lighting adjusted   mobility aid in reach   nonskid shoes/slippers when out of bed   patient and family education   room near nurse's station   room organization consistent   safety round/check completed   supervised activity   treat reversible contributory factors  Intervention: Prevent Skin Injury  Recent Flowsheet Documentation  Taken 10/1/2024 2100 by Dinorah Tan RN  Body Position:   heels elevated   legs elevated  Skin Protection: adhesive use limited  Device Skin Pressure Protection: adhesive use limited  Taken 10/1/2024 2050 by Dinorah Tan RN  Body Position:   turned   position changed independently  Intervention: Prevent and Manage VTE (Venous Thromboembolism) Risk  Recent Flowsheet Documentation  Taken 10/1/2024 2100 by Dinorah Tan RN  VTE Prevention/Management: SCDs off (sequential compression  devices)  Intervention: Prevent Infection  Recent Flowsheet Documentation  Taken 10/1/2024 2100 by Dinorah Tan RN  Infection Prevention:   cohorting utilized   rest/sleep promoted  Goal: Optimal Comfort and Wellbeing  Intervention: Provide Person-Centered Care  Recent Flowsheet Documentation  Taken 10/1/2024 2100 by Dinorah Tan RN  Trust Relationship/Rapport:   care explained   choices provided   emotional support provided   empathic listening provided   questions answered   questions encouraged   reassurance provided   thoughts/feelings acknowledged     Problem: Risk for Delirium  Goal: Optimal Coping  Intervention: Optimize Psychosocial Adjustment to Delirium  Recent Flowsheet Documentation  Taken 10/1/2024 2100 by Dinorah Tan RN  Supportive Measures:   relaxation techniques promoted   problem-solving facilitated  Family/Support System Care: presence promoted  Goal: Improved Behavioral Control  Intervention: Prevent and Manage Agitation  Recent Flowsheet Documentation  Taken 10/1/2024 2100 by Dinorah Tan RN  Environment Familiarity/Consistency: daily routine followed  Intervention: Minimize Safety Risk  Recent Flowsheet Documentation  Taken 10/1/2024 2100 by Dinorah Tan RN  Communication Enhancement Strategies: call light answered in person  Enhanced Safety Measures: review medications for side effects with activity  Trust Relationship/Rapport:   care explained   choices provided   emotional support provided   empathic listening provided   questions answered   questions encouraged   reassurance provided   thoughts/feelings acknowledged  Goal: Improved Attention and Thought Clarity  Intervention: Maximize Cognitive Function  Recent Flowsheet Documentation  Taken 10/1/2024 2100 by Dinorah Tan RN  Sensory Stimulation Regulation: care clustered  Reorientation Measures:   calendar in view   clock in view  Goal: Improved Sleep  Intervention: Promote Sleep  Recent Flowsheet  Documentation  Taken 10/1/2024 2100 by Dinorah Tan, RN  Sleep/Rest Enhancement:   consistent schedule promoted   regular sleep/rest pattern promoted   relaxation techniques promoted     Problem: Risk for Delirium  Goal: Improved Behavioral Control  Intervention: Prevent and Manage Agitation  Recent Flowsheet Documentation  Taken 10/1/2024 2100 by Dinorah Tan, KELLIE  Environment Familiarity/Consistency: daily routine followed  Intervention: Minimize Safety Risk  Recent Flowsheet Documentation  Taken 10/1/2024 2100 by Dinorah Tan, RN  Communication Enhancement Strategies: call light answered in person  Enhanced Safety Measures: review medications for side effects with activity  Trust Relationship/Rapport:   care explained   choices provided   emotional support provided   empathic listening provided   questions answered   questions encouraged   reassurance provided   thoughts/feelings acknowledged

## 2024-10-02 NOTE — PROGRESS NOTES
Called to place pt on Bipap for the night. Pt states has home machine, uses nasal pillows. Advised only have full face mask. Pt declined Bipap tonight, will have PCA bring her home machine tomorrow. RN updated, will continue to follow.     NOE Romero, RRT

## 2024-10-03 ENCOUNTER — ANESTHESIA (OUTPATIENT)
Dept: SURGERY | Facility: CLINIC | Age: 74
DRG: 291 | End: 2024-10-03
Payer: MEDICARE

## 2024-10-03 ENCOUNTER — APPOINTMENT (OUTPATIENT)
Dept: CARDIOLOGY | Facility: CLINIC | Age: 74
DRG: 291 | End: 2024-10-03
Payer: MEDICARE

## 2024-10-03 LAB
ANION GAP SERPL CALCULATED.3IONS-SCNC: 14 MMOL/L (ref 7–15)
BUN SERPL-MCNC: 19.6 MG/DL (ref 8–23)
CALCIUM SERPL-MCNC: 9.6 MG/DL (ref 8.8–10.4)
CHLORIDE SERPL-SCNC: 99 MMOL/L (ref 98–107)
CREAT SERPL-MCNC: 0.95 MG/DL (ref 0.51–0.95)
EGFRCR SERPLBLD CKD-EPI 2021: 63 ML/MIN/1.73M2
GLUCOSE BLDC GLUCOMTR-MCNC: 116 MG/DL (ref 70–99)
GLUCOSE BLDC GLUCOMTR-MCNC: 117 MG/DL (ref 70–99)
GLUCOSE BLDC GLUCOMTR-MCNC: 124 MG/DL (ref 70–99)
GLUCOSE BLDC GLUCOMTR-MCNC: 137 MG/DL (ref 70–99)
GLUCOSE BLDC GLUCOMTR-MCNC: 138 MG/DL (ref 70–99)
GLUCOSE SERPL-MCNC: 123 MG/DL (ref 70–99)
HCO3 SERPL-SCNC: 28 MMOL/L (ref 22–29)
LVEF ECHO: NORMAL
MAGNESIUM SERPL-MCNC: 1.9 MG/DL (ref 1.7–2.3)
POTASSIUM SERPL-SCNC: 3.3 MMOL/L (ref 3.4–5.3)
POTASSIUM SERPL-SCNC: 3.8 MMOL/L (ref 3.4–5.3)
SODIUM SERPL-SCNC: 141 MMOL/L (ref 135–145)
TROPONIN T SERPL HS-MCNC: 15 NG/L
UFH PPP CHRO-ACNC: 0.4 IU/ML

## 2024-10-03 PROCEDURE — 93312 ECHO TRANSESOPHAGEAL: CPT | Mod: 26 | Performed by: INTERNAL MEDICINE

## 2024-10-03 PROCEDURE — 258N000003 HC RX IP 258 OP 636: Performed by: NURSE ANESTHETIST, CERTIFIED REGISTERED

## 2024-10-03 PROCEDURE — 93325 DOPPLER ECHO COLOR FLOW MAPG: CPT | Mod: 26 | Performed by: INTERNAL MEDICINE

## 2024-10-03 PROCEDURE — 250N000011 HC RX IP 250 OP 636: Performed by: NURSE ANESTHETIST, CERTIFIED REGISTERED

## 2024-10-03 PROCEDURE — 250N000011 HC RX IP 250 OP 636: Performed by: INTERNAL MEDICINE

## 2024-10-03 PROCEDURE — 999N000184 HC STATISTIC TELEMETRY

## 2024-10-03 PROCEDURE — 250N000009 HC RX 250: Performed by: NURSE ANESTHETIST, CERTIFIED REGISTERED

## 2024-10-03 PROCEDURE — C8925 2D TEE W OR W/O FOL W/CON,IN: HCPCS

## 2024-10-03 PROCEDURE — 84484 ASSAY OF TROPONIN QUANT: CPT

## 2024-10-03 PROCEDURE — 83735 ASSAY OF MAGNESIUM: CPT | Performed by: INTERNAL MEDICINE

## 2024-10-03 PROCEDURE — 93320 DOPPLER ECHO COMPLETE: CPT | Mod: 26 | Performed by: INTERNAL MEDICINE

## 2024-10-03 PROCEDURE — 92960 CARDIOVERSION ELECTRIC EXT: CPT | Performed by: ANESTHESIOLOGY

## 2024-10-03 PROCEDURE — 92960 CARDIOVERSION ELECTRIC EXT: CPT

## 2024-10-03 PROCEDURE — 999N000010 HC STATISTIC ANES STAT CODE-CRNA PER MINUTE

## 2024-10-03 PROCEDURE — 99232 SBSQ HOSP IP/OBS MODERATE 35: CPT | Performed by: STUDENT IN AN ORGANIZED HEALTH CARE EDUCATION/TRAINING PROGRAM

## 2024-10-03 PROCEDURE — 250N000013 HC RX MED GY IP 250 OP 250 PS 637: Performed by: HOSPITALIST

## 2024-10-03 PROCEDURE — 250N000013 HC RX MED GY IP 250 OP 250 PS 637: Performed by: INTERNAL MEDICINE

## 2024-10-03 PROCEDURE — 250N000013 HC RX MED GY IP 250 OP 250 PS 637

## 2024-10-03 PROCEDURE — 85520 HEPARIN ASSAY: CPT | Performed by: STUDENT IN AN ORGANIZED HEALTH CARE EDUCATION/TRAINING PROGRAM

## 2024-10-03 PROCEDURE — 370N000017 HC ANESTHESIA TECHNICAL FEE, PER MIN

## 2024-10-03 PROCEDURE — 99100 ANES PT EXTEME AGE<1 YR&>70: CPT | Performed by: NURSE ANESTHETIST, CERTIFIED REGISTERED

## 2024-10-03 PROCEDURE — 92960 CARDIOVERSION ELECTRIC EXT: CPT | Performed by: NURSE ANESTHETIST, CERTIFIED REGISTERED

## 2024-10-03 PROCEDURE — 210N000002 HC R&B HEART CARE

## 2024-10-03 PROCEDURE — 84132 ASSAY OF SERUM POTASSIUM: CPT | Performed by: INTERNAL MEDICINE

## 2024-10-03 PROCEDURE — 93325 DOPPLER ECHO COLOR FLOW MAPG: CPT

## 2024-10-03 PROCEDURE — 36415 COLL VENOUS BLD VENIPUNCTURE: CPT | Performed by: STUDENT IN AN ORGANIZED HEALTH CARE EDUCATION/TRAINING PROGRAM

## 2024-10-03 PROCEDURE — 80048 BASIC METABOLIC PNL TOTAL CA: CPT | Performed by: STUDENT IN AN ORGANIZED HEALTH CARE EDUCATION/TRAINING PROGRAM

## 2024-10-03 PROCEDURE — 250N000011 HC RX IP 250 OP 636: Performed by: HOSPITALIST

## 2024-10-03 PROCEDURE — 999N000183 HC STATISTIC TEE INCLUDES SEDATION

## 2024-10-03 PROCEDURE — 99233 SBSQ HOSP IP/OBS HIGH 50: CPT | Mod: 25

## 2024-10-03 PROCEDURE — 36415 COLL VENOUS BLD VENIPUNCTURE: CPT | Performed by: INTERNAL MEDICINE

## 2024-10-03 PROCEDURE — 250N000025 HC SEVOFLURANE, PER MIN

## 2024-10-03 PROCEDURE — 93005 ELECTROCARDIOGRAM TRACING: CPT

## 2024-10-03 RX ORDER — LIDOCAINE HYDROCHLORIDE 20 MG/ML
INJECTION, SOLUTION INFILTRATION; PERINEURAL PRN
Status: DISCONTINUED | OUTPATIENT
Start: 2024-10-03 | End: 2024-10-03

## 2024-10-03 RX ORDER — SENNOSIDES 8.6 MG
8.6 TABLET ORAL 2 TIMES DAILY PRN
Status: ACTIVE | OUTPATIENT
Start: 2024-10-03

## 2024-10-03 RX ORDER — PROPOFOL 10 MG/ML
INJECTION, EMULSION INTRAVENOUS PRN
Status: DISCONTINUED | OUTPATIENT
Start: 2024-10-03 | End: 2024-10-03

## 2024-10-03 RX ORDER — MAGNESIUM HYDROXIDE/ALUMINUM HYDROXICE/SIMETHICONE 120; 1200; 1200 MG/30ML; MG/30ML; MG/30ML
15 SUSPENSION ORAL 3 TIMES DAILY PRN
Status: DISPENSED | OUTPATIENT
Start: 2024-10-03

## 2024-10-03 RX ORDER — POLYETHYLENE GLYCOL 3350 17 G/17G
17 POWDER, FOR SOLUTION ORAL DAILY PRN
Status: DISPENSED | OUTPATIENT
Start: 2024-10-03

## 2024-10-03 RX ORDER — VASOPRESSIN IN 0.9 % NACL 2 UNIT/2ML
SYRINGE (ML) INTRAVENOUS PRN
Status: DISCONTINUED | OUTPATIENT
Start: 2024-10-03 | End: 2024-10-03

## 2024-10-03 RX ORDER — LIDOCAINE 40 MG/G
CREAM TOPICAL
Status: DISCONTINUED | OUTPATIENT
Start: 2024-10-03 | End: 2024-10-04

## 2024-10-03 RX ORDER — SODIUM CHLORIDE 9 MG/ML
30 INJECTION, SOLUTION INTRAVENOUS CONTINUOUS
Status: DISCONTINUED | OUTPATIENT
Start: 2024-10-03 | End: 2024-10-05

## 2024-10-03 RX ORDER — POTASSIUM CHLORIDE 1500 MG/1
40 TABLET, EXTENDED RELEASE ORAL
Status: ACTIVE | OUTPATIENT
Start: 2024-10-03

## 2024-10-03 RX ORDER — POTASSIUM CHLORIDE 1500 MG/1
40 TABLET, EXTENDED RELEASE ORAL 2 TIMES DAILY
Status: DISPENSED | OUTPATIENT
Start: 2024-10-03

## 2024-10-03 RX ORDER — MAGNESIUM SULFATE HEPTAHYDRATE 40 MG/ML
2 INJECTION, SOLUTION INTRAVENOUS
Status: ACTIVE | OUTPATIENT
Start: 2024-10-03

## 2024-10-03 RX ORDER — POTASSIUM CHLORIDE 1500 MG/1
40 TABLET, EXTENDED RELEASE ORAL ONCE
Status: COMPLETED | OUTPATIENT
Start: 2024-10-03 | End: 2024-10-03

## 2024-10-03 RX ORDER — POTASSIUM CHLORIDE 1500 MG/1
20 TABLET, EXTENDED RELEASE ORAL
Status: ACTIVE | OUTPATIENT
Start: 2024-10-03

## 2024-10-03 RX ORDER — SODIUM CHLORIDE, SODIUM LACTATE, POTASSIUM CHLORIDE, CALCIUM CHLORIDE 600; 310; 30; 20 MG/100ML; MG/100ML; MG/100ML; MG/100ML
INJECTION, SOLUTION INTRAVENOUS CONTINUOUS PRN
Status: DISCONTINUED | OUTPATIENT
Start: 2024-10-03 | End: 2024-10-03

## 2024-10-03 RX ORDER — ONDANSETRON 2 MG/ML
INJECTION INTRAMUSCULAR; INTRAVENOUS PRN
Status: DISCONTINUED | OUTPATIENT
Start: 2024-10-03 | End: 2024-10-03

## 2024-10-03 RX ADMIN — PHENYLEPHRINE HYDROCHLORIDE 200 MCG: 10 INJECTION INTRAVENOUS at 15:01

## 2024-10-03 RX ADMIN — NOREPINEPHRINE BITARTRATE 12.8 MCG: 1 INJECTION, SOLUTION, CONCENTRATE INTRAVENOUS at 15:01

## 2024-10-03 RX ADMIN — PHENYLEPHRINE HYDROCHLORIDE 200 MCG: 10 INJECTION INTRAVENOUS at 15:07

## 2024-10-03 RX ADMIN — Medication 2 UNITS: at 15:03

## 2024-10-03 RX ADMIN — POTASSIUM CHLORIDE 40 MEQ: 1500 TABLET, EXTENDED RELEASE ORAL at 09:34

## 2024-10-03 RX ADMIN — SUCCINYLCHOLINE CHLORIDE 100 MG: 20 INJECTION, SOLUTION INTRAMUSCULAR; INTRAVENOUS; PARENTERAL at 14:56

## 2024-10-03 RX ADMIN — NOREPINEPHRINE BITARTRATE 12.8 MCG: 1 INJECTION, SOLUTION, CONCENTRATE INTRAVENOUS at 15:10

## 2024-10-03 RX ADMIN — HEPARIN SODIUM 1350 UNITS/HR: 10000 INJECTION, SOLUTION INTRAVENOUS at 20:58

## 2024-10-03 RX ADMIN — CALCIUM CARBONATE (ANTACID) CHEW TAB 500 MG 1000 MG: 500 CHEW TAB at 01:09

## 2024-10-03 RX ADMIN — SODIUM CHLORIDE, POTASSIUM CHLORIDE, SODIUM LACTATE AND CALCIUM CHLORIDE: 600; 310; 30; 20 INJECTION, SOLUTION INTRAVENOUS at 14:51

## 2024-10-03 RX ADMIN — POTASSIUM CHLORIDE 20 MEQ: 1500 TABLET, EXTENDED RELEASE ORAL at 13:30

## 2024-10-03 RX ADMIN — HYDRALAZINE HYDROCHLORIDE 50 MG: 50 TABLET ORAL at 09:34

## 2024-10-03 RX ADMIN — DICLOFENAC 2 G: 10 GEL TOPICAL at 09:34

## 2024-10-03 RX ADMIN — Medication 1 LOZENGE: at 17:28

## 2024-10-03 RX ADMIN — DICLOFENAC 2 G: 10 GEL TOPICAL at 00:46

## 2024-10-03 RX ADMIN — ACETAMINOPHEN 650 MG: 325 TABLET ORAL at 20:05

## 2024-10-03 RX ADMIN — POTASSIUM CHLORIDE 40 MEQ: 1500 TABLET, EXTENDED RELEASE ORAL at 20:05

## 2024-10-03 RX ADMIN — PHENYLEPHRINE HYDROCHLORIDE 200 MCG: 10 INJECTION INTRAVENOUS at 15:05

## 2024-10-03 RX ADMIN — ONDANSETRON 4 MG: 2 INJECTION INTRAMUSCULAR; INTRAVENOUS at 15:08

## 2024-10-03 RX ADMIN — DICLOFENAC 2 G: 10 GEL TOPICAL at 17:28

## 2024-10-03 RX ADMIN — CARVEDILOL 25 MG: 25 TABLET, FILM COATED ORAL at 20:58

## 2024-10-03 RX ADMIN — FAMOTIDINE 20 MG: 20 TABLET, FILM COATED ORAL at 09:34

## 2024-10-03 RX ADMIN — ATORVASTATIN CALCIUM 40 MG: 40 TABLET, FILM COATED ORAL at 09:34

## 2024-10-03 RX ADMIN — POTASSIUM CHLORIDE 40 MEQ: 1500 TABLET, EXTENDED RELEASE ORAL at 11:41

## 2024-10-03 RX ADMIN — CARVEDILOL 25 MG: 25 TABLET, FILM COATED ORAL at 09:34

## 2024-10-03 RX ADMIN — HEPARIN SODIUM 1350 UNITS/HR: 10000 INJECTION, SOLUTION INTRAVENOUS at 01:07

## 2024-10-03 RX ADMIN — ALUMINUM HYDROXIDE, MAGNESIUM HYDROXIDE, AND SIMETHICONE 15 ML: 200; 200; 20 SUSPENSION ORAL at 01:09

## 2024-10-03 RX ADMIN — PHENYLEPHRINE HYDROCHLORIDE 200 MCG: 10 INJECTION INTRAVENOUS at 14:59

## 2024-10-03 RX ADMIN — FUROSEMIDE 60 MG: 10 INJECTION, SOLUTION INTRAMUSCULAR; INTRAVENOUS at 20:58

## 2024-10-03 RX ADMIN — FUROSEMIDE 60 MG: 10 INJECTION, SOLUTION INTRAMUSCULAR; INTRAVENOUS at 09:33

## 2024-10-03 RX ADMIN — HYDRALAZINE HYDROCHLORIDE 50 MG: 50 TABLET ORAL at 01:09

## 2024-10-03 RX ADMIN — GUAIFENESIN SYRUP AND DEXTROMETHORPHAN 10 ML: 100; 10 SYRUP ORAL at 20:09

## 2024-10-03 RX ADMIN — PHENYLEPHRINE HYDROCHLORIDE 200 MCG: 10 INJECTION INTRAVENOUS at 15:03

## 2024-10-03 RX ADMIN — PROPOFOL 150 MG: 10 INJECTION, EMULSION INTRAVENOUS at 14:56

## 2024-10-03 RX ADMIN — Medication 1 LOZENGE: at 20:05

## 2024-10-03 RX ADMIN — FLUTICASONE FUROATE 1 PUFF: 200 POWDER RESPIRATORY (INHALATION) at 09:34

## 2024-10-03 RX ADMIN — LIDOCAINE HYDROCHLORIDE 100 MG: 20 INJECTION, SOLUTION INFILTRATION; PERINEURAL at 14:56

## 2024-10-03 ASSESSMENT — ACTIVITIES OF DAILY LIVING (ADL)
ADLS_ACUITY_SCORE: 43
ADLS_ACUITY_SCORE: 43
ADLS_ACUITY_SCORE: 42
ADLS_ACUITY_SCORE: 42
ADLS_ACUITY_SCORE: 43
ADLS_ACUITY_SCORE: 42
ADLS_ACUITY_SCORE: 43
ADLS_ACUITY_SCORE: 42
ADLS_ACUITY_SCORE: 43
ADLS_ACUITY_SCORE: 43
ADLS_ACUITY_SCORE: 42
ADLS_ACUITY_SCORE: 43
ADLS_ACUITY_SCORE: 43

## 2024-10-03 NOTE — PLAN OF CARE
Reason for Admission: Aflutter, HFpEF exacerbation, acute on chronic.     Cognitive/Mentation: A/Ox 4  Neuros/CMS: Intact   VS: VSS on 2LNC or Cpap overnight.   Tele: Afib CVR .  /GI: Continent. Uses purewick for ease overnight. Last BM unknown, before admission.   Pulmonary: LS diminished, dyspnea upon exertion  Pain: 7/10, used Voltaren gell with relief. Given Maalox and Tums for gas/heartburn pain     Drains/Lines: PIV infusing heparin gtt @1350units  Skin: Intact  Activity: Assist x 2.  Diet: Npo since midnight. Takes pills whole.     NICOLA with cardioversion today,     Aggression Stoplight Tool: Green

## 2024-10-03 NOTE — OR NURSING
OK to transport back to rm 275-1 per Dr Evi Morales- report given to RN- transport on monitor- heparin gtt continues at 1350

## 2024-10-03 NOTE — PROGRESS NOTES
[facility]  Cardiology Progress Note  Date of Service: 10/03/2024  Primary Cardiologist: Dr. Ocampo    Assessment & Plan    Agatha Rodriguez is a 74 year old female with past medical history significant for hypertension, asthma, DYLAN, CAD, GERD admitted on 9/30/2024 with progressive dyspnea and anasarca, with acute palpitations; and is found to have suspected acute diastolic CHF exacerbation and new onset atrial flutter, as well as ULYSSES     Assessment:  Atrial flutter  Anticoagulated with heparin infusion  Rates fairly controlled with carvedilol 25 mg twice daily  TSH 2.85  HFpEF with exacerbation, LVEF 50% (previously 60-65% in 7/2024)  ProBNP 1,759  CXR showed trace bilateral effusion  Echo shows mild concentric LVH  Diuresing well with IV lasix. Lower extremity edema improved. PTA on lasix 40 mg daily  Weight down 366 pounds today, 376 on admit. Previous discharge weight on 7/30/2024 355 pounds- suspect this is her dry weight  Down 3.8 L  Hypertension  Well controlled with carvedilol 25 mg twice daily, hydralazine 50 mg TID. PTA losartan on hold  -130s  Acute kidney injury  Cr 0.95/GFR 63, improved  Type II diabetes mellitus  DYLAN  Bipap per home settings  Asthma    Plan:   NICOLA/DCCV today with anesthesia. Discussed in detail with patient and family. Patient has no history of esophageal stricture, odynphagia, dysphagia, airway problems, or medication allergies.     All risks and benefits for transesophageal echocardiogram have been explained to the patient.  This includes but is not limited to damage to the oral cavity, esophageal perforation, GI bleeding, pharyngeal hematoma, transient bronchospasm, transient hypoxia, arrhthymias (NSVT, transient atrial fibrillation), vomiting, hemoptysis, and complications from anesthesia. Patient understands and wishes to proceed with it. A formal consent form will be signed by the procedural physician.    I have reviewed the risks of a cardioversion,  including possible reactions to conscious sedation, skin irritation, stroke, and bradycardia. She understands and wishes to proceed.  Continue lasix 60 mg twice daily  Daily weights/IOs/daily BMP/ 2L fluid restriction/2 GM sodium diet/repletion of electrolytes  Ace wraps to lower extremities  Check troponin with episode of chest pressure last evening  Further recommendations following NICOLA/DCCV  Cardiology will continue to follow    Ilana Mosqueda CNP  [facility]  Pager: [phone]      Interval History   Seen sitting resting in bed. No acute events overnight. Overall feeling improved, not quite to baseline. NICOLA/Cardioversion today.    Physical Exam   Temp: 97.9  F (36.6  C) Temp src: Oral BP: 137/86 Pulse: 84   Resp: (!) 4 SpO2: 100 % O2 Device: Nasal cannula (per pt request when sleeping) Oxygen Delivery: 2 LPM  Vitals:    10/01/24 0529 10/02/24 0700 10/03/24 0600   Weight: (!) 168 kg (370 lb 6 oz) (!) 166.5 kg (367 lb) (!) 166.4 kg (366 lb 12.8 oz)     Adult female in no apparent distress. Heart rate and rhythm are irregular. No murmur, rub, or gallop. Lung sounds are clear, slightly diminished in bases. +2 lower extremity swelling. Skin warm and dry.  Clinically Significant Risk Factors        # Hypokalemia: Lowest K = 3 mmol/L in last 2 days, will replace as needed     # Hypomagnesemia: Lowest Mg = 1.5 mg/dL in last 2 days, will replace as needed      # Coagulation Defect: INR = 1.21 (Ref range: 0.85 - 1.15) and/or PTT = N/A, will monitor for bleeding    # Hypertension: Noted on problem list                     Medications   Current Facility-Administered Medications   Medication Dose Route Frequency Provider Last Rate Last Admin    Continuing beta blocker from home medication list OR beta blocker order already placed during this visit   Does not apply DOES NOT GO TO Bandar Beltran MD        Give   of usual dose of LONG ACTING insulin AM of procedure IF diabetic   Does not apply Continuous PRN  Ilana Reinoso APRN CNP        heparin 25,000 units in 0.45% NaCl 250 mL ANTICOAGULANT infusion  0-5,000 Units/hr Intravenous Continuous Kirti Mcdonald MD 13.5 mL/hr at 10/03/24 0107 1,350 Units/hr at 10/03/24 0107    May take oral meds with a sip of water, the morning of Cardioversion procedure.       Does not apply Continuous PRN Ilana Reinoso APRN CNP        No anticoagulants IF patient has had acute trauma/surgery or recent intracranial, GI or urinary tract bleeding.    Other DOES NOT GO TO Bandar Beltran MD        Reason ACE/ARB/ARNI order not selected   Other DOES NOT GO TO Bandar Beltran MD         Current Facility-Administered Medications   Medication Dose Route Frequency Provider Last Rate Last Admin    atorvastatin (LIPITOR) tablet 40 mg  40 mg Oral Daily Cynthia Vu MD   40 mg at 10/02/24 1015    carvedilol (COREG) tablet 25 mg  25 mg Oral BID w/meals Cynthia Vu MD   25 mg at 10/02/24 1811    diclofenac (VOLTAREN) 1 % topical gel 2 g  2 g Topical 4x Daily Betsy Mckinley MD   2 g at 10/03/24 0046    famotidine (PEPCID) tablet 20 mg  20 mg Oral Daily Cynthia Vu MD   20 mg at 10/02/24 1015    [START ON 10/4/2024] ferrous sulfate (FEROSUL) tablet 325 mg  325 mg Oral Q Mon Wed Fri AM Henry Blanco MD        fluticasone (ARNUITY ELLIPTA) 200 MCG/ACT inhaler 1 puff  1 puff Inhalation Daily Cynthia Vu MD   1 puff at 10/02/24 1015    furosemide (LASIX) injection 60 mg  60 mg Intravenous bid 08 & 14 Bandar Siddiqui MD   60 mg at 10/02/24 1435    hydrALAZINE (APRESOLINE) tablet 50 mg  50 mg Oral Q8H Cynthia Vu MD   50 mg at 10/03/24 0109    insulin aspart (NovoLOG) injection (RAPID ACTING)  1-7 Units Subcutaneous TID AC Bandar Siddiqui MD        insulin aspart (NovoLOG) injection (RAPID ACTING)  1-5 Units Subcutaneous At Bedtime Bandar Siddiqui MD        potassium chloride liv ER (KLOR-CON M20) CR tablet 40 mEq   40 mEq Oral BID Ilana Reinoso APRN CNP        potassium chloride liv ER (KLOR-CON M20) CR tablet 40 mEq  40 mEq Oral Once Kirti Mcdonald MD        sodium chloride (PF) 0.9% PF flush 3 mL  3 mL Intracatheter Q8H Bandar Siddiqui MD   3 mL at 10/02/24 2057       Data   Last 24 hours labs reviewed   EKG: (reviewed personally) 9/30/2024: atrial flutter with variable A-V block,     Imaging:   CXR 9/30/2024:  IMPRESSION: Heart is mildly enlarged, unchanged. Trace bilateral effusions with bibasilar atelectasis. Lungs are otherwise clear.     Tele: atrial flutter with occasional PVCs and couplets. HR 70s-110s    Echo 10/1/2024:   1. The left ventricle is normal in size. The visual ejection fraction is  estimated at 50%.  2. The right ventricle is normal in structure, function and size.  3. No valve disease.     Echo 7-28-24 showed EF 60%.      Last ischemic eval:   NM Lexiscan 7/30/2024:    The nuclear stress test is probably abnormal. Specificity reduced due to suboptimal study quality related to patient body habitus.    There is a small area of nontransmural infarction in the distal anteroseptal and apex segment(s) of the left ventricle associated with a small area of a mild degree of gloria-infarct ischemia.    Stress to rest cavity ratio is 1.02.  TID is absent.    Left ventricular function is low normal.    The left ventricular ejection fraction at rest is 55%.  The left ventricular ejection fraction at stress is 50%.    LV cavity size enlarged.    There is no prior study for comparison.    Device: n/a

## 2024-10-03 NOTE — PROGRESS NOTES
[facility]    Medicine Progress Note - Hospitalist Service    Date of Admission:  9/30/2024    Assessment & Plan   Agatha Rodriguez is a 74 year old woman with history of severe morbid obesity, DYLAN, asthma, hypertension, and chronic diastolic CHF, among others; who presents with progressive dyspnea, anasarca, acute palpitations and found to have HF exacerbation and new onset atrial flutter. Undergoing NICOLA/DCCV 10/3.    Acute HFpEF exacerbation (EF 50% from 60-65% 7/2024), improving  Atrial flutter, new diagnosis   Patient presented with worsening SOB, hypervolemia without significant hypoxia and minimal response to increasing home diuretics. Admission weight 376lb, dry weight ~355lb. BNP elevated with trace b/l effusions noted on CXR. Atrial flutter also noted on EKG which represents a new diagnosis. Symptoms consistent with acute HFpEF exacberation, possibly in setting of tachyarrhythmia vs worsening hypertension vs other. No concern for ischemic etiology, TSH normal, consistently wears home BiPAP at night. Plan for ongoing IV diuresis with NICOLA cardioversion today; continues to improve. [id number] of 5.  -Cardiology following  -Continue heparin gtt for now, likely transition to DOAC  -Carvedilol 25mg BID  -NICOLA/DCCV today, remains NPO  -Continue active diuresis with lasix 60mg BID  -Cardiac telemetry, strict Is/Os, daily weights  -Aggressive K, Mag repletion    ULYSSES, possible cardiorenal, resolved  Hypokalemia, hypomagnesemia, resolved  Cr 1.3 on admission, baseline 0.8-0.9. Possible cardiorenal component in setting of HF exacerbation.  -Daily BMP, mag      Hypertension  -Resume home coreg, norvasc, hydralazine. Holding home losartan, hydrochlorothiazide/triamterene in the setting of ULYSSES     Type 2 diabetes mellitus w/out current use of insulin  Most recent A1c was 8.0 in 6/28/2024. PTA regimen includes glipizide 2.5 and metformin 500mg.  -SSI while admitted, continue PO meds on  "discharge     DYLAN, suspected OHS - Continue home BiPAP  Hyperlipidemia - PTA statin  Hx of asthma - PTA fluticasone and albuterol   Chronic anemia, stable - PTA iron supplement  GERD - PTA pepcid  Primary OA of b/l knees     Severe morbid obesity - Noted  Estimated body mass index is 60.49 kg/m  as calculated from the following:    Height as of 7/29/24: 1.68 m (5' 6.14\").    Weight as of this encounter: 170.7 kg (376 lb 6.4 oz).        Diet: NPO per Anesthesia Guidelines for Procedure/Surgery Except for: Meds    DVT Prophylaxis: Heparin gtt, likely transition to DOAC in coming days  Becker Catheter: Not present  Lines: None     Cardiac Monitoring: ACTIVE order. Indication: Acute decompensated heart failure (48 hours)  Code Status: Full Code      Clinically Significant Risk Factors        # Hypokalemia: Lowest K = 3 mmol/L in last 2 days, will replace as needed     # Hypomagnesemia: Lowest Mg = 1.5 mg/dL in last 2 days, will replace as needed    # Coagulation Defect: INR = 1.21 (Ref range: 0.85 - 1.15) and/or PTT = N/A, will monitor for bleeding    # Hypertension: Noted on problem list                      Disposition Plan     Medically Ready for Discharge: Anticipated in 2-4 Days     eHnry Blanco MD  Hospitalist Service  [facility]  Securely message with [employer] (more info)  Text page via [facility] Paging/Directory   ______________________________________________________________________    Interval History   No concerns overnight, wore BiPAP. Improved respiratory status, no chest pain or palpitations. Continues to make good urine. Understands plan for NICOLA later today, answered all questions.    Physical Exam   Vital Signs: Temp: 97.9  F (36.6  C) Temp src: Oral BP: 137/86 Pulse: 84   Resp: (!) 4 SpO2: 100 % O2 Device: Nasal cannula (per pt request when sleeping) Oxygen Delivery: 2 LPM  Weight: 366 lbs 12.8 oz    General: Awake, alert, NAD, appropriate interaction, sitting upright in " bed  HEENT: Atraumatic, normocephalic, EOMI, no scleral icterus  CV: Irregular rhythm, regular rate, no murmurs, 2+ b/l CHARLIE to mid-shins (?improving), distal pulses intact, unable to assess JVD  Pulm: CTAB, breathing comfortably on 2L NC, no wheezes, no crackles  Abd: Soft, non-tender, obese, no overlying skin changes, no palpable hepatosplenomegaly  Skin: No rashes, lesions, or wounds visualized  Neuro: AOx4, CN II-XII grossly intact, no focal deficits, moving all extremities spontaneously, speech normal    Medical Decision Making       35 MINUTES SPENT BY ME on the date of service doing chart review, history, exam, documentation & further activities per the note.      Data     I have personally reviewed the following data over the past 24 hrs:    N/A  \   N/A   / N/A     141 99 19.6 /  116 (H)   3.3 (L) 28 0.95 \     Trop: 15 (H) BNP: N/A

## 2024-10-03 NOTE — PRE-PROCEDURE
GENERAL PRE-PROCEDURE:   Procedure:  NICOLA/DCCV  Date/Time:  10/3/2024 2:33 PM    Verbal consent obtained?: Yes    Written consent obtained?: Yes    Risks and benefits: Risks, benefits and alternatives were discussed    Consent given by:  Patient  Patient states understanding of procedure being performed: Yes    Patient's understanding of procedure matches consent: Yes    Procedure consent matches procedure scheduled: Yes    Expected level of sedation:  Moderate  Appropriately NPO:  Yes  ASA Class:  3  Mallampati  :  Grade 3- soft palate visible, posterior pharyngeal wall not visible  Lungs:  Lungs clear with good breath sounds bilaterally  Heart:  Normal heart sounds and rate, a-fib and a-flutter  History & Physical reviewed:  History and physical reviewed and no updates needed  Statement of review:  I have reviewed the lab findings, diagnostic data, medications, and the plan for sedation

## 2024-10-03 NOTE — PROGRESS NOTES
[id number] - Page sent to Dr. Hendrix  Far. Car. 1400 NICOLA/DCCV. Patient has already received 80mEq of potassium this morning for replacement. Results came back from potassium re-draw and her K+ level is 3.8. Would you like another replacement 20mEq?    Per Dr. Hendrix, replace potassium.

## 2024-10-03 NOTE — ANESTHESIA POSTPROCEDURE EVALUATION
Patient: Agatha Rodriguez    Procedure: Procedure(s):  Transesophageal echocardiogram intraoperative  Anesthesia cardioversion       Anesthesia Type:  General    Note:  Disposition: Inpatient   Postop Pain Control: Uneventful            Sign Out: Well controlled pain   PONV: No   Neuro/Psych: Uneventful            Sign Out: Acceptable/Baseline neuro status   Airway/Respiratory: Uneventful            Sign Out: Acceptable/Baseline resp. status   CV/Hemodynamics: Uneventful            Sign Out: Acceptable CV status; No obvious hypovolemia; No obvious fluid overload   Other NRE:    DID A NON-ROUTINE EVENT OCCUR?            Last vitals:  Vitals Value Taken Time   /86 10/03/24 1600   Temp 36.3  C (97.4  F) 10/03/24 1540   Pulse 98 10/03/24 1606   Resp 9 10/03/24 1606   SpO2 99 % 10/03/24 1606   Vitals shown include unfiled device data.    Electronically Signed By: Evi Morales MD  October 3, 2024  4:07 PM

## 2024-10-03 NOTE — PROVIDER NOTIFICATION
Edgardo message to Dr. Hendrix -  Inpt NICOLA/DCCV had K+ 3.3 this AM, RN is replacing with 80 mEq, do you want a recheck prior to DCCV at 1400?     Ok per Dr. Hendrix to recheck potassium, order placed.

## 2024-10-03 NOTE — PROGRESS NOTES
A/O. Pt denies difficulty swallowing or sleep apnea. No dentures or loose teeth. NPO since yesterday evening. Post-procedure instructions discussed with pt pre procedure w/ verbal understanding received.  All questions & concerns addressed.     NICOLA/DCCV with General Anesthesia   IV fluids started and given by Anesthesia   during procedure. Patient care completed by Anesthesia team [id number]. PASCALE RN provided supplemental care at bedside and performed bubble study at the orders of Dr. Hendrix.     DCCV cancelled/not performed.     Report given to Glenn PACU RN. PASCALE RN transferred patient to PACU with LIYA Feng. Personal CPAP machine was placed on patient bed and traveled with her to PACU.

## 2024-10-03 NOTE — ANESTHESIA CARE TRANSFER NOTE
Patient: Agatha Rodriguez    Procedure: Procedure(s):  Transesophageal echocardiogram intraoperative  Anesthesia cardioversion       Diagnosis: A-fib (H) [I48.91]  Diagnosis Additional Information: No value filed.    Anesthesia Type:   General     Note:    Oropharynx: oropharynx clear of all foreign objects  Level of Consciousness: awake  Oxygen Supplementation: face mask  Level of Supplemental Oxygen (L/min / FiO2): 10  Independent Airway: airway patency satisfactory and stable  Dentition: dentition unchanged  Vital Signs Stable: post-procedure vital signs reviewed and stable  Report to RN Given: handoff report given  Patient transferred to: PACU    Handoff Report: Identifed the Patient, Identified the Reponsible Provider, Reviewed the pertinent medical history, Discussed the surgical course, Reviewed Intra-OP anesthesia mangement and issues during anesthesia, Set expectations for post-procedure period and Allowed opportunity for questions and acknowledgement of understanding  Vitals:  Vitals Value Taken Time   BP     Temp     Pulse     Resp     SpO2         Electronically Signed By: FABIEN Huber CRNA  October 3, 2024  3:31 PM

## 2024-10-03 NOTE — PLAN OF CARE
NICOLA completed, unable to do DCCV related to clot. Up with 1 assist. Meds deferred post NICOLA as was hypotensive briefly. Improving as sedation wears off. Alert/oriented. Throat sore, lozenges and warm water help.

## 2024-10-04 LAB
ANION GAP SERPL CALCULATED.3IONS-SCNC: 11 MMOL/L (ref 7–15)
ATRIAL RATE - MUSE: 119 BPM
BUN SERPL-MCNC: 24.1 MG/DL (ref 8–23)
CALCIUM SERPL-MCNC: 9.7 MG/DL (ref 8.8–10.4)
CHLORIDE SERPL-SCNC: 102 MMOL/L (ref 98–107)
CREAT SERPL-MCNC: 1.24 MG/DL (ref 0.51–0.95)
DIASTOLIC BLOOD PRESSURE - MUSE: NORMAL MMHG
EGFRCR SERPLBLD CKD-EPI 2021: 46 ML/MIN/1.73M2
ERYTHROCYTE [DISTWIDTH] IN BLOOD BY AUTOMATED COUNT: 15.3 % (ref 10–15)
GLUCOSE BLDC GLUCOMTR-MCNC: 108 MG/DL (ref 70–99)
GLUCOSE BLDC GLUCOMTR-MCNC: 121 MG/DL (ref 70–99)
GLUCOSE BLDC GLUCOMTR-MCNC: 127 MG/DL (ref 70–99)
GLUCOSE BLDC GLUCOMTR-MCNC: 129 MG/DL (ref 70–99)
GLUCOSE BLDC GLUCOMTR-MCNC: 140 MG/DL (ref 70–99)
GLUCOSE SERPL-MCNC: 126 MG/DL (ref 70–99)
HCO3 SERPL-SCNC: 28 MMOL/L (ref 22–29)
HCT VFR BLD AUTO: 33.2 % (ref 35–47)
HGB BLD-MCNC: 9.6 G/DL (ref 11.7–15.7)
INTERPRETATION ECG - MUSE: NORMAL
MAGNESIUM SERPL-MCNC: 1.9 MG/DL (ref 1.7–2.3)
MCH RBC QN AUTO: 24.2 PG (ref 26.5–33)
MCHC RBC AUTO-ENTMCNC: 28.9 G/DL (ref 31.5–36.5)
MCV RBC AUTO: 84 FL (ref 78–100)
P AXIS - MUSE: NORMAL DEGREES
PLATELET # BLD AUTO: 267 10E3/UL (ref 150–450)
POTASSIUM SERPL-SCNC: 4.8 MMOL/L (ref 3.4–5.3)
PR INTERVAL - MUSE: NORMAL MS
QRS DURATION - MUSE: 102 MS
QT - MUSE: 416 MS
QTC - MUSE: 533 MS
R AXIS - MUSE: 6 DEGREES
RBC # BLD AUTO: 3.97 10E6/UL (ref 3.8–5.2)
SODIUM SERPL-SCNC: 141 MMOL/L (ref 135–145)
SYSTOLIC BLOOD PRESSURE - MUSE: NORMAL MMHG
T AXIS - MUSE: 108 DEGREES
TROPONIN T SERPL HS-MCNC: 18 NG/L
TROPONIN T SERPL HS-MCNC: 18 NG/L
UFH PPP CHRO-ACNC: 0.32 IU/ML
UFH PPP CHRO-ACNC: 0.54 IU/ML
VENTRICULAR RATE- MUSE: 99 BPM
WBC # BLD AUTO: 8.3 10E3/UL (ref 4–11)

## 2024-10-04 PROCEDURE — 250N000011 HC RX IP 250 OP 636: Performed by: HOSPITALIST

## 2024-10-04 PROCEDURE — 250N000009 HC RX 250: Performed by: HOSPITALIST

## 2024-10-04 PROCEDURE — 250N000011 HC RX IP 250 OP 636

## 2024-10-04 PROCEDURE — 250N000013 HC RX MED GY IP 250 OP 250 PS 637: Performed by: STUDENT IN AN ORGANIZED HEALTH CARE EDUCATION/TRAINING PROGRAM

## 2024-10-04 PROCEDURE — 99232 SBSQ HOSP IP/OBS MODERATE 35: CPT | Performed by: STUDENT IN AN ORGANIZED HEALTH CARE EDUCATION/TRAINING PROGRAM

## 2024-10-04 PROCEDURE — 85520 HEPARIN ASSAY: CPT | Performed by: HOSPITALIST

## 2024-10-04 PROCEDURE — 80048 BASIC METABOLIC PNL TOTAL CA: CPT | Performed by: STUDENT IN AN ORGANIZED HEALTH CARE EDUCATION/TRAINING PROGRAM

## 2024-10-04 PROCEDURE — 250N000013 HC RX MED GY IP 250 OP 250 PS 637

## 2024-10-04 PROCEDURE — 210N000002 HC R&B HEART CARE

## 2024-10-04 PROCEDURE — 93010 ELECTROCARDIOGRAM REPORT: CPT | Performed by: INTERNAL MEDICINE

## 2024-10-04 PROCEDURE — 250N000013 HC RX MED GY IP 250 OP 250 PS 637: Performed by: HOSPITALIST

## 2024-10-04 PROCEDURE — 36415 COLL VENOUS BLD VENIPUNCTURE: CPT | Performed by: INTERNAL MEDICINE

## 2024-10-04 PROCEDURE — 85520 HEPARIN ASSAY: CPT | Performed by: INTERNAL MEDICINE

## 2024-10-04 PROCEDURE — 85027 COMPLETE CBC AUTOMATED: CPT | Performed by: INTERNAL MEDICINE

## 2024-10-04 PROCEDURE — 93005 ELECTROCARDIOGRAM TRACING: CPT

## 2024-10-04 PROCEDURE — 84484 ASSAY OF TROPONIN QUANT: CPT | Performed by: INTERNAL MEDICINE

## 2024-10-04 PROCEDURE — 99233 SBSQ HOSP IP/OBS HIGH 50: CPT | Mod: FS

## 2024-10-04 PROCEDURE — 83735 ASSAY OF MAGNESIUM: CPT | Performed by: STUDENT IN AN ORGANIZED HEALTH CARE EDUCATION/TRAINING PROGRAM

## 2024-10-04 RX ORDER — HEPARIN SODIUM 10000 [USP'U]/100ML
0-5000 INJECTION, SOLUTION INTRAVENOUS CONTINUOUS
Status: DISPENSED | OUTPATIENT
Start: 2024-10-04

## 2024-10-04 RX ORDER — HEPARIN SODIUM 10000 [USP'U]/100ML
0-5000 INJECTION, SOLUTION INTRAVENOUS CONTINUOUS
Status: DISCONTINUED | OUTPATIENT
Start: 2024-10-04 | End: 2024-10-04 | Stop reason: DRUGHIGH

## 2024-10-04 RX ORDER — METOPROLOL SUCCINATE 100 MG/1
100 TABLET, EXTENDED RELEASE ORAL 2 TIMES DAILY
Status: DISPENSED | OUTPATIENT
Start: 2024-10-04

## 2024-10-04 RX ADMIN — HEPARIN SODIUM 1800 UNITS/HR: 10000 INJECTION, SOLUTION INTRAVENOUS at 16:19

## 2024-10-04 RX ADMIN — HYDRALAZINE HYDROCHLORIDE 50 MG: 50 TABLET ORAL at 15:36

## 2024-10-04 RX ADMIN — CARVEDILOL 25 MG: 25 TABLET, FILM COATED ORAL at 08:47

## 2024-10-04 RX ADMIN — FERROUS SULFATE TAB 325 MG (65 MG ELEMENTAL FE) 325 MG: 325 (65 FE) TAB at 08:47

## 2024-10-04 RX ADMIN — FAMOTIDINE 20 MG: 20 TABLET, FILM COATED ORAL at 08:47

## 2024-10-04 RX ADMIN — DICLOFENAC 2 G: 10 GEL TOPICAL at 01:03

## 2024-10-04 RX ADMIN — LEVALBUTEROL HYDROCHLORIDE 1.25 MG: 1.25 SOLUTION RESPIRATORY (INHALATION) at 20:49

## 2024-10-04 RX ADMIN — ATORVASTATIN CALCIUM 40 MG: 40 TABLET, FILM COATED ORAL at 08:47

## 2024-10-04 RX ADMIN — CALCIUM CARBONATE (ANTACID) CHEW TAB 500 MG 1000 MG: 500 CHEW TAB at 19:11

## 2024-10-04 RX ADMIN — ACETAMINOPHEN 650 MG: 325 TABLET ORAL at 15:36

## 2024-10-04 RX ADMIN — FLUTICASONE FUROATE 1 PUFF: 200 POWDER RESPIRATORY (INHALATION) at 08:48

## 2024-10-04 RX ADMIN — DICLOFENAC 2 G: 10 GEL TOPICAL at 08:48

## 2024-10-04 RX ADMIN — DICLOFENAC 2 G: 10 GEL TOPICAL at 19:12

## 2024-10-04 RX ADMIN — Medication 1 LOZENGE: at 08:47

## 2024-10-04 RX ADMIN — Medication 1 LOZENGE: at 15:30

## 2024-10-04 RX ADMIN — FUROSEMIDE 60 MG: 10 INJECTION, SOLUTION INTRAMUSCULAR; INTRAVENOUS at 08:47

## 2024-10-04 RX ADMIN — POTASSIUM CHLORIDE 40 MEQ: 1500 TABLET, EXTENDED RELEASE ORAL at 08:47

## 2024-10-04 RX ADMIN — METOPROLOL SUCCINATE 100 MG: 100 TABLET, EXTENDED RELEASE ORAL at 21:56

## 2024-10-04 RX ADMIN — HYDRALAZINE HYDROCHLORIDE 50 MG: 50 TABLET ORAL at 01:02

## 2024-10-04 RX ADMIN — HYDRALAZINE HYDROCHLORIDE 50 MG: 50 TABLET ORAL at 08:47

## 2024-10-04 ASSESSMENT — ACTIVITIES OF DAILY LIVING (ADL)
ADLS_ACUITY_SCORE: 42
ADLS_ACUITY_SCORE: 39
ADLS_ACUITY_SCORE: 44
ADLS_ACUITY_SCORE: 44
ADLS_ACUITY_SCORE: 39
ADLS_ACUITY_SCORE: 44
ADLS_ACUITY_SCORE: 41
ADLS_ACUITY_SCORE: 42
ADLS_ACUITY_SCORE: 41
ADLS_ACUITY_SCORE: 41
ADLS_ACUITY_SCORE: 42
ADLS_ACUITY_SCORE: 44
ADLS_ACUITY_SCORE: 41
ADLS_ACUITY_SCORE: 44
ADLS_ACUITY_SCORE: 42
ADLS_ACUITY_SCORE: 44

## 2024-10-04 NOTE — PLAN OF CARE
Care plan note:      Recent Vitals:  Temp: 98.7  F (37.1  C) Temp src: Oral BP: 126/78 Pulse: 94   Resp: 20 SpO2: 100 % O2 Device: BiPAP/CPAP      Orientation/Neuro: Alert and Oriented x 4, moving all extremities, feature symmetrical.   Pain: Having CP after moving rooms and reposition, no noted changes on 12 lead, pt does not appear in distress, vitals stable, and pt stated pain moved more to her back and requesting tylenol, which was given, Cards seen at bedside. Cp resolved w/ tylenol, continues to have some back pain.    Tele: Atrial fibrillation - controlled   IV medications: Heparin- changed to high intensity protocol    Mobility: Assist of 1 and Walker   Skin: With in normal limits   Resp: RA  GI: constipation  : pure wick     Diet: Tolerating diet:   Well  Orders Placed This Encounter      Combination Diet Regular Diet Adult; Moderate Consistent Carb (60 g CHO per Meal) Diet; 2 gm NA Diet      Safety/Concerns:  Fall Risk and Giacomo Risk  Aggression Color: Green    Plan: Hospitalist and cards following. Mobile mass puts Agatha at high risk for stroke, educated on s/s to monitor for. Heparin to running for the weekend, next 10 a at 2230. Monitor HR.  Blood sugar stable. Troponin drawn, 18. On mag and k protocol, recheck in the am   Continue to monitor.      Katt Mckee RN

## 2024-10-04 NOTE — PROVIDER NOTIFICATION
Post transfer from CCU to [facility] pt reports 5-6/10 CP moving to back. Stat EKG done, Dr Mcdonald notified. Bariatric bed needed to be deflated for transfer with pts HOB flatter then she preferred. Refused WC transfer.     VSS. Cards aware and seeing pt and bedside. Heparin infusing. Will check trop per cards NP

## 2024-10-04 NOTE — PROGRESS NOTES
[facility]  Cardiology Progress Note  Date of Service: 10/04/2024  Primary Cardiologist: Dr. Ocampo    Assessment & Plan    Agatha Rodriguez is a 74 year old female with past medical history significant for hypertension, asthma, DYLAN, CAD, GERD admitted on 9/30/2024 with progressive dyspnea and anasarca, with acute palpitations; and is found to have suspected acute diastolic CHF exacerbation and new onset atrial flutter, as well as ULYSSES     Assessment:  Atrial flutter  NICOLA 10/3 showed thrombus in the tip of left atrial appendage  Anticoagulated with heparin infusion  Rates fairly controlled with carvedilol 25 mg twice daily  TSH 2.85  HFpEF with exacerbation, LVEF 30-35% on NICOLA from LVEF 50% on 10/1 (previously 60-65% in 7/2024)  ProBNP 1,759  CXR showed trace bilateral effusion  NICOLA shows moderate concentric LVH  Diuresing well with IV lasix. Lower extremity edema improved. PTA on lasix 40 mg daily  Weight down 366 pounds today, 376 on admit. Previous discharge weight on 7/30/2024 355 pounds- suspect this is her dry weight  Down 3.8 L  Hypertension  Well controlled with carvedilol 25 mg twice daily, hydralazine 50 mg TID. PTA losartan on hold  -130s  Acute kidney injury  Cr bump today 1.24/GFR 46 (0.95/GFR 63 10/3)  Type II diabetes mellitus  DYLAN  Bipap per home settings  Asthma    Plan:   Hold lasix/potassium in setting of ULYSSES  Recheck BMP in AM  Switch from carvedilol 25 mg twice daily to metoprolol 100 mg twice daily  Consider adding digoxin if heart rates don't improve with metoprolol  Ace wraps to lower extremities  Continue heparin  Outpatient NICOLA with possible cardioversion in 4 weeks (ordered)  Cardiology will continue to follow    Ilana Mosqueda CNP  [facility]  Pager: [phone]      Interval History   Seen sitting resting in bed. No acute events overnight. Overall feeling improved, not quite to baseline. NICOLA yesterday showed VIVIANE thrombus.    Physical Exam   Temp: 97.8  F  (36.6  C) Temp src: Axillary BP: (!) 140/93 Pulse: 106   Resp: 14 SpO2: 99 % O2 Device: BiPAP/CPAP Oxygen Delivery: 6 LPM  Vitals:    10/02/24 0700 10/03/24 0600 10/04/24 0500   Weight: (!) 166.5 kg (367 lb) (!) 166.4 kg (366 lb 12.8 oz) (!) 166.9 kg (368 lb)     Adult female in no apparent distress.  +2 lower extremity swelling. Skin warm and dry.    Clinically Significant Risk Factors        # Hypokalemia: Lowest K = 3.3 mmol/L in last 2 days, will replace as needed          # Coagulation Defect: INR = 1.21 (Ref range: 0.85 - 1.15) and/or PTT = N/A, will monitor for bleeding    # Hypertension: Noted on problem list  # Acute heart failure with reduced ejection fraction: last echo with EF <40% and receiving IV diuretics                    Medications   Current Facility-Administered Medications   Medication Dose Route Frequency Provider Last Rate Last Admin    Continuing beta blocker from home medication list OR beta blocker order already placed during this visit   Does not apply DOES NOT GO TO Bandar Beltran MD        Give   of usual dose of LONG ACTING insulin AM of procedure IF diabetic   Does not apply Continuous PRN Ilana Reinoso APRN CNP        heparin 25,000 units in 0.45% NaCl 250 mL ANTICOAGULANT infusion  0-5,000 Units/hr Intravenous Continuous Kirti Mcdonald MD 13.5 mL/hr at 10/04/24 0858 1,350 Units/hr at 10/04/24 0858    May take oral meds with a sip of water, the morning of Cardioversion procedure.       Does not apply Continuous PRN Ilana Reinoso APRN CNP        No anticoagulants IF patient has had acute trauma/surgery or recent intracranial, GI or urinary tract bleeding.    Other DOES NOT GO TO Bandar Beltran MD        Reason ACE/ARB/ARNI order not selected   Other DOES NOT GO TO Bandar Beltran MD        sodium chloride 0.9 % infusion  30 mL/hr Intravenous Continuous Ip, Kobi Dunne MD         Current Facility-Administered Medications   Medication  Dose Route Frequency Provider Last Rate Last Admin    atorvastatin (LIPITOR) tablet 40 mg  40 mg Oral Daily Cynthia Vu MD   40 mg at 10/04/24 0847    carvedilol (COREG) tablet 25 mg  25 mg Oral BID w/meals Cynthia Vu MD   25 mg at 10/04/24 0847    diclofenac (VOLTAREN) 1 % topical gel 2 g  2 g Topical 4x Daily Betsy Mckinley MD   2 g at 10/04/24 0848    famotidine (PEPCID) tablet 20 mg  20 mg Oral Daily Cynthia Vu MD   20 mg at 10/04/24 0847    ferrous sulfate (FEROSUL) tablet 325 mg  325 mg Oral Q Mon Wed Fri AM Henry Blanco MD   325 mg at 10/04/24 0847    fluticasone (ARNUITY ELLIPTA) 200 MCG/ACT inhaler 1 puff  1 puff Inhalation Daily Cynthia Vu MD   1 puff at 10/04/24 0848    [Held by provider] furosemide (LASIX) injection 60 mg  60 mg Intravenous bid 08 & 14 Bandar Siddiqui MD   60 mg at 10/04/24 0847    hydrALAZINE (APRESOLINE) tablet 50 mg  50 mg Oral Q8H Cynthia Vu MD   50 mg at 10/04/24 0847    insulin aspart (NovoLOG) injection (RAPID ACTING)  1-7 Units Subcutaneous TID AC Bandar Siddiqui MD        insulin aspart (NovoLOG) injection (RAPID ACTING)  1-5 Units Subcutaneous At Bedtime Bandar Siddiqui MD        [Held by provider] potassium chloride liv ER (KLOR-CON M20) CR tablet 40 mEq  40 mEq Oral BID Ilana Reinoso APRN CNP   40 mEq at 10/04/24 0847    sodium chloride (PF) 0.9% PF flush 3 mL  3 mL Intracatheter Q8H Bandar Siddiqui MD   3 mL at 10/04/24 0848       Data   Last 24 hours labs reviewed   EKG: (reviewed personally) 9/30/2024: atrial flutter with variable A-V block,     Imaging:   CXR 9/30/2024:  IMPRESSION: Heart is mildly enlarged, unchanged. Trace bilateral effusions with bibasilar atelectasis. Lungs are otherwise clear.     Tele: atrial flutter with occasional PVCs and couplets. HR 70s-110s    Transesophageal Echocardiogram 10/3/2024:  The rhythm was atrial fibrillation.  There is moderate concentric left  ventricular hypertrophy.  The visual ejection fraction is 30-35%.  The right ventricular systolic function is moderate to severely reduced.  There is mild-moderate biatrial enlargement.  There is mild (1+) mitral regurgitation.  There is a thrombus seen in the tip of left atrial appendage. Severe  spontaneous echo contrast present.    Echo 10/1/2024:   1. The left ventricle is normal in size. The visual ejection fraction is  estimated at 50%.  2. The right ventricle is normal in structure, function and size.  3. No valve disease.     Echo 7-28-24 showed EF 60%.      Last ischemic eval:   NM Lexiscan 7/30/2024:    The nuclear stress test is probably abnormal. Specificity reduced due to suboptimal study quality related to patient body habitus.    There is a small area of nontransmural infarction in the distal anteroseptal and apex segment(s) of the left ventricle associated with a small area of a mild degree of gloria-infarct ischemia.    Stress to rest cavity ratio is 1.02.  TID is absent.    Left ventricular function is low normal.    The left ventricular ejection fraction at rest is 55%.  The left ventricular ejection fraction at stress is 50%.    LV cavity size enlarged.    There is no prior study for comparison.    Device: n/a

## 2024-10-04 NOTE — PROGRESS NOTES
AxO x4. VSS on RA. CPAP overnight. Assist 1 with walker/IV pole. Purewick in place for frequency/urgency. Tele Afib RVR. BLE edema +2. Up in chair for breakfast. ACE wraps to BLE in place @ 10am to be taken off at bedtime. Heparin infusing @ 1350. Recheck tomorrow AM. No complaints of pain. Transferred to room 250 on [facility] side.

## 2024-10-04 NOTE — PLAN OF CARE
[id number]    Orientations: A&Ox4   Vitals/Pain: VSS, RA, LS clear/diminished @ bases. Pain (8/10 throat and headache)   Tele: A Fib RVR  Lines/Drains: PIV R S infusing heparin 1350u/hr and L SL  Skin/Wounds: Scattered bruising   GI/: WDL; purewick in place. BM x2. Mod carb and 2 g Na diet.    Labs: Abnormal/Trends: BG (124)  Electrolyte Replacement- Mag AM recheck, K replaced x1.   Ambulation/Assist: Ax1 g/w   Plan: PCA at bedside. NICOLA completed today, cardioversion unable to be completed. Tylenol, cough syrup, warm water, and lozenges for pain

## 2024-10-04 NOTE — CONSULTS
Patient has Medicare D through [employer], and Medical Assistance through [facility].    Xarelto/Eliquis:  $0/mo.     Jardiance/Farxiga:  $0/mo.     Entresto:  $0/mo.     Jazzmine Lopez  Pharmacy Technician/Liaison, Discharge Pharmacy   [phone] (voice or text)  [email or web address]

## 2024-10-04 NOTE — PLAN OF CARE
Goal Outcome Evaluation:      Plan of Care Reviewed With: patient        Shift: [id number]    Admitting Dx: SOB & palpitations  Orientation: A&O x4. Calm  Vitals/Tele: VSS on RA. Tele-A fib RVR  IV Access/drains: R PIV infusing Hep @ 1350 units/hr  Diet: Mod carb & low Na.  Mobility: Ax1 W  GI/: Cont B/B.  Wound/Skin: Refused assessment  Consults: Cardiology  Discharge Plan: Pending

## 2024-10-04 NOTE — PROGRESS NOTES
[facility]    Medicine Progress Note - Hospitalist Service    Date of Admission:  9/30/2024    Assessment & Plan   Agatha Rodriguez is a 74 year old woman with history of severe morbid obesity, DYLAN, asthma, hypertension, and chronic diastolic CHF, among others; who presents with progressive dyspnea, anasarca, acute palpitations and found to have HF exacerbation and new onset atrial flutter. NICOLA with evidence of VIVIANE clot and unable to undergo DCCV.    Acute HFpEF exacerbation (EF 50% from 60-65% 7/2024), improving  Atrial flutter, new diagnosis   L atrial appendage clot on NICOLA  Patient presented with worsening SOB, hypervolemia without significant hypoxia and minimal response to increasing home diuretics. Admission weight 376lb, dry weight ~355lb. BNP elevated with trace b/l effusions noted on CXR. Atrial flutter also noted on EKG which represents a new diagnosis. Symptoms consistent with acute HFpEF exacberation, possibly in setting of tachyarrhythmia vs worsening hypertension vs other. No concern for ischemic etiology, TSH normal, consistently wears home BiPAP at night. Underwent NICOLA 10/3 which showed VIVIANE clot and unable to perform DCCV. Respiratory status now much improved and weaned off of supplemental O2. [id number] of 5.  -Cardiology following  -Continue heparin gtt for now, final anticoagulation plan per cardiology  -Carvedilol 25mg BID  -Hold diuresis today given bump in Cr  -Cardiac telemetry, strict Is/Os, daily weights  -Aggressive K, Mag repletion    ULYSSES, possible cardiorenal vs pre-renal  Hypokalemia, hypomagnesemia, resolved  Cr 1.3 on admission, baseline 0.8-0.9. Initially suspected cardiorenal component that improved with diuresis, however, recurrent rise in Cr after several days of furosemide administration.  -Hold additional diuresis today  -Daily BMP, mag      Hypertension  -Resume home coreg, norvasc, hydralazine. Holding home losartan, hydrochlorothiazide/triamterene  "in the setting of ULYSSES     Type 2 diabetes mellitus w/out current use of insulin  Most recent A1c was 8.0 in 6/28/2024. PTA regimen includes glipizide 2.5 and metformin 500mg.  -SSI while admitted, continue PO meds on discharge     DYLAN, suspected OHS - Continue home BiPAP  Hyperlipidemia - PTA statin  Hx of asthma - PTA fluticasone and albuterol   Chronic anemia, stable - PTA iron supplement  GERD - PTA pepcid  Primary OA of b/l knees     Severe morbid obesity - Noted  Estimated body mass index is 60.49 kg/m  as calculated from the following:    Height as of 7/29/24: 1.68 m (5' 6.14\").    Weight as of this encounter: 170.7 kg (376 lb 6.4 oz).        Diet: Combination Diet Regular Diet Adult; Moderate Consistent Carb (60 g CHO per Meal) Diet; 2 gm NA Diet    DVT Prophylaxis: Heparin gtt  Becker Catheter: Not present  Lines: None     Cardiac Monitoring: ACTIVE order. Indication: Procedural area  Code Status: Full Code      Clinically Significant Risk Factors        # Hypokalemia: Lowest K = 3.3 mmol/L in last 2 days, will replace as needed        # Coagulation Defect: INR = 1.21 (Ref range: 0.85 - 1.15) and/or PTT = N/A, will monitor for bleeding    # Hypertension: Noted on problem list  # Acute heart failure with reduced ejection fraction: last echo with EF <40% and receiving IV diuretics                   Disposition Plan     Medically Ready for Discharge: Anticipated Tomorrow     Henry Blanco MD  Hospitalist Service  [employer]  Securely message with [employer] (more info)  Text page via [employer] Paging/Directory   ______________________________________________________________________    Interval History   NICOLA with evidence of clot yesterday and unable to do DCCV. Improved breathing today now off of oxygen. Denies pain, chest pain, SOB. Says her legs look better after water pills.    Physical Exam   Vital Signs: Temp: 98  F (36.7  C) Temp src: Oral BP: (!) 140/93 Pulse: 106   Resp: 14 SpO2: 99 " % O2 Device: BiPAP/CPAP Oxygen Delivery: 6 LPM  Weight: 368 lbs 0 oz    General: Awake, alert, NAD, appropriate interaction, sitting upright in bed  HEENT: Atraumatic, normocephalic, EOMI, no scleral icterus  CV: Irregular rhythm, irregular rate, no murmurs, 1+ b/l CHARLIE to mid-shins (improved), distal pulses intact, unable to assess JVD  Pulm: CTAB, breathing comfortably on RA, no wheezes, no crackles  Abd: Soft, non-tender, obese, no overlying skin changes, no palpable hepatosplenomegaly  Skin: No rashes, lesions, or wounds visualized  Neuro: AOx4, CN II-XII grossly intact, no focal deficits, moving all extremities spontaneously, speech normal    Medical Decision Making       40 MINUTES SPENT BY ME on the date of service doing chart review, history, exam, documentation & further activities per the note.      Data     I have personally reviewed the following data over the past 24 hrs:    8.3  \   9.6 (L)   / 267     141 102 24.1 (H) /  126 (H)   4.8 28 1.24 (H) \

## 2024-10-05 ENCOUNTER — APPOINTMENT (OUTPATIENT)
Dept: OCCUPATIONAL THERAPY | Facility: CLINIC | Age: 74
DRG: 291 | End: 2024-10-05
Payer: MEDICARE

## 2024-10-05 LAB
ANION GAP SERPL CALCULATED.3IONS-SCNC: 12 MMOL/L (ref 7–15)
BUN SERPL-MCNC: 29 MG/DL (ref 8–23)
CALCIUM SERPL-MCNC: 9.9 MG/DL (ref 8.8–10.4)
CHLORIDE SERPL-SCNC: 102 MMOL/L (ref 98–107)
CREAT SERPL-MCNC: 1.23 MG/DL (ref 0.51–0.95)
EGFRCR SERPLBLD CKD-EPI 2021: 46 ML/MIN/1.73M2
ERYTHROCYTE [DISTWIDTH] IN BLOOD BY AUTOMATED COUNT: 15.4 % (ref 10–15)
GLUCOSE BLDC GLUCOMTR-MCNC: 108 MG/DL (ref 70–99)
GLUCOSE BLDC GLUCOMTR-MCNC: 116 MG/DL (ref 70–99)
GLUCOSE BLDC GLUCOMTR-MCNC: 118 MG/DL (ref 70–99)
GLUCOSE BLDC GLUCOMTR-MCNC: 127 MG/DL (ref 70–99)
GLUCOSE BLDC GLUCOMTR-MCNC: 208 MG/DL (ref 70–99)
GLUCOSE SERPL-MCNC: 117 MG/DL (ref 70–99)
HCO3 SERPL-SCNC: 26 MMOL/L (ref 22–29)
HCT VFR BLD AUTO: 32.1 % (ref 35–47)
HGB BLD-MCNC: 9.3 G/DL (ref 11.7–15.7)
MAGNESIUM SERPL-MCNC: 2.1 MG/DL (ref 1.7–2.3)
MCH RBC QN AUTO: 23.9 PG (ref 26.5–33)
MCHC RBC AUTO-ENTMCNC: 29 G/DL (ref 31.5–36.5)
MCV RBC AUTO: 83 FL (ref 78–100)
PLATELET # BLD AUTO: 248 10E3/UL (ref 150–450)
POTASSIUM SERPL-SCNC: 4.7 MMOL/L (ref 3.4–5.3)
RBC # BLD AUTO: 3.89 10E6/UL (ref 3.8–5.2)
SODIUM SERPL-SCNC: 140 MMOL/L (ref 135–145)
UFH PPP CHRO-ACNC: 0.49 IU/ML
WBC # BLD AUTO: 7.8 10E3/UL (ref 4–11)

## 2024-10-05 PROCEDURE — 250N000013 HC RX MED GY IP 250 OP 250 PS 637: Performed by: HOSPITALIST

## 2024-10-05 PROCEDURE — 36415 COLL VENOUS BLD VENIPUNCTURE: CPT | Performed by: INTERNAL MEDICINE

## 2024-10-05 PROCEDURE — 83735 ASSAY OF MAGNESIUM: CPT | Performed by: INTERNAL MEDICINE

## 2024-10-05 PROCEDURE — 99232 SBSQ HOSP IP/OBS MODERATE 35: CPT | Performed by: INTERNAL MEDICINE

## 2024-10-05 PROCEDURE — 210N000002 HC R&B HEART CARE

## 2024-10-05 PROCEDURE — 85027 COMPLETE CBC AUTOMATED: CPT | Performed by: STUDENT IN AN ORGANIZED HEALTH CARE EDUCATION/TRAINING PROGRAM

## 2024-10-05 PROCEDURE — 97535 SELF CARE MNGMENT TRAINING: CPT | Mod: GO

## 2024-10-05 PROCEDURE — 250N000012 HC RX MED GY IP 250 OP 636 PS 637: Performed by: HOSPITALIST

## 2024-10-05 PROCEDURE — 80048 BASIC METABOLIC PNL TOTAL CA: CPT | Performed by: STUDENT IN AN ORGANIZED HEALTH CARE EDUCATION/TRAINING PROGRAM

## 2024-10-05 PROCEDURE — 250N000013 HC RX MED GY IP 250 OP 250 PS 637

## 2024-10-05 PROCEDURE — 250N000011 HC RX IP 250 OP 636

## 2024-10-05 PROCEDURE — 85520 HEPARIN ASSAY: CPT | Performed by: INTERNAL MEDICINE

## 2024-10-05 RX ADMIN — HEPARIN SODIUM 1800 UNITS/HR: 10000 INJECTION, SOLUTION INTRAVENOUS at 07:07

## 2024-10-05 RX ADMIN — HYDRALAZINE HYDROCHLORIDE 50 MG: 50 TABLET ORAL at 00:04

## 2024-10-05 RX ADMIN — FAMOTIDINE 20 MG: 20 TABLET, FILM COATED ORAL at 09:16

## 2024-10-05 RX ADMIN — Medication 1 LOZENGE: at 09:16

## 2024-10-05 RX ADMIN — HYDRALAZINE HYDROCHLORIDE 50 MG: 50 TABLET ORAL at 09:16

## 2024-10-05 RX ADMIN — DICLOFENAC 2 G: 10 GEL TOPICAL at 09:17

## 2024-10-05 RX ADMIN — HYDRALAZINE HYDROCHLORIDE 50 MG: 50 TABLET ORAL at 16:25

## 2024-10-05 RX ADMIN — HYDRALAZINE HYDROCHLORIDE 50 MG: 50 TABLET ORAL at 23:30

## 2024-10-05 RX ADMIN — FLUTICASONE FUROATE 1 PUFF: 200 POWDER RESPIRATORY (INHALATION) at 09:18

## 2024-10-05 RX ADMIN — ATORVASTATIN CALCIUM 40 MG: 40 TABLET, FILM COATED ORAL at 09:16

## 2024-10-05 RX ADMIN — DICLOFENAC 2 G: 10 GEL TOPICAL at 17:01

## 2024-10-05 RX ADMIN — METOPROLOL SUCCINATE 100 MG: 100 TABLET, EXTENDED RELEASE ORAL at 09:16

## 2024-10-05 RX ADMIN — METOPROLOL SUCCINATE 100 MG: 100 TABLET, EXTENDED RELEASE ORAL at 21:32

## 2024-10-05 RX ADMIN — INSULIN ASPART 2 UNITS: 100 INJECTION, SOLUTION INTRAVENOUS; SUBCUTANEOUS at 17:01

## 2024-10-05 RX ADMIN — HEPARIN SODIUM 1800 UNITS/HR: 10000 INJECTION, SOLUTION INTRAVENOUS at 23:28

## 2024-10-05 ASSESSMENT — ACTIVITIES OF DAILY LIVING (ADL)
ADLS_ACUITY_SCORE: 45
ADLS_ACUITY_SCORE: 44
ADLS_ACUITY_SCORE: 45
ADLS_ACUITY_SCORE: 44
ADLS_ACUITY_SCORE: 44
ADLS_ACUITY_SCORE: 46
ADLS_ACUITY_SCORE: 45
ADLS_ACUITY_SCORE: 44
ADLS_ACUITY_SCORE: 44
ADLS_ACUITY_SCORE: 45
ADLS_ACUITY_SCORE: 46
ADLS_ACUITY_SCORE: 45
ADLS_ACUITY_SCORE: 44
ADLS_ACUITY_SCORE: 45

## 2024-10-05 NOTE — PROGRESS NOTES
[facility]    Medicine Progress Note - Hospitalist Service    Date of Admission:  9/30/2024    Assessment & Plan   Agatha Rodriguez is a 74 year old woman with history of severe morbid obesity, DYLAN, asthma, hypertension, and chronic diastolic CHF, among others; who presents with progressive dyspnea, anasarca, acute palpitations and found to have HF exacerbation and new onset atrial flutter. NICOLA with evidence of VIVIANE clot and unable to undergo DCCV.    # Acute HFpEF exacerbation (EF 50% from 60-65% 7/2024), improving  # Atrial flutter, new diagnosis   # L atrial appendage clot on NICOLA  -Patient presented with worsening SOB, hypervolemia without significant hypoxia and minimal response to increasing home diuretics.  - Admission weight 376lb, dry weight ~355lb.  - BNP elevated with trace b/l effusions noted on CXR.  - Atrial flutter also noted on EKG which represents a new diagnosis.  - Symptoms consistent with acute HFpEF exacberation, possibly in setting of tachyarrhythmia vs worsening hypertension vs other.  - No concern for ischemic etiology,  - TSH normal, consistently wears home BiPAP at night.  - Underwent NICOLA 10/3 which showed VIVIANE clot and unable to perform DCCV.  - Respiratory status now much improved and weaned off of supplemental O2.  -[id number] of 5.  - Has been receiving IV Lasix 60 mg twice daily for diuresis currently being held in setting of ULYSSES  -Cardiology following  -Continue heparin gtt for now, final anticoagulation plan per cardiology  -Metoprolol succinate 100 mg twice daily  -Continue to hold diuresis given ULYSSES  -Cardiac telemetry, strict Is/Os, daily weights  -Keep K >4, mag >2    # ULYSSES, possible cardiorenal vs pre-renal  # Hypokalemia, hypomagnesemia, resolved  Cr 1.3 on admission, baseline 0.8-0.9. Initially suspected cardiorenal component that improved with diuresis, however, recurrent rise in Cr after several days of furosemide administration.  -Hold additional  "diuresis today  -Daily BMP, mag      # Hypertension  -Resume home coreg, norvasc, hydralazine. Holding home losartan, hydrochlorothiazide/triamterene in the setting of ULYSSES     # Type 2 diabetes mellitus w/out current use of insulin  Most recent A1c was 8.0 in 6/28/2024. PTA regimen includes glipizide 2.5 and metformin 500mg.  -SSI while admitted, continue PO meds on discharge     # DYLAN, suspected OHS - Continue home BiPAP  # Hyperlipidemia - PTA statin  # Hx of asthma - PTA fluticasone and albuterol   # Chronic anemia, stable - PTA iron supplement  # GERD - PTA pepcid  # Primary OA of b/l knees     # Severe morbid obesity - Noted  Estimated body mass index is 60.49 kg/m  as calculated from the following:    Height as of 7/29/24: 1.68 m (5' 6.14\").    Weight as of this encounter: 170.7 kg (376 lb 6.4 oz).        Diet: Combination Diet Regular Diet Adult; Moderate Consistent Carb (60 g CHO per Meal) Diet; 2 gm NA Diet    DVT Prophylaxis: Heparin gtt  Becker Catheter: Not present  Lines: None     Cardiac Monitoring: ACTIVE order. Indication: Procedural area  Code Status: Full Code      Clinically Significant Risk Factors                    # Hypertension: Noted on problem list  # Acute heart failure with reduced ejection fraction: last echo with EF <40% and receiving IV diuretics                   Disposition Plan     Medically Ready for Discharge: Anticipated in 2-4 Days will likely remain on heparin gtt over the weekend.     CARROLL FOSS MD  Hospitalist Service  [facility]  Securely message with [employer] (more info)  Text page via [employer] Paging/Directory   ______________________________________________________________________    Interval History   --No reports of chest pain from patient  - Has also remained mildly tachycardic  - Was on BiPAP overnight  - Otherwise no acute events overnight  - She currently remains on high intensity heparin gtt.    Physical Exam   Vital Signs: Temp: 98.6  F " (37  C) Temp src: Oral BP: (!) 125/92 Pulse: 108   Resp: 18 SpO2: 96 % O2 Device: None (Room air)    Weight: 372 lbs 8 oz    General: Awake, alert, NAD, appropriate interaction, lying in bed on CPAP, in NAD  HEENT: Atraumatic, normocephalic, EOMI, no scleral icterus  CVS: Irregularly irregular rhythm, tachycardic, normal S1-S2.  Pulm: CTAB, breathing comfortably on RA, no wheezes, no crackles  Abd: Soft, non-tender, obese, no overlying skin changes, no palpable hepatosplenomegaly  Skin: No rashes, lesions, or wounds visualized  Neuro: AOx4, coherent    Medical Decision Making       40 MINUTES SPENT BY ME on the date of service doing chart review, history, exam, documentation & further activities per the note.      Data     I have personally reviewed the following data over the past 24 hrs:    7.8  \   9.3 (L)   / 248     140 102 29.0 (H) /  118 (H)   4.7 26 1.23 (H) \     Trop: 18 (H) BNP: N/A

## 2024-10-05 NOTE — PLAN OF CARE
Goal Outcome Evaluation:      Plan of Care Reviewed With: patient    Overall Patient Progress: improvingOverall Patient Progress: improving     Neuro:intact  CV/Rhythm:AF RVR  Resp/02:RA, bipap home for sleep  GI/Diet:mod carb  :pure wick while in bed, up to toilet x 2  Skin/Incisions/Sites:intact, ace wraps applied this am  Pulses/CMS:intact  Edema:BLE thigh/abd  Activity/Falls Risk:fall risk, up with 1, walker GB  Lines/Drains/IVs:PIV x 2  Labs/BGM:xa in am  Test/Procedures:-  VS/Pain:stable. No pain today  DC Plan:?  Other:cards, OT    Heart Failure Care Map  GOALS TO BE MET BEFORE DISCHARGE:    1. Decrease congestion and/or edema with diuretic therapy to achieve near optimal volume status.     Dyspnea improved: Yes, satisfactory for discharge.   Edema improved: Yes, satisfactory for discharge.        Last 24 hour I/O:   Intake/Output Summary (Last 24 hours) at 10/5/2024 1316  Last data filed at 10/5/2024 0900  Gross per 24 hour   Intake 810 ml   Output 200 ml   Net 610 ml           Net I/O and Weights since admission:   09/05 1500 - 10/05 1459  In: 4164.95 [P.O.:3860; I.V.:304.95]  Out: 7300 [Urine:7300]  Net: -3135.05     Vitals:    09/30/24 2200 10/01/24 0529 10/02/24 0700 10/03/24 0600   Weight: (!) 170.7 kg (376 lb 6.4 oz) (!) 168 kg (370 lb 6 oz) (!) 166.5 kg (367 lb) (!) 166.4 kg (366 lb 12.8 oz)    10/04/24 0500 10/05/24 0705   Weight: (!) 166.9 kg (368 lb) (!) 169 kg (372 lb 8 oz)       2.  O2 sats > 90% on room air, or at prior home O2 therapy level.      Able to wean O2 this shift to keep sats above 90%?: Yes, satisfactory for discharge.   Does patient use Home O2? No          Current oxygenation status:   SpO2: 94 %     O2 Device: BiPAP/CPAP, Oxygen Delivery: 2 LPM    3.  Tolerates ambulation and mobility near baseline.     Ambulation: Yes, satisfactory for discharge.   Times patient ambulated with staff this shift: 2    Please review the Heart Failure Care Map for additional HF goal  outcomes.    Shelby Galeano RN  10/5/2024

## 2024-10-05 NOTE — PLAN OF CARE
Goal Outcome Evaluation:    Pt alert, orient x4. /80   Pulse 95   Temp 97.7  F (36.5  C) (Oral)   Resp 18   Wt (!) 166.9 kg (368 lb)   SpO2 100%   BMI 59.14 kg/m     On bipap. Afib cvr on tele. Plan to continue heparin drip at 1800 for thrombus in atrial appendage.

## 2024-10-05 NOTE — PROGRESS NOTES
[facility] Cardiology Progress Note    Date of Admission:  9/30/2024  Reason for Consult:   CHF, VIVIANE thrombus    Subjective   Ms. Rodriguez continues to be short of breath. Denies any recurrent chest pain since yesterday     Objective   INTAKE / OUTPUT     Intake/Output Summary (Last 24 hours) at 10/4/2024 2034  Last data filed at 10/4/2024 1023  Gross per 24 hour   Intake 320 ml   Output --   Net 320 ml       VITAL SIGNS  Temp:  [97.8  F (36.6  C)-98.7  F (37.1  C)] 98.7  F (37.1  C)  Pulse:  [] 94  Resp:  [10-20] 20  BP: (112-140)/(78-98) 126/78  SpO2:  [84 %-100 %] 100 %  368 lbs 0 oz    PHYSICAL EXAM   Constitutional: Appears stated age, well nourished, NAD.   Neck: Supple.  JVD not visualized.   Respiratory: Non-labored. Lungs CTAB.  Cardiovascular: RRR. Bilateral lower extremities with trace edema.   GI: Soft, non-distended, non-tender.  Skin: Warm and dry.   Musculoskeletal/Extremities: Symmetrical movement.  Neurologic: No gross focal deficits. Alert, awake.  Psychiatric: Affect appropriate. Mentation normal.      Data   Most Recent 3 CBC's:  Recent Labs   Lab Test 10/04/24  0548 10/01/24  1924 10/01/24  1110   WBC 8.3 8.8 8.3   HGB 9.6* 10.5* 10.2*   MCV 84 82 82    319 310     Most Recent 3  BMP's:  Recent Labs   Lab Test 10/04/24  1829 10/04/24  1534 10/04/24  1305 10/04/24  0548 10/03/24  1726 10/03/24  1251 10/03/24  0809 10/03/24  0614 10/02/24  0836 10/02/24  0611   NA  --   --   --  141  --   --   --  141  --  144   POTASSIUM  --   --   --  4.8  --  3.8  --  3.3*  --  3.5  3.5   CHLORIDE  --   --   --  102  --   --   --  99  --  102   CO2  --   --   --  28  --   --   --  28  --  27   BUN  --   --   --  24.1*  --   --   --  19.6  --  18.3   CR  --   --   --  1.24*  --   --   --  0.95  --  0.93   ANIONGAP  --   --   --  11  --   --   --  14  --  15   AVNI  --   --   --  9.7  --   --   --  9.6  --  9.3   * 108* 121* 126*   < >  --    < >  123*   < > 127*    < > = values in this interval not displayed.     Most Recent 3 BNP's:  Recent Labs   Lab Test 09/30/24  2229 09/30/24  1824 09/17/24  0037 07/27/24  2211   NTBNPI 1,759*  --  719 355   NTBNP  --  1,523*  --   --       Most Recent Cholesterol Panel:No lab results found.    Most Recent Transthoracic Echocardiogram:  Echo result w/o MOPS: Interpretation Summary 1. The left ventricle is normal in size. The visual ejection fraction isestimated at 50%.2. The right ventricle is normal in structure, function and size.3. No valve disease. Echo 7-28-24 showed EF 60%.        Most Recent Cardiac Catheterization:  No results found.    Assessment & Plan     Assessment     Left atrial appendage thrombus  Mobile, detected on NICOLA 10/3  High-intensity heparin gtt  Atrial flutter  Rate control with Metoprolol 100 mg  Avoid cardioversion/rhythm control given VIVIANE thrombus  HFrEF  LVEF 30%, down from 50% on 10/1  Likely tachycardia-mediated, ?Ischemic component  ULYSSES  Likely cardio-renal   Hypertension   DM type 2  DYLAN/Asthma  Home BiPAP    Pleasant 75 y/o female who was admitted on 9/30/2024 with progressive dyspnea and anasarca, with acute palpitations; and is found to have suspected acute diastolic CHF exacerbation and new onset atrial flutter, as well as ULYSSES. NICOLA this admission showed mobile VIVIANE thrombus and high intensity heparin gtt was started. Was receiving diuresis, however cr increased from 0.8 to 1.2 yesterday and diuresis was held. Additionally, the patient complained of chest pain on Friday and troponin was flat.     Over the last 24 hours, continues to be short of breath, no recurrent chest pain. SOB can be related to Afib. Average HR is 90-100s    Plan     Hold lasix/potassium in setting of ULYSSES  Continue metoprolol 100 mg twice daily  Will consider digoxin if heart rates consistently >120  Ace wraps to lower extremities  Continue heparin  Outpatient NICOLA with possible cardioversion in 4 weeks  (ordered)    Thank you for involving us in the care of this patient.  We will follow     Frederick Poon MD on 10/4/2024 at 8:34 PM

## 2024-10-06 LAB
ANION GAP SERPL CALCULATED.3IONS-SCNC: 12 MMOL/L (ref 7–15)
BUN SERPL-MCNC: 26.9 MG/DL (ref 8–23)
CALCIUM SERPL-MCNC: 9.7 MG/DL (ref 8.8–10.4)
CHLORIDE SERPL-SCNC: 101 MMOL/L (ref 98–107)
CREAT SERPL-MCNC: 1.05 MG/DL (ref 0.51–0.95)
EGFRCR SERPLBLD CKD-EPI 2021: 56 ML/MIN/1.73M2
GLUCOSE BLDC GLUCOMTR-MCNC: 102 MG/DL (ref 70–99)
GLUCOSE BLDC GLUCOMTR-MCNC: 104 MG/DL (ref 70–99)
GLUCOSE BLDC GLUCOMTR-MCNC: 110 MG/DL (ref 70–99)
GLUCOSE BLDC GLUCOMTR-MCNC: 127 MG/DL (ref 70–99)
GLUCOSE SERPL-MCNC: 106 MG/DL (ref 70–99)
HCO3 SERPL-SCNC: 27 MMOL/L (ref 22–29)
HOLD SPECIMEN: 0
MAGNESIUM SERPL-MCNC: 2.1 MG/DL (ref 1.7–2.3)
POTASSIUM SERPL-SCNC: 4.3 MMOL/L (ref 3.4–5.3)
SODIUM SERPL-SCNC: 140 MMOL/L (ref 135–145)
UFH PPP CHRO-ACNC: 0.5 IU/ML

## 2024-10-06 PROCEDURE — 250N000013 HC RX MED GY IP 250 OP 250 PS 637: Performed by: HOSPITALIST

## 2024-10-06 PROCEDURE — 99232 SBSQ HOSP IP/OBS MODERATE 35: CPT | Performed by: INTERNAL MEDICINE

## 2024-10-06 PROCEDURE — 250N000011 HC RX IP 250 OP 636: Performed by: INTERNAL MEDICINE

## 2024-10-06 PROCEDURE — 36415 COLL VENOUS BLD VENIPUNCTURE: CPT | Performed by: INTERNAL MEDICINE

## 2024-10-06 PROCEDURE — 99232 SBSQ HOSP IP/OBS MODERATE 35: CPT | Performed by: HOSPITALIST

## 2024-10-06 PROCEDURE — 250N000011 HC RX IP 250 OP 636

## 2024-10-06 PROCEDURE — 210N000002 HC R&B HEART CARE

## 2024-10-06 PROCEDURE — 80048 BASIC METABOLIC PNL TOTAL CA: CPT | Performed by: INTERNAL MEDICINE

## 2024-10-06 PROCEDURE — 83735 ASSAY OF MAGNESIUM: CPT | Performed by: STUDENT IN AN ORGANIZED HEALTH CARE EDUCATION/TRAINING PROGRAM

## 2024-10-06 PROCEDURE — 85520 HEPARIN ASSAY: CPT | Performed by: INTERNAL MEDICINE

## 2024-10-06 PROCEDURE — 250N000013 HC RX MED GY IP 250 OP 250 PS 637

## 2024-10-06 PROCEDURE — 250N000013 HC RX MED GY IP 250 OP 250 PS 637: Performed by: STUDENT IN AN ORGANIZED HEALTH CARE EDUCATION/TRAINING PROGRAM

## 2024-10-06 RX ORDER — FUROSEMIDE 10 MG/ML
60 INJECTION INTRAMUSCULAR; INTRAVENOUS
Status: DISPENSED | OUTPATIENT
Start: 2024-10-06

## 2024-10-06 RX ADMIN — HYDRALAZINE HYDROCHLORIDE 50 MG: 50 TABLET ORAL at 09:42

## 2024-10-06 RX ADMIN — HYDRALAZINE HYDROCHLORIDE 50 MG: 50 TABLET ORAL at 16:19

## 2024-10-06 RX ADMIN — POLYETHYLENE GLYCOL 3350 17 G: 17 POWDER, FOR SOLUTION ORAL at 09:41

## 2024-10-06 RX ADMIN — ACETAMINOPHEN 650 MG: 325 TABLET ORAL at 12:16

## 2024-10-06 RX ADMIN — POTASSIUM CHLORIDE 40 MEQ: 1500 TABLET, EXTENDED RELEASE ORAL at 22:00

## 2024-10-06 RX ADMIN — METOPROLOL SUCCINATE 100 MG: 100 TABLET, EXTENDED RELEASE ORAL at 22:00

## 2024-10-06 RX ADMIN — HYDRALAZINE HYDROCHLORIDE 50 MG: 50 TABLET ORAL at 23:17

## 2024-10-06 RX ADMIN — METOPROLOL SUCCINATE 100 MG: 100 TABLET, EXTENDED RELEASE ORAL at 09:42

## 2024-10-06 RX ADMIN — DICLOFENAC 2 G: 10 GEL TOPICAL at 09:42

## 2024-10-06 RX ADMIN — FUROSEMIDE 60 MG: 10 INJECTION, SOLUTION INTRAMUSCULAR; INTRAVENOUS at 16:18

## 2024-10-06 RX ADMIN — FUROSEMIDE 60 MG: 10 INJECTION, SOLUTION INTRAMUSCULAR; INTRAVENOUS at 10:25

## 2024-10-06 RX ADMIN — FAMOTIDINE 20 MG: 20 TABLET, FILM COATED ORAL at 09:42

## 2024-10-06 RX ADMIN — FLUTICASONE FUROATE 1 PUFF: 200 POWDER RESPIRATORY (INHALATION) at 09:42

## 2024-10-06 RX ADMIN — ATORVASTATIN CALCIUM 40 MG: 40 TABLET, FILM COATED ORAL at 09:42

## 2024-10-06 RX ADMIN — HEPARIN SODIUM 1800 UNITS/HR: 10000 INJECTION, SOLUTION INTRAVENOUS at 14:47

## 2024-10-06 ASSESSMENT — ACTIVITIES OF DAILY LIVING (ADL)
ADLS_ACUITY_SCORE: 46
ADLS_ACUITY_SCORE: 50
ADLS_ACUITY_SCORE: 51
ADLS_ACUITY_SCORE: 49
ADLS_ACUITY_SCORE: 46
ADLS_ACUITY_SCORE: 46
ADLS_ACUITY_SCORE: 50
ADLS_ACUITY_SCORE: 51
ADLS_ACUITY_SCORE: 51
ADLS_ACUITY_SCORE: 46
ADLS_ACUITY_SCORE: 45
ADLS_ACUITY_SCORE: 45
ADLS_ACUITY_SCORE: 49
ADLS_ACUITY_SCORE: 51
ADLS_ACUITY_SCORE: 48
ADLS_ACUITY_SCORE: 50
ADLS_ACUITY_SCORE: 46
ADLS_ACUITY_SCORE: 46
ADLS_ACUITY_SCORE: 52
ADLS_ACUITY_SCORE: 49
ADLS_ACUITY_SCORE: 48
ADLS_ACUITY_SCORE: 49
ADLS_ACUITY_SCORE: 48

## 2024-10-06 NOTE — PLAN OF CARE
Goal Outcome Evaluation:      Pt alert, orient x4. /82 (BP Location: Left arm)   Pulse 97   Temp 97.9  F (36.6  C) (Oral)   Resp 20   Wt (!) 170 kg (374 lb 12.8 oz)   SpO2 100%   BMI 60.23 kg/m     On RA when off bipap. Pt walked to restroom around 0200, asked to have oxygen put on bipap. RT called, 3L of oxygen applied. Pt assist of 1 with walker.   Heparin continued for VIVIANE clot.

## 2024-10-06 NOTE — PLAN OF CARE
Goal Outcome Evaluation:      Plan of Care Reviewed With: patient, family      Heart Failure Care Map  GOALS TO BE MET BEFORE DISCHARGE:    1. Decrease congestion and/or edema with diuretic therapy to achieve near optimal volume status.     Dyspnea improved: Yes, satisfactory for discharge.   Edema improved: No, further care required to meet this goal. Please explain restarted lasix IV today        Last 24 hour I/O:   Intake/Output Summary (Last 24 hours) at 10/6/2024 1331  Last data filed at 10/6/2024 1200  Gross per 24 hour   Intake 687 ml   Output 800 ml   Net -113 ml           Net I/O and Weights since admission:   09/06 1500 - 10/06 1459  In: 4851.95 [P.O.:4400; I.V.:451.95]  Out: 8100 [Urine:8100]  Net: -3248.05     Vitals:    09/30/24 2200 10/01/24 0529 10/02/24 0700 10/03/24 0600   Weight: (!) 170.7 kg (376 lb 6.4 oz) (!) 168 kg (370 lb 6 oz) (!) 166.5 kg (367 lb) (!) 166.4 kg (366 lb 12.8 oz)    10/04/24 0500 10/05/24 0705 10/06/24 0248   Weight: (!) 166.9 kg (368 lb) (!) 169 kg (372 lb 8 oz) (!) 170 kg (374 lb 12.8 oz)       2.  O2 sats > 90% on room air, or at prior home O2 therapy level.      Able to wean O2 this shift to keep sats above 90%?: Yes, satisfactory for discharge.   Does patient use Home O2? No          Current oxygenation status:   SpO2: 95 %     O2 Device: None (Room air),      3.  Tolerates ambulation and mobility near baseline.     Ambulation: Yes, satisfactory for discharge.   Times patient ambulated with staff this shift: 5    Please review the Heart Failure Care Map for additional HF goal outcomes.    Shelby Galeano RN  10/6/2024     Neuro:intact  CV/Rhythm:AF   Resp/02:RA, likes home bipap with 3 L NC piped in  GI/Diet:mod carb  :voiding and pure wick in bed  Skin/Incisions/Sites:intact  Pulses/CMS:intact  Edema:BLE, generalized  Activity/Falls Risk:up to BR x 3, up to chair  Lines/Drains/IVs:PIV x 2  Labs/BGM:mag and k in am  Test/Procedures:DCCV in 4 weeks as  outpt  VS/Pain:stable, sinus pain this afternoon - resolved with tylenols  DC Plan:per cards  Other:PCA/niece at bedside

## 2024-10-06 NOTE — PROGRESS NOTES
[facility] Cardiology Progress Note    Date of Admission:  9/30/2024  Reason for Consult:   CHF, VIVIANE thrombus    Subjective   Ms. Rodriguez continues to be short of breath with leg swelling. Denies any recurrent chest pain since yesterday     Objective   INTAKE / OUTPUT     Intake/Output Summary (Last 24 hours) at 10/4/2024 2034  Last data filed at 10/4/2024 1023  Gross per 24 hour   Intake 320 ml   Output --   Net 320 ml       VITAL SIGNS  Temp:  [97.5  F (36.4  C)-98.5  F (36.9  C)] 98.5  F (36.9  C)  Pulse:  [] 106  Resp:  [19-20] 19  BP: (103-147)/() 103/79  SpO2:  [94 %-100 %] 96 %  374 lbs 12.8 oz    PHYSICAL EXAM   Constitutional: Appears stated age, well nourished, NAD.   Neck: Supple.  JVD not visualized.   Respiratory: Non-labored. Lungs CTAB.  Cardiovascular: RRR. Bilateral lower extremities with +2-3 edema.   GI: Soft, non-distended, non-tender.  Skin: Warm and dry.   Musculoskeletal/Extremities: Symmetrical movement.  Neurologic: No gross focal deficits. Alert, awake.  Psychiatric: Affect appropriate. Mentation normal.      Data   Most Recent 3 CBC's:  Recent Labs   Lab Test 10/05/24  0546 10/04/24  0548 10/01/24  1924   WBC 7.8 8.3 8.8   HGB 9.3* 9.6* 10.5*   MCV 83 84 82    267 319     Most Recent 3  BMP's:  Recent Labs   Lab Test 10/06/24  0850 10/06/24  0629 10/06/24  0210 10/05/24  0802 10/05/24  0546 10/04/24  1305 10/04/24  0548   NA  --  140  --   --  140  --  141   POTASSIUM  --  4.3  --   --  4.7  --  4.8   CHLORIDE  --  101  --   --  102  --  102   CO2  --  27  --   --  26  --  28   BUN  --  26.9*  --   --  29.0*  --  24.1*   CR  --  1.05*  --   --  1.23*  --  1.24*   ANIONGAP  --  12  --   --  12  --  11   AVNI  --  9.7  --   --  9.9  --  9.7   * 106* 104*   < > 117*   < > 126*    < > = values in this interval not displayed.     Most Recent 3 BNP's:  Recent Labs   Lab Test 09/30/24  2229 09/30/24  1824 09/17/24  0037  07/27/24  2211   NTBNPI 1,759*  --  719 355   NTBNP  --  1,523*  --   --       Most Recent Cholesterol Panel:No lab results found.    Most Recent Transthoracic Echocardiogram:  Echo result w/o MOPS: Interpretation Summary 1. The left ventricle is normal in size. The visual ejection fraction isestimated at 50%.2. The right ventricle is normal in structure, function and size.3. No valve disease. Echo 7-28-24 showed EF 60%.        Most Recent Cardiac Catheterization:  No results found.    Assessment & Plan     Assessment     Left atrial appendage thrombus  Mobile, detected on NICOLA 10/3  High-intensity heparin gtt  Atrial flutter  Rate control with Metoprolol 100 mg  Avoid cardioversion/rhythm control given VIVIANE thrombus  HFrEF  LVEF 30%, down from 50% on 10/1  Likely tachycardia-mediated, ?Ischemic component  ULYSSES  Likely cardio-renal   Hypertension   DM type 2  DYLAN/Asthma  Home BiPAP    Pleasant 75 y/o female who was admitted on 9/30/2024 with progressive dyspnea and anasarca, with acute palpitations; and is found to have suspected acute diastolic CHF exacerbation and new onset atrial flutter, as well as ULYSSES. NICOLA this admission showed mobile VIVIANE thrombus and high intensity heparin gtt was started. Was receiving diuresis, however cr increased from 0.8 to 1.2 yesterday and diuresis was held. Additionally, the patient complained of chest pain on Friday and troponin was flat.     Over the last 24 hours, continues to be short of breath, no recurrent chest pain. SOB can be related to Afib. Average HR is 90-100s    Plan     Resume Lasix 60 mg IV BID  Continue metoprolol 100 mg twice daily  Will consider digoxin if heart rates consistently >120  Continue heparin  Outpatient NICOLA with possible cardioversion in 4 weeks (ordered)    Thank you for involving us in the care of this patient.  We will follow     Frederick Poon MD on 10/4/2024 at 8:34 PM

## 2024-10-06 NOTE — PROGRESS NOTES
[facility]  Hospitalist Progress Note        Ilya Goodman MD   10/06/2024        Interval History:        - Cardiology following, holding further Lasix in the setting of acute renal failure; plan for outpatient NICOLA with possible cardioversion in 4 weeks  - reports improvement in edema but still with significant dyspnea/hypoxia on minimal exertion and appears fluid overloaded  - renal function improving Cr 1.24--->1.05; d/w cardiology, resumed Lasix 60 mg IV BID  -has had several unmeasured urine but weight trending up; suggested for strict intake and output  - still in a fib but rate appears mostly controlled         Assessment and Plan:        Agatha Rodriguez is a 74 year old woman with PMH of severe morbid obesity, DYLAN, asthma, hypertension, and chronic diastolic CHF, among others; who presented with progressive dyspnea, anasarca, acute palpitations and found to have HF exacerbation and new onset atrial flutter. NICOLA with evidence of VIVIANE clot and unable to undergo DCCV.; admitted inpatient 9/30/24     # Acute HFpEF exacerbation (EF 50% from 60-65% 7/2024)  # Atrial flutter, new diagnosis   # L atrial appendage thrombus on NICOLA (10/3/24)  - presented with worsening SOB, hypervolemia without significant hypoxia and minimal response to increasing home diuretics.  - Admission weight 376lb, dry weight ~355lb.  - BNP elevated with trace b/l effusions noted on CXR.  - Atrial flutter also noted on EKG which represents a new diagnosis.  - Symptoms consistent with acute HFpEF exacberation, possibly in setting of tachyarrhythmia vs worsening hypertension vs other.  - TSH normal, consistently wears home BiPAP at night.  - Underwent NICOLA 10/3 which showed VIVIANE clot and unable to perform DCCV.  - clinically improving with IV diuresis and weaned off of supplemental O2 .  - [id number] of 5.    - Cardiology following, held further Lasix (10/4) in the setting of acute renal failure; plan for outpatient NICOLA  with possible cardioversion in 4 weeks  - reports improvement in edema but still with significant dyspnea/hypoxia on minimal exertion and appears fluid overloaded  - renal function improving Cr 1.24--->1.05; d/w cardiology, resumed Lasix 60 mg IV BID (10/624) with PO potassium supplementation  -has had several unmeasured urine but weight trending up; suggested for strict intake and output  - still in a fib but rate appears mostly controlled  -continue heparin drip; differ anticoagulation plan to cardiology  -continue Metoprolol succinate 100 mg twice daily, Lipitor 40 mg daily hydralazine 50 mg Q8 hours  - Holding home losartan, hydrochlorothiazide/triamterene in the setting of ULYSSES  -continue telemetry     # ULYSSES, possible cardiorenal vs pre-renal  # Hypokalemia, hypomagnesemia, resolved  - Cr 1.3 on admission, baseline 0.8-0.9. Initially suspected cardiorenal component that improved with diuresis, however, recurrent rise in Cr after several days of furosemide administration and diuretic held  - renal function improving Cr 1.24--->1.05  -will monitor BMP with resumption of diuresis     # Type 2 diabetes mellitus w/out current use of insulin  Most recent A1c was 8.0 in 6/28/2024. PTA regimen includes glipizide 2.5 and metformin 500mg.  -SSI while admitted, continue PO meds on discharge     # DYLAN, suspected OHS - Continue home BiPAP  # Hyperlipidemia - PTA statin  # Hx of asthma - PTA fluticasone and albuterol   # Chronic anemia, stable - PTA iron supplement  # GERD - PTA pepcid  # Primary OA of b/l knees     DVT Prophylaxis: Heparin gtt    Code Status: Full Code      Diet: Combination Diet Regular Diet Adult; Moderate Consistent Carb (60 g CHO per Meal) Diet; 2 gm NA Diet      Disposition:   Medically Ready for Discharge: Anticipated in 2-4 Days pending clinical improvement; transition to PO diuretics and PO anticoagulation; cardiology clearance    Care plan discussed with patient and her PCA present in room along  with cardiology       Clinically Significant Risk Factors                    # Hypertension: Noted on problem list  # Acute heart failure with reduced ejection fraction: last echo with EF <40% and receiving IV diuretics                                        Physical Exam:      Blood pressure 134/82, pulse 97, temperature 97.9  F (36.6  C), temperature source Oral, resp. rate 20, weight (!) 170 kg (374 lb 12.8 oz), SpO2 100%, not currently breastfeeding.  Vitals:    10/04/24 0500 10/05/24 0705 10/06/24 0248   Weight: (!) 166.9 kg (368 lb) (!) 169 kg (372 lb 8 oz) (!) 170 kg (374 lb 12.8 oz)     Vital Signs with Ranges  Temp:  [97.5  F (36.4  C)-98.6  F (37  C)] 97.9  F (36.6  C)  Pulse:  [] 97  Resp:  [18-20] 20  BP: (105-147)/() 134/82  SpO2:  [94 %-100 %] 100 %  I/O's Last 24 hours  I/O last 3 completed shifts:  In: 687 [P.O.:540; I.V.:147]  Out: 300 [Urine:300]    Constitutional: Alert, awake and oriented X 3; resting comfortably in no apparent distress; morbidly obese       Oral cavity: Moist mucosa   Cardiovascular: Normal s1 s2, irregularly irregular rate and rhythm, no murmur   Lungs: B/l clear to auscultation, no wheezes or crepitations   Abdomen: Soft, nt, nd, no guarding, rigidity or rebound; BS +   LE : Marked b/l edema ++   Musculoskeletal/Neuro Power 5/5 in all extremities; No focal neurological deficits noted   Psychiatry: normal mood and affect                Medications:        Current Facility-Administered Medications   Medication Dose Route Frequency Provider Last Rate Last Admin    atorvastatin (LIPITOR) tablet 40 mg  40 mg Oral Daily Cynthia Vu MD   40 mg at 10/05/24 0916    diclofenac (VOLTAREN) 1 % topical gel 2 g  2 g Topical 4x Daily Betsy Mckinley MD   2 g at 10/05/24 1701    famotidine (PEPCID) tablet 20 mg  20 mg Oral Daily Cynthia Vu MD   20 mg at 10/05/24 0916    ferrous sulfate (FEROSUL) tablet 325 mg  325 mg Oral Q Mon Wed Fri AM Henry Blanco, MD    325 mg at 10/04/24 0847    fluticasone (ARNUITY ELLIPTA) 200 MCG/ACT inhaler 1 puff  1 puff Inhalation Daily Cynthia Vu MD   1 puff at 10/05/24 0918    [Held by provider] furosemide (LASIX) injection 60 mg  60 mg Intravenous bid 08 & 14 Bandar Siddiqui MD   60 mg at 10/04/24 0847    hydrALAZINE (APRESOLINE) tablet 50 mg  50 mg Oral Q8H Cynthia Vu MD   50 mg at 10/05/24 2330    insulin aspart (NovoLOG) injection (RAPID ACTING)  1-7 Units Subcutaneous TID AC Bandar Siddiqui MD   2 Units at 10/05/24 1701    insulin aspart (NovoLOG) injection (RAPID ACTING)  1-5 Units Subcutaneous At Bedtime Bandar Siddiqui MD        metoprolol succinate ER (TOPROL XL) 24 hr tablet 100 mg  100 mg Oral BID Ilana Reinoso APRN CNP   100 mg at 10/05/24 2132    [Held by provider] potassium chloride liv ER (KLOR-CON M20) CR tablet 40 mEq  40 mEq Oral BID Ilana Reinoso APRN CNP   40 mEq at 10/04/24 0847    sodium chloride (PF) 0.9% PF flush 3 mL  3 mL Intracatheter Q8H Bandar Siddiqui MD   3 mL at 10/05/24 2328     PRN Meds:   Current Facility-Administered Medications   Medication Dose Route Frequency Provider Last Rate Last Admin    acetaminophen (TYLENOL) tablet 650 mg  650 mg Oral Q4H PRN Bandar Siddiqui MD   650 mg at 10/04/24 1536    Or    acetaminophen (TYLENOL) Suppository 650 mg  650 mg Rectal Q4H PRN Bandar Siddiqui MD        albuterol (PROVENTIL HFA/VENTOLIN HFA) inhaler  2 puff Inhalation Q6H PRN Cynthia Vu MD        alum & mag hydroxide-simethicone (MAALOX) suspension 15 mL  15 mL Oral TID PRN Bandar Siddiqui MD   15 mL at 10/03/24 0109    benzocaine-menthol (CHLORASEPTIC) 6-10 MG lozenge 1 lozenge  1 lozenge Buccal Q1H PRN Bandar Siddiqui MD   1 lozenge at 10/05/24 0916    calcium carbonate (TUMS) chewable tablet 1,000 mg  1,000 mg Oral 4x Daily PRN Bandar Siddiqui MD   1,000 mg at 10/04/24 1911    Continuing beta blocker from home  medication list OR beta blocker order already placed during this visit   Does not apply DOES NOT GO TO Bandar Beltran MD        glucose gel 15-30 g  15-30 g Oral Q15 Min PRBandar Geronimo MD        Or    dextrose 50 % injection 25-50 mL  25-50 mL Intravenous Q15 Min Bandar Mendieta MD        Or    glucagon injection 1 mg  1 mg Subcutaneous Q15 Min PRBandar Geronimo MD        Give   of usual dose of LONG ACTING insulin AM of procedure IF diabetic   Does not apply Continuous PRN Ilana Reinoso APRN CNP        guaiFENesin-dextromethorphan (ROBITUSSIN DM) 100-10 MG/5ML syrup 10 mL  10 mL Oral Q4H PRN Bandar Siddiqui MD   10 mL at 10/03/24 2009    HOLD digoxin (LANOXIN)  AM of procedure IF on digoxin   Does not apply HOLD Ilana Reinoso APRN CNP        HOLD: Insulin - RAPID/SHORT acting AM of procedure IF diabetic   Does not apply HOLD Ilana Rienoso APRN CNP        HOLD: Insulin - REGULAR AM of procedure IF diabetic   Does not apply HOLD Ilana Reinoso APRN CNP        HOLD: Oral hypoglycemics AM of procedure IF diabetic   Does not apply HOLD Ilana Reinoso APRN CNP        levalbuterol (XOPENEX) neb solution 1.25 mg  1.25 mg Nebulization Q2H PRN Bandar Siddiqui MD   1.25 mg at 10/04/24 2049    lidocaine (LMX4) cream   Topical Q1H PRN Bandar Siddiqui MD        lidocaine 1 % 0.1-1 mL  0.1-1 mL Other Q1H PRN Bandar Siddiqui MD        lidocaine 1 % 1 mL  1 mL Other Q1H PRN Ilana Reinoso APRN CNP        magnesium sulfate 2 g in 50 mL sterile water intermittent infusion  2 g Intravenous Once PRN Ruma, Kobi Dunne MD        magnesium sulfate 2 g in 50 mL sterile water intermittent infusion  2 g Intravenous Once PRN Ilana Reinoso APRN CNP        May take oral meds with a sip of water, the morning of Cardioversion procedure.       Does not apply Continuous PRN Ilana Reinoso APRN CNP        melatonin tablet 3 mg  3 mg Oral At Bedtime  PRN Bandar Siddiqui MD   3 mg at 10/02/24 2057    metoprolol (LOPRESSOR) injection 5 mg  5 mg Intravenous Q4H PRN Bandar Siddiqui MD        No anticoagulants IF patient has had acute trauma/surgery or recent intracranial, GI or urinary tract bleeding.    Other DOES NOT GO TO Bandar Beltran MD        polyethylene glycol (MIRALAX) Packet 17 g  17 g Oral Daily PRN Henry Blanco MD        potassium chloride liv ER (KLOR-CON M20) CR tablet 20 mEq  20 mEq Oral Once PRN Ip, Kobi Dunne MD        potassium chloride liv ER (KLOR-CON M20) CR tablet 40 mEq  40 mEq Oral Once PRN Ip, Kobi Dunne MD        potassium chloride liv ER (KLOR-CON M20) CR tablet 40 mEq  40 mEq Oral Once PRN Ilana Reinoso APRN CNP        Reason ACE/ARB/ARNI order not selected   Other DOES NOT GO TO Bandar Beltran MD        sennosides (SENOKOT) tablet 8.6 mg  8.6 mg Oral BID PRN Henry Blanco MD        sodium chloride (PF) 0.9% PF flush 3 mL  3 mL Intracatheter q1 min prn Ip, Kobi Dunne MD        sodium chloride (PF) 0.9% PF flush 3 mL  3 mL Intravenous Q1H PRN Ilana Reinoso APRN CNP        sodium chloride (PF) 0.9% PF flush 3 mL  3 mL Intracatheter q1 min prn Bandar Siddiqui MD   3 mL at 10/02/24 0006            Data:      All new lab and imaging data was reviewed.   Recent Labs   Lab Test 10/05/24  0546 10/04/24  0548 10/02/24  0611 10/01/24  1924   WBC 7.8 8.3  --  8.8   HGB 9.3* 9.6*  --  10.5*   MCV 83 84  --  82    267  --  319   INR  --   --  1.21*  --       Recent Labs   Lab Test 10/06/24  0210 10/05/24  2113 10/05/24  1627 10/05/24  0802 10/05/24  0546 10/04/24  1305 10/04/24  0548 10/03/24  1726 10/03/24  1251 10/03/24  0809 10/03/24  0614   NA  --   --   --   --  140  --  141  --   --   --  141   POTASSIUM  --   --   --   --  4.7  --  4.8  --  3.8  --  3.3*   CHLORIDE  --   --   --   --  102  --  102  --   --   --  99   CO2  --   --   --   --  26  --  28  --    "--   --  28   BUN  --   --   --   --  29.0*  --  24.1*  --   --   --  19.6   CR  --   --   --   --  1.23*  --  1.24*  --   --   --  0.95   ANIONGAP  --   --   --   --  12  --  11  --   --   --  14   AVNI  --   --   --   --  9.9  --  9.7  --   --   --  9.6   * 116* 208*   < > 117*   < > 126*   < >  --    < > 123*    < > = values in this interval not displayed.     No lab results found.    Invalid input(s): \"TROP\", \"TROPONINIES\"      "

## 2024-10-06 NOTE — PLAN OF CARE
Date of admission: 9/30/2024    Chief Complaint / current diagnosis: admitted with shortness of breath, new afib, and left atrial appendage clot found of NICOLA.    RN Assessment & Course 5976-2741:    Neuro / Mentation / Psych: A&Ox 4. Appears fatigued this afternoon.  Aggression Stoplight: green   Vitals / Pain: VSS on 2 liters nasal cannula and home settings on BIPAP which she prefers to wear resting/napping throughout the afternoon, denies pain  CV: stable HR/BP; rhythm  LS: diminished, dyspnea on exertion  Anticoag: IV Heparin with anti Xa rechecks  Activity: standby assist, moves well, BLE ace wraps  Bariatric: Yes; keenan recliner and wheelchair  Skin: intact  Drains: external pure wick intact  GI/: incontinent  Diet/nutrition: consistent carb diet with blood glucose checks  Access: 2 PIV's C/D/I  Labs: electrolytes, CBC, BMP  Diagnostics: NICOLA, echo  Education/Discharge Plan: reviewed with patient  Patient/Staff Safety: bed in low/locked position, call light in reach, ID band on, all appropriate equipment/monitors on & audible.    _____________________________________________________    Electronically signed by  Dinorah Tan, RN-BSN  [facility] Nurse    Goal Outcome Evaluation:      Plan of Care Reviewed With: patient    Overall Patient Progress: improving    Problem: Adult Inpatient Plan of Care  Goal: Plan of Care Review  Description: The Plan of Care Review/Shift note should be completed every shift.  The Outcome Evaluation is a brief statement about your assessment that the patient is improving, declining, or no change.  This information will be displayed automatically on your shift  note.  Outcome: Progressing  Flowsheets (Taken 10/5/2024 2037)  Plan of Care Reviewed With: patient  Overall Patient Progress: improving  Goal: Absence of Hospital-Acquired Illness or Injury  Intervention: Identify and Manage Fall Risk  Recent Flowsheet Documentation  Taken 10/5/2024 1600 by  Britney, Dinorah, RN  Safety Promotion/Fall Prevention:   room organization consistent   safety round/check completed  Intervention: Prevent Skin Injury  Recent Flowsheet Documentation  Taken 10/5/2024 1600 by Dinorah Tan RN  Body Position: position changed independently  Skin Protection: adhesive use limited  Device Skin Pressure Protection:   adhesive use limited   absorbent pad utilized/changed  Intervention: Prevent and Manage VTE (Venous Thromboembolism) Risk  Recent Flowsheet Documentation  Taken 10/5/2024 1600 by Dinorah Tan RN  VTE Prevention/Management: compression stockings on  Intervention: Prevent Infection  Recent Flowsheet Documentation  Taken 10/5/2024 1600 by Dinorah Tan RN  Infection Prevention:   cohorting utilized   single patient room provided   rest/sleep promoted   environmental surveillance performed   equipment surfaces disinfected  Goal: Optimal Comfort and Wellbeing  Intervention: Provide Person-Centered Care  Recent Flowsheet Documentation  Taken 10/5/2024 1600 by Dinorah Tan RN  Trust Relationship/Rapport:   care explained   choices provided   emotional support provided   empathic listening provided   questions answered   questions encouraged   reassurance provided   thoughts/feelings acknowledged     Problem: Risk for Delirium  Goal: Optimal Coping  Intervention: Optimize Psychosocial Adjustment to Delirium  Recent Flowsheet Documentation  Taken 10/5/2024 1600 by Dinorah Tan RN  Supportive Measures:   active listening utilized   relaxation techniques promoted   self-care encouraged  Family/Support System Care:   support provided   presence promoted   involvement promoted  Goal: Improved Behavioral Control  Intervention: Prevent and Manage Agitation  Recent Flowsheet Documentation  Taken 10/5/2024 1600 by Dinorah Tan RN  Environment Familiarity/Consistency:   daily routine followed   familiar objects from home provided   personal clothing/items  utilized  Intervention: Minimize Safety Risk  Recent Flowsheet Documentation  Taken 10/5/2024 1600 by Dinorah Tan RN  Communication Enhancement Strategies: communication board used  Enhanced Safety Measures: review medications for side effects with activity  Trust Relationship/Rapport:   care explained   choices provided   emotional support provided   empathic listening provided   questions answered   questions encouraged   reassurance provided   thoughts/feelings acknowledged  Goal: Improved Attention and Thought Clarity  Intervention: Maximize Cognitive Function  Recent Flowsheet Documentation  Taken 10/5/2024 1600 by Dinorah Tan RN  Sensory Stimulation Regulation:   care clustered   quiet environment promoted  Reorientation Measures:   calendar in view   clock in view   familiar social contact encouraged  Goal: Improved Sleep  Intervention: Promote Sleep  Recent Flowsheet Documentation  Taken 10/5/2024 1600 by Dinorah Tan RN  Sleep/Rest Enhancement:   consistent schedule promoted   family presence promoted   relaxation techniques promoted   snack     Problem: Skin Injury Risk Increased  Goal: Skin Health and Integrity  Intervention: Plan: Nurse Driven Intervention: Moisture Management  Recent Flowsheet Documentation  Taken 10/5/2024 1600 by Dinorah Tan RN  Moisture Interventions:   Encourage regular toileting   No brief in bed   Urinary collection device  Intervention: Plan: Nurse Driven Intervention: Friction and Shear  Recent Flowsheet Documentation  Taken 10/5/2024 1600 by Dinorah Tan RN  Friction/Shear Interventions: HOB 30 degrees or less  Intervention: Optimize Skin Protection  Recent Flowsheet Documentation  Taken 10/5/2024 1600 by Dinorah Tan RN  Pressure Reduction Techniques: frequent weight shift encouraged  Pressure Reduction Devices: specialty bed utilized  Skin Protection: adhesive use limited  Activity Management:   ambulated in room   back to bed  Head of Bed (HOB)  Positioning: HOB at 60-90 degrees  Intervention: Promote and Optimize Oral Intake  Recent Flowsheet Documentation  Taken 10/5/2024 1600 by Dinorah Tan RN  Oral Nutrition Promotion:   social interaction promoted   rest periods promoted     Problem: Heart Failure  Goal: Optimal Coping  Outcome: Progressing  Intervention: Support Psychosocial Response  Recent Flowsheet Documentation  Taken 10/5/2024 1600 by Dinorah Tan RN  Supportive Measures:   active listening utilized   relaxation techniques promoted   self-care encouraged  Family/Support System Care:   support provided   presence promoted   involvement promoted  Goal: Optimal Cardiac Output  Outcome: Progressing  Intervention: Optimize Cardiac Output  Recent Flowsheet Documentation  Taken 10/5/2024 1600 by Dinorah Tan RN  Stabilization Measures: legs elevated  Environmental Support:   calm environment promoted   caregiver consistency promoted   comfort object encouraged   distractions minimized   environmental consistency promoted   personal routine supported   rest periods encouraged  Goal: Stable Heart Rate and Rhythm  Outcome: Progressing  Goal: Optimal Functional Ability  Outcome: Progressing  Intervention: Optimize Functional Ability  Recent Flowsheet Documentation  Taken 10/5/2024 1600 by Dinorah Tan RN  Activity Management:   ambulated in room   back to bed  Goal: Fluid and Electrolyte Balance  Outcome: Progressing  Intervention: Monitor and Manage Fluid and Electrolyte Balance  Recent Flowsheet Documentation  Taken 10/5/2024 1600 by Dinorah Tan RN  Fluid/Electrolyte Management: fluids provided  Goal: Improved Oral Intake  Outcome: Progressing  Intervention: Promote and Optimize Nutrition Intake  Recent Flowsheet Documentation  Taken 10/5/2024 1600 by Dinorah Tan RN  Oral Nutrition Promotion:   social interaction promoted   rest periods promoted  Goal: Effective Oxygenation and Ventilation  Outcome: Progressing  Intervention:  Promote Airway Secretion Clearance  Recent Flowsheet Documentation  Taken 10/5/2024 1600 by Dinorah Tan RN  Activity Management:   ambulated in room   back to bed  Intervention: Optimize Oxygenation and Ventilation  Recent Flowsheet Documentation  Taken 10/5/2024 1600 by Dinorah Tan RN  Airway/Ventilation Management: calming measures promoted  Head of Bed (HOB) Positioning: HOB at 60-90 degrees  Goal: Effective Breathing Pattern During Sleep  Outcome: Progressing  Intervention: Monitor and Manage Obstructive Sleep Apnea  Recent Flowsheet Documentation  Taken 10/5/2024 1600 by Dinorah Tan RN  NPPV/CPAP Maintenance: home PAP equipment/settings used  Medication Review/Management: medications reviewed     Problem: Pain Acute  Goal: Optimal Pain Control and Function  Outcome: Progressing  Intervention: Prevent or Manage Pain  Recent Flowsheet Documentation  Taken 10/5/2024 1600 by Dinorah Tan RN  Sensory Stimulation Regulation:   care clustered   quiet environment promoted  Sleep/Rest Enhancement:   consistent schedule promoted   family presence promoted   relaxation techniques promoted   snack  Bowel Elimination Promotion:   adequate fluid intake promoted   ambulation promoted  Medication Review/Management: medications reviewed  Intervention: Optimize Psychosocial Wellbeing  Recent Flowsheet Documentation  Taken 10/5/2024 1600 by Dinorah Tan RN  Supportive Measures:   active listening utilized   relaxation techniques promoted   self-care encouraged     Problem: Fall Injury Risk  Goal: Absence of Fall and Fall-Related Injury  Outcome: Progressing  Intervention: Identify and Manage Contributors  Recent Flowsheet Documentation  Taken 10/5/2024 1600 by Dinorah Tan RN  Medication Review/Management: medications reviewed  Intervention: Promote Injury-Free Environment  Recent Flowsheet Documentation  Taken 10/5/2024 1600 by Dinorah Tan RN  Safety Promotion/Fall Prevention:   room organization  consistent   safety round/check completed

## 2024-10-07 ENCOUNTER — APPOINTMENT (OUTPATIENT)
Dept: OCCUPATIONAL THERAPY | Facility: CLINIC | Age: 74
DRG: 291 | End: 2024-10-07
Payer: MEDICARE

## 2024-10-07 LAB
ANION GAP SERPL CALCULATED.3IONS-SCNC: 10 MMOL/L (ref 7–15)
ATRIAL RATE - MUSE: 138 BPM
BUN SERPL-MCNC: 25.7 MG/DL (ref 8–23)
CALCIUM SERPL-MCNC: 9.7 MG/DL (ref 8.8–10.4)
CHLORIDE SERPL-SCNC: 103 MMOL/L (ref 98–107)
CREAT SERPL-MCNC: 1.05 MG/DL (ref 0.51–0.95)
DIASTOLIC BLOOD PRESSURE - MUSE: NORMAL MMHG
EGFRCR SERPLBLD CKD-EPI 2021: 56 ML/MIN/1.73M2
ERYTHROCYTE [DISTWIDTH] IN BLOOD BY AUTOMATED COUNT: 15.6 % (ref 10–15)
GLUCOSE BLDC GLUCOMTR-MCNC: 105 MG/DL (ref 70–99)
GLUCOSE BLDC GLUCOMTR-MCNC: 114 MG/DL (ref 70–99)
GLUCOSE BLDC GLUCOMTR-MCNC: 116 MG/DL (ref 70–99)
GLUCOSE BLDC GLUCOMTR-MCNC: 132 MG/DL (ref 70–99)
GLUCOSE BLDC GLUCOMTR-MCNC: 136 MG/DL (ref 70–99)
GLUCOSE SERPL-MCNC: 109 MG/DL (ref 70–99)
HCO3 SERPL-SCNC: 29 MMOL/L (ref 22–29)
HCT VFR BLD AUTO: 31.9 % (ref 35–47)
HGB BLD-MCNC: 9.6 G/DL (ref 11.7–15.7)
INTERPRETATION ECG - MUSE: NORMAL
MAGNESIUM SERPL-MCNC: 2 MG/DL (ref 1.7–2.3)
MCH RBC QN AUTO: 24.4 PG (ref 26.5–33)
MCHC RBC AUTO-ENTMCNC: 30.1 G/DL (ref 31.5–36.5)
MCV RBC AUTO: 81 FL (ref 78–100)
P AXIS - MUSE: NORMAL DEGREES
PLATELET # BLD AUTO: 225 10E3/UL (ref 150–450)
POTASSIUM SERPL-SCNC: 4.2 MMOL/L (ref 3.4–5.3)
PR INTERVAL - MUSE: NORMAL MS
QRS DURATION - MUSE: 88 MS
QT - MUSE: 384 MS
QTC - MUSE: 517 MS
R AXIS - MUSE: -11 DEGREES
RBC # BLD AUTO: 3.94 10E6/UL (ref 3.8–5.2)
SODIUM SERPL-SCNC: 142 MMOL/L (ref 135–145)
SYSTOLIC BLOOD PRESSURE - MUSE: NORMAL MMHG
T AXIS - MUSE: 75 DEGREES
UFH PPP CHRO-ACNC: 0.57 IU/ML
VENTRICULAR RATE- MUSE: 109 BPM
WBC # BLD AUTO: 7.8 10E3/UL (ref 4–11)

## 2024-10-07 PROCEDURE — 99232 SBSQ HOSP IP/OBS MODERATE 35: CPT | Performed by: HOSPITALIST

## 2024-10-07 PROCEDURE — 83735 ASSAY OF MAGNESIUM: CPT | Performed by: HOSPITALIST

## 2024-10-07 PROCEDURE — 36415 COLL VENOUS BLD VENIPUNCTURE: CPT | Performed by: HOSPITALIST

## 2024-10-07 PROCEDURE — 97530 THERAPEUTIC ACTIVITIES: CPT | Mod: GO

## 2024-10-07 PROCEDURE — 250N000013 HC RX MED GY IP 250 OP 250 PS 637: Performed by: STUDENT IN AN ORGANIZED HEALTH CARE EDUCATION/TRAINING PROGRAM

## 2024-10-07 PROCEDURE — 85520 HEPARIN ASSAY: CPT | Performed by: HOSPITALIST

## 2024-10-07 PROCEDURE — 250N000011 HC RX IP 250 OP 636

## 2024-10-07 PROCEDURE — 80048 BASIC METABOLIC PNL TOTAL CA: CPT | Performed by: HOSPITALIST

## 2024-10-07 PROCEDURE — 250N000013 HC RX MED GY IP 250 OP 250 PS 637: Performed by: HOSPITALIST

## 2024-10-07 PROCEDURE — 85027 COMPLETE CBC AUTOMATED: CPT

## 2024-10-07 PROCEDURE — 210N000002 HC R&B HEART CARE

## 2024-10-07 PROCEDURE — 250N000013 HC RX MED GY IP 250 OP 250 PS 637

## 2024-10-07 PROCEDURE — 250N000013 HC RX MED GY IP 250 OP 250 PS 637: Performed by: NURSE PRACTITIONER

## 2024-10-07 PROCEDURE — 99231 SBSQ HOSP IP/OBS SF/LOW 25: CPT | Performed by: NURSE PRACTITIONER

## 2024-10-07 PROCEDURE — 250N000011 HC RX IP 250 OP 636: Performed by: INTERNAL MEDICINE

## 2024-10-07 RX ORDER — LOSARTAN POTASSIUM 25 MG/1
25 TABLET ORAL DAILY
Status: DISPENSED | OUTPATIENT
Start: 2024-10-07

## 2024-10-07 RX ADMIN — FAMOTIDINE 20 MG: 20 TABLET, FILM COATED ORAL at 09:39

## 2024-10-07 RX ADMIN — HYDRALAZINE HYDROCHLORIDE 50 MG: 50 TABLET ORAL at 09:39

## 2024-10-07 RX ADMIN — POTASSIUM CHLORIDE 40 MEQ: 1500 TABLET, EXTENDED RELEASE ORAL at 09:39

## 2024-10-07 RX ADMIN — ATORVASTATIN CALCIUM 40 MG: 40 TABLET, FILM COATED ORAL at 09:39

## 2024-10-07 RX ADMIN — FUROSEMIDE 60 MG: 10 INJECTION, SOLUTION INTRAMUSCULAR; INTRAVENOUS at 17:16

## 2024-10-07 RX ADMIN — HEPARIN SODIUM 1800 UNITS/HR: 10000 INJECTION, SOLUTION INTRAVENOUS at 20:28

## 2024-10-07 RX ADMIN — DICLOFENAC 2 G: 10 GEL TOPICAL at 20:30

## 2024-10-07 RX ADMIN — HEPARIN SODIUM 1800 UNITS/HR: 10000 INJECTION, SOLUTION INTRAVENOUS at 05:22

## 2024-10-07 RX ADMIN — FLUTICASONE FUROATE 1 PUFF: 200 POWDER RESPIRATORY (INHALATION) at 09:42

## 2024-10-07 RX ADMIN — HYDRALAZINE HYDROCHLORIDE 50 MG: 50 TABLET ORAL at 17:17

## 2024-10-07 RX ADMIN — FUROSEMIDE 60 MG: 10 INJECTION, SOLUTION INTRAMUSCULAR; INTRAVENOUS at 09:38

## 2024-10-07 RX ADMIN — LOSARTAN POTASSIUM 25 MG: 25 TABLET, FILM COATED ORAL at 13:39

## 2024-10-07 RX ADMIN — METOPROLOL SUCCINATE 100 MG: 100 TABLET, EXTENDED RELEASE ORAL at 09:39

## 2024-10-07 RX ADMIN — METOPROLOL SUCCINATE 100 MG: 100 TABLET, EXTENDED RELEASE ORAL at 20:38

## 2024-10-07 RX ADMIN — FERROUS SULFATE TAB 325 MG (65 MG ELEMENTAL FE) 325 MG: 325 (65 FE) TAB at 09:39

## 2024-10-07 RX ADMIN — POTASSIUM CHLORIDE 40 MEQ: 1500 TABLET, EXTENDED RELEASE ORAL at 20:38

## 2024-10-07 ASSESSMENT — ACTIVITIES OF DAILY LIVING (ADL)
ADLS_ACUITY_SCORE: 43
ADLS_ACUITY_SCORE: 48
ADLS_ACUITY_SCORE: 45
ADLS_ACUITY_SCORE: 49
ADLS_ACUITY_SCORE: 48
ADLS_ACUITY_SCORE: 47
ADLS_ACUITY_SCORE: 48
ADLS_ACUITY_SCORE: 47
ADLS_ACUITY_SCORE: 47
ADLS_ACUITY_SCORE: 45
ADLS_ACUITY_SCORE: 47
ADLS_ACUITY_SCORE: 47
ADLS_ACUITY_SCORE: 48
ADLS_ACUITY_SCORE: 48
ADLS_ACUITY_SCORE: 47
ADLS_ACUITY_SCORE: 43
ADLS_ACUITY_SCORE: 48
ADLS_ACUITY_SCORE: 43
ADLS_ACUITY_SCORE: 47
ADLS_ACUITY_SCORE: 48
ADLS_ACUITY_SCORE: 43
ADLS_ACUITY_SCORE: 49
ADLS_ACUITY_SCORE: 47

## 2024-10-07 NOTE — PROGRESS NOTES
CLINICAL NUTRITION SERVICES - BRIEF NOTE    Reviewed chart for routine length of stay nutrition risk screening. Moderate Consistent Carbohydrate Diet + 2 gm Na. Documented intakes: 10/6 100%, 10/5 100/100/50, 10/4 100%, 10/2-10/3 100/100. Ordering 3 adequately nourishing meals/day. Edema +1 (trace). Pressure Ulcers: none noted. Last BM 10/6.     Weight History: stable    Wt Readings from Last 10 Encounters:   10/07/24 (!) 170.4 kg (375 lb 10.6 oz)   09/30/24 (!) 161 kg (355 lb)   07/30/24 (!) 161.2 kg (355 lb 6.4 oz)   05/14/19 (!) 161.5 kg (356 lb 1.6 oz)   01/31/16 (!) 164.3 kg (362 lb 3.2 oz)   11/04/12 (!) 149.7 kg (330 lb)   07/11/12 (!) 152.4 kg (336 lb)   05/11/12 (!) 146.5 kg (323 lb)   03/23/12 (!) 146.1 kg (322 lb 3.2 oz)   02/17/12 (!) 146.5 kg (323 lb)     MONITORING/EVALUATION  Will recheck for nutrition risks in 7-10 days if remains admitted. Please consult for any nutrition interventions prior to this time if needed.    Carli Magdaleno RD, LD

## 2024-10-07 NOTE — PLAN OF CARE
[id number]    Orientations: A&Ox4   Vitals/Pain: VSS, RA, LS clear/diminished @ bases. Denies pain   Tele: A Fib RVR (110)  Lines/Drains: PIV R infusing heparin 1800u/hr   Skin/Wounds: Scattered bruising   GI/: WDL; purewick in place. BM x1. Mod carb and 2 g Na diet.    Labs: Abnormal/Trends: BG (109, 105), BUN (25.7), Crt (1.05), GFR (56), Hgb (9.6), hematocrit (31.9)  Electrolyte Replacement- Mag and K AM check 10/8   Ambulation/Assist: Ax1/SB (IV pole or walker)   Plan: Heparin infusing, Hep Xa recheck 10/8 AM. Started losartan; cards following. Continue w/ POC.

## 2024-10-07 NOTE — PLAN OF CARE
Heart Failure Care Map  GOALS TO BE MET BEFORE DISCHARGE:    1. Decrease congestion and/or edema with diuretic therapy to achieve near optimal volume status.     Dyspnea improved: Yes, satisfactory for discharge.   Edema improved: No, lasix done this shift         Last 24 hour I/O:   Intake/Output Summary (Last 24 hours) at 10/6/2024 2238  Last data filed at 10/6/2024 2200  Gross per 24 hour   Intake 600 ml   Output 2100 ml   Net -1500 ml           Net I/O and Weights since admission:   09/06 2300 - 10/06 2259  In: 5091.95 [P.O.:4640; I.V.:451.95]  Out: 9500 [Urine:9500]  Net: -4408.05     Vitals:    09/30/24 2200 10/01/24 0529 10/02/24 0700 10/03/24 0600   Weight: (!) 170.7 kg (376 lb 6.4 oz) (!) 168 kg (370 lb 6 oz) (!) 166.5 kg (367 lb) (!) 166.4 kg (366 lb 12.8 oz)    10/04/24 0500 10/05/24 0705 10/06/24 0248   Weight: (!) 166.9 kg (368 lb) (!) 169 kg (372 lb 8 oz) (!) 170 kg (374 lb 12.8 oz)       2.  O2 sats > 90% on room air, or at prior home O2 therapy level.      Able to wean O2 this shift to keep sats above 90%?: Yes, satisfactory for discharge.   Does patient use Home O2? No          Current oxygenation status:   SpO2: 95 %     O2 Device: None (Room air),      3.  Tolerates ambulation and mobility near baseline.     Ambulation: No, further care required to meet this goal. Please explain Pt did not ambulate outside of room this shift    Times patient ambulated with staff this shift: 0    Please review the Heart Failure Care Map for additional HF goal outcomes.    Nataly Ayon RN  10/6/2024   Neuro:intact  CV/Rhythm:AF CVR/RVR  Resp/02:RA, likes home bipap with 3 L NC piped in  GI/Diet:mod carb  :voiding and pure wick in bed  Skin/Incisions/Sites:intact  Pulses/CMS:intact  Edema:BLE, generalized  Activity/Falls Risk:up to BR x 1, up to chair  Lines/Drains/IVs:PIV x 2, heparin infusing   Labs/BGM:mag and k in am  Test/Procedures:DCCV in 4 weeks as outpt  VS/Pain:stable  DC Plan:per cards  Other:

## 2024-10-07 NOTE — PLAN OF CARE
Goal Outcome Evaluation:    Pt alert, orient x4. /78   Pulse 86   Temp 98.7  F (37.1  C) (Oral)   Resp 16   Wt (!) 170 kg (374 lb 12.8 oz)   SpO2 100%   BMI 60.23 kg/m     On Bipap overnight with 2L oxygen. Afib on tele. Heparin continued at 1800/kg/hr for VIVIANE clot. Plan to do out patient  NICOLA and cardioversion in 4 weeks.       Please schedule for EGD with Bx, ASC - 7 AM procedure ok, Dx - Eosinophilic Esophagitis.     Thanks    Ever Miller MD

## 2024-10-07 NOTE — PROGRESS NOTES
[facility]  Hospitalist Progress Note        Ilya Goodman MD   10/07/2024        Interval History:        - Continues with dyspnea on exertion and weight trending up; IV diuresis resumed on 10/6/24  - renal function stable; cardiology started losartan 25 mg daily (10/7/24)         Assessment and Plan:        Agatha Rodriguez is a 74 year old woman with PMH of severe morbid obesity, DYLAN, asthma, hypertension, and chronic diastolic CHF, among others; who presented with progressive dyspnea, anasarca, acute palpitations and found to have HF exacerbation and new onset atrial flutter. NICOLA with evidence of VIVIANE clot and unable to undergo DCCV.; admitted inpatient 9/30/24     # Acute HFpEF exacerbation (EF 50% from 60-65% 7/2024)  # Atrial flutter, new diagnosis   # L atrial appendage thrombus on NICOLA (10/3/24)  - presented with worsening SOB, hypervolemia without significant hypoxia and minimal response to increasing home diuretics.  - Admission weight 376lb, dry weight ~355lb.  - BNP elevated with trace b/l effusions noted on CXR.  - Atrial flutter also noted on EKG which represents a new diagnosis.  - Symptoms consistent with acute HFpEF exacberation, possibly in setting of tachyarrhythmia vs worsening hypertension vs other.  - TSH normal, consistently wears home BiPAP at night.  - Underwent NICOLA 10/3 which showed VIVIANE clot and unable to perform DCCV.  - clinically improving with IV diuresis and weaned off of supplemental O2 .  - [id number] of 5.    - Cardiology following, held Lasix (10/4) in the setting of acute renal failure; plan for outpatient NICOLA with possible cardioversion in 4 weeks  - remains symptomatic with clinically fluid overloaded  - renal function improving Cr 1.24--->1.05; resumed Lasix 60 mg IV BID (10/624) with PO potassium supplementation  - monitor strict intake and output  - still in a fib but rate mostly controlled  - continue heparin drip; differ anticoagulation plan to  cardiology  - continue Metoprolol succinate 100 mg twice daily, Lipitor 40 mg daily hydralazine 50 mg Q8 hours  - Holding hydrochlorothiazide/triamterene in the setting of ULYSSES; cardiology started losartan 25 mg daily (10/7/24)  -continue telemetry     # ULYSSES, possible cardiorenal vs pre-renal  # Hypokalemia, hypomagnesemia, resolved  - Cr 1.3 on admission, baseline 0.8-0.9. Initially suspected cardiorenal component that improved with diuresis, however, recurrent rise in Cr after several days of furosemide administration and diuretic held  - renal function improving Cr 1.24--->1.05  - will monitor BMP with resumption of diuresis     # Type 2 diabetes mellitus w/out current use of insulin  Most recent A1c was 8.0 in 6/28/2024. PTA regimen includes glipizide 2.5 and metformin 500mg.  -SSI while admitted, continue PO meds on discharge     # DYLAN, suspected OHS - Continue home BiPAP  # Hyperlipidemia - PTA statin  # Hx of asthma - PTA fluticasone and albuterol   # Chronic anemia, stable - PTA iron supplement  # GERD - PTA pepcid  # Primary OA of b/l knees     DVT Prophylaxis: Heparin gtt    Code Status: Full Code      Diet: Combination Diet Regular Diet Adult; Moderate Consistent Carb (60 g CHO per Meal) Diet; 2 gm NA Diet      Disposition:   Medically Ready for Discharge: Anticipated in 2-4 Days pending clinical improvement; transition to PO diuretics and PO anticoagulation; cardiology clearance    Care plan discussed with patient      Clinically Significant Risk Factors                    # Hypertension: Noted on problem list    # Acute heart failure with reduced ejection fraction: last echo with EF <40% and receiving IV diuretics                                        Physical Exam:      Blood pressure 117/78, pulse 86, temperature 98.7  F (37.1  C), temperature source Oral, resp. rate 16, weight (!) 170.4 kg (375 lb 10.6 oz), SpO2 100%, not currently breastfeeding.  Vitals:    10/05/24 0705 10/06/24 0248 10/07/24 0609    Weight: (!) 169 kg (372 lb 8 oz) (!) 170 kg (374 lb 12.8 oz) (!) 170.4 kg (375 lb 10.6 oz)     Vital Signs with Ranges  Temp:  [98  F (36.7  C)-98.7  F (37.1  C)] 98.7  F (37.1  C)  Pulse:  [] 86  Resp:  [16-19] 16  BP: (103-121)/(62-79) 117/78  SpO2:  [95 %-100 %] 100 %  I/O's Last 24 hours  I/O last 3 completed shifts:  In: 600 [P.O.:600]  Out: 2800 [Urine:2800]    Constitutional: Alert, awake and oriented X 3; resting comfortably in no apparent distress; morbidly obese       Oral cavity: Moist mucosa   Cardiovascular: Normal s1 s2, irregularly irregular rate and rhythm, no murmur   Lungs: B/l clear to auscultation, no wheezes or crepitations   Abdomen: Soft, nt, nd, no guarding, rigidity or rebound; BS +   LE : Marked b/l edema ++   Musculoskeletal/Neuro Power 5/5 in all extremities; No focal neurological deficits noted   Psychiatry: normal mood and affect                Medications:        Current Facility-Administered Medications   Medication Dose Route Frequency Provider Last Rate Last Admin    atorvastatin (LIPITOR) tablet 40 mg  40 mg Oral Daily Cynthia Vu MD   40 mg at 10/06/24 0942    [Auto Hold] diclofenac (VOLTAREN) 1 % topical gel 2 g  2 g Topical 4x Daily Betsy Mckinley MD   2 g at 10/06/24 0942    famotidine (PEPCID) tablet 20 mg  20 mg Oral Daily Cynthia Vu MD   20 mg at 10/06/24 0942    ferrous sulfate (FEROSUL) tablet 325 mg  325 mg Oral Q Mon Wed Fri AM Henry Blanco MD   325 mg at 10/04/24 0847    fluticasone (ARNUITY ELLIPTA) 200 MCG/ACT inhaler 1 puff  1 puff Inhalation Daily Cynthia Vu MD   1 puff at 10/06/24 0942    furosemide (LASIX) injection 60 mg  60 mg Intravenous bid 08 & 14 Frederick Poon MD   60 mg at 10/06/24 1618    hydrALAZINE (APRESOLINE) tablet 50 mg  50 mg Oral Q8H Cynthia Vu MD   50 mg at 10/06/24 2317    insulin aspart (NovoLOG) injection (RAPID ACTING)  1-7 Units Subcutaneous TID  Bandar Siddiqui MD   2 Units at  10/05/24 1701    insulin aspart (NovoLOG) injection (RAPID ACTING)  1-5 Units Subcutaneous At Bedtime Bandar Siddiqui MD        metoprolol succinate ER (TOPROL XL) 24 hr tablet 100 mg  100 mg Oral BID Ilana Reinoso APRN CNP   100 mg at 10/06/24 2200    potassium chloride liv ER (KLOR-CON M20) CR tablet 40 mEq  40 mEq Oral BID Ilya Goodman MD   40 mEq at 10/06/24 2200    sodium chloride (PF) 0.9% PF flush 3 mL  3 mL Intracatheter Q8H Bandar Siddiqui MD   3 mL at 10/06/24 1624     PRN Meds:   Current Facility-Administered Medications   Medication Dose Route Frequency Provider Last Rate Last Admin    acetaminophen (TYLENOL) tablet 650 mg  650 mg Oral Q4H PRN Bandar Siddiqui MD   650 mg at 10/06/24 1216    Or    acetaminophen (TYLENOL) Suppository 650 mg  650 mg Rectal Q4H PRN Bandar Siddiqui MD        albuterol (PROVENTIL HFA/VENTOLIN HFA) inhaler  2 puff Inhalation Q6H PRN Cynthia Vu MD        alum & mag hydroxide-simethicone (MAALOX) suspension 15 mL  15 mL Oral TID PRN Bandar Siddiqui MD   15 mL at 10/03/24 0109    benzocaine-menthol (CHLORASEPTIC) 6-10 MG lozenge 1 lozenge  1 lozenge Buccal Q1H PRN Bandar Siddiqui MD   1 lozenge at 10/05/24 0916    calcium carbonate (TUMS) chewable tablet 1,000 mg  1,000 mg Oral 4x Daily PRN Bandar Siddiqui MD   1,000 mg at 10/04/24 1911    Continuing beta blocker from home medication list OR beta blocker order already placed during this visit   Does not apply DOES NOT GO TO Bandar Beltran MD        glucose gel 15-30 g  15-30 g Oral Q15 Min PRBandar Geronimo MD        Or    dextrose 50 % injection 25-50 mL  25-50 mL Intravenous Q15 Min PRBandar Geronimo MD        Or    glucagon injection 1 mg  1 mg Subcutaneous Q15 Min Bandar Mendieta MD        Give   of usual dose of LONG ACTING insulin AM of procedure IF diabetic   Does not apply Continuous PRN Ilana Reinoso, APRN  CNP        guaiFENesin-dextromethorphan (ROBITUSSIN DM) 100-10 MG/5ML syrup 10 mL  10 mL Oral Q4H PRN Bandar Siddiqui MD   10 mL at 10/03/24 2009    HOLD digoxin (LANOXIN)  AM of procedure IF on digoxin   Does not apply HOLD Ilana Reinoso APRN CNP        HOLD: Insulin - RAPID/SHORT acting AM of procedure IF diabetic   Does not apply HOLD Ilana Reinoso APRN CNP        HOLD: Insulin - REGULAR AM of procedure IF diabetic   Does not apply HOLD Ilana Reinoso APRN CNP        HOLD: Oral hypoglycemics AM of procedure IF diabetic   Does not apply HOLD Ilana Reinoso APRN CNP        levalbuterol (XOPENEX) neb solution 1.25 mg  1.25 mg Nebulization Q2H PRN Bandar Siddiqui MD   1.25 mg at 10/04/24 2049    lidocaine (LMX4) cream   Topical Q1H PRN Bandar Siddiqui MD        lidocaine 1 % 0.1-1 mL  0.1-1 mL Other Q1H PRN Bandar Siddiqui MD        lidocaine 1 % 1 mL  1 mL Other Q1H PRN Ilana Reinoso APRN CNP        magnesium sulfate 2 g in 50 mL sterile water intermittent infusion  2 g Intravenous Once PRN Ip, Kobi Dunne MD        magnesium sulfate 2 g in 50 mL sterile water intermittent infusion  2 g Intravenous Once PRN Ilana Reinoso APRN CNP        May take oral meds with a sip of water, the morning of Cardioversion procedure.       Does not apply Continuous PRN Ilana Reinoso APRN CNP        melatonin tablet 3 mg  3 mg Oral At Bedtime PRN Bandar Siddiqui MD   3 mg at 10/02/24 2057    metoprolol (LOPRESSOR) injection 5 mg  5 mg Intravenous Q4H PRN Bandar Siddiqui MD        No anticoagulants IF patient has had acute trauma/surgery or recent intracranial, GI or urinary tract bleeding.    Other DOES NOT GO TO Bandar Beltran MD        polyethylene glycol (MIRALAX) Packet 17 g  17 g Oral Daily PRN Henry Blanco MD   17 g at 10/06/24 0941    potassium chloride liv ER (KLOR-CON M20) CR tablet 20 mEq  20 mEq Oral Once PRN Ip, Kobi Dunne MD      "   potassium chloride liv ER (KLOR-CON M20) CR tablet 40 mEq  40 mEq Oral Once PRN Ip, Kobi Dunne MD        potassium chloride liv ER (KLOR-CON M20) CR tablet 40 mEq  40 mEq Oral Once PRN Ilana Reinoso APRN CNP        Reason ACE/ARB/ARNI order not selected   Other DOES NOT GO TO Bandar Beltran MD        sennosides (SENOKOT) tablet 8.6 mg  8.6 mg Oral BID PRN Henry Blanco MD        sodium chloride (PF) 0.9% PF flush 3 mL  3 mL Intracatheter q1 min prn IpKobi MD        sodium chloride (PF) 0.9% PF flush 3 mL  3 mL Intravenous Q1H PRN Ilana Reinoso APRN CNP        sodium chloride (PF) 0.9% PF flush 3 mL  3 mL Intracatheter q1 min prn Bandar Siddiqui MD   3 mL at 10/02/24 0006            Data:      All new lab and imaging data was reviewed.   Recent Labs   Lab Test 10/07/24  0532 10/05/24  0546 10/04/24  0548 10/02/24  0611   WBC 7.8 7.8 8.3  --    HGB 9.6* 9.3* 9.6*  --    MCV 81 83 84  --     248 267  --    INR  --   --   --  1.21*      Recent Labs   Lab Test 10/07/24  0532 10/07/24  0139 10/06/24  2215 10/06/24  0850 10/06/24  0629 10/05/24  0802 10/05/24  0546     --   --   --  140  --  140   POTASSIUM 4.2  --   --   --  4.3  --  4.7   CHLORIDE 103  --   --   --  101  --  102   CO2 29  --   --   --  27  --  26   BUN 25.7*  --   --   --  26.9*  --  29.0*   CR 1.05*  --   --   --  1.05*  --  1.23*   ANIONGAP 10  --   --   --  12  --  12   AVNI 9.7  --   --   --  9.7  --  9.9   * 114* 116*   < > 106*   < > 117*    < > = values in this interval not displayed.     No lab results found.    Invalid input(s): \"TROP\", \"TROPONINIES\"      "

## 2024-10-07 NOTE — PROGRESS NOTES
[facility] Cardiology Progress Note  Date of Service: 10/07/2024  Staff Cardiologist: Dr. Howard     Assessment & Plan   Agatha Rodriguez is a 74 year old female with past medical history significant for hypertension, type II diabetes, DYLAN admitted on 9/30/2024 with progressive dyspnea and palpitations, found to be in new Atrial flutter with RVR and newly reduced LVEF of 30%.     Interval History:   Endorses ongoing dyspnea. Net negative 1.5L yesterday, wt unchanged (?). Cr stable at 1.05, K 4.2. Remains in Aflutter with HR 's.     Assessment:   Atrial flutter with RVR, new   On heparin gtt, will need DOAC prior to discharge  Rate control: Toprol  mg BID    Acute systolic heart failure with EF 30% (previously 50% 10/1/24), likely tachy-mediated  Etiology likely tachy-mediated, though non-diagnostic lexiscan stress test 7/2024  Hypervolemic on exam, though difficult to assess d/t body habitus  IV lasix 60 mg BID  GDMT:   ACE/ARB/ARNI: none  BB: Toprol  mg BID  SGLT2i: none  MRA: none  Daily standing weights, 2G sodium diet, strict I&O's   EDW unknown    VIVIANE thrombus on heparin gtt   NICOLA with VIVIANE thrombus 10/3  On heparin gtt    Hypertension   Type II diabetes  DYLAN on Bipap   ULYSSES, improving       Plan:  Continue IV lasix 60 mg BID.    Start losartan 25 mg daily.   Pharmacy liaison consult for SGLT2i/ARNI/DOACs.   Strict I&O's, daily standing weights, 2G Na diet.   Daily  BMP, replace lytes per protocol.   Will need ischemic evaluation as outpatient.   Consider RHC this admission to assess volume status.     Danyelle Hernandez, CNP  Pager:  [phone]  (7am - 5pm, M-F)      Physical Exam   Temp: 97.9  F (36.6  C) Temp src: Oral BP: 125/66 Pulse: 95   Resp: 17 SpO2: 99 % O2 Device: None (Room air) Oxygen Delivery: 2 LPM  Vitals:    10/05/24 0705 10/06/24 0248 10/07/24 0609   Weight: (!) 169 kg (372 lb 8 oz) (!) 170 kg (374 lb 12.8 oz) (!) 170.4 kg (375 lb 10.6 oz)        Constitutional:   NAD   Skin:   Warm and dry   Head:   Nontraumatic   Neck:   no JVD   Lungs:   normal   Cardiovascular:   regularly irregular rhythm   Abdomen:   Benign   Extremities and Back:   2+ CHARLIE   Neurological:   Grossly nonfocal       Medications   Current Facility-Administered Medications   Medication Dose Route Frequency Provider Last Rate Last Admin    Continuing beta blocker from home medication list OR beta blocker order already placed during this visit   Does not apply DOES NOT GO TO Bandar Beltran MD        Give   of usual dose of LONG ACTING insulin AM of procedure IF diabetic   Does not apply Continuous PRN Ilana Reinoso APRN CNP        heparin 25,000 units in 0.45% NaCl 250 mL ANTICOAGULANT infusion  0-5,000 Units/hr Intravenous Continuous Ilana Reinoso APRN CNP 18 mL/hr at 10/07/24 0522 1,800 Units/hr at 10/07/24 0522    May take oral meds with a sip of water, the morning of Cardioversion procedure.       Does not apply Continuous PRN Ilana Reinoso APRN CNP        No anticoagulants IF patient has had acute trauma/surgery or recent intracranial, GI or urinary tract bleeding.    Other DOES NOT GO TO Bandar Beltran MD        Reason ACE/ARB/ARNI order not selected   Other DOES NOT GO TO Bandar Beltran MD         Current Facility-Administered Medications   Medication Dose Route Frequency Provider Last Rate Last Admin    atorvastatin (LIPITOR) tablet 40 mg  40 mg Oral Daily Cynthia Vu MD   40 mg at 10/07/24 0939    [Auto Hold] diclofenac (VOLTAREN) 1 % topical gel 2 g  2 g Topical 4x Daily Betsy Mckinley MD   2 g at 10/06/24 0942    famotidine (PEPCID) tablet 20 mg  20 mg Oral Daily Cynthia Vu MD   20 mg at 10/07/24 0939    ferrous sulfate (FEROSUL) tablet 325 mg  325 mg Oral Q Mon Wed Fri AM Henry Blanco MD   325 mg at 10/07/24 0939    fluticasone (ARNUITY ELLIPTA) 200 MCG/ACT inhaler 1 puff  1 puff Inhalation Daily  Cynthia Vu MD   1 puff at 10/07/24 0942    furosemide (LASIX) injection 60 mg  60 mg Intravenous bid 08 & 14 Frederick Poon MD   60 mg at 10/07/24 0938    hydrALAZINE (APRESOLINE) tablet 50 mg  50 mg Oral Q8H Cynthia Vu MD   50 mg at 10/07/24 0939    insulin aspart (NovoLOG) injection (RAPID ACTING)  1-7 Units Subcutaneous TID AC Bandar Siddiqui MD   2 Units at 10/05/24 1701    insulin aspart (NovoLOG) injection (RAPID ACTING)  1-5 Units Subcutaneous At Bedtime Bandar Siddiqui MD        metoprolol succinate ER (TOPROL XL) 24 hr tablet 100 mg  100 mg Oral BID Ilana Reinoso APRN CNP   100 mg at 10/07/24 0939    potassium chloride liv ER (KLOR-CON M20) CR tablet 40 mEq  40 mEq Oral BID Ilya Goodman MD   40 mEq at 10/07/24 0939    sodium chloride (PF) 0.9% PF flush 3 mL  3 mL Intracatheter Q8H Bandar Siddiqui MD   3 mL at 10/07/24 0942       Data     Most Recent 3 CBC's:  Recent Labs   Lab Test 10/07/24  0532 10/05/24  0546 10/04/24  0548   WBC 7.8 7.8 8.3   HGB 9.6* 9.3* 9.6*   MCV 81 83 84    248 267     Most Recent 3 BMP's:  Recent Labs   Lab Test 10/07/24  0532 10/07/24  0139 10/06/24  2215 10/06/24  0850 10/06/24  0629 10/05/24  0802 10/05/24  0546     --   --   --  140  --  140   POTASSIUM 4.2  --   --   --  4.3  --  4.7   CHLORIDE 103  --   --   --  101  --  102   CO2 29  --   --   --  27  --  26   BUN 25.7*  --   --   --  26.9*  --  29.0*   CR 1.05*  --   --   --  1.05*  --  1.23*   ANIONGAP 10  --   --   --  12  --  12   AVNI 9.7  --   --   --  9.7  --  9.9   * 114* 116*   < > 106*   < > 117*    < > = values in this interval not displayed.     Most Recent 3 BNP's:  Recent Labs   Lab Test 09/30/24  2229 09/30/24  1824 09/17/24  0037 07/27/24  2211   NTBNPI 1,759*  --  719 355   NTBNP  --  1,523*  --   --      Most Recent Hemoglobin A1c:No lab results found.      Medical Decision Making       30 MINUTES SPENT BY ME on the date of service  doing chart review, history, exam, documentation & further activities per the note.          Clinically Significant Risk Factors                  # Hypertension: Noted on problem list  # Acute heart failure with reduced ejection fraction: last echo with EF <40% and receiving IV diuretics

## 2024-10-07 NOTE — Clinical Note
Heart Failure Care Map  GOALS TO BE MET BEFORE DISCHARGE:    1. Decrease congestion and/or edema with diuretic therapy to achieve near optimal volume status.     Dyspnea improved: {Dyspnea Goal:807899}   Edema improved: {Edema Goal:686453}        Last 24 hour I/O:   Intake/Output Summary (Last 24 hours) at 10/7/2024 1038  Last data filed at 10/7/2024 0158  Gross per 24 hour   Intake 240 ml   Output 2800 ml   Net -2560 ml           Net I/O and Weights since admission:   09/07 1500 - 10/07 1459  In: 5091.95 [P.O.:4640; I.V.:451.95]  Out: 48594 [Urine:44926]  Net: -5108.05     Vitals:    09/30/24 2200 10/01/24 0529 10/02/24 0700 10/03/24 0600   Weight: (!) 170.7 kg (376 lb 6.4 oz) (!) 168 kg (370 lb 6 oz) (!) 166.5 kg (367 lb) (!) 166.4 kg (366 lb 12.8 oz)    10/04/24 0500 10/05/24 0705 10/06/24 0248 10/07/24 0609   Weight: (!) 166.9 kg (368 lb) (!) 169 kg (372 lb 8 oz) (!) 170 kg (374 lb 12.8 oz) (!) 170.4 kg (375 lb 10.6 oz)       2.  O2 sats > 90% on room air, or at prior home O2 therapy level.      Able to wean O2 this shift to keep sats above 90%?: {O2 Goal:849981}   Does patient use Home O2? {NO/YES :451829}          Current oxygenation status:   SpO2: 100 %     O2 Device: None (Room air), Oxygen Delivery: 2 LPM    3.  Tolerates ambulation and mobility near baseline.     Ambulation: {hfcaremapambulationgoal:260866}   Times patient ambulated with staff this shift: {NUMBERS 1-12:762867}    Please review the Heart Failure Care Map for additional HF goal outcomes.    Bhargav Ly RN  10/7/2024

## 2024-10-08 ENCOUNTER — APPOINTMENT (OUTPATIENT)
Dept: OCCUPATIONAL THERAPY | Facility: CLINIC | Age: 74
DRG: 291 | End: 2024-10-08
Payer: MEDICARE

## 2024-10-08 VITALS
RESPIRATION RATE: 18 BRPM | BODY MASS INDEX: 60.37 KG/M2 | DIASTOLIC BLOOD PRESSURE: 72 MMHG | SYSTOLIC BLOOD PRESSURE: 116 MMHG | HEART RATE: 90 BPM | WEIGHT: 293 LBS | TEMPERATURE: 98.2 F | OXYGEN SATURATION: 99 %

## 2024-10-08 LAB
ANION GAP SERPL CALCULATED.3IONS-SCNC: 9 MMOL/L (ref 7–15)
BUN SERPL-MCNC: 23.1 MG/DL (ref 8–23)
CALCIUM SERPL-MCNC: 9.7 MG/DL (ref 8.8–10.4)
CHLORIDE SERPL-SCNC: 104 MMOL/L (ref 98–107)
CREAT SERPL-MCNC: 1.1 MG/DL (ref 0.51–0.95)
EGFRCR SERPLBLD CKD-EPI 2021: 53 ML/MIN/1.73M2
GLUCOSE BLDC GLUCOMTR-MCNC: 100 MG/DL (ref 70–99)
GLUCOSE BLDC GLUCOMTR-MCNC: 104 MG/DL (ref 70–99)
GLUCOSE BLDC GLUCOMTR-MCNC: 111 MG/DL (ref 70–99)
GLUCOSE BLDC GLUCOMTR-MCNC: 128 MG/DL (ref 70–99)
GLUCOSE BLDC GLUCOMTR-MCNC: 98 MG/DL (ref 70–99)
GLUCOSE SERPL-MCNC: 109 MG/DL (ref 70–99)
HCO3 SERPL-SCNC: 29 MMOL/L (ref 22–29)
MAGNESIUM SERPL-MCNC: 2 MG/DL (ref 1.7–2.3)
POTASSIUM SERPL-SCNC: 4.3 MMOL/L (ref 3.4–5.3)
SODIUM SERPL-SCNC: 142 MMOL/L (ref 135–145)
UFH PPP CHRO-ACNC: 0.49 IU/ML

## 2024-10-08 PROCEDURE — 250N000011 HC RX IP 250 OP 636: Performed by: INTERNAL MEDICINE

## 2024-10-08 PROCEDURE — 250N000013 HC RX MED GY IP 250 OP 250 PS 637

## 2024-10-08 PROCEDURE — 80048 BASIC METABOLIC PNL TOTAL CA: CPT | Performed by: HOSPITALIST

## 2024-10-08 PROCEDURE — 250N000013 HC RX MED GY IP 250 OP 250 PS 637: Performed by: NURSE PRACTITIONER

## 2024-10-08 PROCEDURE — 36415 COLL VENOUS BLD VENIPUNCTURE: CPT | Performed by: HOSPITALIST

## 2024-10-08 PROCEDURE — 83735 ASSAY OF MAGNESIUM: CPT | Performed by: HOSPITALIST

## 2024-10-08 PROCEDURE — 250N000011 HC RX IP 250 OP 636

## 2024-10-08 PROCEDURE — 250N000013 HC RX MED GY IP 250 OP 250 PS 637: Performed by: STUDENT IN AN ORGANIZED HEALTH CARE EDUCATION/TRAINING PROGRAM

## 2024-10-08 PROCEDURE — 97110 THERAPEUTIC EXERCISES: CPT | Mod: GO

## 2024-10-08 PROCEDURE — 85520 HEPARIN ASSAY: CPT | Performed by: HOSPITALIST

## 2024-10-08 PROCEDURE — 250N000013 HC RX MED GY IP 250 OP 250 PS 637: Performed by: HOSPITALIST

## 2024-10-08 PROCEDURE — 99231 SBSQ HOSP IP/OBS SF/LOW 25: CPT | Performed by: NURSE PRACTITIONER

## 2024-10-08 PROCEDURE — 210N000002 HC R&B HEART CARE

## 2024-10-08 PROCEDURE — 99232 SBSQ HOSP IP/OBS MODERATE 35: CPT | Performed by: HOSPITALIST

## 2024-10-08 PROCEDURE — 99223 1ST HOSP IP/OBS HIGH 75: CPT | Performed by: INTERNAL MEDICINE

## 2024-10-08 RX ADMIN — FAMOTIDINE 20 MG: 20 TABLET, FILM COATED ORAL at 08:23

## 2024-10-08 RX ADMIN — DICLOFENAC 2 G: 10 GEL TOPICAL at 12:38

## 2024-10-08 RX ADMIN — ACETAMINOPHEN 650 MG: 325 TABLET ORAL at 12:39

## 2024-10-08 RX ADMIN — POTASSIUM CHLORIDE 40 MEQ: 1500 TABLET, EXTENDED RELEASE ORAL at 08:24

## 2024-10-08 RX ADMIN — LOSARTAN POTASSIUM 25 MG: 25 TABLET, FILM COATED ORAL at 08:23

## 2024-10-08 RX ADMIN — FUROSEMIDE 60 MG: 10 INJECTION, SOLUTION INTRAMUSCULAR; INTRAVENOUS at 16:10

## 2024-10-08 RX ADMIN — POTASSIUM CHLORIDE 40 MEQ: 1500 TABLET, EXTENDED RELEASE ORAL at 20:59

## 2024-10-08 RX ADMIN — FUROSEMIDE 60 MG: 10 INJECTION, SOLUTION INTRAMUSCULAR; INTRAVENOUS at 08:24

## 2024-10-08 RX ADMIN — ATORVASTATIN CALCIUM 40 MG: 40 TABLET, FILM COATED ORAL at 08:23

## 2024-10-08 RX ADMIN — HYDRALAZINE HYDROCHLORIDE 50 MG: 50 TABLET ORAL at 16:10

## 2024-10-08 RX ADMIN — FLUTICASONE FUROATE 1 PUFF: 200 POWDER RESPIRATORY (INHALATION) at 08:31

## 2024-10-08 RX ADMIN — HYDRALAZINE HYDROCHLORIDE 50 MG: 50 TABLET ORAL at 00:07

## 2024-10-08 RX ADMIN — HYDRALAZINE HYDROCHLORIDE 50 MG: 50 TABLET ORAL at 08:23

## 2024-10-08 RX ADMIN — METOPROLOL SUCCINATE 100 MG: 100 TABLET, EXTENDED RELEASE ORAL at 20:59

## 2024-10-08 RX ADMIN — METOPROLOL SUCCINATE 100 MG: 100 TABLET, EXTENDED RELEASE ORAL at 08:23

## 2024-10-08 RX ADMIN — HYDRALAZINE HYDROCHLORIDE 50 MG: 50 TABLET ORAL at 23:40

## 2024-10-08 RX ADMIN — HEPARIN SODIUM 1800 UNITS/HR: 10000 INJECTION, SOLUTION INTRAVENOUS at 12:35

## 2024-10-08 RX ADMIN — POLYETHYLENE GLYCOL 3350 17 G: 17 POWDER, FOR SOLUTION ORAL at 12:39

## 2024-10-08 ASSESSMENT — ACTIVITIES OF DAILY LIVING (ADL)
ADLS_ACUITY_SCORE: 38
ADLS_ACUITY_SCORE: 43
ADLS_ACUITY_SCORE: 38
ADLS_ACUITY_SCORE: 43
ADLS_ACUITY_SCORE: 38
ADLS_ACUITY_SCORE: 38
ADLS_ACUITY_SCORE: 43
ADLS_ACUITY_SCORE: 38
ADLS_ACUITY_SCORE: 38
ADLS_ACUITY_SCORE: 43
ADLS_ACUITY_SCORE: 38
ADLS_ACUITY_SCORE: 43

## 2024-10-08 NOTE — PROGRESS NOTES
Pt is alert and oriented, pleasant, able to call appropriately. VSS on RA, although on home CPAP whenever she is lying in bed. Tele a-fib cvr w/ occ PVCs. Ambulates SBA, needs help with lines. Encourage ambulation to restroom and not sole use of purewik. Headache, resolved with tylenol. Pulmonology consult today, continue to diurese. Worked with PT. Up to chair most of the morning. Dispo pending oral diuretic intake.     Hand off given to oncoming RN.     Edwige Malave RN .............................................  October 8, 2024 7:49 PM

## 2024-10-08 NOTE — PROGRESS NOTES
Care Management Follow Up    Length of Stay (days): 8    Expected Discharge Date: 10/09/2024     Concerns to be Addressed: discharge planning     Patient plan of care discussed at interdisciplinary rounds: Yes    Anticipated Discharge Disposition: Home, Outpatient Rehab (PT, OT, SLP, Cardiac or Pulmonary)     Anticipated Discharge Services: resume PCA  Anticipated Discharge DME:      Patient/family educated on Medicare website which has current facility and service quality ratings:    Education Provided on the Discharge Plan: Yes  Patient/Family in Agreement with the Plan: yes    Referrals Placed by CM/SW:    Private pay costs discussed: Not applicable    Discussed  Partnership in Safe Discharge Planning  document with patient/family: No     Handoff Completed: No, handoff not indicated or clinically appropriate    Additional Information:  CM following for discharge planning.  Therapy recommending home care PT and OT but pt preference is for outpatient therapy.    Next Steps: no current needs identified.  Assist with finding home care agency if pt changes mind and wants home care.    Yoli Garzon RN, BS  Care Coordinator  [email or web address]

## 2024-10-08 NOTE — PLAN OF CARE
Goal Outcome Evaluation:                 Outcome Evaluation: Orientations: AOx4  Vitals/Pain: VSS 3L - CPAP overnight  Tele: A-fib CVR  Lines/Drains: PIV infusing hep gtt @ 1800 - Recheck scheduled for tomorrow 10/9 at 6am  Skin/Wounds: Intact  GI/: Continent - No BM this shift. Purewik in place overnight  Ambulation/Assist: Ax1  Diet: Regular  Other: Daughter expressing concern regarding pt experiencing SOB with ambulation and BLE edema. No complaints of chest pain or SOB overnight.

## 2024-10-08 NOTE — CONSULTS
PULMONARY INPATIENT CONSULTATION  [facility]  [address]  [facility] [address]  Phone: [phone]    Patient:  Agatha Rodriguez, Age 74 year old, Date of birth 1950, MRN# [id number]  Date of Visit:  10/08/2024  Referring Provider No ref. provider found     Assessment and Plan:    1. Dyspnea in setting of heart failure, afib, LA appendage thrombus, and ULYSSES. Agree with ongoing diuresis as able. If bibarb is >35, would add diamox. She likely does have a component of OHS and previous CT suggestive of tracheobronchomalacia   - Continue diuresis   - Obtain ABG   - CT chest with EDAC protocol when she is able to be weaned off of BIPAP and more stable  - Cont BIPAP for now     Billing: I spent a total of 75min for an initial consult including review of chart, images, labs and notes (20min), history taking and examination (30min) and post-consultation documentation, decision making and discussion with primary team and nursing (25min).     Deshaun Mon MD, MHA  Associate Professor of Medicine  Section of Interventional Pulmonology   Division of Pulmonary, Allergy, Critical Care and Sleep Medicine   [facility]  Pager: [phone]   Office: [phone]  Email: [email or web address]         HPI/Interval history     Agatha Rodriguez is a markedly pleasant 74 year old woman with past medical history that is most notable for severe morbid obesity, DYLAN, asthma, hypertension, and chronic diastolic CHF, among others; who presents with progressive dyspnea and anasarca, with acute palpitations; and is found to have suspected acute diastolic CHF exacerbation and new onset atrial flutter, as well as ULYSSES.     - being treated for heart failure exacerbation with NIPPV and diuresis. Cardiology has been following  - Pulmonary consulted to help assist in management   - Admission cxr c/w pulmonary edema, vascular  congestion. Net neg 7L since admission   - TTE with EF 35% and LVH  - PFT in 2012 c/w mild restrictive defect likely from body habitus. DLCO was mildly reduced   - CT Chest 7/2024 with air trapping and small hiatal hernia. It was neg for PE. There is a slight hint of EDAC however.            Past Medical History:      Past Medical History:   Diagnosis Date    (HFpEF) heart failure with preserved ejection fraction (H) 07/2024    Asthma     CAD (coronary artery disease)     Diverticulitis of intestine without perforation or abscess 02/17/2017    GERD (gastroesophageal reflux disease)     Gout of right foot 2015    Hypertension     Morbid obesity with BMI of 50.0-59.9, adult (H) 07/28/2024    Myocardial infarction, silent (H) 2005    Dx w silent Mi in [place] ~ 2005    Obesity     Primary osteoarthritis of both knees     Sleep apnea     uses CPAP    Type 2 diabetes mellitus (H)            Past Surgical History:      Past Surgical History:   Procedure Laterality Date    ANESTHESIA CARDIOVERSION N/A 10/3/2024    Procedure: Anesthesia cardioversion;  Surgeon: GENERIC ANESTHESIA PROVIDER;  Location:  OR    HYSTERECTOMY      TRANSESOPHAGEAL ECHOCARDIOGRAM INTRAOPERATIVE N/A 10/3/2024    Procedure: Transesophageal echocardiogram intraoperative;  Surgeon: GENERIC ANESTHESIA PROVIDER;  Location:  OR          Social History:     Social History     Tobacco Use    Smoking status: Never    Smokeless tobacco: Never   Substance Use Topics    Alcohol use: No          Family History:     Family History   Problem Relation Age of Onset    Diabetes Mother     Breast Cancer Mother            Allergies:    No Known Allergies       Medications:     Current Facility-Administered Medications   Medication Dose Route Frequency Provider Last Rate Last Admin    acetaminophen (TYLENOL) tablet 650 mg  650 mg Oral Q4H PRN Bandar Siddiqui MD   650 mg at 10/08/24 1239    Or    acetaminophen (TYLENOL) Suppository 650 mg  650 mg Rectal Q4H  PRN Bandar Siddiqui MD        albuterol (PROVENTIL HFA/VENTOLIN HFA) inhaler  2 puff Inhalation Q6H PRN Cynthia Vu MD        alum & mag hydroxide-simethicone (MAALOX) suspension 15 mL  15 mL Oral TID PRN Bandar Siddiqui MD   15 mL at 10/03/24 0109    atorvastatin (LIPITOR) tablet 40 mg  40 mg Oral Daily Cynthia Vu MD   40 mg at 10/08/24 0823    benzocaine-menthol (CHLORASEPTIC) 6-10 MG lozenge 1 lozenge  1 lozenge Buccal Q1H PRN Bandar Siddiqui MD   1 lozenge at 10/05/24 0916    calcium carbonate (TUMS) chewable tablet 1,000 mg  1,000 mg Oral 4x Daily PRBandar Geronimo MD   1,000 mg at 10/04/24 1911    Continuing beta blocker from home medication list OR beta blocker order already placed during this visit   Does not apply DOES NOT GO TO Bandar Beltran MD        glucose gel 15-30 g  15-30 g Oral Q15 Min PRBandar Geronimo MD        Or    dextrose 50 % injection 25-50 mL  25-50 mL Intravenous Q15 Min PRBandar Geronimo MD        Or    glucagon injection 1 mg  1 mg Subcutaneous Q15 Min PRBandar Geronimo MD        diclofenac (VOLTAREN) 1 % topical gel 2 g  2 g Topical 4x Daily Ilya Goodman MD        [START ON 10/9/2024] empagliflozin (JARDIANCE) tablet 10 mg  10 mg Oral Daily Danyelle Hernandez CNP        famotidine (PEPCID) tablet 20 mg  20 mg Oral Daily Cynthia Vu MD   20 mg at 10/08/24 0823    ferrous sulfate (FEROSUL) tablet 325 mg  325 mg Oral Q Mon Wed Fri AM Henry Blanco MD   325 mg at 10/07/24 0939    fluticasone (ARNUITY ELLIPTA) 200 MCG/ACT inhaler 1 puff  1 puff Inhalation Daily Cynthia Vu MD   1 puff at 10/08/24 0831    furosemide (LASIX) injection 60 mg  60 mg Intravenous bid 08 & 14 Frederick Poon MD   60 mg at 10/08/24 0824    Give   of usual dose of LONG ACTING insulin AM of procedure IF diabetic   Does not apply Continuous PRN Ilana Reinoso APRN CNP         guaiFENesin-dextromethorphan (ROBITUSSIN DM) 100-10 MG/5ML syrup 10 mL  10 mL Oral Q4H PRN Bandar Siddiqui MD   10 mL at 10/03/24 2009    heparin 25,000 units in 0.45% NaCl 250 mL ANTICOAGULANT infusion  0-5,000 Units/hr Intravenous Continuous Ilana Reinoso APRN CNP 18 mL/hr at 10/08/24 1235 1,800 Units/hr at 10/08/24 1235    HOLD digoxin (LANOXIN)  AM of procedure IF on digoxin   Does not apply HOLD Ilana Reinoso APRN CNP        HOLD: Insulin - RAPID/SHORT acting AM of procedure IF diabetic   Does not apply HOLD Ilana Reinoso APRN CNP        HOLD: Insulin - REGULAR AM of procedure IF diabetic   Does not apply HOLD Ilana Reinoso APRN CNP        HOLD: Oral hypoglycemics AM of procedure IF diabetic   Does not apply HOLD Ilana Reinoso APRN CNP        hydrALAZINE (APRESOLINE) tablet 50 mg  50 mg Oral Q8H Cynthia Vu MD   50 mg at 10/08/24 0823    insulin aspart (NovoLOG) injection (RAPID ACTING)  1-7 Units Subcutaneous TID AC Bandar Siddiqui MD   2 Units at 10/05/24 1701    insulin aspart (NovoLOG) injection (RAPID ACTING)  1-5 Units Subcutaneous At Bedtime Bandar Siddiqui MD        levalbuterol (XOPENEX) neb solution 1.25 mg  1.25 mg Nebulization Q2H PRN Bandar Siddiqui MD   1.25 mg at 10/04/24 2049    lidocaine (LMX4) cream   Topical Q1H PRN Bandar Siddiqui MD        lidocaine 1 % 0.1-1 mL  0.1-1 mL Other Q1H PRN Bandar Siddiqui MD        lidocaine 1 % 1 mL  1 mL Other Q1H PRN Ilana Reinoso APRN CNP        magnesium sulfate 2 g in 50 mL sterile water intermittent infusion  2 g Intravenous Once PRN Kobi Hendrix MD        magnesium sulfate 2 g in 50 mL sterile water intermittent infusion  2 g Intravenous Once PRN Ilana Reinoso APRN CNP        May take oral meds with a sip of water, the morning of Cardioversion procedure.       Does not apply Continuous PRN Ilana Reinoso APRN CNP        melatonin tablet 3 mg  3 mg Oral At Bedtime PRN  Bandar Siddiqui MD   3 mg at 10/02/24 2057    metoprolol (LOPRESSOR) injection 5 mg  5 mg Intravenous Q4H PRN Bandar Siddiqui MD        metoprolol succinate ER (TOPROL XL) 24 hr tablet 100 mg  100 mg Oral BID Ilana Reinoso APRN CNP   100 mg at 10/08/24 0823    No anticoagulants IF patient has had acute trauma/surgery or recent intracranial, GI or urinary tract bleeding.    Other DOES NOT GO TO Bandar Beltran MD        polyethylene glycol (MIRALAX) Packet 17 g  17 g Oral Daily PRN Henry Blanco MD   17 g at 10/08/24 1239    potassium chloride liv ER (KLOR-CON M20) CR tablet 20 mEq  20 mEq Oral Once PRN Ip, Kobi Dunne MD        potassium chloride liv ER (KLOR-CON M20) CR tablet 40 mEq  40 mEq Oral BID Ilya Goodman MD   40 mEq at 10/08/24 0824    potassium chloride liv ER (KLOR-CON M20) CR tablet 40 mEq  40 mEq Oral Once PRN Ip, Kobi Dunne MD        potassium chloride liv ER (KLOR-CON M20) CR tablet 40 mEq  40 mEq Oral Once PRN Ilana Reinoso APRN CNP        Reason ACE/ARB/ARNI order not selected   Other DOES NOT GO TO Bandar Beltran MD        [START ON 10/9/2024] sacubitril-valsartan (ENTRESTO) 24-26 MG per tablet 1 tablet  1 tablet Oral BID Danyelle Hernandez CNP        sennosides (SENOKOT) tablet 8.6 mg  8.6 mg Oral BID PRN Henry Blanco MD        sodium chloride (PF) 0.9% PF flush 3 mL  3 mL Intravenous Q1H PRN Ilana Reinoso APRN CNP        sodium chloride (PF) 0.9% PF flush 3 mL  3 mL Intracatheter Q8H Bandar Siddiqui MD   3 mL at 10/08/24 0830    sodium chloride (PF) 0.9% PF flush 3 mL  3 mL Intracatheter q1 min prn Bandar Siddiqui MD   3 mL at 10/02/24 0006          Review of Systems:     CONSTITUTIONAL: negative for fever, chills, change in weight  INTEGUMENTARY/SKIN: no rash or obvious new lesions  ENT/MOUTH: no sore throat, new sinus pain or nasal drainage  RESP: see interval history  CV: negative for chest pain,  palpitations or peripheral edema  GI: no nausea, vomiting, change in stools  : no dysuria  MUSCULOSKELETAL: no myalgias, arthralgias  ENDOCRINE: blood sugars with adequate control  PSYCHIATRIC: mood stable  LYMPHATIC: no new lymphadenopathy  HEME: no bleeding or easy bruisability  NEURO: no numbness, weakness, headaches         Physical Exam:     Temp:  [97.6  F (36.4  C)-98.4  F (36.9  C)] 98  F (36.7  C)  Pulse:  [] 95  Resp:  [18] 18  BP: ()/(64-89) 105/68  SpO2:  [98 %-100 %] 98 %  Wt Readings from Last 4 Encounters:   10/08/24 (!) 162.9 kg (359 lb 3.2 oz)   09/30/24 (!) 161 kg (355 lb)   07/30/24 (!) 161.2 kg (355 lb 6.4 oz)   05/14/19 (!) 161.5 kg (356 lb 1.6 oz)     Constitutional:   Awake, alert and in no apparent distress     Eyes:   Nonicteric, SAYRA     ENT:    Trachea is midline. No gross neck abnormalities      Neck:   Supple without supraclavicular or cervical lymphadenopathy     Lungs:   Good air flow.  No crackles. No rhonchi.  No wheezes.     Cardiovascular:   Normal S1 and S2.  RRR.  No murmur, gallop or rub.  Radial, DP and PT pulses normal and symmetric     Abdomen:   NABS, soft, nontender, nondistended.  No HSM.     Musculoskeletal:   No edema.      Neurologic:   Alert and conversant. Cranial nerves  intact.       Skin:   Warm, dry.  No rash on limited exam.           Current Laboratory Data:   All laboratory and imaging data reviewed.    Results for orders placed or performed during the hospital encounter of 09/30/24 (from the past 24 hour(s))   Glucose by meter   Result Value Ref Range    GLUCOSE BY METER POCT 132 (H) 70 - 99 mg/dL   Glucose by meter   Result Value Ref Range    GLUCOSE BY METER POCT 136 (H) 70 - 99 mg/dL   Glucose by meter   Result Value Ref Range    GLUCOSE BY METER POCT 104 (H) 70 - 99 mg/dL   Heparin Unfractionated Anti Xa Level   Result Value Ref Range    Anti Xa Unfractionated Heparin 0.49 For Reference Range, See Comment IU/mL    Narrative    Therapeutic  Range: UFH: 0.25-0.50 IU/mL for low intensity dosing,  0.30-0.70 IU/mL for high intensity dosing DVT and PE.  This test is not validated for other direct factor X inhibitors (e.g. rivaroxaban, apixaban, edoxaban, betrixaban, fondaparinux) and should not be used for monitoring of other medications.   Magnesium   Result Value Ref Range    Magnesium 2.0 1.7 - 2.3 mg/dL   Basic metabolic panel   Result Value Ref Range    Sodium 142 135 - 145 mmol/L    Potassium 4.3 3.4 - 5.3 mmol/L    Chloride 104 98 - 107 mmol/L    Carbon Dioxide (CO2) 29 22 - 29 mmol/L    Anion Gap 9 7 - 15 mmol/L    Urea Nitrogen 23.1 (H) 8.0 - 23.0 mg/dL    Creatinine 1.10 (H) 0.51 - 0.95 mg/dL    GFR Estimate 53 (L) >60 mL/min/1.73m2    Calcium 9.7 8.8 - 10.4 mg/dL    Glucose 109 (H) 70 - 99 mg/dL   Glucose by meter   Result Value Ref Range    GLUCOSE BY METER POCT 100 (H) 70 - 99 mg/dL   Glucose by meter   Result Value Ref Range    GLUCOSE BY METER POCT 111 (H) 70 - 99 mg/dL

## 2024-10-08 NOTE — PROGRESS NOTES
[facility] Cardiology Progress Note  Date of Service: 10/08/2024  Staff Cardiologist: Dr. Howard     Assessment & Plan   Agatha Rodriguez is a 74 year old female with past medical history significant for hypertension, type II diabetes, DYLAN admitted on 9/30/2024 with progressive dyspnea and palpitations, found to be in new Atrial flutter with RVR and newly reduced LVEF of 30%.     Interval History:   Endorses ongoing dyspnea. Net negative 2.6L yesterday, down 16#. Cr stable. Remains in Aflutter with HR 's.     Assessment:   Atrial flutter with RVR, new   On heparin gtt, will need DOAC prior to discharge  Rate control: Toprol  mg BID    Acute systolic heart failure with EF 30% (previously 50% 10/1/24), likely tachy-mediated  Etiology likely tachy-mediated, though non-diagnostic lexiscan stress test 7/2024  Hypervolemic on exam, though difficult to assess d/t body habitus  IV lasix 60 mg BID  GDMT:   ACE/ARB/ARNI: none  BB: Toprol  mg BID  SGLT2i: none  MRA: none  Daily standing weights, 2G sodium diet, strict I&O's   EDW unknown    VIVIANE thrombus on heparin gtt   NICOLA with VIVIANE thrombus 10/3  On heparin gtt    Hypertension   Type II diabetes  DYLAN on Bipap   ULYSSES, improving       Plan:  Continue IV lasix 60 mg BID.    Stop losartan, start Entresto 24-26 mg BID.   Start Jardiance 10 mg daily.   Consider digoxin for rate control pending renal function.   Strict I&O's, daily standing weights, 2G Na diet.   Daily  BMP, replace lytes per protocol.   Will need ischemic evaluation as outpatient.   Consider RHC this admission to assess volume status.     Danyelle Hernandez, CNP  Pager:  [phone]  (7am - 5pm, M-F)      Physical Exam   Temp: 98.4  F (36.9  C) Temp src: Oral BP: (!) 148/84 Pulse: 81   Resp: 18 SpO2: 99 % O2 Device: BiPAP/CPAP Oxygen Delivery: 3 LPM  Vitals:    10/06/24 0248 10/07/24 0609 10/08/24 0920   Weight: (!) 170 kg (374 lb 12.8 oz) (!) 170.4 kg (375 lb  10.6 oz) (!) 162.9 kg (359 lb 3.2 oz)       Constitutional:   NAD   Skin:   Warm and dry   Head:   Nontraumatic   Neck:   no JVD   Lungs:   normal   Cardiovascular:   regularly irregular rhythm   Abdomen:   Benign   Extremities and Back:   2+ CHARLIE   Neurological:   Grossly nonfocal       Medications   Current Facility-Administered Medications   Medication Dose Route Frequency Provider Last Rate Last Admin    Continuing beta blocker from home medication list OR beta blocker order already placed during this visit   Does not apply DOES NOT GO TO Bandar Beltran MD        Give   of usual dose of LONG ACTING insulin AM of procedure IF diabetic   Does not apply Continuous PRN Ilana Reinoso APRN CNP        heparin 25,000 units in 0.45% NaCl 250 mL ANTICOAGULANT infusion  0-5,000 Units/hr Intravenous Continuous Ilana Reinoso APRN CNP 18 mL/hr at 10/08/24 0832 1,800 Units/hr at 10/08/24 0832    May take oral meds with a sip of water, the morning of Cardioversion procedure.       Does not apply Continuous PRN Ilana Reinoso APRN CNP        No anticoagulants IF patient has had acute trauma/surgery or recent intracranial, GI or urinary tract bleeding.    Other DOES NOT GO TO Bnadar Beltran MD        Reason ACE/ARB/ARNI order not selected   Other DOES NOT GO TO Bandar Beltran MD         Current Facility-Administered Medications   Medication Dose Route Frequency Provider Last Rate Last Admin    atorvastatin (LIPITOR) tablet 40 mg  40 mg Oral Daily Cynthia Vu MD   40 mg at 10/08/24 0823    [Auto Hold] diclofenac (VOLTAREN) 1 % topical gel 2 g  2 g Topical 4x Daily Betsy Mckinley MD   2 g at 10/07/24 2030    famotidine (PEPCID) tablet 20 mg  20 mg Oral Daily Cynthia Vu MD   20 mg at 10/08/24 0823    ferrous sulfate (FEROSUL) tablet 325 mg  325 mg Oral Q Mon Wed Fri AM Henry Blanco MD   325 mg at 10/07/24 0939    fluticasone (ARNUITY ELLIPTA) 200 MCG/ACT inhaler  1 puff  1 puff Inhalation Daily Cynthia Vu MD   1 puff at 10/08/24 0831    furosemide (LASIX) injection 60 mg  60 mg Intravenous bid 08 & 14 Frederick Poon MD   60 mg at 10/08/24 0824    hydrALAZINE (APRESOLINE) tablet 50 mg  50 mg Oral Q8H Cynthia Vu MD   50 mg at 10/08/24 0823    insulin aspart (NovoLOG) injection (RAPID ACTING)  1-7 Units Subcutaneous TID AC Bandar Siddiqui MD   2 Units at 10/05/24 1701    insulin aspart (NovoLOG) injection (RAPID ACTING)  1-5 Units Subcutaneous At Bedtime Bandar Siddiqui MD        losartan (COZAAR) tablet 25 mg  25 mg Oral Daily Danyelle Hernandez CNP   25 mg at 10/08/24 0823    metoprolol succinate ER (TOPROL XL) 24 hr tablet 100 mg  100 mg Oral BID Ilana Reinoso APRN CNP   100 mg at 10/08/24 0823    potassium chloride liv ER (KLOR-CON M20) CR tablet 40 mEq  40 mEq Oral BID Ilya Goodman MD   40 mEq at 10/08/24 0824    sodium chloride (PF) 0.9% PF flush 3 mL  3 mL Intracatheter Q8H Bandar Siddiqui MD   3 mL at 10/08/24 0830       Data     Most Recent 3 CBC's:  Recent Labs   Lab Test 10/07/24  0532 10/05/24  0546 10/04/24  0548   WBC 7.8 7.8 8.3   HGB 9.6* 9.3* 9.6*   MCV 81 83 84    248 267     Most Recent 3 BMP's:  Recent Labs   Lab Test 10/08/24  0728 10/08/24  0559 10/08/24  0151 10/07/24  1338 10/07/24  0532 10/06/24  0850 10/06/24  0629   NA  --  142  --   --  142  --  140   POTASSIUM  --  4.3  --   --  4.2  --  4.3   CHLORIDE  --  104  --   --  103  --  101   CO2  --  29  --   --  29  --  27   BUN  --  23.1*  --   --  25.7*  --  26.9*   CR  --  1.10*  --   --  1.05*  --  1.05*   ANIONGAP  --  9  --   --  10  --  12   AVNI  --  9.7  --   --  9.7  --  9.7   * 109* 104*   < > 109*   < > 106*    < > = values in this interval not displayed.     Most Recent 3 BNP's:  Recent Labs   Lab Test 09/30/24 2229 09/30/24  1824 09/17/24  0037 07/27/24  2211   NTBNPI 1,759*  --  719 355   NTBNP  --  1,523*  --   --       Most Recent Hemoglobin A1c:No lab results found.      Medical Decision Making       30 MINUTES SPENT BY ME on the date of service doing chart review, history, exam, documentation & further activities per the note.          Clinically Significant Risk Factors                  # Hypertension: Noted on problem list  # Acute heart failure with reduced ejection fraction: last echo with EF <40% and receiving IV diuretics

## 2024-10-08 NOTE — PLAN OF CARE
Goal Outcome Evaluation:  DATE & TIME: 10/07/2024, [id number]   Cognitive Concerns/ Orientation : A & O times four  BEHAVIOR & AGGRESSION TOOL COLOR: calm   ABNL VS/O2: VSS, room air  MOBILITY: assist of one  PAIN MANAGMENT: denied  DIET: cardiac  BOWEL/BLADDER: voids per BRP  ABNL LAB/BG: WLY=992/136, on e-lyte protocol, repeat lab AM  DRAIN/DEVICES: PIV infusing  TELEMETRY RHYTHM: A-fib CVR  SKIN: moist under skin fold, otherwise WDL  TESTS/PROCEDURES: lab  D/C DATE: pending  OTHER IMPORTANT INFO: here d/t dyspnea and palpitation.   on heparin gtt, next heparin 10 A in AM. IV lasix. Diuresing well  Continue to monitor.

## 2024-10-08 NOTE — PROGRESS NOTES
[facility]  Hospitalist Progress Note        Ilya Goodman MD   10/08/2024        Interval History:        - diuresing well with IV Lasix and weight down to 359 lbs but continues with dyspnea on exertion and insistent on a pulmonology consultation-- will order per request  -cardiology switching Losartan to Entresto (starting 10/9), also planning to start Jardiance 10 mg daily          Assessment and Plan:        Agatha Rodriguez is a 74 year old woman with PMH of severe morbid obesity, DYLAN, asthma, hypertension, and chronic diastolic CHF, among others; who presented with progressive dyspnea, anasarca, acute palpitations and found to have HF exacerbation and new onset atrial flutter. NICOLA with evidence of VIVIANE clot and unable to undergo DCCV.; admitted inpatient 9/30/24     # Acute HFpEF exacerbation (EF 50% from 60-65% 7/2024)  # Atrial flutter, new diagnosis   # L atrial appendage thrombus on NICOLA (10/3/24)  - presented with worsening SOB, hypervolemia without significant hypoxia and minimal response to increasing home diuretics.  - Admission weight 376lb, dry weight ~355lb.  - BNP elevated with trace b/l effusions noted on CXR.  - Atrial flutter also noted on EKG which represents a new diagnosis.  - Symptoms consistent with acute HFpEF exacberation, possibly in setting of tachyarrhythmia vs worsening hypertension vs other.  - TSH normal, consistently wears home BiPAP at night.  - Underwent NICOLA 10/3 which showed VIVIANE clot and unable to perform DCCV.  - weaned off of supplemental O2  - [id number] of 5.    - Cardiology following, held Lasix (10/4) in the setting of acute renal failure; plan for outpatient NICOLA with possible cardioversion in 4 weeks  - remains symptomatic with clinically fluid overloaded  - renal function improving Cr 1.24--->1.05---1.10; resumed Lasix 60 mg IV BID (10/624) with PO potassium supplementation  - monitor strict intake and output  - still in a fib but rate mostly  controlled  - continue heparin drip; will need DOAC; differ anticoagulation plan to cardiology  - continue Metoprolol succinate 100 mg twice daily, Lipitor 40 mg daily hydralazine 50 mg Q8 hours  - Holding hydrochlorothiazide/triamterene in the setting of ULYSSES  - diuresing well with IV Lasix and weight down to 359 lbs but continues with dyspnea on exertion and insistent on a pulmonology consultation-- will order per request  - cardiology switching Losartan to Entresto (starting 10/9), also planning to start Jardiance 10 mg daily   -continue telemetry     # ULYSSES, possible cardiorenal vs pre-renal  # Hypokalemia, hypomagnesemia, resolved  - Cr 1.3 on admission, baseline 0.8-0.9. Initially suspected cardiorenal component that improved with diuresis, however, recurrent rise in Cr after several days of furosemide administration and diuretic held  - renal function improving Cr 1.24--->1.05---1.10  - will monitor BMP with resumption of diuresis and addition of Entresto     # Type 2 diabetes mellitus w/out current use of insulin  Most recent A1c was 8.0 in 6/28/2024. PTA regimen includes glipizide 2.5 and metformin 500mg.  -SSI while admitted, continue PO meds on discharge     # DYLAN, suspected OHS - Continue home BiPAP  # Hyperlipidemia - PTA statin  # Hx of asthma - PTA fluticasone and albuterol   # Chronic anemia, stable - PTA iron supplement  # GERD - PTA pepcid  # Primary OA of b/l knees     DVT Prophylaxis: Heparin gtt    Code Status: Full Code      Diet: Combination Diet Regular Diet Adult; Moderate Consistent Carb (60 g CHO per Meal) Diet; 2 gm NA Diet      Disposition:   Medically Ready for Discharge: Anticipated in 2-4 Days pending clinical improvement; transition to PO diuretics and PO anticoagulation; cardiology clearance    Care plan discussed with patient and her PCA over the phone along with cardiology    Clinically Significant Risk Factors                    # Hypertension: Noted on problem list    # Acute  heart failure with reduced ejection fraction: last echo with EF <40% and receiving IV diuretics                                        Physical Exam:      Blood pressure (!) 148/84, pulse 81, temperature 98.4  F (36.9  C), temperature source Oral, resp. rate 18, weight (!) 170.4 kg (375 lb 10.6 oz), SpO2 99%, not currently breastfeeding.  Vitals:    10/05/24 0705 10/06/24 0248 10/07/24 0609   Weight: (!) 169 kg (372 lb 8 oz) (!) 170 kg (374 lb 12.8 oz) (!) 170.4 kg (375 lb 10.6 oz)     Vital Signs with Ranges  Temp:  [97.6  F (36.4  C)-98.4  F (36.9  C)] 98.4  F (36.9  C)  Pulse:  [] 81  Resp:  [17-18] 18  BP: ()/(56-89) 148/84  SpO2:  [99 %-100 %] 99 %  I/O's Last 24 hours  I/O last 3 completed shifts:  In: 1330 [P.O.:1320; I.V.:10]  Out: 3500 [Urine:3500]    Constitutional: Alert, awake and oriented X 3; resting comfortably in no apparent distress; morbidly obese       Oral cavity: Moist mucosa   Cardiovascular: Normal s1 s2, irregularly irregular rate and rhythm, no murmur   Lungs: B/l clear to auscultation, no wheezes or crepitations   Abdomen: Soft, nt, nd, no guarding, rigidity or rebound; BS +   LE : Marked b/l edema ++   Musculoskeletal/Neuro Power 5/5 in all extremities; No focal neurological deficits noted   Psychiatry: normal mood and affect                Medications:        Current Facility-Administered Medications   Medication Dose Route Frequency Provider Last Rate Last Admin    atorvastatin (LIPITOR) tablet 40 mg  40 mg Oral Daily Cynthia Vu MD   40 mg at 10/07/24 0939    [Auto Hold] diclofenac (VOLTAREN) 1 % topical gel 2 g  2 g Topical 4x Daily Betsy Mckinley MD   2 g at 10/07/24 2030    famotidine (PEPCID) tablet 20 mg  20 mg Oral Daily Cynthia Vu MD   20 mg at 10/07/24 0939    ferrous sulfate (FEROSUL) tablet 325 mg  325 mg Oral Q Mon Wed Fri AM Henry Blanco MD   325 mg at 10/07/24 0939    fluticasone (ARNUITY ELLIPTA) 200 MCG/ACT inhaler 1 puff  1 puff  Inhalation Daily Cynthia Vu MD   1 puff at 10/07/24 0942    furosemide (LASIX) injection 60 mg  60 mg Intravenous bid 08 & 14 Frederick Poon MD   60 mg at 10/07/24 1716    hydrALAZINE (APRESOLINE) tablet 50 mg  50 mg Oral Q8H Cynthia Vu MD   50 mg at 10/08/24 0007    insulin aspart (NovoLOG) injection (RAPID ACTING)  1-7 Units Subcutaneous TID AC Bandar Siddiqui MD   2 Units at 10/05/24 1701    insulin aspart (NovoLOG) injection (RAPID ACTING)  1-5 Units Subcutaneous At Bedtime Bandar Siddiqui MD        losartan (COZAAR) tablet 25 mg  25 mg Oral Daily Danyelle Hernandez CNP   25 mg at 10/07/24 1339    metoprolol succinate ER (TOPROL XL) 24 hr tablet 100 mg  100 mg Oral BID Ilana Reinoso APRN CNP   100 mg at 10/07/24 2038    potassium chloride liv ER (KLOR-CON M20) CR tablet 40 mEq  40 mEq Oral BID Ilya Goodman MD   40 mEq at 10/07/24 2038    sodium chloride (PF) 0.9% PF flush 3 mL  3 mL Intracatheter Q8H Bandar Siddiqui MD   3 mL at 10/07/24 0942     PRN Meds:   Current Facility-Administered Medications   Medication Dose Route Frequency Provider Last Rate Last Admin    acetaminophen (TYLENOL) tablet 650 mg  650 mg Oral Q4H PRN Bandar Siddiqui MD   650 mg at 10/06/24 1216    Or    acetaminophen (TYLENOL) Suppository 650 mg  650 mg Rectal Q4H PRN Bandar Siddiqui MD        albuterol (PROVENTIL HFA/VENTOLIN HFA) inhaler  2 puff Inhalation Q6H PRN Cynthia Vu MD        alum & mag hydroxide-simethicone (MAALOX) suspension 15 mL  15 mL Oral TID PRN Bandar Siddiqui MD   15 mL at 10/03/24 0109    benzocaine-menthol (CHLORASEPTIC) 6-10 MG lozenge 1 lozenge  1 lozenge Buccal Q1H PRN Bandar Siddiqui MD   1 lozenge at 10/05/24 0916    calcium carbonate (TUMS) chewable tablet 1,000 mg  1,000 mg Oral 4x Daily PRN Bandar Siddiqui MD   1,000 mg at 10/04/24 1911    Continuing beta blocker from home medication list OR beta blocker order  already placed during this visit   Does not apply DOES NOT GO TO Bandar Beltran MD        glucose gel 15-30 g  15-30 g Oral Q15 Min PRBandar Geronimo MD        Or    dextrose 50 % injection 25-50 mL  25-50 mL Intravenous Q15 Min Bandar Mendieta MD        Or    glucagon injection 1 mg  1 mg Subcutaneous Q15 Min Bandar Mendieta MD        Give   of usual dose of LONG ACTING insulin AM of procedure IF diabetic   Does not apply Continuous PRN Ilaan Reinoso APRN CNP        guaiFENesin-dextromethorphan (ROBITUSSIN DM) 100-10 MG/5ML syrup 10 mL  10 mL Oral Q4H PRN Bandar Siddiqui MD   10 mL at 10/03/24 2009    HOLD digoxin (LANOXIN)  AM of procedure IF on digoxin   Does not apply HOLD Ilana Reinoso APRN CNP        HOLD: Insulin - RAPID/SHORT acting AM of procedure IF diabetic   Does not apply HOLD Ilana Reinoso APRN CNP        HOLD: Insulin - REGULAR AM of procedure IF diabetic   Does not apply HOLD Ilana Reinoso APRN CNP        HOLD: Oral hypoglycemics AM of procedure IF diabetic   Does not apply HOLD Ilana Reinoso APRN CNP        levalbuterol (XOPENEX) neb solution 1.25 mg  1.25 mg Nebulization Q2H PRN Bandar Siddiqui MD   1.25 mg at 10/04/24 2049    lidocaine (LMX4) cream   Topical Q1H PRN Bandar Siddiqui MD        lidocaine 1 % 0.1-1 mL  0.1-1 mL Other Q1H JOSEN Bandar Siddiqui MD        lidocaine 1 % 1 mL  1 mL Other Q1H PRN Ilana Reinoso APRN CNP        magnesium sulfate 2 g in 50 mL sterile water intermittent infusion  2 g Intravenous Once PRN Kobi Hendrix MD        magnesium sulfate 2 g in 50 mL sterile water intermittent infusion  2 g Intravenous Once PRN Ilana Reinoso APRN CNP        May take oral meds with a sip of water, the morning of Cardioversion procedure.       Does not apply Continuous PRN Ilana Reinoso APRN CNP        melatonin tablet 3 mg  3 mg Oral At Bedtime PRN Bandar Siddiqui MD   3 mg  at 10/02/24 2057    metoprolol (LOPRESSOR) injection 5 mg  5 mg Intravenous Q4H PRN Bandar Siddiqui MD        No anticoagulants IF patient has had acute trauma/surgery or recent intracranial, GI or urinary tract bleeding.    Other DOES NOT GO TO Bandar Beltran MD        polyethylene glycol (MIRALAX) Packet 17 g  17 g Oral Daily PRN Henry Blanco MD   17 g at 10/06/24 0941    potassium chloride liv ER (KLOR-CON M20) CR tablet 20 mEq  20 mEq Oral Once PRN Ip, Kobi Dunne MD        potassium chloride liv ER (KLOR-CON M20) CR tablet 40 mEq  40 mEq Oral Once PRN Ip, Kobi Dunne MD        potassium chloride liv ER (KLOR-CON M20) CR tablet 40 mEq  40 mEq Oral Once PRN Ilana Reinoso APRN CNP        Reason ACE/ARB/ARNI order not selected   Other DOES NOT GO TO Bandar Beltran MD        sennosides (SENOKOT) tablet 8.6 mg  8.6 mg Oral BID PRN Henry Blanco MD        sodium chloride (PF) 0.9% PF flush 3 mL  3 mL Intravenous Q1H PRN Ilana Reinoso APRN CNP        sodium chloride (PF) 0.9% PF flush 3 mL  3 mL Intracatheter q1 min prn Bandar Siddiqui MD   3 mL at 10/02/24 0006            Data:      All new lab and imaging data was reviewed.   Recent Labs   Lab Test 10/07/24  0532 10/05/24  0546 10/04/24  0548 10/02/24  0611   WBC 7.8 7.8 8.3  --    HGB 9.6* 9.3* 9.6*  --    MCV 81 83 84  --     248 267  --    INR  --   --   --  1.21*      Recent Labs   Lab Test 10/08/24  0728 10/08/24  0559 10/08/24  0151 10/07/24  1338 10/07/24  0532 10/06/24  0850 10/06/24  0629   NA  --  142  --   --  142  --  140   POTASSIUM  --  4.3  --   --  4.2  --  4.3   CHLORIDE  --  104  --   --  103  --  101   CO2  --  29  --   --  29  --  27   BUN  --  23.1*  --   --  25.7*  --  26.9*   CR  --  1.10*  --   --  1.05*  --  1.05*   ANIONGAP  --  9  --   --  10  --  12   AVNI  --  9.7  --   --  9.7  --  9.7   * 109* 104*   < > 109*   < > 106*    < > = values in this interval not  "displayed.     No lab results found.    Invalid input(s): \"TROP\", \"TROPONINIES\"      "

## 2024-10-09 LAB
ANION GAP SERPL CALCULATED.3IONS-SCNC: 10 MMOL/L (ref 7–15)
BUN SERPL-MCNC: 23.4 MG/DL (ref 8–23)
CALCIUM SERPL-MCNC: 9.5 MG/DL (ref 8.8–10.4)
CHLORIDE SERPL-SCNC: 103 MMOL/L (ref 98–107)
CREAT SERPL-MCNC: 1.1 MG/DL (ref 0.51–0.95)
EGFRCR SERPLBLD CKD-EPI 2021: 53 ML/MIN/1.73M2
GLUCOSE BLDC GLUCOMTR-MCNC: 108 MG/DL (ref 70–99)
GLUCOSE BLDC GLUCOMTR-MCNC: 113 MG/DL (ref 70–99)
GLUCOSE BLDC GLUCOMTR-MCNC: 120 MG/DL (ref 70–99)
GLUCOSE BLDC GLUCOMTR-MCNC: 121 MG/DL (ref 70–99)
GLUCOSE SERPL-MCNC: 106 MG/DL (ref 70–99)
HCO3 SERPL-SCNC: 27 MMOL/L (ref 22–29)
MAGNESIUM SERPL-MCNC: 2.1 MG/DL (ref 1.7–2.3)
POTASSIUM SERPL-SCNC: 4.2 MMOL/L (ref 3.4–5.3)
SODIUM SERPL-SCNC: 140 MMOL/L (ref 135–145)
UFH PPP CHRO-ACNC: 0.5 IU/ML

## 2024-10-09 PROCEDURE — 250N000013 HC RX MED GY IP 250 OP 250 PS 637: Performed by: STUDENT IN AN ORGANIZED HEALTH CARE EDUCATION/TRAINING PROGRAM

## 2024-10-09 PROCEDURE — 250N000013 HC RX MED GY IP 250 OP 250 PS 637: Performed by: HOSPITALIST

## 2024-10-09 PROCEDURE — 99232 SBSQ HOSP IP/OBS MODERATE 35: CPT | Performed by: HOSPITALIST

## 2024-10-09 PROCEDURE — 250N000011 HC RX IP 250 OP 636: Performed by: INTERNAL MEDICINE

## 2024-10-09 PROCEDURE — 99232 SBSQ HOSP IP/OBS MODERATE 35: CPT | Mod: FS | Performed by: NURSE PRACTITIONER

## 2024-10-09 PROCEDURE — 210N000002 HC R&B HEART CARE

## 2024-10-09 PROCEDURE — 36415 COLL VENOUS BLD VENIPUNCTURE: CPT | Performed by: HOSPITALIST

## 2024-10-09 PROCEDURE — 250N000013 HC RX MED GY IP 250 OP 250 PS 637

## 2024-10-09 PROCEDURE — 83735 ASSAY OF MAGNESIUM: CPT | Performed by: HOSPITALIST

## 2024-10-09 PROCEDURE — 250N000011 HC RX IP 250 OP 636

## 2024-10-09 PROCEDURE — 250N000013 HC RX MED GY IP 250 OP 250 PS 637: Performed by: NURSE PRACTITIONER

## 2024-10-09 PROCEDURE — 80048 BASIC METABOLIC PNL TOTAL CA: CPT | Performed by: HOSPITALIST

## 2024-10-09 PROCEDURE — 85520 HEPARIN ASSAY: CPT | Performed by: HOSPITALIST

## 2024-10-09 PROCEDURE — 250N000011 HC RX IP 250 OP 636: Performed by: NURSE PRACTITIONER

## 2024-10-09 RX ORDER — DIGOXIN 0.25 MG/ML
500 INJECTION INTRAMUSCULAR; INTRAVENOUS ONCE
Status: COMPLETED | OUTPATIENT
Start: 2024-10-09 | End: 2024-10-09

## 2024-10-09 RX ORDER — DIGOXIN 0.25 MG/ML
250 INJECTION INTRAMUSCULAR; INTRAVENOUS EVERY 6 HOURS
Status: COMPLETED | OUTPATIENT
Start: 2024-10-09 | End: 2024-10-10

## 2024-10-09 RX ORDER — IBUPROFEN 200 MG
200 TABLET ORAL 2 TIMES DAILY PRN
Status: DISCONTINUED | OUTPATIENT
Start: 2024-10-09 | End: 2024-10-12 | Stop reason: HOSPADM

## 2024-10-09 RX ORDER — FUROSEMIDE 10 MG/ML
80 INJECTION INTRAMUSCULAR; INTRAVENOUS
Status: DISCONTINUED | OUTPATIENT
Start: 2024-10-09 | End: 2024-10-10

## 2024-10-09 RX ADMIN — POTASSIUM CHLORIDE 40 MEQ: 1500 TABLET, EXTENDED RELEASE ORAL at 09:18

## 2024-10-09 RX ADMIN — DIGOXIN 500 MCG: 0.25 INJECTION INTRAMUSCULAR; INTRAVENOUS at 12:13

## 2024-10-09 RX ADMIN — HEPARIN SODIUM 1800 UNITS/HR: 10000 INJECTION, SOLUTION INTRAVENOUS at 05:10

## 2024-10-09 RX ADMIN — METOPROLOL SUCCINATE 100 MG: 100 TABLET, EXTENDED RELEASE ORAL at 09:18

## 2024-10-09 RX ADMIN — SACUBITRIL AND VALSARTAN 1 TABLET: 24; 26 TABLET, FILM COATED ORAL at 20:07

## 2024-10-09 RX ADMIN — FUROSEMIDE 80 MG: 10 INJECTION, SOLUTION INTRAMUSCULAR; INTRAVENOUS at 17:25

## 2024-10-09 RX ADMIN — APIXABAN 5 MG: 5 TABLET, FILM COATED ORAL at 20:07

## 2024-10-09 RX ADMIN — FAMOTIDINE 20 MG: 20 TABLET, FILM COATED ORAL at 09:18

## 2024-10-09 RX ADMIN — FUROSEMIDE 60 MG: 10 INJECTION, SOLUTION INTRAMUSCULAR; INTRAVENOUS at 09:08

## 2024-10-09 RX ADMIN — HYDRALAZINE HYDROCHLORIDE 50 MG: 50 TABLET ORAL at 16:53

## 2024-10-09 RX ADMIN — DIGOXIN 250 MCG: 0.25 INJECTION INTRAMUSCULAR; INTRAVENOUS at 23:55

## 2024-10-09 RX ADMIN — Medication 2 G: at 15:21

## 2024-10-09 RX ADMIN — POTASSIUM CHLORIDE 40 MEQ: 1500 TABLET, EXTENDED RELEASE ORAL at 20:07

## 2024-10-09 RX ADMIN — ATORVASTATIN CALCIUM 40 MG: 40 TABLET, FILM COATED ORAL at 09:17

## 2024-10-09 RX ADMIN — APIXABAN 5 MG: 5 TABLET, FILM COATED ORAL at 12:10

## 2024-10-09 RX ADMIN — EMPAGLIFLOZIN 10 MG: 10 TABLET, FILM COATED ORAL at 09:18

## 2024-10-09 RX ADMIN — METOPROLOL SUCCINATE 100 MG: 100 TABLET, EXTENDED RELEASE ORAL at 20:06

## 2024-10-09 RX ADMIN — DIGOXIN 250 MCG: 0.25 INJECTION INTRAMUSCULAR; INTRAVENOUS at 16:52

## 2024-10-09 RX ADMIN — HYDRALAZINE HYDROCHLORIDE 50 MG: 50 TABLET ORAL at 23:55

## 2024-10-09 RX ADMIN — Medication 2 G: at 20:19

## 2024-10-09 RX ADMIN — HYDRALAZINE HYDROCHLORIDE 50 MG: 50 TABLET ORAL at 09:15

## 2024-10-09 RX ADMIN — FERROUS SULFATE TAB 325 MG (65 MG ELEMENTAL FE) 325 MG: 325 (65 FE) TAB at 09:18

## 2024-10-09 RX ADMIN — Medication 2 G: at 10:55

## 2024-10-09 RX ADMIN — SACUBITRIL AND VALSARTAN 1 TABLET: 24; 26 TABLET, FILM COATED ORAL at 09:17

## 2024-10-09 ASSESSMENT — ACTIVITIES OF DAILY LIVING (ADL)
ADLS_ACUITY_SCORE: 42
ADLS_ACUITY_SCORE: 38
ADLS_ACUITY_SCORE: 38
ADLS_ACUITY_SCORE: 46
ADLS_ACUITY_SCORE: 38
ADLS_ACUITY_SCORE: 46
ADLS_ACUITY_SCORE: 38
ADLS_ACUITY_SCORE: 46
ADLS_ACUITY_SCORE: 42
ADLS_ACUITY_SCORE: 38
ADLS_ACUITY_SCORE: 46
ADLS_ACUITY_SCORE: 38
ADLS_ACUITY_SCORE: 38
ADLS_ACUITY_SCORE: 44
ADLS_ACUITY_SCORE: 44
ADLS_ACUITY_SCORE: 38
ADLS_ACUITY_SCORE: 38
ADLS_ACUITY_SCORE: 46
ADLS_ACUITY_SCORE: 38

## 2024-10-09 NOTE — PLAN OF CARE
Heart Failure Care Map  GOALS TO BE MET BEFORE DISCHARGE:    1. Decrease congestion and/or edema with diuretic therapy to achieve near optimal volume status.     Dyspnea improved: Yes, satisfactory for discharge.   Edema improved: Yes, satisfactory for discharge.        Last 24 hour I/O:   Intake/Output Summary (Last 24 hours) at 10/9/2024 0727  Last data filed at 10/9/2024 0632  Gross per 24 hour   Intake 3935.2 ml   Output 2350 ml   Net 1585.2 ml           Net I/O and Weights since admission:   09/09 1500 - 10/09 1459  In: 93479.15 [P.O.:6060; I.V.:4297.15]  Out: 52088 [Urine:71743]  Net: -5692.85     Vitals:    09/30/24 2200 10/01/24 0529 10/02/24 0700 10/03/24 0600   Weight: (!) 170.7 kg (376 lb 6.4 oz) (!) 168 kg (370 lb 6 oz) (!) 166.5 kg (367 lb) (!) 166.4 kg (366 lb 12.8 oz)    10/04/24 0500 10/05/24 0705 10/06/24 0248 10/07/24 0609   Weight: (!) 166.9 kg (368 lb) (!) 169 kg (372 lb 8 oz) (!) 170 kg (374 lb 12.8 oz) (!) 170.4 kg (375 lb 10.6 oz)    10/08/24 0920   Weight: (!) 162.9 kg (359 lb 3.2 oz)       2.  O2 sats > 90% on room air, or at prior home O2 therapy level.      Able to wean O2 this shift to keep sats above 90%?: Yes, satisfactory for discharge.   Does patient use Home O2? No          Current oxygenation status:   SpO2: 100 %     O2 Device: BiPAP/CPAP, Oxygen Delivery: 2 LPM    3.  Tolerates ambulation and mobility near baseline.     Ambulation: Yes, satisfactory for discharge.   Times patient ambulated with staff this shift: 1    Please review the Heart Failure Care Map for additional HF goal outcomes.    Shelby Vaughn RN  10/9/2024        Orientation: A&Ox4  Vitals/Pain: VSS on RA. On home CPAP NOC. Pt denies pain.  Tele: A-fib CVR w/ occasional PVCs  Lines/Drains: RPIV; infusing heparin at 1800 units/hr.  LS: clear; diminished at bases.  GI/: BS +, x0 BM this shift. Pt having adequate urine output throughout shift. Combo/Mod Carb diet.  Labs: BMP, Xa, K, and Mag recheck in AM. BG  ACHS.  Ambulation/Assist: SBA/A1  Skin/Wounds: WDL except for moisture in skin creases.  Plan: Discharge pending PO diuretic intake and when medically ready. Unable to get standing weight. Pt did not want to get OOB to do standing weight. Please get weight when pt ambulates for the day.

## 2024-10-09 NOTE — PROGRESS NOTES
SW:  D:   Patient will discharge home with outpatient therapy at discharge. At this time, no care management needs. Patient will schedule her own PCP appointment post discharge.    No further care management intervention anticipated at this time.  Please re-consult if further needs arise.  Care management signing off.       AMBREEN Salmeron, Dorothea Dix Psychiatric CenterSW  Social Work- Inpatient Care Coordination  [facility]

## 2024-10-09 NOTE — PROGRESS NOTES
[facility] Cardiology Progress Note  Date of Service: 10/09/2024  Staff Cardiologist: Dr. Howard     Assessment & Plan   Agatha Rodriguez is a 74 year old female with past medical history significant for hypertension, type II diabetes, DYLAN admitted on 9/30/2024 with progressive dyspnea and palpitations, found to be in new Atrial flutter with RVR and newly reduced LVEF of 30%.     Interval History:   Reports improvement in breathing. Decent response to IV diuresis, net negative 1.0 L yesterday.  Renal function stable. Rates suboptimally controlled.     Assessment:   Atrial flutter with RVR, new   On heparin gtt, will need DOAC prior to discharge  Rate control: Toprol  mg BID  HR 's on telemetry    Acute systolic heart failure with EF 30% (previously 50% 10/1/24), likely tachy-mediated  Etiology likely tachy-mediated, non-diagnostic lexiscan stress test 7/2024  Hypervolemic on exam, though difficult to assess d/t body habitus  IV lasix 60 mg BID  GDMT:   ACE/ARB/ARNI: Entresto 24-26 mg BID  BB: Toprol  mg BID  SGLT2i: Jardiance 10 mg daily   MRA: none  Daily standing weights, 2G sodium diet, strict I&O's   EDW unknown ([phone]#?)    VIVIANE thrombus on heparin gtt   NICOLA with VIVIANE thrombus 10/3  On heparin gtt    Hypertension   Type II diabetes  DYLAN on Bipap   ULYSSES, improving       Plan:  Increase IV lasix to 80 mg BID.   IV digoxin load with 500 mcg x once, then 250 mcg q6h x 3.    Transition to Eliquis 5 mg BID for anticoagulation.   Continue Toprol XL, Entresto, and Jardiance.   Strict I&O's, daily standing weights, 2G Na diet.   Daily  BMP, replace lytes per protocol.   Will need ischemic evaluation as outpatient.   Hold off on RHC for now in the setting of VIVIANE thrombus.     Danyelle Hernandez, CNP  Pager:  [phone]  (7am - 5pm, M-F)      Physical Exam   Temp: 97  F (36.1  C) Temp src: Oral BP: 130/82 Pulse: 93   Resp: 18 SpO2: 100 % O2 Device: None (Room air)     Vitals:    10/07/24 0609 10/08/24 0920 10/09/24 0936   Weight: (!) 170.4 kg (375 lb 10.6 oz) (!) 162.9 kg (359 lb 3.2 oz) (!) 162.1 kg (357 lb 6.4 oz)       Constitutional:   NAD   Skin:   Warm and dry   Head:   Nontraumatic   Neck:   no JVD   Lungs:   normal   Cardiovascular:   regularly irregular rhythm   Abdomen:   Benign   Extremities and Back:   2+ CHARLIE   Neurological:   Grossly nonfocal       Medications   Current Facility-Administered Medications   Medication Dose Route Frequency Provider Last Rate Last Admin    Continuing beta blocker from home medication list OR beta blocker order already placed during this visit   Does not apply DOES NOT GO TO Bandar Beltran MD        Give   of usual dose of LONG ACTING insulin AM of procedure IF diabetic   Does not apply Continuous PRN Ilana Reinoso APRN CNP        heparin 25,000 units in 0.45% NaCl 250 mL ANTICOAGULANT infusion  0-5,000 Units/hr Intravenous Continuous Ilana Reinoso APRN CNP 18 mL/hr at 10/09/24 0510 1,800 Units/hr at 10/09/24 0510    May take oral meds with a sip of water, the morning of Cardioversion procedure.       Does not apply Continuous PRN Ilana Reinoso APRN CNP        No anticoagulants IF patient has had acute trauma/surgery or recent intracranial, GI or urinary tract bleeding.    Other DOES NOT GO TO Bandar Beltran MD        Reason ACE/ARB/ARNI order not selected   Other DOES NOT GO TO Bandar Beltran MD         Current Facility-Administered Medications   Medication Dose Route Frequency Provider Last Rate Last Admin    atorvastatin (LIPITOR) tablet 40 mg  40 mg Oral Daily Cynthia Vu MD   40 mg at 10/09/24 0917    diclofenac (VOLTAREN) 1 % topical gel 2 g  2 g Topical 4x Daily Ilya Goodman MD        digoxin (LANOXIN) injection 250 mcg  250 mcg Intravenous Q6H Danyelle Hernandez CNP        digoxin (LANOXIN) injection 500 mcg  500 mcg Intravenous Once Danyelle Hernandez CNP         empagliflozin (JARDIANCE) tablet 10 mg  10 mg Oral Daily Danyelle Hernandez CNP   10 mg at 10/09/24 0918    famotidine (PEPCID) tablet 20 mg  20 mg Oral Daily Cynthia Vu MD   20 mg at 10/09/24 0918    ferrous sulfate (FEROSUL) tablet 325 mg  325 mg Oral Q Mon Wed Fri AM Henry Blanco MD   325 mg at 10/09/24 0918    fluticasone (ARNUITY ELLIPTA) 200 MCG/ACT inhaler 1 puff  1 puff Inhalation Daily Cynthia Vu MD   1 puff at 10/08/24 0831    furosemide (LASIX) injection 80 mg  80 mg Intravenous bid 08 & 14 Danyelle Hernandez CNP        hydrALAZINE (APRESOLINE) tablet 50 mg  50 mg Oral Q8H Cynthia Vu MD   50 mg at 10/09/24 0915    insulin aspart (NovoLOG) injection (RAPID ACTING)  1-7 Units Subcutaneous TID AC Bandar Siddiqui MD   2 Units at 10/05/24 1701    insulin aspart (NovoLOG) injection (RAPID ACTING)  1-5 Units Subcutaneous At Bedtime Bandar Siddiqui MD        metoprolol succinate ER (TOPROL XL) 24 hr tablet 100 mg  100 mg Oral BID Ilana Reinoso APRN CNP   100 mg at 10/09/24 0918    potassium chloride liv ER (KLOR-CON M20) CR tablet 40 mEq  40 mEq Oral BID Ilya Goodman MD   40 mEq at 10/09/24 0918    sacubitril-valsartan (ENTRESTO) 24-26 MG per tablet 1 tablet  1 tablet Oral BID Danyelle Hernandez CNP   1 tablet at 10/09/24 0917    sodium chloride (PF) 0.9% PF flush 3 mL  3 mL Intracatheter Q8H Bandar Siddiqui MD   3 mL at 10/08/24 1621       Data     Most Recent 3 CBC's:  Recent Labs   Lab Test 10/07/24  0532 10/05/24  0546 10/04/24  0548   WBC 7.8 7.8 8.3   HGB 9.6* 9.3* 9.6*   MCV 81 83 84    248 267     Most Recent 3 BMP's:  Recent Labs   Lab Test 10/09/24  0935 10/09/24  0551 10/09/24  0159 10/08/24  0728 10/08/24  0559 10/07/24  1338 10/07/24  0532   NA  --  140  --   --  142  --  142   POTASSIUM  --  4.2  --   --  4.3  --  4.2   CHLORIDE  --  103  --   --  104  --  103   CO2  --  27  --   --  29  --  29   BUN  --  23.4*  --   --  23.1*   --  25.7*   CR  --  1.10*  --   --  1.10*  --  1.05*   ANIONGAP  --  10  --   --  9  --  10   AVNI  --  9.5  --   --  9.7  --  9.7   * 106* 113*   < > 109*   < > 109*    < > = values in this interval not displayed.     Most Recent 3 BNP's:  Recent Labs   Lab Test 09/30/24  2229 09/30/24  1824 09/17/24  0037 07/27/24  2211   NTBNPI 1,759*  --  719 355   NTBNP  --  1,523*  --   --      Most Recent Hemoglobin A1c:No lab results found.      Medical Decision Making       30 MINUTES SPENT BY ME on the date of service doing chart review, history, exam, documentation & further activities per the note.          Clinically Significant Risk Factors                  # Hypertension: Noted on problem list  # Acute heart failure with reduced ejection fraction: last echo with EF <40% and receiving IV diuretics

## 2024-10-09 NOTE — PROGRESS NOTES
Medicine Progress Note - Hospitalist Service    Date of Admission:  9/30/2024    Assessment & Plan   Agatha Rodriguez is a 74 year old woman with PMH of severe morbid obesity, DYLAN, asthma, hypertension, and chronic diastolic CHF, among others; who presented with progressive dyspnea, anasarca, acute palpitations and was found to have HF exacerbation and new onset atrial flutter.  She was admitted to the hospitalist service for further evaluation and management.     Acute HFpEF exacerbation (EF 50% from 60-65% 7/2024)  Atrial flutter, new diagnosis   Left atrial appendage thrombus on NICOLA (10/3/24)  Presented with worsening SOB, hypervolemia without significant hypoxia and minimal response to increasing home diuretics.  Admission weight 376lb, dry weight ~355lb.  BNP elevated with trace b/l effusions noted on CXR.  Atrial flutter noted on EKG, which represented a new diagnosis.  Symptoms consistent with acute HFpEF exacberation, possibly in setting of tachyarrhythmia vs worsening hypertension vs other.  TSH normal, consistently wears home BiPAP at night.  Admitted to inpatient.  Cardiology consulted, appreciate their assistance.  Diuresed with IV Lasix.  Creatinine trended up and diuretics were held on 10/5/2024.  Renal function improved and diuretics restarted on 10/6/2024.  IV Lasix dose increased by cardiology on 10/9/2024.  Status post NICOLA on 10/3/2024 which showed VIVIANE clot, therefore unable to perform DCCV.   Plan for outpatient NICOLA with possible cardioversion in 4 weeks.  Monitor intake and output and daily weights.  Telemetry.  [id number] of 5.  Initially on IV heparin, transitioned to Eliquis on 10/9/2024.  Digoxin added on 10/9/2024 due to suboptimal rate control.  Switched from losartan to Entresto on 10/9/2024 per cardiology.  Jardiance started on 10/9/2024 per cardiology.  Continue Toprol-XL, atorvastatin, and hydralazine.  PTA HCTZ/triamterene on hold in the  setting of ULYSSES.  Pulmonology consulted, recommended continuing diuresis, obtain ABG, and CT chest with EDAC protocol.  Weight trending down and symptoms improving, however still not back to baseline.  OT consulted, recommending home care OT after discharge.    Acute kidney injury, possible cardiorenal vs pre-renal  Hypokalemia, resolved  Hypomagnesemia, resolved  Creatinine 1.3 on admission, baseline around 0.8-0.9.  Initially suspected cardiorenal component that improved with diuresis, however, recurrent rise in creatinine after several days of furosemide administration.  Diuretic was held on 10/5 with improvement in renal function.  Subsequently restarted on IV Lasix twice daily.  Creatinine stable at 1.1 on 10/9/2024.  BMP in AM.     Diabetes mellitus, type II  Most recent A1c was 8.0 in 6/28/2024. PTA regimen includes glipizide 2.5 and metformin 500mg.  Hold PTA glipizide and metformin while in the hospital, plan to resume upon discharge.  Monitor blood sugars and use medium dose sliding scale NovoLog as indicated.  Diabetic diet.  Monitor for hypoglycemia.     DYLAN, suspected OHS   Continue home BiPAP per home settings.    Hyperlipidemia   Continue PTA atorvastatin.    Asthma   Continue PTA Arnuity Ellipta and PRN albuterol.    Chronic anemia, stable  Continue PTA iron supplement.    GERD  Continue PTA pepcid.    Primary OA of b/l knees  Noted.          Diet: Combination Diet Regular Diet Adult; Moderate Consistent Carb (60 g CHO per Meal) Diet; 2 gm NA Diet    DVT Prophylaxis: DOAC  Becker Catheter: Not present  Lines: None     Cardiac Monitoring: ACTIVE order. Indication: Procedural area  Code Status: Full Code      Clinically Significant Risk Factors                   # Hypertension: Noted on problem list  # Acute heart failure with reduced ejection fraction: last echo with EF <40% and receiving IV diuretics                     Disposition Plan     Medically Ready for Discharge: Anticipated in 2-4 Days              Garrett Dai MD  Hospitalist Service  [facility]  Securely message with [employer] (more info)  Text page via [employer] Paging/Directory   ______________________________________________________________________    Interval History   Agatha Rodriguez was seen today.  Overall feeling a little better.  Shortness of breath improving at rest, but still gets short of breath with minimal activity.  Denies fevers, chest pain, nausea, abdominal pain.  Occasional headaches at night, normally uses as needed ibuprofen at home.    Physical Exam   Vital Signs: Temp: 97  F (36.1  C) Temp src: Oral BP: 130/82 Pulse: 93   Resp: 18 SpO2: 100 % O2 Device: None (Room air)    Weight: 357 lbs 6.4 oz    Constitutional: awake, alert, cooperative, no apparent distress, laying in the hospital bed  Respiratory: no increased work of breathing, clear to auscultation bilaterally, no crackles or wheezing  Cardiovascular: irregular rhythm, normal rate, normal S1 and S2, no murmur noted  GI: normal bowel sounds, soft, non-distended, non-tender  Skin: warm, dry  Musculoskeletal: 2+ bilateral lower extremity pitting edema present  Neurologic: awake, alert, answers questions appropriately, moves all extremities    Medical Decision Making       40 MINUTES SPENT BY ME on the date of service doing chart review, history, exam, documentation & further activities per the note.      Data     I have personally reviewed the following data over the past 24 hrs:    N/A  \   N/A   / N/A     140 103 23.4 (H) /  120 (H)   4.2 27 1.10 (H) \

## 2024-10-10 LAB
ANION GAP SERPL CALCULATED.3IONS-SCNC: 9 MMOL/L (ref 7–15)
BUN SERPL-MCNC: 20.5 MG/DL (ref 8–23)
CALCIUM SERPL-MCNC: 9.8 MG/DL (ref 8.8–10.4)
CHLORIDE SERPL-SCNC: 104 MMOL/L (ref 98–107)
CREAT SERPL-MCNC: 1.09 MG/DL (ref 0.51–0.95)
EGFRCR SERPLBLD CKD-EPI 2021: 53 ML/MIN/1.73M2
ERYTHROCYTE [DISTWIDTH] IN BLOOD BY AUTOMATED COUNT: 15.4 % (ref 10–15)
GLUCOSE BLDC GLUCOMTR-MCNC: 104 MG/DL (ref 70–99)
GLUCOSE BLDC GLUCOMTR-MCNC: 107 MG/DL (ref 70–99)
GLUCOSE BLDC GLUCOMTR-MCNC: 117 MG/DL (ref 70–99)
GLUCOSE BLDC GLUCOMTR-MCNC: 139 MG/DL (ref 70–99)
GLUCOSE BLDC GLUCOMTR-MCNC: 165 MG/DL (ref 70–99)
GLUCOSE SERPL-MCNC: 119 MG/DL (ref 70–99)
HCO3 SERPL-SCNC: 28 MMOL/L (ref 22–29)
HCT VFR BLD AUTO: 35.8 % (ref 35–47)
HGB BLD-MCNC: 10.7 G/DL (ref 11.7–15.7)
MAGNESIUM SERPL-MCNC: 2 MG/DL (ref 1.7–2.3)
MCH RBC QN AUTO: 24.2 PG (ref 26.5–33)
MCHC RBC AUTO-ENTMCNC: 29.9 G/DL (ref 31.5–36.5)
MCV RBC AUTO: 81 FL (ref 78–100)
PLATELET # BLD AUTO: 279 10E3/UL (ref 150–450)
POTASSIUM SERPL-SCNC: 4.7 MMOL/L (ref 3.4–5.3)
RBC # BLD AUTO: 4.43 10E6/UL (ref 3.8–5.2)
SODIUM SERPL-SCNC: 141 MMOL/L (ref 135–145)
WBC # BLD AUTO: 8.1 10E3/UL (ref 4–11)

## 2024-10-10 PROCEDURE — 250N000013 HC RX MED GY IP 250 OP 250 PS 637: Performed by: HOSPITALIST

## 2024-10-10 PROCEDURE — 250N000013 HC RX MED GY IP 250 OP 250 PS 637: Performed by: NURSE PRACTITIONER

## 2024-10-10 PROCEDURE — 99233 SBSQ HOSP IP/OBS HIGH 50: CPT | Performed by: INTERNAL MEDICINE

## 2024-10-10 PROCEDURE — 99232 SBSQ HOSP IP/OBS MODERATE 35: CPT | Performed by: HOSPITALIST

## 2024-10-10 PROCEDURE — 85027 COMPLETE CBC AUTOMATED: CPT | Performed by: HOSPITALIST

## 2024-10-10 PROCEDURE — 36415 COLL VENOUS BLD VENIPUNCTURE: CPT | Performed by: HOSPITALIST

## 2024-10-10 PROCEDURE — 210N000002 HC R&B HEART CARE

## 2024-10-10 PROCEDURE — 250N000013 HC RX MED GY IP 250 OP 250 PS 637

## 2024-10-10 PROCEDURE — 250N000011 HC RX IP 250 OP 636: Performed by: NURSE PRACTITIONER

## 2024-10-10 PROCEDURE — 83735 ASSAY OF MAGNESIUM: CPT | Performed by: INTERNAL MEDICINE

## 2024-10-10 PROCEDURE — 80048 BASIC METABOLIC PNL TOTAL CA: CPT | Performed by: HOSPITALIST

## 2024-10-10 PROCEDURE — 99231 SBSQ HOSP IP/OBS SF/LOW 25: CPT | Mod: FS | Performed by: NURSE PRACTITIONER

## 2024-10-10 RX ORDER — FUROSEMIDE 20 MG/1
20 TABLET ORAL EVERY EVENING
Status: DISCONTINUED | OUTPATIENT
Start: 2024-10-10 | End: 2024-10-12 | Stop reason: HOSPADM

## 2024-10-10 RX ORDER — FUROSEMIDE 40 MG/1
40 TABLET ORAL EVERY MORNING
Status: DISCONTINUED | OUTPATIENT
Start: 2024-10-11 | End: 2024-10-12 | Stop reason: HOSPADM

## 2024-10-10 RX ORDER — DIGOXIN 125 MCG
125 TABLET ORAL DAILY
Status: DISCONTINUED | OUTPATIENT
Start: 2024-10-10 | End: 2024-10-10

## 2024-10-10 RX ORDER — DIGOXIN 125 MCG
125 TABLET ORAL DAILY
Status: DISCONTINUED | OUTPATIENT
Start: 2024-10-11 | End: 2024-10-12 | Stop reason: HOSPADM

## 2024-10-10 RX ADMIN — Medication 2 G: at 08:25

## 2024-10-10 RX ADMIN — IBUPROFEN 200 MG: 200 TABLET, FILM COATED ORAL at 04:21

## 2024-10-10 RX ADMIN — HYDRALAZINE HYDROCHLORIDE 50 MG: 50 TABLET ORAL at 16:29

## 2024-10-10 RX ADMIN — POTASSIUM CHLORIDE 40 MEQ: 1500 TABLET, EXTENDED RELEASE ORAL at 21:18

## 2024-10-10 RX ADMIN — SACUBITRIL AND VALSARTAN 1 TABLET: 24; 26 TABLET, FILM COATED ORAL at 08:14

## 2024-10-10 RX ADMIN — EMPAGLIFLOZIN 10 MG: 10 TABLET, FILM COATED ORAL at 08:13

## 2024-10-10 RX ADMIN — HYDRALAZINE HYDROCHLORIDE 50 MG: 50 TABLET ORAL at 08:13

## 2024-10-10 RX ADMIN — SACUBITRIL AND VALSARTAN 1 TABLET: 24; 26 TABLET, FILM COATED ORAL at 21:18

## 2024-10-10 RX ADMIN — APIXABAN 5 MG: 5 TABLET, FILM COATED ORAL at 08:13

## 2024-10-10 RX ADMIN — Medication 2 G: at 19:54

## 2024-10-10 RX ADMIN — FAMOTIDINE 20 MG: 20 TABLET, FILM COATED ORAL at 08:13

## 2024-10-10 RX ADMIN — FUROSEMIDE 80 MG: 10 INJECTION, SOLUTION INTRAMUSCULAR; INTRAVENOUS at 08:16

## 2024-10-10 RX ADMIN — METOPROLOL SUCCINATE 100 MG: 100 TABLET, EXTENDED RELEASE ORAL at 21:18

## 2024-10-10 RX ADMIN — Medication 2 G: at 14:12

## 2024-10-10 RX ADMIN — METOPROLOL SUCCINATE 100 MG: 100 TABLET, EXTENDED RELEASE ORAL at 08:14

## 2024-10-10 RX ADMIN — FUROSEMIDE 20 MG: 20 TABLET ORAL at 16:29

## 2024-10-10 RX ADMIN — DIGOXIN 250 MCG: 0.25 INJECTION INTRAMUSCULAR; INTRAVENOUS at 05:13

## 2024-10-10 RX ADMIN — APIXABAN 5 MG: 5 TABLET, FILM COATED ORAL at 21:18

## 2024-10-10 RX ADMIN — ATORVASTATIN CALCIUM 40 MG: 40 TABLET, FILM COATED ORAL at 08:13

## 2024-10-10 RX ADMIN — HYDRALAZINE HYDROCHLORIDE 50 MG: 50 TABLET ORAL at 23:33

## 2024-10-10 RX ADMIN — Medication 2 G: at 21:20

## 2024-10-10 RX ADMIN — POTASSIUM CHLORIDE 40 MEQ: 1500 TABLET, EXTENDED RELEASE ORAL at 08:13

## 2024-10-10 ASSESSMENT — ACTIVITIES OF DAILY LIVING (ADL)
ADLS_ACUITY_SCORE: 43
ADLS_ACUITY_SCORE: 48
ADLS_ACUITY_SCORE: 43
ADLS_ACUITY_SCORE: 48
ADLS_ACUITY_SCORE: 38
ADLS_ACUITY_SCORE: 43
ADLS_ACUITY_SCORE: 38
ADLS_ACUITY_SCORE: 38
ADLS_ACUITY_SCORE: 39
ADLS_ACUITY_SCORE: 48
ADLS_ACUITY_SCORE: 43
ADLS_ACUITY_SCORE: 38
ADLS_ACUITY_SCORE: 43
ADLS_ACUITY_SCORE: 43
ADLS_ACUITY_SCORE: 48
ADLS_ACUITY_SCORE: 38
ADLS_ACUITY_SCORE: 43
ADLS_ACUITY_SCORE: 40
ADLS_ACUITY_SCORE: 43
ADLS_ACUITY_SCORE: 48
ADLS_ACUITY_SCORE: 38
ADLS_ACUITY_SCORE: 43
ADLS_ACUITY_SCORE: 40

## 2024-10-10 NOTE — PLAN OF CARE
Goal Outcome Evaluation:  A&Ox4. Up with assist of 1 and walker. External catheter in place. Voltaren gel for bilateral knee pain.   C pap at night. Tele af ib cvr lasix increased from 60 mg to 80 mg. Heparin discontinue, transition to Eliquis

## 2024-10-10 NOTE — PROGRESS NOTES
PULMONARY INPATIENT CONSULTATION  [facility]  [address]  [facility] [address]  Phone: [phone]    Patient:  Agatha Rodriguez, Age 74 year old, Date of birth 1950, MRN# [id number]  Date of Visit:  10/10/2024  Referring Provider No ref. provider found     Assessment and Plan:    1. Dyspnea in setting of heart failure, afib, LA appendage thrombus, and ULYSSES. Agree with ongoing diuresis as able. She likely does have a component of OHS and previous CT suggestive of tracheobronchomalacia   -Recommend continued management of heart failure and atrial fibrillation and left atrial thrombus.  I discussed with her that any pulmonary disease is likely secondary to her heart condition and causing dyspnea.  -I will arrange for follow-up in the pulmonary clinic in 2 to 3 months at that time can obtain CT with inspiration and expiration to evaluate for tracheobronchomalacia/excessive dynamic airway collapse  Continue using her home PAP    Billing: I spent a total of 50min for a subsequent hospital visit including review of chart, images, labs and notes (15min), bedside exam and dialogue with the patient (15min), and post-visit documentation, decision making and discussion with primary team and nursing (20min).     Jane Wang MD  Associate Professor of Medicine  Section of Interventional Pulmonology   Division of Pulmonary, Allergy, Critical Care and Sleep Medicine   [facility]         HPI/Interval history     Agatha Rodriguez is feeling better and looking forward to discharge soon.  She expressed some disappointment that we have been identified and solved what has caused her to be short of breath with exertion.  I tried to explain that she has multiple cardiac conditions which are likely contributing and are being addressed.    - Admission cxr c/w pulmonary edema, vascular congestion. Net  neg 7L since admission   - TTE with EF 35% and LVH  - PFT in 2012 c/w mild restrictive defect likely from body habitus. DLCO was mildly reduced   - CT Chest 7/2024 with air trapping and small hiatal hernia. It was neg for PE. There is a slight hint of EDAC however.            Past Medical History:      Past Medical History:   Diagnosis Date    (HFpEF) heart failure with preserved ejection fraction (H) 07/2024    Asthma     CAD (coronary artery disease)     Diverticulitis of intestine without perforation or abscess 02/17/2017    GERD (gastroesophageal reflux disease)     Gout of right foot 2015    Hypertension     Morbid obesity with BMI of 50.0-59.9, adult (H) 07/28/2024    Myocardial infarction, silent (H) 2005    Dx w silent Mi in [place] ~ 2005    Obesity     Primary osteoarthritis of both knees     Sleep apnea     uses CPAP    Type 2 diabetes mellitus (H)            Past Surgical History:      Past Surgical History:   Procedure Laterality Date    ANESTHESIA CARDIOVERSION N/A 10/3/2024    Procedure: Anesthesia cardioversion;  Surgeon: GENERIC ANESTHESIA PROVIDER;  Location:  OR    HYSTERECTOMY      TRANSESOPHAGEAL ECHOCARDIOGRAM INTRAOPERATIVE N/A 10/3/2024    Procedure: Transesophageal echocardiogram intraoperative;  Surgeon: GENERIC ANESTHESIA PROVIDER;  Location:  OR          Social History:     Social History     Tobacco Use    Smoking status: Never    Smokeless tobacco: Never   Substance Use Topics    Alcohol use: No          Family History:     Family History   Problem Relation Age of Onset    Diabetes Mother     Breast Cancer Mother            Allergies:    No Known Allergies       Medications:     Current Facility-Administered Medications   Medication Dose Route Frequency Provider Last Rate Last Admin    acetaminophen (TYLENOL) tablet 650 mg  650 mg Oral Q4H PRN Bandar Siddiqui MD   650 mg at 10/08/24 1239    Or    acetaminophen (TYLENOL) Suppository 650 mg  650 mg Rectal Q4H PRN Chen  Bandar Bauman MD        albuterol (PROVENTIL HFA/VENTOLIN HFA) inhaler  2 puff Inhalation Q6H PRN Cynthia Vu MD        alum & mag hydroxide-simethicone (MAALOX) suspension 15 mL  15 mL Oral TID PRN Bandar Siddiqui MD   15 mL at 10/03/24 0109    apixaban ANTICOAGULANT (ELIQUIS) tablet 5 mg  5 mg Oral BID Danyelle Hernandez CNP   5 mg at 10/10/24 0813    atorvastatin (LIPITOR) tablet 40 mg  40 mg Oral Daily Cynthia Vu MD   40 mg at 10/10/24 0813    benzocaine-menthol (CHLORASEPTIC) 6-10 MG lozenge 1 lozenge  1 lozenge Buccal Q1H PRN Bandar Siddiqui MD   1 lozenge at 10/05/24 0916    calcium carbonate (TUMS) chewable tablet 1,000 mg  1,000 mg Oral 4x Daily PRN Bandar Siddiqui MD   1,000 mg at 10/04/24 1911    Continuing beta blocker from home medication list OR beta blocker order already placed during this visit   Does not apply DOES NOT GO TO Bandar Beltran MD        glucose gel 15-30 g  15-30 g Oral Q15 Min PRBandar Geronimo MD        Or    dextrose 50 % injection 25-50 mL  25-50 mL Intravenous Q15 Min PRBandar Geronimo MD        Or    glucagon injection 1 mg  1 mg Subcutaneous Q15 Min PRBandar Geronimo MD        diclofenac (VOLTAREN) 1 % topical gel 2 g  2 g Topical 4x Daily Ilya Goodman MD   2 g at 10/10/24 1412    [START ON 10/11/2024] digoxin (LANOXIN) tablet 125 mcg  125 mcg Oral Daily Danyelle Hernandez CNP        empagliflozin (JARDIANCE) tablet 10 mg  10 mg Oral Daily Danyelle Hernandez CNP   10 mg at 10/10/24 0813    famotidine (PEPCID) tablet 20 mg  20 mg Oral Daily Cynthia Vu MD   20 mg at 10/10/24 0813    ferrous sulfate (FEROSUL) tablet 325 mg  325 mg Oral Q Mon Wed Fri AM Henry Blanco MD   325 mg at 10/09/24 0918    fluticasone (ARNUITY ELLIPTA) 200 MCG/ACT inhaler 1 puff  1 puff Inhalation Daily Cynthia Vu MD   1 puff at 10/08/24 0831    furosemide (LASIX) tablet 20 mg  20 mg Oral QPM David  Danyelle J, CNP   20 mg at 10/10/24 1629    [START ON 10/11/2024] furosemide (LASIX) tablet 40 mg  40 mg Oral Danyelle Bermudez CNP        Give   of usual dose of LONG ACTING insulin AM of procedure IF diabetic   Does not apply Continuous PRN Ilana Reinoso APRN CNP        guaiFENesin-dextromethorphan (ROBITUSSIN DM) 100-10 MG/5ML syrup 10 mL  10 mL Oral Q4H PRN Bandar Siddiqui MD   10 mL at 10/03/24 2009    HOLD digoxin (LANOXIN)  AM of procedure IF on digoxin   Does not apply HOLD Ilana Reinoso APRN CNP        HOLD: Insulin - RAPID/SHORT acting AM of procedure IF diabetic   Does not apply HOLD Ilana Reinoso APRN CNP        HOLD: Insulin - REGULAR AM of procedure IF diabetic   Does not apply HOLD Ilana Reinoso APRN CNP        HOLD: Oral hypoglycemics AM of procedure IF diabetic   Does not apply HOLD Ilana Reinoso APRN CNP        hydrALAZINE (APRESOLINE) tablet 50 mg  50 mg Oral Q8H Cynthia Vu MD   50 mg at 10/10/24 1629    ibuprofen (ADVIL/MOTRIN) tablet 200 mg  200 mg Oral BID PRN Garrett Dai MD   200 mg at 10/10/24 0421    insulin aspart (NovoLOG) injection (RAPID ACTING)  1-7 Units Subcutaneous TID AC Bandar Siddiqui MD   2 Units at 10/05/24 1701    insulin aspart (NovoLOG) injection (RAPID ACTING)  1-5 Units Subcutaneous At Bedtime Bandar Siddiqui MD        levalbuterol (XOPENEX) neb solution 1.25 mg  1.25 mg Nebulization Q2H PRN Bandar Siddiqui MD   1.25 mg at 10/04/24 2049    lidocaine (LMX4) cream   Topical Q1H PRN Bandar Siddiqui MD        lidocaine 1 % 0.1-1 mL  0.1-1 mL Other Q1H PRN Bandar Siddiqui MD        magnesium sulfate 2 g in 50 mL sterile water intermittent infusion  2 g Intravenous Once PRN Ruma, Kobi Dunne MD        magnesium sulfate 2 g in 50 mL sterile water intermittent infusion  2 g Intravenous Once PRN Arleth, Ilana, APRN CNP        May take oral meds with a sip of water, the morning of Cardioversion  procedure.       Does not apply Continuous PRN Ilana Reinoso APRN CNP        melatonin tablet 3 mg  3 mg Oral At Bedtime PRN Bandar Siddiqui MD   3 mg at 10/02/24 2057    metoprolol (LOPRESSOR) injection 5 mg  5 mg Intravenous Q4H PRN Bandar Siddiqui MD        metoprolol succinate ER (TOPROL XL) 24 hr tablet 100 mg  100 mg Oral BID Ilana Reinoso APRN CNP   100 mg at 10/10/24 0814    No anticoagulants IF patient has had acute trauma/surgery or recent intracranial, GI or urinary tract bleeding.    Other DOES NOT GO TO Bandar Beltran MD        polyethylene glycol (MIRALAX) Packet 17 g  17 g Oral Daily PRN Henry Blanco MD   17 g at 10/08/24 1239    potassium chloride liv ER (KLOR-CON M20) CR tablet 20 mEq  20 mEq Oral Once PRN Ip, Kobi Dunne MD        potassium chloride liv ER (KLOR-CON M20) CR tablet 40 mEq  40 mEq Oral BID Ilya Goodman MD   40 mEq at 10/10/24 0813    potassium chloride liv ER (KLOR-CON M20) CR tablet 40 mEq  40 mEq Oral Once PRN Ip, Kobi Dunne MD        potassium chloride liv ER (KLOR-CON M20) CR tablet 40 mEq  40 mEq Oral Once PRN Ilana Reinoso APRN CNP        Reason ACE/ARB/ARNI order not selected   Other DOES NOT GO TO Bandar Beltran MD        sacubitril-valsartan (ENTRESTO) 24-26 MG per tablet 1 tablet  1 tablet Oral BID Danyelle Hernandez CNP   1 tablet at 10/10/24 0814    sennosides (SENOKOT) tablet 8.6 mg  8.6 mg Oral BID PRN Henry Blanco MD        sodium chloride (PF) 0.9% PF flush 3 mL  3 mL Intravenous Q1H PRN Ilana Reinoso APRN CNP        sodium chloride (PF) 0.9% PF flush 3 mL  3 mL Intracatheter Q8H Bandar Siddiqui MD   3 mL at 10/10/24 1631    sodium chloride (PF) 0.9% PF flush 3 mL  3 mL Intracatheter q1 min prn Bandar Siddiqui MD   3 mL at 10/02/24 0006          Review of Systems:     CONSTITUTIONAL: negative for fever, chills, change in weight  INTEGUMENTARY/SKIN: no rash or obvious  new lesions  ENT/MOUTH: no sore throat, new sinus pain or nasal drainage  RESP: see interval history  CV: negative for chest pain, palpitations or peripheral edema  GI: no nausea, vomiting, change in stools  : no dysuria  MUSCULOSKELETAL: no myalgias, arthralgias  ENDOCRINE: blood sugars with adequate control  PSYCHIATRIC: mood stable  LYMPHATIC: no new lymphadenopathy  HEME: no bleeding or easy bruisability  NEURO: no numbness, weakness, headaches         Physical Exam:     Temp:  [97.7  F (36.5  C)-98.2  F (36.8  C)] 97.7  F (36.5  C)  Pulse:  [60-81] 60  Resp:  [16-19] 16  BP: ()/(59-86) 129/68  SpO2:  [97 %-100 %] 99 %  Wt Readings from Last 4 Encounters:   10/10/24 (!) 158.1 kg (348 lb 8 oz)   09/30/24 (!) 161 kg (355 lb)   07/30/24 (!) 161.2 kg (355 lb 6.4 oz)   05/14/19 (!) 161.5 kg (356 lb 1.6 oz)     Constitutional:   Awake, alert and in no apparent distress     Eyes:   Nonicteric, SAYRA       Lungs:   Good air flow.  No crackles. No rhonchi.  No wheezes.     Cardiovascular:   Normal S1 and S2.  RRR.  No murmur, gallop or rub.  Radial, DP and PT pulses normal and symmetric     Abdomen:   NABS, soft, nontender, nondistended.  No HSM.     Musculoskeletal:   No edema.      Neurologic:   Alert and conversant. Cranial nerves  intact.       Skin:   Warm, dry.  No rash on limited exam.           Current Laboratory Data:   All laboratory and imaging data reviewed.    Results for orders placed or performed during the hospital encounter of 09/30/24 (from the past 24 hour(s))   Glucose by meter   Result Value Ref Range    GLUCOSE BY METER POCT 121 (H) 70 - 99 mg/dL   Glucose by meter   Result Value Ref Range    GLUCOSE BY METER POCT 117 (H) 70 - 99 mg/dL   Magnesium   Result Value Ref Range    Magnesium 2.0 1.7 - 2.3 mg/dL   CBC with platelets   Result Value Ref Range    WBC Count 8.1 4.0 - 11.0 10e3/uL    RBC Count 4.43 3.80 - 5.20 10e6/uL    Hemoglobin 10.7 (L) 11.7 - 15.7 g/dL    Hematocrit 35.8 35.0 - 47.0  %    MCV 81 78 - 100 fL    MCH 24.2 (L) 26.5 - 33.0 pg    MCHC 29.9 (L) 31.5 - 36.5 g/dL    RDW 15.4 (H) 10.0 - 15.0 %    Platelet Count 279 150 - 450 10e3/uL   Basic metabolic panel   Result Value Ref Range    Sodium 141 135 - 145 mmol/L    Potassium 4.7 3.4 - 5.3 mmol/L    Chloride 104 98 - 107 mmol/L    Carbon Dioxide (CO2) 28 22 - 29 mmol/L    Anion Gap 9 7 - 15 mmol/L    Urea Nitrogen 20.5 8.0 - 23.0 mg/dL    Creatinine 1.09 (H) 0.51 - 0.95 mg/dL    GFR Estimate 53 (L) >60 mL/min/1.73m2    Calcium 9.8 8.8 - 10.4 mg/dL    Glucose 119 (H) 70 - 99 mg/dL   Glucose by meter   Result Value Ref Range    GLUCOSE BY METER POCT 104 (H) 70 - 99 mg/dL   Glucose by meter   Result Value Ref Range    GLUCOSE BY METER POCT 107 (H) 70 - 99 mg/dL

## 2024-10-10 NOTE — PROGRESS NOTES
[facility]    Medicine Progress Note - Hospitalist Service    Date of Admission:  9/30/2024    Assessment & Plan   Agatha Rodriguez is a 74 year old woman with PMH of severe morbid obesity, DYLAN, asthma, hypertension, and chronic diastolic CHF, among others; who presented with progressive dyspnea, anasarca, acute palpitations and was found to have HF exacerbation and new onset atrial flutter.  She was admitted to the hospitalist service for further evaluation and management.     Acute HFpEF exacerbation (EF 50% from 60-65% 7/2024)  Atrial flutter, new diagnosis   Left atrial appendage thrombus on NICOLA (10/3/24)  Presented with worsening SOB, hypervolemia without significant hypoxia and minimal response to increasing home diuretics.  Admission weight 376lb, dry weight ~355lb.  BNP elevated with trace b/l effusions noted on CXR.  Atrial flutter noted on EKG, which represented a new diagnosis.  Symptoms consistent with acute HFpEF exacberation, possibly in setting of tachyarrhythmia vs worsening hypertension vs other.  TSH normal, consistently wears home BiPAP at night.  Admitted to inpatient.  Cardiology consulted, appreciate their assistance.  Diuresed with IV Lasix.  Creatinine trended up and diuretics were held on 10/5/2024.  Renal function improved and diuretics restarted on 10/6/2024.  IV Lasix dose increased by cardiology on 10/9/2024.  Status post NICOLA on 10/3/2024 which showed VIVIANE clot, therefore unable to perform DCCV.  Plan for outpatient NICOLA with possible cardioversion in 4 weeks.  Monitor intake and output and daily weights.  Telemetry.  [id number] of 5.  Initially on IV heparin, transitioned to Eliquis on 10/9/2024.  Digoxin added on 10/9/2024 due to suboptimal rate control.  Switched from losartan to Entresto on 10/9/2024 per cardiology.  Jardiance started on 10/9/2024 per cardiology.  Continue Toprol-XL, atorvastatin, and hydralazine.  PTA HCTZ/triamterene on hold in the setting  of ULYSSES.  Pulmonology consulted, recommended continuing diuresis, obtain ABG, and CT chest with EDAC protocol.  Weight trending down (348 pounds on 10/10/2024) and symptoms improving.  OT consulted, recommending home care OT after discharge.  Defer to cardiology regarding diuretic management.  Will likely monitor in the hospital overnight following transition to oral diuretics.  Potential discharge in the next 24-48 hours pending clinical course and cardiology recommendations.    Acute kidney injury, possible cardiorenal vs pre-renal  Hypokalemia, resolved  Hypomagnesemia, resolved  Creatinine 1.3 on admission, baseline around 0.8-0.9.  Initially suspected cardiorenal component that improved with diuresis, however, recurrent rise in creatinine after several days of furosemide administration.  Diuretic was held on 10/5 with improvement in renal function.  Subsequently restarted on IV Lasix twice daily.  Creatinine stable at 1.09 on 10/10/2024.  BMP in AM.     Diabetes mellitus, type II  Most recent A1c was 8.0 in 6/28/2024. PTA regimen includes glipizide 2.5 and metformin 500mg.  Hold PTA glipizide and metformin while in the hospital.  Jardiance added by Cardiology for CHF on 10/9/2024 as noted above.  Monitor blood sugars and use medium dose sliding scale NovoLog as indicated.  Diabetic diet.  Monitor for hypoglycemia.  Blood sugars well controlled.     DYLAN, suspected OHS   Continue home BiPAP per home settings.    Hyperlipidemia   Continue PTA atorvastatin.    Asthma   Continue PTA Arnuity Ellipta and PRN albuterol.    Chronic anemia, stable  Continue PTA iron supplement.    GERD  Continue PTA pepcid.    Primary OA of b/l knees  Noted.          Diet: Combination Diet Regular Diet Adult; Moderate Consistent Carb (60 g CHO per Meal) Diet; 2 gm NA Diet    DVT Prophylaxis: DOAC  Becker Catheter: Not present  Lines: None     Cardiac Monitoring: ACTIVE order. Indication: Procedural area  Code Status: Full Code       Clinically Significant Risk Factors                   # Hypertension: Noted on problem list  # Acute heart failure with reduced ejection fraction: last echo with EF <40% and receiving IV diuretics                     Disposition Plan     Medically Ready for Discharge: Anticipated in 2-4 Days             Garrett Dai MD  Hospitalist Service  [facility]  Securely message with [employer] (more info)  Text page via [employer] Paging/Directory   ______________________________________________________________________    Interval History   Agatha Rodriguez was seen this morning.  She feels a little better today.  Shortness of breath continues to improve.  Had a mild headache last night, much improved following ibuprofen.  Denies fevers, chest pain, nausea, abdominal pain.    Physical Exam   Vital Signs: Temp: 98.2  F (36.8  C) Temp src: Oral BP: (!) 156/75 Pulse: 62   Resp: 19 SpO2: 97 % O2 Device: None (Room air)    Weight: 348 lbs 8 oz    Constitutional: awake, alert, cooperative, no apparent distress, laying in the hospital bed  Respiratory: no increased work of breathing, clear to auscultation bilaterally, no crackles or wheezing  Cardiovascular: irregular rhythm, normal rate, normal S1 and S2, no murmur noted  GI: normal bowel sounds, soft, non-distended, non-tender  Skin: warm, dry  Musculoskeletal: 1-2+ bilateral lower extremity pitting edema present  Neurologic: awake, alert, answers questions appropriately, moves all extremities       Medical Decision Making       40 MINUTES SPENT BY ME on the date of service doing chart review, history, exam, documentation & further activities per the note.      Data     I have personally reviewed the following data over the past 24 hrs:    8.1  \   10.7 (L)   / 279     141 104 20.5 /  104 (H)   4.7 28 1.09 (H) \

## 2024-10-10 NOTE — PROGRESS NOTES
[facility] Cardiology Progress Note  Date of Service: 10/10/2024  Staff Cardiologist: Dr. Howard     Assessment & Plan   Agatha Rodriguez is a 74 year old female with past medical history significant for hypertension, type II diabetes, DYLAN admitted on 9/30/2024 with progressive dyspnea and palpitations, found to be in new Atrial flutter with RVR and newly reduced LVEF of 30%.     Interval History:   No acute events overnight. Rates well controlled. Good response to IV diuresis, down 19lbs. Renal function stable.     Assessment:   Atrial flutter with RVR, new   [id number] of 7, on Eliquis 5 mg BID  Rate control: Toprol  mg BID  IV digoxin load 10/9, on 125 mcg daily     Acute systolic heart failure with EF 30% (previously 50% 10/1/24)  Etiology likely tachy-mediated, non-diagnostic lexiscan stress test 7/2024  Hypervolemic on exam, though difficult to assess d/t body habitus  IV lasix 60 mg BID [PTA oral lasix 40 mg daily]  GDMT:   ACE/ARB/ARNI: Entresto 24-26 mg BID [ PTA losartan 100 mg daily]  BB: Toprol  mg BID [PTA carvedilol 25 mg daily]  SGLT2i: Jardiance 10 mg daily   MRA: none  EDW unknown ([phone]?)    VIVIANE thrombus on anticoagulation  NICOLA with VIVIANE thrombus 10/3  Heparin gtt stopped 10/9, on Eliquis 5 mg BID    Hypertension   Type II diabetes  DYLAN on Bipap   ULYSSES, improving       Plan:  Transition to oral lasix 40 mg/20mg daily.   Start oral digoxin 125 mcg daily.     Continue Eliquis 5 mg BID for anticoagulation.   Continue Toprol XL, Entresto, and Jardiance.   Will need ischemic evaluation as outpatient.   Hold off on RHC for now in the setting of VIVIANE thrombus.   Follow-up with cardiology CARLY in 2 weeks.     Danyelle Hernandez, SUZIE  Pager:  [phone]  (7am - 5pm, M-F)      Physical Exam   Temp: 98.2  F (36.8  C) Temp src: Oral BP: (!) 156/75 Pulse: 62   Resp: 19 SpO2: 97 % O2 Device: None (Room air)    Vitals:    10/08/24 0920 10/09/24 0936 10/10/24 0000    Weight: (!) 162.9 kg (359 lb 3.2 oz) (!) 162.1 kg (357 lb 6.4 oz) (!) 158.1 kg (348 lb 8 oz)       Constitutional:   NAD   Skin:   Warm and dry   Head:   Nontraumatic   Neck:   no JVD   Lungs:   normal   Cardiovascular:   regularly irregular rhythm   Abdomen:   Benign   Extremities and Back:   2+ CHARLIE   Neurological:   Grossly nonfocal       Medications   Current Facility-Administered Medications   Medication Dose Route Frequency Provider Last Rate Last Admin    Continuing beta blocker from home medication list OR beta blocker order already placed during this visit   Does not apply DOES NOT GO TO Bandar Beltran MD        Give   of usual dose of LONG ACTING insulin AM of procedure IF diabetic   Does not apply Continuous PRN Ilana Reinoso APRN CNP        May take oral meds with a sip of water, the morning of Cardioversion procedure.       Does not apply Continuous PRN Ilana Reinoso APRN CNP        No anticoagulants IF patient has had acute trauma/surgery or recent intracranial, GI or urinary tract bleeding.    Other DOES NOT GO TO Bandar Beltran MD        Reason ACE/ARB/ARNI order not selected   Other DOES NOT GO TO Bandar Beltran MD         Current Facility-Administered Medications   Medication Dose Route Frequency Provider Last Rate Last Admin    apixaban ANTICOAGULANT (ELIQUIS) tablet 5 mg  5 mg Oral BID Danyelle Hernandez CNP   5 mg at 10/10/24 0813    atorvastatin (LIPITOR) tablet 40 mg  40 mg Oral Daily Cynthia Vu MD   40 mg at 10/10/24 0813    diclofenac (VOLTAREN) 1 % topical gel 2 g  2 g Topical 4x Daily Ilya Goodman MD   2 g at 10/10/24 0825    digoxin (LANOXIN) tablet 125 mcg  125 mcg Oral Daily Danyelle Hernandez CNP        empagliflozin (JARDIANCE) tablet 10 mg  10 mg Oral Daily Danyelle Hernandez CNP   10 mg at 10/10/24 0813    famotidine (PEPCID) tablet 20 mg  20 mg Oral Daily Cynthia Vu MD   20 mg at 10/10/24 0813    ferrous sulfate  (FEROSUL) tablet 325 mg  325 mg Oral Q Mon Wed Fri AM Henry Blanco MD   325 mg at 10/09/24 0918    fluticasone (ARNUITY ELLIPTA) 200 MCG/ACT inhaler 1 puff  1 puff Inhalation Daily Cynthia Vu MD   1 puff at 10/08/24 0831    furosemide (LASIX) tablet 20 mg  20 mg Oral QPM Danyelle Hernandez CNP        furosemide (LASIX) tablet 40 mg  40 mg Oral QAM Danyelle Hernandez CNP        hydrALAZINE (APRESOLINE) tablet 50 mg  50 mg Oral Q8H Cynthia Vu MD   50 mg at 10/10/24 0813    insulin aspart (NovoLOG) injection (RAPID ACTING)  1-7 Units Subcutaneous TID AC Bandar Siddiqui MD   2 Units at 10/05/24 1701    insulin aspart (NovoLOG) injection (RAPID ACTING)  1-5 Units Subcutaneous At Bedtime Bandar Siddiqui MD        metoprolol succinate ER (TOPROL XL) 24 hr tablet 100 mg  100 mg Oral BID Ilana Reinoso APRN CNP   100 mg at 10/10/24 0814    potassium chloride liv ER (KLOR-CON M20) CR tablet 40 mEq  40 mEq Oral BID Ilya Goodman MD   40 mEq at 10/10/24 0813    sacubitril-valsartan (ENTRESTO) 24-26 MG per tablet 1 tablet  1 tablet Oral BID Danyelle Hernandez CNP   1 tablet at 10/10/24 0814    sodium chloride (PF) 0.9% PF flush 3 mL  3 mL Intracatheter Q8H Bandar Siddiqui MD   3 mL at 10/10/24 0831       Data     Most Recent 3 CBC's:  Recent Labs   Lab Test 10/10/24  0602 10/07/24  0532 10/05/24  0546   WBC 8.1 7.8 7.8   HGB 10.7* 9.6* 9.3*   MCV 81 81 83    225 248     Most Recent 3 BMP's:  Recent Labs   Lab Test 10/10/24  0830 10/10/24  0602 10/10/24  0212 10/09/24  0935 10/09/24  0551 10/08/24  0728 10/08/24  0559   NA  --  141  --   --  140  --  142   POTASSIUM  --  4.7  --   --  4.2  --  4.3   CHLORIDE  --  104  --   --  103  --  104   CO2  --  28  --   --  27  --  29   BUN  --  20.5  --   --  23.4*  --  23.1*   CR  --  1.09*  --   --  1.10*  --  1.10*   ANIONGAP  --  9  --   --  10  --  9   AVNI  --  9.8  --   --  9.5  --  9.7   * 119* 117*   < > 106*   < >  109*    < > = values in this interval not displayed.     Most Recent 3 BNP's:  Recent Labs   Lab Test 09/30/24  2229 09/30/24  1824 09/17/24  0037 07/27/24  2211   NTBNPI 1,759*  --  719 355   NTBNP  --  1,523*  --   --      Most Recent Hemoglobin A1c:No lab results found.      Medical Decision Making       30 MINUTES SPENT BY ME on the date of service doing chart review, history, exam, documentation & further activities per the note.          Clinically Significant Risk Factors                   # Hypertension: Noted on problem list  # Acute heart failure with reduced ejection fraction: last echo with EF <40% and receiving IV diuretics

## 2024-10-10 NOTE — PLAN OF CARE
Heart Failure Care Map  GOALS TO BE MET BEFORE DISCHARGE:    1. Decrease congestion and/or edema with diuretic therapy to achieve near optimal volume status.     Dyspnea improved: Yes, satisfactory for discharge.   Edema improved: Yes, satisfactory for discharge.        Last 24 hour I/O:   Intake/Output Summary (Last 24 hours) at 10/10/2024 0610  Last data filed at 10/10/2024 0400  Gross per 24 hour   Intake 1230 ml   Output 2775 ml   Net -1545 ml           Net I/O and Weights since admission:   09/10 0700 - 10/10 0659  In: 13644.15 [P.O.:7290; I.V.:4297.15]  Out: 05519 [Urine:11147]  Net: -6637.85     Vitals:    09/30/24 2200 10/01/24 0529 10/02/24 0700 10/03/24 0600   Weight: (!) 170.7 kg (376 lb 6.4 oz) (!) 168 kg (370 lb 6 oz) (!) 166.5 kg (367 lb) (!) 166.4 kg (366 lb 12.8 oz)    10/04/24 0500 10/05/24 0705 10/06/24 0248 10/07/24 0609   Weight: (!) 166.9 kg (368 lb) (!) 169 kg (372 lb 8 oz) (!) 170 kg (374 lb 12.8 oz) (!) 170.4 kg (375 lb 10.6 oz)    10/08/24 0920 10/09/24 0936 10/10/24 0000   Weight: (!) 162.9 kg (359 lb 3.2 oz) (!) 162.1 kg (357 lb 6.4 oz) (!) 158.1 kg (348 lb 8 oz)       2.  O2 sats > 90% on room air, or at prior home O2 therapy level.      Able to wean O2 this shift to keep sats above 90%?: Yes, satisfactory for discharge.   Does patient use Home O2? No          Current oxygenation status:   SpO2: 97 %     O2 Device: None (Room air),      3.  Tolerates ambulation and mobility near baseline.     Ambulation: Yes, satisfactory for discharge.   Times patient ambulated with staff this shift: 2    Please review the Heart Failure Care Map for additional HF goal outcomes.    Shelby Vaughn RN  10/10/2024      Orientation: A&Ox4  Vitals/Pain: VSS on RA. Pt reporting head pain, managing with ibuprofen throughout shift. Pt had 2.0 second pause this morning at 06:33 HR 44, asymptomatic.  Tele: A-fib CVR  Lines/Drains: RPIV, SL; working but pt c/o of tenderness near catheter insertion site.   LS: clear but  diminished.  GI/: BS +, x0 BM this shift. Pt having adequate urine output throughout shift. 2g Na Diet.  Labs: BMP, CBC, and Mag checked this AM.  Ambulation/Assist: Ax1 OOB; SBA w/walker for ambulation.  Skin/Wounds: Scattered bruising.  Plan: Continue plan of care. Pt diuresing, lasix increased to 80 mg BID. Started on Eliquis. Plan is to transition to PO digoxin. Discharge pending diuresis and medication management.

## 2024-10-11 LAB
ANION GAP SERPL CALCULATED.3IONS-SCNC: 12 MMOL/L (ref 7–15)
BUN SERPL-MCNC: 20.7 MG/DL (ref 8–23)
CALCIUM SERPL-MCNC: 9.4 MG/DL (ref 8.8–10.4)
CHLORIDE SERPL-SCNC: 105 MMOL/L (ref 98–107)
CREAT SERPL-MCNC: 1.11 MG/DL (ref 0.51–0.95)
EGFRCR SERPLBLD CKD-EPI 2021: 52 ML/MIN/1.73M2
GLUCOSE BLDC GLUCOMTR-MCNC: 111 MG/DL (ref 70–99)
GLUCOSE BLDC GLUCOMTR-MCNC: 113 MG/DL (ref 70–99)
GLUCOSE BLDC GLUCOMTR-MCNC: 171 MG/DL (ref 70–99)
GLUCOSE SERPL-MCNC: 113 MG/DL (ref 70–99)
HCO3 SERPL-SCNC: 25 MMOL/L (ref 22–29)
HOLD SPECIMEN: NORMAL
MAGNESIUM SERPL-MCNC: 2.1 MG/DL (ref 1.7–2.3)
POTASSIUM SERPL-SCNC: 4.5 MMOL/L (ref 3.4–5.3)
SODIUM SERPL-SCNC: 142 MMOL/L (ref 135–145)

## 2024-10-11 PROCEDURE — 210N000002 HC R&B HEART CARE

## 2024-10-11 PROCEDURE — 250N000013 HC RX MED GY IP 250 OP 250 PS 637: Performed by: HOSPITALIST

## 2024-10-11 PROCEDURE — 99232 SBSQ HOSP IP/OBS MODERATE 35: CPT | Performed by: HOSPITALIST

## 2024-10-11 PROCEDURE — 250N000013 HC RX MED GY IP 250 OP 250 PS 637

## 2024-10-11 PROCEDURE — 250N000013 HC RX MED GY IP 250 OP 250 PS 637: Performed by: NURSE PRACTITIONER

## 2024-10-11 PROCEDURE — 250N000013 HC RX MED GY IP 250 OP 250 PS 637: Performed by: STUDENT IN AN ORGANIZED HEALTH CARE EDUCATION/TRAINING PROGRAM

## 2024-10-11 PROCEDURE — 83735 ASSAY OF MAGNESIUM: CPT | Performed by: INTERNAL MEDICINE

## 2024-10-11 PROCEDURE — 80048 BASIC METABOLIC PNL TOTAL CA: CPT | Performed by: HOSPITALIST

## 2024-10-11 PROCEDURE — 36415 COLL VENOUS BLD VENIPUNCTURE: CPT | Performed by: HOSPITALIST

## 2024-10-11 RX ADMIN — FUROSEMIDE 40 MG: 40 TABLET ORAL at 09:38

## 2024-10-11 RX ADMIN — FAMOTIDINE 20 MG: 20 TABLET, FILM COATED ORAL at 09:36

## 2024-10-11 RX ADMIN — APIXABAN 5 MG: 5 TABLET, FILM COATED ORAL at 20:49

## 2024-10-11 RX ADMIN — FUROSEMIDE 20 MG: 20 TABLET ORAL at 15:42

## 2024-10-11 RX ADMIN — Medication 2 G: at 20:57

## 2024-10-11 RX ADMIN — FLUTICASONE FUROATE 1 PUFF: 200 POWDER RESPIRATORY (INHALATION) at 09:51

## 2024-10-11 RX ADMIN — SACUBITRIL AND VALSARTAN 1 TABLET: 24; 26 TABLET, FILM COATED ORAL at 09:43

## 2024-10-11 RX ADMIN — METOPROLOL SUCCINATE 100 MG: 100 TABLET, EXTENDED RELEASE ORAL at 09:32

## 2024-10-11 RX ADMIN — HYDRALAZINE HYDROCHLORIDE 50 MG: 50 TABLET ORAL at 15:42

## 2024-10-11 RX ADMIN — POTASSIUM CHLORIDE 40 MEQ: 1500 TABLET, EXTENDED RELEASE ORAL at 20:49

## 2024-10-11 RX ADMIN — DIGOXIN 125 MCG: 125 TABLET ORAL at 09:35

## 2024-10-11 RX ADMIN — Medication 2 G: at 15:44

## 2024-10-11 RX ADMIN — POTASSIUM CHLORIDE 40 MEQ: 1500 TABLET, EXTENDED RELEASE ORAL at 09:35

## 2024-10-11 RX ADMIN — SACUBITRIL AND VALSARTAN 1 TABLET: 24; 26 TABLET, FILM COATED ORAL at 20:50

## 2024-10-11 RX ADMIN — FERROUS SULFATE TAB 325 MG (65 MG ELEMENTAL FE) 325 MG: 325 (65 FE) TAB at 09:37

## 2024-10-11 RX ADMIN — METOPROLOL SUCCINATE 100 MG: 100 TABLET, EXTENDED RELEASE ORAL at 20:50

## 2024-10-11 RX ADMIN — ATORVASTATIN CALCIUM 40 MG: 40 TABLET, FILM COATED ORAL at 09:37

## 2024-10-11 RX ADMIN — Medication 2 G: at 09:52

## 2024-10-11 RX ADMIN — APIXABAN 5 MG: 5 TABLET, FILM COATED ORAL at 09:35

## 2024-10-11 RX ADMIN — HYDRALAZINE HYDROCHLORIDE 50 MG: 50 TABLET ORAL at 09:43

## 2024-10-11 RX ADMIN — EMPAGLIFLOZIN 10 MG: 10 TABLET, FILM COATED ORAL at 09:35

## 2024-10-11 ASSESSMENT — ACTIVITIES OF DAILY LIVING (ADL)
ADLS_ACUITY_SCORE: 43
ADLS_ACUITY_SCORE: 42
ADLS_ACUITY_SCORE: 43
ADLS_ACUITY_SCORE: 42
ADLS_ACUITY_SCORE: 43

## 2024-10-11 NOTE — PLAN OF CARE
Goal Outcome Evaluation:  Neuro: A&Ox4  Tele/Cardiac: Afib CVR  Activity: SBA w/ rolling walker  Pain: Denies pain  Drips/IV: PIV:S/L  GI/: Continent   Skin: WDL  Diet: Mod carb/cardiac  Test/Procedures: AM labs  Plan: Possibly discharge 10/11, will need outpatient NICOLA w/ possible cardioversion. Will start oral digoxin and lasix 10/11

## 2024-10-11 NOTE — PLAN OF CARE
A&O x 4. VSS. RA. Tele: Afib CVR. Denies pain/CP/SOB. Up SBA w/walker. Plan: Possible discharge tomorrow. Will continue w/plan of care.

## 2024-10-11 NOTE — PROGRESS NOTES
[facility]    Medicine Progress Note - Hospitalist Service    Date of Admission:  9/30/2024    Assessment & Plan   Agatha Rodriguez is a 74 year old woman with PMH of severe morbid obesity, DYLAN, asthma, hypertension, and chronic diastolic CHF, among others; who presented with progressive dyspnea, anasarca, acute palpitations and was found to have HF exacerbation and new onset atrial flutter.  She was admitted to the hospitalist service for further evaluation and management.     Acute HFpEF exacerbation (EF 50% from 60-65% 7/2024)  Atrial flutter, new diagnosis   Left atrial appendage thrombus on NICOLA (10/3/24)  Presented with worsening SOB, hypervolemia without significant hypoxia and minimal response to increasing home diuretics.  Admission weight 376lb, dry weight ~355lb.  BNP elevated with trace b/l effusions noted on CXR.  Atrial flutter noted on EKG, which represented a new diagnosis.  Symptoms consistent with acute HFpEF exacberation, possibly in setting of tachyarrhythmia vs worsening hypertension vs other.  TSH normal, consistently wears home BiPAP at night.  Admitted to inpatient.  Cardiology consulted, appreciate their assistance.  Diuresed with IV Lasix.  Creatinine trended up and diuretics were held on 10/5/2024.  Renal function improved and diuretics restarted on 10/6/2024.  IV Lasix dose increased by cardiology on 10/9/2024.  Transitioned to oral lasix on afternoon of 10/10/2024.  Lasix dose is 40 mg in AM and 20 mg in afternoon.  Status post NICOLA on 10/3/2024 which showed VIVIANE clot, therefore unable to perform DCCV.  Plan for outpatient NICOLA with possible cardioversion in 4 weeks.  Monitor intake and output and daily weights.  Telemetry.  [id number] of 5.  Initially on IV heparin, transitioned to Eliquis on 10/9/2024.  Digoxin added on 10/9/2024 due to suboptimal rate control.  Check digoxin level in AM on 10/12/2024 prior to AM dose of digoxin.  Switched from losartan to  Entresto on 10/9/2024 per cardiology.  Jardiance started on 10/9/2024 per cardiology.  Continue Toprol-XL, atorvastatin, and hydralazine.  PTA HCTZ/triamterene on hold in the setting of ULYSSES.  Pulmonology consulted, recommending outpatient follow up in pulmonology clinic in 2-3 months.  Weight trending down (345 pounds on 10/11/2024) and symptoms improving.  OT consulted, recommending home care OT after discharge.  Potential discharge home tomorrow.    Acute kidney injury, possible cardiorenal vs pre-renal  Hypokalemia, resolved  Hypomagnesemia, resolved  Creatinine 1.3 on admission, baseline around 0.8-0.9.  Initially suspected cardiorenal component that improved with diuresis, however, recurrent rise in creatinine after several days of furosemide administration.  Diuretic was held on 10/5 with improvement in renal function.  Subsequently restarted on IV Lasix twice daily.  Creatinine stable at 1.11 on 10/11/2024.  BMP in AM.     Diabetes mellitus, type II  Most recent A1c was 8.0 in 6/28/2024. PTA regimen includes glipizide 2.5 and metformin 500mg.  Hold PTA glipizide and metformin while in the hospital.  Consider stopping glipizide at discharge in setting of well controlled blood sugars and addition of jardiance during this admission.  Jardiance added by Cardiology for CHF on 10/9/2024 as noted above.  Monitor blood sugars and use medium dose sliding scale NovoLog as indicated.  Diabetic diet.  Monitor for hypoglycemia.  Blood sugars well controlled.     DYLAN, suspected OHS   Continue home BiPAP per home settings.    Hyperlipidemia   Continue PTA atorvastatin.    Asthma   Continue PTA Arnuity Ellipta and PRN albuterol.    Chronic anemia, stable  Continue PTA iron supplement.    GERD  Continue PTA pepcid.    Primary OA of b/l knees  Noted.          Diet: Combination Diet Regular Diet Adult; Moderate Consistent Carb (60 g CHO per Meal) Diet; 2 gm NA Diet    DVT Prophylaxis: DOAC  Becker Catheter: Not present  Lines:  None     Cardiac Monitoring: ACTIVE order. Indication: Procedural area  Code Status: Full Code      Clinically Significant Risk Factors                   # Hypertension: Noted on problem list  # Acute heart failure with reduced ejection fraction: last echo with EF <40% and receiving IV diuretics                     Disposition Plan     Medically Ready for Discharge: Anticipated Tomorrow             Garrett Dai MD  Hospitalist Service  [facility]  Securely message with [employer] (more info)  Text page via [employer] Paging/Directory   ______________________________________________________________________    Interval History   Agatha Rodriguez was seen this morning.  Overall feeling better.  Shortness of breath continues to improve.  Has not really been up out of bed much yet, encouraged to ambulate with nursing today.  Denies fevers, chest pain, nausea, abdominal pain.  Plan of care discussed with nursing.    Physical Exam   Vital Signs: Temp: 98.6  F (37  C) Temp src: Oral BP: 127/67 Pulse: 81   Resp: 22 SpO2: 94 % O2 Device: None (Room air)    Weight: 345 lbs 14.4 oz    Constitutional: awake, alert, cooperative, no apparent distress, laying in the hospital bed  Respiratory: no increased work of breathing, clear to auscultation bilaterally, no crackles or wheezing  Cardiovascular: irregular rhythm, normal rate, normal S1 and S2, no murmur noted  GI: normal bowel sounds, soft, obesity, non-distended, non-tender  Skin: warm, dry  Musculoskeletal: no lower extremity pitting edema present  Neurologic: awake, alert, answers questions appropriately, moves all extremities    Medical Decision Making       40 MINUTES SPENT BY ME on the date of service doing chart review, history, exam, documentation & further activities per the note.      Data     I have personally reviewed the following data over the past 24 hrs:    N/A  \   N/A   / N/A     142 105 20.7 /  113 (H)   4.5 25 1.11 (H) \

## 2024-10-12 VITALS
BODY MASS INDEX: 55.59 KG/M2 | OXYGEN SATURATION: 98 % | TEMPERATURE: 98.2 F | DIASTOLIC BLOOD PRESSURE: 99 MMHG | HEART RATE: 74 BPM | WEIGHT: 293 LBS | RESPIRATION RATE: 18 BRPM | SYSTOLIC BLOOD PRESSURE: 117 MMHG

## 2024-10-12 LAB
ANION GAP SERPL CALCULATED.3IONS-SCNC: 9 MMOL/L (ref 7–15)
BUN SERPL-MCNC: 23.9 MG/DL (ref 8–23)
CALCIUM SERPL-MCNC: 9.3 MG/DL (ref 8.8–10.4)
CHLORIDE SERPL-SCNC: 104 MMOL/L (ref 98–107)
CREAT SERPL-MCNC: 1.04 MG/DL (ref 0.51–0.95)
DIGOXIN SERPL-MCNC: 0.8 NG/ML (ref 0.6–2)
EGFRCR SERPLBLD CKD-EPI 2021: 56 ML/MIN/1.73M2
GLUCOSE BLDC GLUCOMTR-MCNC: 130 MG/DL (ref 70–99)
GLUCOSE BLDC GLUCOMTR-MCNC: 131 MG/DL (ref 70–99)
GLUCOSE BLDC GLUCOMTR-MCNC: 144 MG/DL (ref 70–99)
GLUCOSE BLDC GLUCOMTR-MCNC: 168 MG/DL (ref 70–99)
GLUCOSE SERPL-MCNC: 119 MG/DL (ref 70–99)
HCO3 SERPL-SCNC: 25 MMOL/L (ref 22–29)
MAGNESIUM SERPL-MCNC: 2.2 MG/DL (ref 1.7–2.3)
POTASSIUM SERPL-SCNC: 4.6 MMOL/L (ref 3.4–5.3)
SODIUM SERPL-SCNC: 138 MMOL/L (ref 135–145)

## 2024-10-12 PROCEDURE — 250N000013 HC RX MED GY IP 250 OP 250 PS 637: Performed by: HOSPITALIST

## 2024-10-12 PROCEDURE — 250N000013 HC RX MED GY IP 250 OP 250 PS 637: Performed by: INTERNAL MEDICINE

## 2024-10-12 PROCEDURE — 99239 HOSP IP/OBS DSCHRG MGMT >30: CPT | Performed by: HOSPITALIST

## 2024-10-12 PROCEDURE — 250N000013 HC RX MED GY IP 250 OP 250 PS 637: Performed by: NURSE PRACTITIONER

## 2024-10-12 PROCEDURE — 36415 COLL VENOUS BLD VENIPUNCTURE: CPT | Performed by: HOSPITALIST

## 2024-10-12 PROCEDURE — 250N000013 HC RX MED GY IP 250 OP 250 PS 637

## 2024-10-12 PROCEDURE — 83735 ASSAY OF MAGNESIUM: CPT | Performed by: HOSPITALIST

## 2024-10-12 PROCEDURE — 80048 BASIC METABOLIC PNL TOTAL CA: CPT | Performed by: HOSPITALIST

## 2024-10-12 PROCEDURE — 80162 ASSAY OF DIGOXIN TOTAL: CPT | Performed by: HOSPITALIST

## 2024-10-12 RX ORDER — IBUPROFEN 200 MG
200 TABLET ORAL 2 TIMES DAILY PRN
COMMUNITY
Start: 2024-10-12

## 2024-10-12 RX ORDER — METOPROLOL SUCCINATE 100 MG/1
100 TABLET, EXTENDED RELEASE ORAL 2 TIMES DAILY
Qty: 60 TABLET | Refills: 0 | Status: SHIPPED | OUTPATIENT
Start: 2024-10-12

## 2024-10-12 RX ORDER — DIGOXIN 125 MCG
125 TABLET ORAL DAILY
Qty: 30 TABLET | Refills: 0 | Status: SHIPPED | OUTPATIENT
Start: 2024-10-12

## 2024-10-12 RX ORDER — GLIPIZIDE 2.5 MG/1
2.5 TABLET, EXTENDED RELEASE ORAL DAILY
COMMUNITY
Start: 2024-10-12

## 2024-10-12 RX ORDER — FUROSEMIDE 20 MG/1
TABLET ORAL
Qty: 90 TABLET | Refills: 0 | Status: SHIPPED | OUTPATIENT
Start: 2024-10-12

## 2024-10-12 RX ORDER — POTASSIUM CHLORIDE 1500 MG/1
40 TABLET, EXTENDED RELEASE ORAL 2 TIMES DAILY
Qty: 120 TABLET | Refills: 0 | Status: SHIPPED | OUTPATIENT
Start: 2024-10-12

## 2024-10-12 RX ORDER — ACETAMINOPHEN 325 MG/1
650 TABLET ORAL EVERY 4 HOURS PRN
COMMUNITY
Start: 2024-10-12

## 2024-10-12 RX ADMIN — ATORVASTATIN CALCIUM 40 MG: 40 TABLET, FILM COATED ORAL at 11:15

## 2024-10-12 RX ADMIN — HYDRALAZINE HYDROCHLORIDE 50 MG: 50 TABLET ORAL at 01:13

## 2024-10-12 RX ADMIN — APIXABAN 5 MG: 5 TABLET, FILM COATED ORAL at 11:13

## 2024-10-12 RX ADMIN — HYDRALAZINE HYDROCHLORIDE 50 MG: 50 TABLET ORAL at 11:19

## 2024-10-12 RX ADMIN — METOPROLOL SUCCINATE 100 MG: 100 TABLET, EXTENDED RELEASE ORAL at 11:10

## 2024-10-12 RX ADMIN — EMPAGLIFLOZIN 10 MG: 10 TABLET, FILM COATED ORAL at 11:13

## 2024-10-12 RX ADMIN — DIGOXIN 125 MCG: 125 TABLET ORAL at 11:15

## 2024-10-12 RX ADMIN — MELATONIN TAB 3 MG 3 MG: 3 TAB at 02:07

## 2024-10-12 RX ADMIN — SACUBITRIL AND VALSARTAN 1 TABLET: 24; 26 TABLET, FILM COATED ORAL at 11:17

## 2024-10-12 RX ADMIN — Medication 2 G: at 11:22

## 2024-10-12 RX ADMIN — POTASSIUM CHLORIDE 40 MEQ: 1500 TABLET, EXTENDED RELEASE ORAL at 11:13

## 2024-10-12 RX ADMIN — FUROSEMIDE 40 MG: 40 TABLET ORAL at 11:17

## 2024-10-12 RX ADMIN — FLUTICASONE FUROATE 1 PUFF: 200 POWDER RESPIRATORY (INHALATION) at 11:22

## 2024-10-12 RX ADMIN — FAMOTIDINE 20 MG: 20 TABLET, FILM COATED ORAL at 11:10

## 2024-10-12 ASSESSMENT — ACTIVITIES OF DAILY LIVING (ADL)
ADLS_ACUITY_SCORE: 42

## 2024-10-12 NOTE — PLAN OF CARE
A&O x 4. VSS. RA. Tele: Afib CVR. Denies pain/SOB/CP. Up SBA. K of 4.6, Mag of 2.2, no replacement required. Pt discharged to home. Discharge instructions & medications given to pt & family member. Questions were answered, no new concerns reported. Pt escorted to door six via wheelchair & hospital staff.

## 2024-10-12 NOTE — ED PROVIDER NOTES
MD Notification    Notified Person: MD    Notified Person Name: Candido Zazueta    Notification Date/Time: 10/11/2024 2233    Notification Interaction: vocera    Purpose of Notification: Pt loss of IV access. Hard stick. Pt will be discharging tomorrow. All meds PO. Pt is on tele. Pt requesting no IV placed. Is this Ok?    Orders Received: Ok for no PIV.     Comments:

## 2024-10-12 NOTE — PLAN OF CARE
Goal Outcome Evaluation:        COGNITION/MENTATION: A/O x 4   NEURO/CMS: BLE +2 edema.   CARDIAC/TELE: A-fib CVR   RESPIRATORY: LS diminished. On RA. Home CPAP at HS w/3L bled in.  GI: Obese. BS+.   : No complaints per pt.   PAIN: Denies  SKIN: Intact   DRAINS/LINES: loss of PIV, MD aware.   ACTIVITY: SBA walker   DIET: Mod carb, 2gm Na     B, 168.

## 2024-10-12 NOTE — DISCHARGE SUMMARY
[facility]  Hospitalist Discharge Summary      Date of Admission:  9/30/2024  Date of Discharge:  10/12/2024  Discharging Provider: Garrett Dai MD  Discharge Service: Hospitalist Service    Discharge Diagnoses   Acute HFpEF exacerbation (EF 50% from 60-65% 7/2024)  Atrial flutter, new diagnosis   Left atrial appendage thrombus on NICOLA (10/3/24)  Acute kidney injury, resolved  Hypokalemia, resolved  Hypomagnesemia, resolved  Diabetes mellitus, type II  DYLAN, suspected OHS   Hyperlipidemia   Asthma   Chronic anemia, stable  GERD  Primary OA of b/l knees    Clinically Significant Risk Factors          Follow-ups Needed After Discharge   Follow-up Appointments     Follow-up and recommended labs and tests       Follow up with primary care provider, Carli Nayak, within 7 days to   evaluate medication change and for hospital follow up.  The following   labs/tests are recommended: CBC and BMP.  Follow up with pulmonology in 2-3 months.  Their clinic will contact you   to arrange an appointment.            Discharge Disposition   Discharged to home  Condition at discharge: Stable    Hospital Course   Agatha Rodriguez is a 74 year old woman with PMH of severe morbid obesity, DYLAN, asthma, hypertension, and chronic diastolic CHF, among others; who presented with progressive dyspnea, anasarca, acute palpitations and was found to have HF exacerbation and new onset atrial flutter.  She was admitted to the hospitalist service for further evaluation and management.     Acute HFpEF exacerbation (EF 50% from 60-65% 7/2024)  Atrial flutter, new diagnosis   Left atrial appendage thrombus on NICOLA (10/3/24)  Presented with worsening SOB, hypervolemia without significant hypoxia and minimal response to increasing home diuretics.  Admission weight 376lb, dry weight ~355lb.  BNP elevated with trace b/l effusions noted on CXR.  Atrial flutter noted on EKG, which represented a new diagnosis.  Symptoms consistent  with acute HFpEF exacberation, possibly in setting of tachyarrhythmia vs worsening hypertension vs other.  TSH normal, consistently wears home BiPAP at night.  Admitted to inpatient.  Cardiology consulted, appreciate their assistance.  Diuresed with IV Lasix.  Creatinine trended up and diuretics were held on 10/5/2024.  Renal function improved and diuretics restarted on 10/6/2024.  IV Lasix dose increased by cardiology on 10/9/2024.  Transitioned to oral lasix on afternoon of 10/10/2024.  New oral lasix dose is 40 mg in AM and 20 mg in afternoon.  Status post NICOLA on 10/3/2024 which showed VIVIANE clot, therefore unable to perform DCCV.  Plan for outpatient NICOLA with possible cardioversion in 4 weeks.  Monitored intake and output and daily weights.  Weight was down to 345 pounds on 10/11/2024, no weight on the day of discharge.  [id number] of 5.  Initially on IV heparin, transitioned to Eliquis on 10/9/2024, continue the same upon discharge.  Digoxin added on 10/9/2024 due to suboptimal rate control.  Digoxin level in AM on 10/12/2024 was within therapeutic range at 0.8.  Switched from losartan to Entresto on 10/9/2024 per cardiology.  Jardiance started on 10/9/2024 per cardiology.  PTA carvedilol switched to Toprol XL per cardiology.  Continue PTA atorvastatin and hydralazine.  PTA aspirin discontinued when she was started on Eliquis.  PTA HCTZ/triamterene discontinued in setting of medication adjustments as noted above.  Pulmonology consulted, recommending outpatient follow up in pulmonology clinic in 2-3 months.  OT consulted, recommending home care OT after discharge, however patient declined home care and would like to follow up with outpatient therapies, referrals placed.  Weight trending down (345 pounds on 10/11/2024) and symptoms improved.  She feels ready for discharge home.  Discharge home today.  Follow up with PCP, cardiology, and pulmonology as directed.  Discussed reasons to seek medical attention prior  to follow up appointments.  Plan of care discussed with patient and her niece on the day of discharge, they were in agreement with the discharge plan.    Acute kidney injury, resolved  Hypokalemia, resolved  Hypomagnesemia, resolved  Creatinine 1.3 on admission, baseline around 0.8-0.9.  Initially suspected cardiorenal component that improved with diuresis, however, recurrent rise in creatinine after several days of furosemide administration.  Diuretic was held on 10/5 with improvement in renal function.  Subsequently restarted on IV Lasix twice daily.  Creatinine stable at 1.04 on 10/12/2024.  Recheck BMP as outpatient follow up.     Diabetes mellitus, type II  Most recent A1c was 8.0 in 6/28/2024. PTA regimen includes glipizide 2.5 and metformin 500mg.  Held PTA glipizide and metformin while in the hospital.  Resume PTA metformin upon discharge.  Hold PTA glipizide until follow up with PCP, concern for potential hypoglycemia with addition of Jardiance for CHF as noted above.  Jardiance added by Cardiology for CHF on 10/9/2024 as noted above, continue the same.  Diabetic diet.  Follow up with PCP.     DYLAN, suspected OHS   Continue home BiPAP per home settings.    Hyperlipidemia   Continue PTA atorvastatin.    Asthma   Continue PTA Arnuity Ellipta and PRN albuterol.    Chronic anemia, stable  Continue PTA iron supplement.    GERD  Continue PTA pepcid.    Primary OA of b/l knees  Noted.    Consultations This Hospital Stay   CARE MANAGEMENT / SOCIAL WORK IP CONSULT  CORE CLINIC EVALUATION IP CONSULT  OCCUPATIONAL THERAPY ADULT IP CONSULT  CARDIOLOGY IP CONSULT  PHARMACY IP CONSULT  VASCULAR ACCESS ADULT IP CONSULT  SPIRITUAL HEALTH SERVICES IP CONSULT  PHARMACY LIAISON FOR MEDICATION COVERAGE CONSULT  PHARMACY LIAISON FOR MEDICATION COVERAGE CONSULT  PULMONARY IP CONSULT    Code Status   Full Code    Time Spent on this Encounter   I, Garrett Dai MD, personally saw the patient today and spent greater than 30  minutes discharging this patient.       Garrett Dai MD  [facility]  [address]  [place] [address]  Phone: [phone]  ______________________________________________________________________    Physical Exam   Vital Signs: Temp: 98.2  F (36.8  C) Temp src: Oral BP: (!) 117/99 Pulse: 74   Resp: 18 SpO2: 98 % O2 Device: None (Room air)    Weight: 345 lbs 14.4 oz  Constitutional: awake, alert, cooperative, no apparent distress, laying in the hospital bed  Respiratory: no increased work of breathing, clear to auscultation bilaterally, no crackles or wheezing  Cardiovascular: irregular rhythm, normal rate, normal S1 and S2, no murmur noted  GI: normal bowel sounds, soft, obesity, non-distended, non-tender  Skin: warm, dry  Musculoskeletal: no lower extremity pitting edema present  Neurologic: awake, alert, answers questions appropriately, moves all extremities       Primary Care Physician   Carli Nayak    Discharge Orders      CT Chest w/o Contrast     Basic metabolic panel     Follow-Up with Cardiology CARLY      Follow-Up with Cardiology CARLY      Occupational Therapy  Referral      Physical Therapy  Referral      Reason for your hospital stay    Acute heart failure with preserved ejection fraction, atrial flutter (new diagnosis), left atrial appendage thrombus noted on NICOLA.     Follow-up and recommended labs and tests     Follow up with primary care provider, Carli Nayak, within 7 days to evaluate medication change and for hospital follow up.  The following labs/tests are recommended: CBC and BMP.  Follow up with pulmonology in 2-3 months.  Their clinic will contact you to arrange an appointment.     Activity    Your activity upon discharge: activity as tolerated with walker     Cardioversion    Patient Instructions:   NPO for 8 hours prior to the procedure except for sips of water with medications.   Continue daily Warfarin (Coumadin)  if on the medication   Hold insulin or oral diabetic medications morning of procedure. May take   dose long acting insulin. Follow further instructions of primary physician.    required     Transesophageal Echocardiogram    Administration of IV contrast will be tailored to this examination per the appropriate written protocol listed in the Echocardiography department Protocol Book, or by the supervising Cardiologist. This may result in an order change.    Use of contrast is at the discretion of the supervising Cardiologist.     Diet    Follow this diet upon discharge: Moderate Consistent Carb (60 g CHO per Meal) Diet; 2 gram sodium Diet     Significant Results and Procedures   Most Recent 3 CBC's:  Recent Labs   Lab Test 10/10/24  0602 10/07/24  0532 10/05/24  0546   WBC 8.1 7.8 7.8   HGB 10.7* 9.6* 9.3*   MCV 81 81 83    225 248     Most Recent 3 BMP's:  Recent Labs   Lab Test 10/12/24  0733 10/12/24  0548 10/12/24  0202 10/11/24  0803 10/11/24  0546 10/10/24  0830 10/10/24  0602   NA  --  138  --   --  142  --  141   POTASSIUM  --  4.6  --   --  4.5  --  4.7   CHLORIDE  --  104  --   --  105  --  104   CO2  --  25  --   --  25  --  28   BUN  --  23.9*  --   --  20.7  --  20.5   CR  --  1.04*  --   --  1.11*  --  1.09*   ANIONGAP  --  9  --   --  12  --  9   AVNI  --  9.3  --   --  9.4  --  9.8   * 119* 168*   < > 113*   < > 119*    < > = values in this interval not displayed.     Results for orders placed or performed during the hospital encounter of 24   Echocardiogram Complete     Value    LVEF  50%    Narrative    [id number]  [id number]  [id number]  [id number]^SARAH^TAMEKA^ANIRUDH     [facility]  Echocardiography Laboratory  [address] [place] [id number]     Name: ION KIMBROUGH  MRN: [id number]  : 1950  Study Date: 10/01/2024 02:57 PM  Age: 74 yrs  Gender: Female  Patient Location: [facility]  Reason For Study: Atrial Fibrillation  Ordering Physician:  TAMEKA SMITH  Referring Physician: Radha Earl  Performed By: John Paul Jaimes     BSA: 2.6 m2  Height: 66 in  Weight: 375 lb  HR: 82  BP: 170/116 mmHg  ______________________________________________________________________________  Procedure  Complete Portable Echo Adult. Optison (NDC #0815-1716) given intravenously.  ______________________________________________________________________________  Interpretation Summary     1. The left ventricle is normal in size. The visual ejection fraction is  estimated at 50%.  2. The right ventricle is normal in structure, function and size.  3. No valve disease.     Echo 7-28-24 showed EF 60%.  ______________________________________________________________________________  Left Ventricle  The left ventricle is normal in size. There is mild concentric left  ventricular hypertrophy. The visual ejection fraction is estimated at 50%.  Left ventricular diastolic function is indeterminate. Normal left ventricular  wall motion.     Right Ventricle  The right ventricle is normal in structure, function and size.     Atria  The left atrium is moderately dilated. The right atrium is mildly dilated.  There is no atrial shunt seen.     Mitral Valve  There is no mitral regurgitation noted.     Tricuspid Valve  There is mild (1+) tricuspid regurgitation.     Aortic Valve  The aortic valve is normal in structure and function.     Pulmonic Valve  The pulmonic valve is normal in structure and function.     Vessels  Normal ascending, transverse (arch), and descending aorta. Dilation of the  inferior vena cava is present with abnormal respiratory variation in diameter.     Pericardium  There is no pericardial effusion.     Rhythm  The rhythm was atrial fibrillation.  ______________________________________________________________________________  MMode/2D Measurements & Calculations  IVSd: 1.4 cm     LVIDd: 5.0 cm  LVIDs: 3.5 cm  LVPWd: 1.5 cm  FS: 29.3 %  LV mass(C)d: 309.9 grams  LV  mass(C)dI: 118.6 grams/m2  Ao root diam: 3.1 cm  asc Aorta Diam: 3.3 cm  LVOT diam: 2.1 cm  LVOT area: 3.4 cm2  Ao root diam index Ht(cm/m): 1.9  Ao root diam index BSA (cm/m2): 1.2  Asc Ao diam index BSA (cm/m2): 1.3  Asc Ao diam index Ht(cm/m): 2.0  LA Volume (BP): 146.0 ml     LA Volume Index (BP): 55.9 ml/m2  RV Base: 4.3 cm  RWT: 0.62  TAPSE: 2.1 cm     Doppler Measurements & Calculations  MV E max khang: 144.0 cm/sec  MV dec slope: 742.8 cm/sec2  MV dec time: 0.19 sec  Ao V2 max: 167.0 cm/sec  Ao max P.0 mmHg  Ao V2 mean: 113.0 cm/sec  Ao mean P.0 mmHg  Ao V2 VTI: 28.4 cm  KRISTA(I,D): 1.7 cm2  KRISTA(V,D): 1.5 cm2  LV V1 max P.1 mmHg  LV V1 max: 72.2 cm/sec  LV V1 VTI: 14.2 cm  SV(LVOT): 48.3 ml  SI(LVOT): 18.5 ml/m2  PA acc time: 0.12 sec  TR max khang: 218.5 cm/sec  TR max P.2 mmHg  AV Khang Ratio (DI): 0.43  KRISTA Index (cm2/m2): 0.65     E/E' av.3  Lateral E/e': 26.5  Medial E/e': 18.1  RV S Khang: 14.0 cm/sec     ______________________________________________________________________________  Report approved by: Ashley Ruiz 10/01/2024 04:48 PM         Transesophageal Echocardiogram     Value    LVEF  30-35%    [facility]    [id number]  [id number]  [id number]^KAYLENE^LAVINIA     [facility]  Echocardiography Laboratory  [address] [place] [address]     Name: ION KIMBROUGH  MRN: [id number]  : 1950  Study Date: 10/03/2024 02:54 PM  Age: 74 yrs  Gender: Female  Patient Location: [facility] [address]  Reason For Study: Afib  Ordering Physician: LAVINIA MAURICE  Referring Physician: Carli Nayak  Performed By: Uriel Neal RDCS     BSA: 2.6 m2  Height: 66 in  Weight: 367 lb  HR: 106  BP: 122/82 mmHg  ______________________________________________________________________________  Procedure  Complete Portable NICOLA Adult. NICOLA Probe serial #[id number] was used during the  procedure. The heart rate, respiratory rate and response to care were  monitored throughout the  procedure with the assistance of the nurse.  ______________________________________________________________________________  Interpretation Summary     The rhythm was atrial fibrillation.  There is moderate concentric left ventricular hypertrophy.  The visual ejection fraction is 30-35%.  The right ventricular systolic function is moderate to severely reduced.  There is mild-moderate biatrial enlargement.  There is mild (1+) mitral regurgitation.  There is a thrombus seen in the tip of left atrial appendage. Severe  spontaneous echo contrast present.  ______________________________________________________________________________  NICOLA  Sedation, endotracheal intubation, and mechanical ventilation were initiated  prior to the NICOLA and were monitored by anesthesia. Sedation was administered  and monitored by anesthesia. The NICOLA probe was inserted prior to our arrival  by anesthesia.     Left Ventricle  The left ventricle is normal in size. There is moderate concentric left  ventricular hypertrophy. The visual ejection fraction is 30-35%.     Right Ventricle  The right ventricle is mildly dilated. The right ventricular systolic function  is moderate to severely reduced.     Atria  The left atrium is mildly dilated. There is mild-moderate biatrial  enlargement. A contrast injection (Bubble Study) was performed that was  negative for flow across the interatrial septum. There is a thrombus seen in  the left atrial appendage.     Mitral Valve  The mitral valve leaflets are mildly thickened. There is mild (1+) mitral  regurgitation.     Tricuspid Valve  Normal tricuspid valve. There is mild (1+) tricuspid regurgitation.     Aortic Valve  There is trivial trileaflet aortic sclerosis.     Pulmonic Valve  Normal pulmonic valve.     Vessels  The aortic root is normal size. Mild atherosclerotic plaque(s) in the aortic  arch.     Pericardial/Pleural  There is no pericardial effusion.     Rhythm  The rhythm was atrial  fibrillation.  ______________________________________________________________________________  Report approved by: Ashley Davidson 10/03/2024 05:00 PM     ______________________________________________________________________________        Discharge Medications   Current Discharge Medication List        START taking these medications    Details   acetaminophen (TYLENOL) 325 MG tablet Take 2 tablets (650 mg) by mouth every 4 hours as needed for mild pain.    Associated Diagnoses: Pain      apixaban ANTICOAGULANT (ELIQUIS) 5 MG tablet Take 1 tablet (5 mg) by mouth 2 times daily.  Qty: 60 tablet, Refills: 0    Associated Diagnoses: Typical atrial flutter (H)      digoxin (LANOXIN) 125 MCG tablet Take 1 tablet (125 mcg) by mouth daily.  Qty: 30 tablet, Refills: 0    Associated Diagnoses: Typical atrial flutter (H)      empagliflozin (JARDIANCE) 10 MG TABS tablet Take 1 tablet (10 mg) by mouth daily.  Qty: 90 tablet, Refills: 1    Associated Diagnoses: Acute diastolic heart failure (H)      melatonin 3 MG tablet Take 1 tablet (3 mg) by mouth nightly as needed for sleep.    Associated Diagnoses: Insomnia, unspecified type      metoprolol succinate ER (TOPROL XL) 100 MG 24 hr tablet Take 1 tablet (100 mg) by mouth 2 times daily.  Qty: 60 tablet, Refills: 0    Associated Diagnoses: Typical atrial flutter (H)      potassium chloride liv ER (KLOR-CON M20) 20 MEQ CR tablet Take 2 tablets (40 mEq) by mouth 2 times daily.  Qty: 120 tablet, Refills: 0    Associated Diagnoses: Hypokalemia      sacubitril-valsartan (ENTRESTO) 24-26 MG per tablet Take 1 tablet by mouth 2 times daily.  Qty: 60 tablet, Refills: 0    Associated Diagnoses: Acute diastolic heart failure (H)           CONTINUE these medications which have CHANGED    Details   furosemide (LASIX) 20 MG tablet Take 2 tablets (40 mg) by mouth every morning and 1 tablet (20 mg) by mouth every afternoon (around 2:00 pm).  Qty: 90 tablet, Refills: 0    Associated Diagnoses:  Benign essential hypertension      glipiZIDE (GLUCOTROL XL) 2.5 MG 24 hr tablet Take 1 tablet (2.5 mg) by mouth daily. --- 10/12/2024 --- HOLD until follow up with primary care provider. ---      ibuprofen (ADVIL/MOTRIN) 200 MG tablet Take 1 tablet (200 mg) by mouth 2 times daily as needed for inflammatory pain, mild pain or other (headache).    Associated Diagnoses: Pain           CONTINUE these medications which have NOT CHANGED    Details   albuterol (PROAIR HFA/PROVENTIL HFA/VENTOLIN HFA) 108 (90 Base) MCG/ACT inhaler Inhale 2 puffs into the lungs every 6 hours as needed for shortness of breath, wheezing or cough      albuterol (PROVENTIL) (2.5 MG/3ML) 0.083% neb solution Take 1 vial (2.5 mg) by nebulization every 4 hours as needed for shortness of breath, wheezing or cough  Qty: 90 mL, Refills: 0    Associated Diagnoses: Exacerbation of asthma, unspecified asthma severity, unspecified whether persistent      atorvastatin (LIPITOR) 40 MG tablet Take 40 mg by mouth daily      Docusate Calcium (STOOL SOFTENER PO) Take 1 tablet by mouth daily as needed.      ferrous sulfate (FEROSUL) 325 (65 Fe) MG tablet Take 325 mg by mouth Every Mon, Wed, Fri Morning.      fluticasone (ARNUITY ELLIPTA) 200 MCG/ACT inhaler Inhale 1 puff into the lungs daily      hydrALAZINE (APRESOLINE) 50 MG tablet Take 1 tablet (50 mg) by mouth every 8 hours  Qty: 90 tablet, Refills: 0    Associated Diagnoses: Benign essential hypertension      metFORMIN (GLUCOPHAGE) 500 MG tablet Take 500 mg by mouth 2 times daily (with meals)      Multiple Vitamin (MULTIVITAMIN OR) Take 1 tablet by mouth daily    Associated Diagnoses: Chronic ischemic heart disease, unspecified; Hypertension; Other dyspnea and respiratory abnormality      nitroGLYCERIN (NITROSTAT) 0.4 MG SL tablet Place 1 tablet under the tongue every 5 minutes as needed for chest pain.  Qty: 90 tablet, Refills: 12    Associated Diagnoses: Chronic ischemic heart disease, unspecified;  Hypertension; Other dyspnea and respiratory abnormality      famotidine (PEPCID) 20 MG tablet Take 1 tablet (20 mg) by mouth daily.  Qty: 30 tablet, Refills: 0      !! Respiratory Therapy Supplies (NEBULIZER COMPRESSOR) KIT 1 each 4 times daily  Qty: 1 kit, Refills: 0    Associated Diagnoses: Asthma with acute exacerbation, unspecified asthma severity      !! Respiratory Therapy Supplies (NEBULIZER/ADULT MASK) KIT 1 each 4 times daily  Qty: 1 kit, Refills: 0    Associated Diagnoses: Asthma with acute exacerbation, unspecified asthma severity       !! - Potential duplicate medications found. Please discuss with provider.        STOP taking these medications       aspirin 81 MG tablet Comments:   Reason for Stopping:         carvedilol (COREG) 25 MG tablet Comments:   Reason for Stopping:         losartan (COZAAR) 100 MG tablet Comments:   Reason for Stopping:         potassium chloride (KLOR-CON) 20 MEQ packet Comments:   Reason for Stopping:         potassium chloride ER (MICRO-K) 10 MEQ CR capsule Comments:   Reason for Stopping:         triamterene-HCTZ (MAXZIDE) 75-50 MG tablet Comments:   Reason for Stopping:             Allergies   No Known Allergies

## 2024-10-12 NOTE — PROGRESS NOTES
Care Management Discharge Note    Discharge Date: 10/12/2024       Discharge Disposition: Home, Outpatient Rehab (PT, OT, SLP, Cardiac or Pulmonary)    Discharge Services: PCA    Discharge DME:      Discharge Transportation: car, drives self    Private pay costs discussed: Not applicable    Does the patient's insurance plan have a 3 day qualifying hospital stay waiver?  No    PAS Confirmation Code:    Patient/family educated on Medicare website which has current facility and service quality ratings:      Education Provided on the Discharge Plan: Yes  Persons Notified of Discharge Plans: bedside RN  Patient/Family in Agreement with the Plan: yes    Handoff Referral Completed: No, handoff not indicated or clinically appropriate    Additional Information:  Pt to discharge home with outpatient therapies (per patient's request).  Cardiology follow up already arranged and added to AVS.    Jazzmine Jacques RN Care Coordinator  [facility]  [phone]

## 2024-10-14 ENCOUNTER — TELEPHONE (OUTPATIENT)
Dept: CARDIOLOGY | Facility: CLINIC | Age: 74
End: 2024-10-14
Payer: MEDICARE

## 2024-10-14 NOTE — PLAN OF CARE
Occupational Therapy Discharge Summary    Reason for therapy discharge:    Discharged to home with outpatient therapy.    Progress towards therapy goal(s). See goals on Care Plan in [facility] electronic health record for goal details.  Goals partially met.  Barriers to achieving goals:   discharge from facility.    Therapy recommendation(s):    Continued therapy is recommended.  Rationale/Recommendations:  Per prior OT recommendation; Pt below baseline in ADLS. Limited by decreased activity tolerance, shortness of breath, and decreased IND in ADLs. Anticipate w/ medical management and IP OT, home with assist for all I/ADLs. Pt would benefit from home OT and PT, however, pt prefers OP therapy..

## 2024-10-14 NOTE — TELEPHONE ENCOUNTER
Patient was admitted to [facility] on 9/30/24 with progressive dyspnea and anasarca, with acute palpitations; and is found to have suspected acute diastolic CHF exacerbation and new onset atrial flutter, as well as ULYSSES.    PMH:  hypertension, asthma, DYLAN, CAD, GERD.    10/1/24: Echo showed EF of 50%. The right ventricle is normal in structure, function and size. No valve disease. Likely tachy mediated. Will need ischemic evaluation as outpatient.     10/3/24: NICOLA showed EF of 30% with VIVIANE thrombus on anticoagulation, therefore unable to perform DCCV. Plan for outpatient NICOLA with possible cardioversion in 4 weeks.    IV Lasix diuresed 19 lbs.    Pt was started on Eliquis, Metoprolol, Digoxin, KCL, Jardiance, Lasix and Entresto. PTA ASA, Coreg, Cozaar and Maxzide were discontinued at time of discharge.    Pt is scheduled for an OV on 10/22/24 at 1110 with CARLY Jose Guadalupe Hernandez at our [place] Office. Scheduled on 11/4/24 for NICOLA at 0730, followed with a DCCV at 1030 in [place].    Writer attempted to call pt for a cardiology post discharge phone call, but no answer. VM left to return my call. GALE Phan RN.

## 2024-10-15 ENCOUNTER — PATIENT OUTREACH (OUTPATIENT)
Dept: CARE COORDINATION | Facility: CLINIC | Age: 74
End: 2024-10-15
Payer: MEDICARE

## 2024-10-15 NOTE — PROGRESS NOTES
[facility]    Background: Transitional Care Management program identified per system criteria and reviewed by [facility] team for possible outreach.    Assessment: Upon chart review, CCR Team member will not proceed with patient outreach related to this episode of Transitional Care Management program due to reason below:    Patient has active communication with a nurse, provider or care team for reason of post-hospital follow up plan.  Outreach call by CCR team not indicated to minimize duplicative efforts.     Plan: Transitional Care Management episode addressed appropriately per reason noted above.        ENOC Silver  [phone]  [facility]

## 2024-10-17 ENCOUNTER — TELEPHONE (OUTPATIENT)
Dept: PULMONOLOGY | Facility: CLINIC | Age: 74
End: 2024-10-17
Payer: MEDICARE

## 2024-10-17 NOTE — TELEPHONE ENCOUNTER
Called pt no answer. Spoke with RENAE. RENAE informed that the pt would like to see her pulmonologist at [facility] instead.

## 2024-10-29 ENCOUNTER — HOSPITAL ENCOUNTER (OUTPATIENT)
Facility: CLINIC | Age: 74
End: 2024-10-29
Payer: MEDICARE

## 2024-11-12 ENCOUNTER — TELEPHONE (OUTPATIENT)
Dept: CARDIOLOGY | Facility: CLINIC | Age: 74
End: 2024-11-12
Payer: MEDICARE

## 2024-11-12 NOTE — TELEPHONE ENCOUNTER
[facility] Call Center    Phone Message    May a detailed message be left on voicemail: yes     Reason for Call: Other: Pt would like a call back to schedule a cardioversion in [place]     Action Taken: Other: Cardio    Travel Screening: Not Applicable     Date of Service:

## 2024-11-18 NOTE — TELEPHONE ENCOUNTER
[facility] Call Center    Phone Message    May a detailed message be left on voicemail: yes     Reason for Call: Other: Patient called back to schedule the h and p with the CARLY. Next available is 12/27/24. Patient is scheduled, but is concerned that this is to far out. She states she is having weight gain. Please call her back or the PCA to further discuss.      Action Taken: Other: cardiology    Travel Screening: Not Applicable   Thank you!  [facility]    Date of Service:

## 2024-11-21 NOTE — TELEPHONE ENCOUNTER
1st attempt- Left voicemail for the patient to call back and schedule the following:    Appointment type:  Return Cardiology  Provider:  General CARLY or NP  Return date:  Next available- Anabel Carroll has 2 openings on 11/22 at the time of this call  Additional appointment(s) needed:  NA  Additonal Notes:  Pt has also been added to the waitlist  Specialty phone number: [phone]    Kelli Wei

## 2024-11-22 ENCOUNTER — OFFICE VISIT (OUTPATIENT)
Dept: CARDIOLOGY | Facility: CLINIC | Age: 74
End: 2024-11-22
Attending: NURSE PRACTITIONER
Payer: MEDICARE

## 2024-11-22 VITALS
DIASTOLIC BLOOD PRESSURE: 84 MMHG | OXYGEN SATURATION: 99 % | BODY MASS INDEX: 58.42 KG/M2 | RESPIRATION RATE: 16 BRPM | HEART RATE: 60 BPM | WEIGHT: 293 LBS | SYSTOLIC BLOOD PRESSURE: 123 MMHG

## 2024-11-22 DIAGNOSIS — I10 BENIGN ESSENTIAL HYPERTENSION: ICD-10-CM

## 2024-11-22 DIAGNOSIS — I48.3 TYPICAL ATRIAL FLUTTER (H): ICD-10-CM

## 2024-11-22 DIAGNOSIS — I48.91 ATRIAL FIBRILLATION, UNSPECIFIED TYPE (H): ICD-10-CM

## 2024-11-22 DIAGNOSIS — I50.41 ACUTE COMBINED SYSTOLIC AND DIASTOLIC HEART FAILURE (H): Primary | ICD-10-CM

## 2024-11-22 DIAGNOSIS — I50.32 CHRONIC HEART FAILURE WITH PRESERVED EJECTION FRACTION (HFPEF) (H): ICD-10-CM

## 2024-11-22 PROCEDURE — G2211 COMPLEX E/M VISIT ADD ON: HCPCS | Performed by: NURSE PRACTITIONER

## 2024-11-22 PROCEDURE — 99204 OFFICE O/P NEW MOD 45 MIN: CPT | Performed by: NURSE PRACTITIONER

## 2024-11-22 PROCEDURE — 93000 ELECTROCARDIOGRAM COMPLETE: CPT | Performed by: NURSE PRACTITIONER

## 2024-11-22 RX ORDER — METOLAZONE 2.5 MG/1
2.5 TABLET ORAL ONCE
Qty: 1 TABLET | Refills: 0 | Status: SHIPPED | OUTPATIENT
Start: 2024-11-22 | End: 2024-11-25

## 2024-11-22 RX ORDER — FUROSEMIDE 40 MG/1
40 TABLET ORAL 2 TIMES DAILY
Qty: 180 TABLET | Refills: 1 | Status: SHIPPED | OUTPATIENT
Start: 2024-11-22

## 2024-11-22 RX ORDER — HYDRALAZINE HYDROCHLORIDE 50 MG/1
25 TABLET, FILM COATED ORAL 2 TIMES DAILY
Qty: 90 TABLET | Refills: 1 | Status: SHIPPED | OUTPATIENT
Start: 2024-11-22

## 2024-11-22 NOTE — PROGRESS NOTES
Cardiology Clinic Follow up     Agatha Rodriguez MRN# [id number]   YOB: 1950 Age: 74 year old     Primary cardiologist: Dr. Rainey (initial hospital consult)     Reason for visit: Post hospital follow up     History of presenting illness:    Agatha Rodriguez is a 74 year old female with past medical history significant for hypertension, type II diabetes (A1c 6.8%), morbid obesity BMI 58, and DYLAN compliant on bipap who was recently admitted on 9/30/2024-10/12/2024 with progressive dyspnea and palpitations, found to be in new Atrial flutter with RVR and acute HFpEF exacerbation. Her EF was noted to be 30% by NICOLA along with left atrial appendage thrombus, cardioversion was cancelled. She was started on anticoagulation, diuresed and started on GDMT. Plan for rate control for atleast 4 weeks and then repeat NICOLA and cardioversion if VIVIANE thrombus resolved and consider ischemia evaluation outpatient. She discharged on 10/12/24 and presents today for follow up.     Today, Agatha presents for follow up with her PCA. She reports when she left the hospital weight was 343 lbs , weight fluctuated 2-3 lbs the first week or two but then she ran out of pills for 1 week and weight went up to 350 lbs and she got back on pills and then went up another 5-10 lbs the following week. She has had significant increased abdominal bloating and feeling like the diuretics aren't working as well. No edema in lower legs. She was watching salt closely after discharge but not as strict now. She sleeps inclined chronically for comfort and with her history of breathing problems and wears Bipap.  Gets winded with exertion, uses walker. She feels chest flutters that comes and go. No chest pain. No dizziness or weakness. No calf cramping but has knee arthritis. Is on potassium supplements after PCP started. Likely only taking hydralazine twice a day.            Assessment and Plan:     ASSESSMENT:    Acute likely combined systolic and  diastolic heart failure  -Discrepant EF by TTE and NICOLA; NICOLA read EF 30-35% with moderate RVSF. Etiology under investigation, longstanding history of HTN, possible tachy mediated with history of AFL/AF with difficulty to control rates and carries risk factors for ischemia. Plan for optimization of volume status first, then consider NICOLA/CDV and then likely pursue coronary angiogram after 4 weeks given prior abnormal stress test.   -Presenting today with increased abdominal bloating and 20 lb weight gain since hospital discharge. Suspect dry weight closer to 343 by home scale. Difficult exam given body habitus.   -Ran out of medications for a week after discharge but is back on medications. She favors trial of oral metolazone 2.5mg over the weekend, likely plan to set her up for infusion clinic early next week if availability. ER precautions reviewed if symptoms worsen over the weekend. High risk for hospital readmission.   -Creat 0.93, K 3.4 (prior to oral replacement ordered per PCP)  -Increase lasix 40mg twice daily  -Additional potassium chloride 20mEq with metolazone dose tomorrow  -Continue potassium chloride 40mEq twice daily  -Continue metoprolol [id number] twice daily   -Continue Entresto 24-26mg twice daily - consider dose increase next once euvolemic and can titrate off hydralazine if BP controlled   -Continue Jardiance 10mg daily   -Consider spironolactone next     Atrial fibrillation  Left atrial appendage thrombus   -Rate controlled, continue rate control approach until diuresed appropriately prior to repeat NICOLA assessment - would schedule with general anesthesia given BMI and DYLAN risks. If VIVIANE thrombus resolved then cardioversion attempt reasonable given possibly tachy-mediated CM however rhythm control may prove difficult long term with risk factors and mild-moderate biatrial enlargement.   -TSH is normal (10/2024)  -Continue metoprolol XL 100mg twice daily and digoxin 125mcg  -Continue Eliquis 5mg twice  daily, ensure no missed doses    HTN  -Controlled 123/84, reduce hydralazine to 25mg twice daily to allow room for diuresis  -Continue Entresto     Morbid obesity BMI 58  DYLAN on Bipap   DM type 2    Iron deficiency anemia   -Iron sat 10%; Hgb 11.8. On oral iron, likely poor absorption.  -Consider IV iron replacement if infusion clinic utilized         PLAN:     Metolazone 2.5mg tomorrow morning with additional potassium chloride 20mEq   Increase lasix to 40mg twice daily  Reduce hydralazine to 25mg twice daily  Continue other current medications  Phone check in Monday   2 week follow up requested with myself          Latricia Carroll, DNP, APRN, CNP  [facility]     Orders this Visit:  Orders Placed This Encounter   Procedures    Follow-Up with Cardiology CARLY    EKG 12-lead complete w/read - Clinics (performed today)     Orders Placed This Encounter   Medications    hydrALAZINE (APRESOLINE) 50 MG tablet     Sig: Take 0.5 tablets (25 mg) by mouth 2 times daily.     Dispense:  90 tablet     Refill:  1     Dose reduction    furosemide (LASIX) 40 MG tablet     Sig: Take 1 tablet (40 mg) by mouth 2 times daily.     Dispense:  180 tablet     Refill:  1     Dose increase    metolazone (ZAROXOLYN) 2.5 MG tablet     Sig: Take 1 tablet (2.5 mg) by mouth once for 1 dose. Extra water pill     Dispense:  1 tablet     Refill:  0     Medications Discontinued During This Encounter   Medication Reason    ibuprofen (ADVIL/MOTRIN) 200 MG tablet     hydrALAZINE (APRESOLINE) 50 MG tablet     furosemide (LASIX) 20 MG tablet      Today's clinic visit entailed:  45 minutes spent by me on the date of the encounter doing chart review, review of outside records, review of test results, interpretation of tests, patient visit, documentation, discussion with other provider(s), and discussion with family, coordination of care with CORE nurse team after visit. Personally escorted patient to [place].            Physical Exam:    Vitals: /84 (BP Location: Right arm, Patient Position: Sitting, Cuff Size: Adult Large)   Pulse 60   Resp 16   Wt (!) 164.9 kg (363 lb 8 oz)   SpO2 99%   BMI 58.42 kg/m      Body mass index is 58.42 kg/m .    Vitals:    11/22/24 1614   Weight: (!) 164.9 kg (363 lb 8 oz)       General :   Alert and oriented, in no acute distress.    Respiratory:   Respirations unlabored at rest, dyspnea with exertion. LS diminished without crackles or wheeze   Cardiovascular:   Rhythm is irregular. S1 and S2. Distant tones. JVP difficult to visualize given body habitus   GI: Abdomen is obese, bloated, non tender   Extremities: Warm and dry, Trace ankle edema   Neurologic: Moves all extremities, non focal    Psych:  Appropriate             Medications:     Current Outpatient Medications   Medication Sig Dispense Refill    acetaminophen (TYLENOL) 325 MG tablet Take 2 tablets (650 mg) by mouth every 4 hours as needed for mild pain.      albuterol (PROAIR HFA/PROVENTIL HFA/VENTOLIN HFA) 108 (90 Base) MCG/ACT inhaler Inhale 2 puffs into the lungs every 6 hours as needed for shortness of breath, wheezing or cough      albuterol (PROVENTIL) (2.5 MG/3ML) 0.083% neb solution Take 1 vial (2.5 mg) by nebulization every 4 hours as needed for shortness of breath, wheezing or cough 90 mL 0    apixaban ANTICOAGULANT (ELIQUIS) 5 MG tablet Take 1 tablet (5 mg) by mouth 2 times daily. 60 tablet 0    atorvastatin (LIPITOR) 40 MG tablet Take 40 mg by mouth daily      digoxin (LANOXIN) 125 MCG tablet Take 1 tablet (125 mcg) by mouth daily. 30 tablet 0    Docusate Calcium (STOOL SOFTENER PO) Take 1 tablet by mouth daily as needed.      empagliflozin (JARDIANCE) 10 MG TABS tablet Take 1 tablet (10 mg) by mouth daily. 90 tablet 1    ferrous sulfate (FEROSUL) 325 (65 Fe) MG tablet Take 325 mg by mouth Every Mon, Wed, Fri Morning.      fluticasone (ARNUITY ELLIPTA) 200 MCG/ACT inhaler Inhale 1 puff into the lungs daily      furosemide (LASIX) 40  MG tablet Take 1 tablet (40 mg) by mouth 2 times daily. 180 tablet 1    glipiZIDE (GLUCOTROL XL) 2.5 MG 24 hr tablet Take 1 tablet (2.5 mg) by mouth daily. --- 10/12/2024 --- HOLD until follow up with primary care provider. ---      hydrALAZINE (APRESOLINE) 50 MG tablet Take 0.5 tablets (25 mg) by mouth 2 times daily. 90 tablet 1    metFORMIN (GLUCOPHAGE) 500 MG tablet Take 500 mg by mouth 2 times daily (with meals)      metolazone (ZAROXOLYN) 2.5 MG tablet Take 1 tablet (2.5 mg) by mouth once for 1 dose. Extra water pill 1 tablet 0    metoprolol succinate ER (TOPROL XL) 100 MG 24 hr tablet Take 1 tablet (100 mg) by mouth 2 times daily. 60 tablet 0    Multiple Vitamin (MULTIVITAMIN OR) Take 1 tablet by mouth daily      potassium chloride liv ER (KLOR-CON M20) 20 MEQ CR tablet Take 2 tablets (40 mEq) by mouth 2 times daily. 120 tablet 0    Respiratory Therapy Supplies (NEBULIZER COMPRESSOR) KIT 1 each 4 times daily 1 kit 0    sacubitril-valsartan (ENTRESTO) 24-26 MG per tablet Take 1 tablet by mouth 2 times daily. 60 tablet 0    melatonin 3 MG tablet Take 1 tablet (3 mg) by mouth nightly as needed for sleep. (Patient not taking: Reported on 11/22/2024)      nitroGLYCERIN (NITROSTAT) 0.4 MG SL tablet Place 1 tablet under the tongue every 5 minutes as needed for chest pain. (Patient not taking: Reported on 11/22/2024) 90 tablet 12    Respiratory Therapy Supplies (NEBULIZER/ADULT MASK) KIT 1 each 4 times daily 1 kit 0       Family History   Problem Relation Age of Onset    Diabetes Mother     Breast Cancer Mother        Social History     Socioeconomic History    Marital status:      Spouse name: Not on file    Number of children: Not on file    Years of education: Not on file    Highest education level: Not on file   Occupational History    Not on file   Tobacco Use    Smoking status: Never    Smokeless tobacco: Never   Substance and Sexual Activity    Alcohol use: No    Drug use: No    Sexual activity: Yes    Other Topics Concern    Parent/sibling w/ CABG, MI or angioplasty before 65F 55M? Not Asked   Social History Narrative    Not on file     Social Drivers of Health     Financial Resource Strain: Low Risk  (10/1/2024)    Financial Resource Strain     Within the past 12 months, have you or your family members you live with been unable to get utilities (heat, electricity) when it was really needed?: No   Food Insecurity: Low Risk  (10/1/2024)    Food Insecurity     Within the past 12 months, did you worry that your food would run out before you got money to buy more?: No     Within the past 12 months, did the food you bought just not last and you didn t have money to get more?: No   Transportation Needs: Low Risk  (10/1/2024)    Transportation Needs     Within the past 12 months, has lack of transportation kept you from medical appointments, getting your medicines, non-medical meetings or appointments, work, or from getting things that you need?: No   Physical Activity: Not on file   Stress: Not on file   Social Connections: Not on file   Interpersonal Safety: Low Risk  (10/1/2024)    Interpersonal Safety     Do you feel physically and emotionally safe where you currently live?: Yes     Within the past 12 months, have you been hit, slapped, kicked or otherwise physically hurt by someone?: No     Within the past 12 months, have you been humiliated or emotionally abused in other ways by your partner or ex-partner?: No   Housing Stability: Low Risk  (10/1/2024)    Housing Stability     Do you have housing? : Yes     Are you worried about losing your housing?: No            Past Medical History:     Past Medical History:   Diagnosis Date    (HFpEF) heart failure with preserved ejection fraction (H) 07/2024    Asthma     CAD (coronary artery disease)     Diverticulitis of intestine without perforation or abscess 02/17/2017    GERD (gastroesophageal reflux disease)     Gout of right foot 2015    Hypertension     Morbid  obesity with BMI of 50.0-59.9, adult (H) 2024    Myocardial infarction, silent (H) 2005    Dx w silent Mi in [place] ~ 2005    Obesity     Primary osteoarthritis of both knees     Sleep apnea     uses CPAP    Type 2 diabetes mellitus (H)             Past Surgical History:     Past Surgical History:   Procedure Laterality Date    ANESTHESIA CARDIOVERSION N/A 10/3/2024    Procedure: Anesthesia cardioversion;  Surgeon: GENERIC ANESTHESIA PROVIDER;  Location: [facility] OR    HYSTERECTOMY      TRANSESOPHAGEAL ECHOCARDIOGRAM INTRAOPERATIVE N/A 10/3/2024    Procedure: Transesophageal echocardiogram intraoperative;  Surgeon: GENERIC ANESTHESIA PROVIDER;  Location:  OR            Allergies:   Patient has no known allergies.       Data Reviewed today:   Echocardiogram: 10/1/2024   Summary  1. The left ventricle is normal in size. The visual ejection fraction is  estimated at 50%.  2. The right ventricle is normal in structure, function and size.  3. No valve disease.  Echo 24 showed EF 60%.    NICOLA: 10/3/2024  Summary  The rhythm was atrial fibrillation.  There is moderate concentric left ventricular hypertrophy.  The visual ejection fraction is 30-35%.  The right ventricular systolic function is moderate to severely reduced.  There is mild-moderate biatrial enlargement.  There is mild (1+) mitral regurgitation.  There is a thrombus seen in the tip of left atrial appendage. Severe  spontaneous echo contrast present.    Stress testin24    The nuclear stress test is probably abnormal. Specificity reduced due to suboptimal study quality related to patient body habitus.    There is a small area of nontransmural infarction in the distal anteroseptal and apex segment(s) of the left ventricle associated with a small area of a mild degree of gloria-infarct ischemia.    Stress to rest cavity ratio is 1.02.  TID is absent.    Left ventricular function is low normal.    The left ventricular ejection fraction at rest is 55%.   The left ventricular ejection fraction at stress is 50%.    LV cavity size enlarged.    There is no prior study for comparison.        All laboratory data reviewed:    Recent Labs   Lab Test 10/01/24  0024 09/30/24  1824 07/28/24  1008   TSH 2.85  --  3.13   NTBNP  --  1,523*  --        Lab Results   Component Value Date    WBC 8.1 10/10/2024    WBC 8.5 05/14/2019    RBC 4.43 10/10/2024    RBC 4.08 05/14/2019    HGB 10.7 (L) 10/10/2024    HGB 11.5 (L) 05/14/2019    HCT 35.8 10/10/2024    HCT 35.7 05/14/2019    MCV 81 10/10/2024    MCV 88 05/14/2019    MCH 24.2 (L) 10/10/2024    MCH 28.2 05/14/2019    MCHC 29.9 (L) 10/10/2024    MCHC 32.2 05/14/2019    RDW 15.4 (H) 10/10/2024    RDW 13.4 05/14/2019     10/10/2024     05/14/2019       Lab Results   Component Value Date     10/12/2024     03/23/2012    POTASSIUM 4.6 10/12/2024    POTASSIUM 3.5 09/30/2024    POTASSIUM 3.6 03/23/2012    CHLORIDE 104 10/12/2024    CHLORIDE 107 09/30/2024    CHLORIDE 103 03/23/2012    CO2 25 10/12/2024    CO2 34 (H) 09/30/2024    CO2 36 (H) 03/23/2012    ANIONGAP 9 10/12/2024    ANIONGAP 3 09/30/2024    ANIONGAP 5 (L) 03/23/2012     (H) 10/12/2024     (H) 09/30/2024    GLC 99 03/23/2012    BUN 23.9 (H) 10/12/2024    BUN 15 09/30/2024    BUN 23 03/23/2012    CR 1.04 (H) 10/12/2024    CR 0.80 03/23/2012    GFRESTIMATED 56 (L) 10/12/2024    GFRESTIMATED 73 03/23/2012    GFRESTBLACK 89 03/23/2012    AVNI 9.3 10/12/2024    AVNI 9.6 03/23/2012      Lab Results   Component Value Date    AST 36 09/30/2024    AST 24 02/17/2012    ALT 28 09/30/2024    ALT 14 02/17/2012       The longitudinal plan of care for Agatha was addressed during this visit. Due to the added complexity in care, I will continue to support Agatha in the subsequent management of this condition(s) and with the ongoing continuity of care of this condition(s).    This note has been dictated using voice recognition software. Any grammatical,  typographical, or context distortions are unintentional and inherent to the software.

## 2024-11-22 NOTE — PATIENT INSTRUCTIONS
"Call C.O.R.DOROTHY. nurse for any questions or concerns Mon-Fri 8am-4pm  [phone]: Nurse number    [phone]: After Hours urgent Phone Number (choose option 2)      Today, we discussed the following:  Medication changes:   Take Lasix to 40mg twice daily (water pill)    Tomorrow morning take the super water pill Metolazone 2.5mg tablet about 30 minutes before your Lasix. Take an additional 20mEq of Potassium chloride with this in addition to your potassium 40mEq twice daily     Reduce Hydralazine to 25mg twice daily (half tablets)    Follow up:   Phone check in Monday with my nurse team.   If weight is responding we might consider additional super water pill other wise we can set you up for the infusion clinic (4 hour infusion to help jump start to get fluid off).     Once we get the fluid off we will set you up for the NICOLA to recheck the clot in the heart and possible cardioversion to resync the heart     Plan follow up with Anabel menjivar of December 9th        Please, remember:   1.  Weigh yourself daily. Call if your weight is up > than 2 pounds in one day, or 5 pounds in one week; if you feel more short of breath or have worsening swelling in abdomen.    Bring a log of weights to your clinic visits.     2.  Follow the American Heart association diet: Eat a low sodium diet (less than 2,000mg or 2g of salt per day). Try to avoid \"fast food\".  Read food labels to know how much salt is in one serving. There is a lot of hidden salt in cheese, bread, sauces and salad dressings.  Diet is the broussard to loosing weight first - start with smaller portion sizes.  If you eat less salt, you will retain less fluid.     3.  Avoid alcohol, as this can worsen heart failure.     4.  Avoid NSAIDs as able (For example, Ibuprofen / aleve / advil / naprosen / diclofenac).    5.   Activity:  Aim for routine walking daily or moderate physical activity of 30 minutes, 4 times a week to start.   Take rest breaks to help conserve your energy. "   If your legs swell during the day, lay down and elevate feet on pillows for 10 minutes twice a day; consider wearing knee high compression stockings 20-30 mmHg daily       Latricia Carroll CNP  [facility]

## 2024-11-22 NOTE — LETTER
11/22/2024    [facility] [address]  [place] [address]    RE: Agatha Rodriguez       Dear Colleague,     I had the pleasure of seeing Agatha Rodriguez in the [facility].        Cardiology Clinic Follow up     Agatha Rodriguez MRN# [id number]   YOB: 1950 Age: 74 year old     Primary cardiologist: Dr. Rainey (initial hospital consult)     Reason for visit: Post hospital follow up     History of presenting illness:    Agatha Rodriguez is a 74 year old female with past medical history significant for hypertension, type II diabetes (A1c 6.8%), morbid obesity BMI 58, and DYLAN compliant on bipap who was recently admitted on 9/30/2024-10/12/2024 with progressive dyspnea and palpitations, found to be in new Atrial flutter with RVR and acute HFpEF exacerbation. Her EF was noted to be 30% by NICOLA along with left atrial appendage thrombus, cardioversion was cancelled. She was started on anticoagulation, diuresed and started on GDMT. Plan for rate control for atleast 4 weeks and then repeat NICOLA and cardioversion if VIVIANE thrombus resolved and consider ischemia evaluation outpatient. She discharged on 10/12/24 and presents today for follow up.     Today, Agatha presents for follow up with her PCA. She reports when she left the hospital weight was 343 lbs , weight fluctuated 2-3 lbs the first week or two but then she ran out of pills for 1 week and weight went up to 350 lbs and she got back on pills and then went up another 5-10 lbs the following week. She has had significant increased abdominal bloating and feeling like the diuretics aren't working as well. No edema in lower legs. She was watching salt closely after discharge but not as strict now. She sleeps inclined chronically for comfort and with her history of breathing problems and wears Bipap.  Gets winded with exertion, uses walker. She feels chest flutters that comes and go. No chest  pain. No dizziness or weakness. No calf cramping but has knee arthritis. Is on potassium supplements after PCP started. Likely only taking hydralazine twice a day.            Assessment and Plan:     ASSESSMENT:    Acute likely combined systolic and diastolic heart failure  -Discrepant EF by TTE and NICOLA; NICOLA read EF 30-35% with moderate RVSF. Etiology under investigation, longstanding history of HTN, possible tachy mediated with history of AFL/AF with difficulty to control rates and carries risk factors for ischemia. Plan for optimization of volume status first, then consider NICOLA/CDV and then likely pursue coronary angiogram after 4 weeks given prior abnormal stress test.   -Presenting today with increased abdominal bloating and 20 lb weight gain since hospital discharge. Suspect dry weight closer to 343 by home scale. Difficult exam given body habitus.   -Ran out of medications for a week after discharge but is back on medications. She favors trial of oral metolazone 2.5mg over the weekend, likely plan to set her up for infusion clinic early next week if availability. ER precautions reviewed if symptoms worsen over the weekend. High risk for hospital readmission.   -Creat 0.93, K 3.4 (prior to oral replacement ordered per PCP)  -Increase lasix 40mg twice daily  -Additional potassium chloride 20mEq with metolazone dose tomorrow  -Continue potassium chloride 40mEq twice daily  -Continue metoprolol [id number] twice daily   -Continue Entresto 24-26mg twice daily - consider dose increase next once euvolemic and can titrate off hydralazine if BP controlled   -Continue Jardiance 10mg daily   -Consider spironolactone next     Atrial fibrillation  Left atrial appendage thrombus   -Rate controlled, continue rate control approach until diuresed appropriately prior to repeat NICOLA assessment - would schedule with general anesthesia given BMI and DYLAN risks. If VIVIANE thrombus resolved then cardioversion attempt reasonable given  possibly tachy-mediated CM however rhythm control may prove difficult long term with risk factors and mild-moderate biatrial enlargement.   -TSH is normal (10/2024)  -Continue metoprolol XL 100mg twice daily and digoxin 125mcg  -Continue Eliquis 5mg twice daily, ensure no missed doses    HTN  -Controlled 123/84, reduce hydralazine to 25mg twice daily to allow room for diuresis  -Continue Entresto     Morbid obesity BMI 58  DYLAN on Bipap   DM type 2    Iron deficiency anemia   -Iron sat 10%; Hgb 11.8. On oral iron, likely poor absorption.  -Consider IV iron replacement if infusion clinic utilized         PLAN:     Metolazone 2.5mg tomorrow morning with additional potassium chloride 20mEq   Increase lasix to 40mg twice daily  Reduce hydralazine to 25mg twice daily  Continue other current medications  Phone check in Monday   2 week follow up requested with myself          Latricia Carroll, ELAYNE, APRN, CNP  [facility]     Orders this Visit:  Orders Placed This Encounter   Procedures     Follow-Up with Cardiology CARLY     EKG 12-lead complete w/read - Clinics (performed today)     Orders Placed This Encounter   Medications     hydrALAZINE (APRESOLINE) 50 MG tablet     Sig: Take 0.5 tablets (25 mg) by mouth 2 times daily.     Dispense:  90 tablet     Refill:  1     Dose reduction     furosemide (LASIX) 40 MG tablet     Sig: Take 1 tablet (40 mg) by mouth 2 times daily.     Dispense:  180 tablet     Refill:  1     Dose increase     metolazone (ZAROXOLYN) 2.5 MG tablet     Sig: Take 1 tablet (2.5 mg) by mouth once for 1 dose. Extra water pill     Dispense:  1 tablet     Refill:  0     Medications Discontinued During This Encounter   Medication Reason     ibuprofen (ADVIL/MOTRIN) 200 MG tablet      hydrALAZINE (APRESOLINE) 50 MG tablet      furosemide (LASIX) 20 MG tablet      Today's clinic visit entailed:  45 minutes spent by me on the date of the encounter doing chart review, review of outside records,  review of test results, interpretation of tests, patient visit, documentation, discussion with other provider(s), and discussion with family, coordination of care with CORE nurse team after visit. Personally escorted patient to [facility].            Physical Exam:   Vitals: /84 (BP Location: Right arm, Patient Position: Sitting, Cuff Size: Adult Large)   Pulse 60   Resp 16   Wt (!) 164.9 kg (363 lb 8 oz)   SpO2 99%   BMI 58.42 kg/m      Body mass index is 58.42 kg/m .    Vitals:    11/22/24 1614   Weight: (!) 164.9 kg (363 lb 8 oz)       General :   Alert and oriented, in no acute distress.    Respiratory:   Respirations unlabored at rest, dyspnea with exertion. LS diminished without crackles or wheeze   Cardiovascular:   Rhythm is irregular. S1 and S2. Distant tones. JVP difficult to visualize given body habitus   GI: Abdomen is obese, bloated, non tender   Extremities: Warm and dry, Trace ankle edema   Neurologic: Moves all extremities, non focal    Psych:  Appropriate             Medications:     Current Outpatient Medications   Medication Sig Dispense Refill     acetaminophen (TYLENOL) 325 MG tablet Take 2 tablets (650 mg) by mouth every 4 hours as needed for mild pain.       albuterol (PROAIR HFA/PROVENTIL HFA/VENTOLIN HFA) 108 (90 Base) MCG/ACT inhaler Inhale 2 puffs into the lungs every 6 hours as needed for shortness of breath, wheezing or cough       albuterol (PROVENTIL) (2.5 MG/3ML) 0.083% neb solution Take 1 vial (2.5 mg) by nebulization every 4 hours as needed for shortness of breath, wheezing or cough 90 mL 0     apixaban ANTICOAGULANT (ELIQUIS) 5 MG tablet Take 1 tablet (5 mg) by mouth 2 times daily. 60 tablet 0     atorvastatin (LIPITOR) 40 MG tablet Take 40 mg by mouth daily       digoxin (LANOXIN) 125 MCG tablet Take 1 tablet (125 mcg) by mouth daily. 30 tablet 0     Docusate Calcium (STOOL SOFTENER PO) Take 1 tablet by mouth daily as needed.       empagliflozin (JARDIANCE) 10 MG TABS tablet  Take 1 tablet (10 mg) by mouth daily. 90 tablet 1     ferrous sulfate (FEROSUL) 325 (65 Fe) MG tablet Take 325 mg by mouth Every Mon, Wed, Fri Morning.       fluticasone (ARNUITY ELLIPTA) 200 MCG/ACT inhaler Inhale 1 puff into the lungs daily       furosemide (LASIX) 40 MG tablet Take 1 tablet (40 mg) by mouth 2 times daily. 180 tablet 1     glipiZIDE (GLUCOTROL XL) 2.5 MG 24 hr tablet Take 1 tablet (2.5 mg) by mouth daily. --- 10/12/2024 --- HOLD until follow up with primary care provider. ---       hydrALAZINE (APRESOLINE) 50 MG tablet Take 0.5 tablets (25 mg) by mouth 2 times daily. 90 tablet 1     metFORMIN (GLUCOPHAGE) 500 MG tablet Take 500 mg by mouth 2 times daily (with meals)       metolazone (ZAROXOLYN) 2.5 MG tablet Take 1 tablet (2.5 mg) by mouth once for 1 dose. Extra water pill 1 tablet 0     metoprolol succinate ER (TOPROL XL) 100 MG 24 hr tablet Take 1 tablet (100 mg) by mouth 2 times daily. 60 tablet 0     Multiple Vitamin (MULTIVITAMIN OR) Take 1 tablet by mouth daily       potassium chloride liv ER (KLOR-CON M20) 20 MEQ CR tablet Take 2 tablets (40 mEq) by mouth 2 times daily. 120 tablet 0     Respiratory Therapy Supplies (NEBULIZER COMPRESSOR) KIT 1 each 4 times daily 1 kit 0     sacubitril-valsartan (ENTRESTO) 24-26 MG per tablet Take 1 tablet by mouth 2 times daily. 60 tablet 0     melatonin 3 MG tablet Take 1 tablet (3 mg) by mouth nightly as needed for sleep. (Patient not taking: Reported on 11/22/2024)       nitroGLYCERIN (NITROSTAT) 0.4 MG SL tablet Place 1 tablet under the tongue every 5 minutes as needed for chest pain. (Patient not taking: Reported on 11/22/2024) 90 tablet 12     Respiratory Therapy Supplies (NEBULIZER/ADULT MASK) KIT 1 each 4 times daily 1 kit 0       Family History   Problem Relation Age of Onset     Diabetes Mother      Breast Cancer Mother        Social History     Socioeconomic History     Marital status:      Spouse name: Not on file     Number of children:  Not on file     Years of education: Not on file     Highest education level: Not on file   Occupational History     Not on file   Tobacco Use     Smoking status: Never     Smokeless tobacco: Never   Substance and Sexual Activity     Alcohol use: No     Drug use: No     Sexual activity: Yes   Other Topics Concern     Parent/sibling w/ CABG, MI or angioplasty before 65F 55M? Not Asked   Social History Narrative     Not on file     Social Drivers of Health     Financial Resource Strain: Low Risk  (10/1/2024)    Financial Resource Strain      Within the past 12 months, have you or your family members you live with been unable to get utilities (heat, electricity) when it was really needed?: No   Food Insecurity: Low Risk  (10/1/2024)    Food Insecurity      Within the past 12 months, did you worry that your food would run out before you got money to buy more?: No      Within the past 12 months, did the food you bought just not last and you didn t have money to get more?: No   Transportation Needs: Low Risk  (10/1/2024)    Transportation Needs      Within the past 12 months, has lack of transportation kept you from medical appointments, getting your medicines, non-medical meetings or appointments, work, or from getting things that you need?: No   Physical Activity: Not on file   Stress: Not on file   Social Connections: Not on file   Interpersonal Safety: Low Risk  (10/1/2024)    Interpersonal Safety      Do you feel physically and emotionally safe where you currently live?: Yes      Within the past 12 months, have you been hit, slapped, kicked or otherwise physically hurt by someone?: No      Within the past 12 months, have you been humiliated or emotionally abused in other ways by your partner or ex-partner?: No   Housing Stability: Low Risk  (10/1/2024)    Housing Stability      Do you have housing? : Yes      Are you worried about losing your housing?: No            Past Medical History:     Past Medical History:    Diagnosis Date     (HFpEF) heart failure with preserved ejection fraction (H) 2024     Asthma      CAD (coronary artery disease)      Diverticulitis of intestine without perforation or abscess 2017     GERD (gastroesophageal reflux disease)      Gout of right foot 2015     Hypertension      Morbid obesity with BMI of 50.0-59.9, adult (H) 2024     Myocardial infarction, silent (H) 2005    Dx w silent Mi in [place] ~ 2005     Obesity      Primary osteoarthritis of both knees      Sleep apnea     uses CPAP     Type 2 diabetes mellitus (H)             Past Surgical History:     Past Surgical History:   Procedure Laterality Date     ANESTHESIA CARDIOVERSION N/A 10/3/2024    Procedure: Anesthesia cardioversion;  Surgeon: GENERIC ANESTHESIA PROVIDER;  Location:  OR     HYSTERECTOMY       TRANSESOPHAGEAL ECHOCARDIOGRAM INTRAOPERATIVE N/A 10/3/2024    Procedure: Transesophageal echocardiogram intraoperative;  Surgeon: GENERIC ANESTHESIA PROVIDER;  Location:  OR            Allergies:   Patient has no known allergies.       Data Reviewed today:   Echocardiogram: 10/1/2024   Summary  1. The left ventricle is normal in size. The visual ejection fraction is  estimated at 50%.  2. The right ventricle is normal in structure, function and size.  3. No valve disease.  Echo 24 showed EF 60%.    NICOLA: 10/3/2024  Summary  The rhythm was atrial fibrillation.  There is moderate concentric left ventricular hypertrophy.  The visual ejection fraction is 30-35%.  The right ventricular systolic function is moderate to severely reduced.  There is mild-moderate biatrial enlargement.  There is mild (1+) mitral regurgitation.  There is a thrombus seen in the tip of left atrial appendage. Severe  spontaneous echo contrast present.    Stress testin24    The nuclear stress test is probably abnormal. Specificity reduced due to suboptimal study quality related to patient body habitus.     There is a small area of  nontransmural infarction in the distal anteroseptal and apex segment(s) of the left ventricle associated with a small area of a mild degree of gloria-infarct ischemia.     Stress to rest cavity ratio is 1.02.  TID is absent.     Left ventricular function is low normal.     The left ventricular ejection fraction at rest is 55%.  The left ventricular ejection fraction at stress is 50%.     LV cavity size enlarged.     There is no prior study for comparison.        All laboratory data reviewed:    Recent Labs   Lab Test 10/01/24  0024 09/30/24  1824 07/28/24  1008   TSH 2.85  --  3.13   NTBNP  --  1,523*  --        Lab Results   Component Value Date    WBC 8.1 10/10/2024    WBC 8.5 05/14/2019    RBC 4.43 10/10/2024    RBC 4.08 05/14/2019    HGB 10.7 (L) 10/10/2024    HGB 11.5 (L) 05/14/2019    HCT 35.8 10/10/2024    HCT 35.7 05/14/2019    MCV 81 10/10/2024    MCV 88 05/14/2019    MCH 24.2 (L) 10/10/2024    MCH 28.2 05/14/2019    MCHC 29.9 (L) 10/10/2024    MCHC 32.2 05/14/2019    RDW 15.4 (H) 10/10/2024    RDW 13.4 05/14/2019     10/10/2024     05/14/2019       Lab Results   Component Value Date     10/12/2024     03/23/2012    POTASSIUM 4.6 10/12/2024    POTASSIUM 3.5 09/30/2024    POTASSIUM 3.6 03/23/2012    CHLORIDE 104 10/12/2024    CHLORIDE 107 09/30/2024    CHLORIDE 103 03/23/2012    CO2 25 10/12/2024    CO2 34 (H) 09/30/2024    CO2 36 (H) 03/23/2012    ANIONGAP 9 10/12/2024    ANIONGAP 3 09/30/2024    ANIONGAP 5 (L) 03/23/2012     (H) 10/12/2024     (H) 09/30/2024    GLC 99 03/23/2012    BUN 23.9 (H) 10/12/2024    BUN 15 09/30/2024    BUN 23 03/23/2012    CR 1.04 (H) 10/12/2024    CR 0.80 03/23/2012    GFRESTIMATED 56 (L) 10/12/2024    GFRESTIMATED 73 03/23/2012    GFRESTBLACK 89 03/23/2012    AVNI 9.3 10/12/2024    AVNI 9.6 03/23/2012      Lab Results   Component Value Date    AST 36 09/30/2024    AST 24 02/17/2012    ALT 28 09/30/2024    ALT 14 02/17/2012       The  longitudinal plan of care for Agatha was addressed during this visit. Due to the added complexity in care, I will continue to support Agatha in the subsequent management of this condition(s) and with the ongoing continuity of care of this condition(s).    This note has been dictated using voice recognition software. Any grammatical, typographical, or context distortions are unintentional and inherent to the software.    Thank you for allowing me to participate in the care of your patient.      Sincerely,     FABIEN Oh CNP     [facility]  cc:   Danyelle Hernandez, CNP  [address] [place] [address]

## 2024-12-09 ENCOUNTER — LAB (OUTPATIENT)
Dept: LAB | Facility: CLINIC | Age: 74
End: 2024-12-09
Payer: MEDICARE

## 2024-12-09 DIAGNOSIS — I50.32 CHRONIC HEART FAILURE WITH PRESERVED EJECTION FRACTION (HFPEF) (H): ICD-10-CM

## 2024-12-09 DIAGNOSIS — E87.6 HYPOKALEMIA: ICD-10-CM

## 2024-12-09 LAB
ANION GAP SERPL CALCULATED.3IONS-SCNC: 11 MMOL/L (ref 7–15)
BUN SERPL-MCNC: 15.6 MG/DL (ref 8–23)
CALCIUM SERPL-MCNC: 9.7 MG/DL (ref 8.8–10.4)
CHLORIDE SERPL-SCNC: 102 MMOL/L (ref 98–107)
CREAT SERPL-MCNC: 1.05 MG/DL (ref 0.51–0.95)
EGFRCR SERPLBLD CKD-EPI 2021: 56 ML/MIN/1.73M2
GLUCOSE SERPL-MCNC: 200 MG/DL (ref 70–99)
HCO3 SERPL-SCNC: 27 MMOL/L (ref 22–29)
POTASSIUM SERPL-SCNC: 3.7 MMOL/L (ref 3.4–5.3)
SODIUM SERPL-SCNC: 140 MMOL/L (ref 135–145)

## 2024-12-11 ENCOUNTER — OFFICE VISIT (OUTPATIENT)
Dept: CARDIOLOGY | Facility: CLINIC | Age: 74
End: 2024-12-11
Payer: MEDICARE

## 2024-12-11 VITALS
WEIGHT: 293 LBS | BODY MASS INDEX: 58.02 KG/M2 | DIASTOLIC BLOOD PRESSURE: 82 MMHG | OXYGEN SATURATION: 99 % | RESPIRATION RATE: 16 BRPM | SYSTOLIC BLOOD PRESSURE: 116 MMHG | HEART RATE: 74 BPM

## 2024-12-11 DIAGNOSIS — I48.91 ATRIAL FIBRILLATION, UNSPECIFIED TYPE (H): ICD-10-CM

## 2024-12-11 DIAGNOSIS — E87.6 HYPOKALEMIA: ICD-10-CM

## 2024-12-11 DIAGNOSIS — I50.32 CHRONIC HEART FAILURE WITH PRESERVED EJECTION FRACTION (HFPEF) (H): Primary | ICD-10-CM

## 2024-12-11 PROCEDURE — 99214 OFFICE O/P EST MOD 30 MIN: CPT | Performed by: NURSE PRACTITIONER

## 2024-12-11 PROCEDURE — G2211 COMPLEX E/M VISIT ADD ON: HCPCS | Performed by: NURSE PRACTITIONER

## 2024-12-11 RX ORDER — POTASSIUM CHLORIDE 1500 MG/1
60 TABLET, EXTENDED RELEASE ORAL 2 TIMES DAILY
Qty: 360 TABLET | Refills: 3 | Status: SHIPPED | OUTPATIENT
Start: 2024-12-11

## 2024-12-11 NOTE — PROGRESS NOTES
Cardiology Clinic Follow up     Agatha Rodriguez MRN# [id number]   YOB: 1950 Age: 74 year old     Primary cardiologist: Dr. Rainey (initial hospital consult)     Reason for visit: Post hospital follow up     History of presenting illness:    Agatha Rodriguez is a 74 year old female with past medical history significant for hypertension, type II diabetes (A1c 6.8%), morbid obesity BMI 58, and DYLAN compliant on bipap who was recently admitted on 9/30/2024-10/12/2024 with progressive dyspnea and palpitations, found to be in new Atrial flutter with RVR and acute HFpEF exacerbation. Her EF was noted to be 30% by NICOLA along with left atrial appendage thrombus, cardioversion was cancelled. She was started on anticoagulation, diuresed and started on GDMT. Plan for rate control for atleast 4 weeks and then repeat NICOLA and cardioversion if VIVIANE thrombus resolved and consider ischemia evaluation outpatient. She discharged on 10/12/24.    Seen last in cardiology follow up with myself 11/22/24. Weight up to 363 (343 upon arriving home from hospital).  Lasix increased to 40mg twice daily and hydralazine reduced to 25mg twice daily. Metolazone 2.5mg once a week added until lab follow up.  She lost weight down to reported 348 and then gained again, plan for infusion clinic 12/6 but declined. Had limited transportation for labs. Potassium down to 2.9 on recheck - was inadvertently rationing potassium.     Today, Agatha presents for follow up with her PCA. Her scale is out of batteries. Weight today up to 361, previously as low as 348. She had not restarted metolazone yet as awaiting lab draw today. She feels her breathing is better but her abdomen is bloated and she feels heavier. Denies palpitations or chest pain. No edema in lower legs - all in abdomen. She is fatigued.            Assessment and Plan:     ASSESSMENT:    Acute likely combined systolic and diastolic heart failure  -Discrepant EF by TTE and NICOLA; NICOLA  read EF 30-35% with moderate RVSF. Etiology under investigation, longstanding history of HTN, possible tachy mediated with history of AFL/AF with difficulty to control rates and carries risk factors for ischemia. Plan for optimization of volume status first, then consider NICOLA/CDV and then likely pursue coronary angiogram after 4 weeks to uninterrupted anticoagulation given prior abnormal stress test.   -Presenting back with ongoing abdominal bloating and likely 13-15+ lb above dry weight, ?abdominal ascites-consider abdominal US to see if paracentesis needed if unable to diurese otherwise. Suspect dry weight closer to 343 by prior home scale. Difficult exam given body habitus.   -Ran out of medications for a week after discharge initially. High risk for hospital readmission.   -Creat 1.05, K 3.7, BNP down to 720 (12/9/24)  -Resume metolazone 2.5mg tomorrow once a week for now  -Continue lasix 40mg twice daily  -Additional potassium chloride 20mEq with metolazone dose tomorrow  -Continue potassium chloride 60mEq twice daily  -Continue metoprolol Xl 100mg twice daily   -Stop hydralazine for now - /82 and fatigued   -Continue Entresto 24-26mg twice daily - can up titrate in follow up   -Continue Jardiance 10mg daily   -Consider spironolactone next   -We discussed plan for infusion clinic on Tuesday 12/17. Will work with RN team to check in on Monday with symptoms.     Atrial fibrillation  Left atrial appendage thrombus   -Rate controlled, continue rate control approach until diuresed appropriately prior to repeat NICOLA assessment - would schedule with general anesthesia given BMI and DYLAN risks. If VIVIANE thrombus resolved then cardioversion attempt reasonable given possibly tachy-mediated CM however rhythm control may prove difficult long term with risk factors and mild-moderate biatrial enlargement.   -TSH is normal (10/2024)  -Continue metoprolol XL 100mg twice daily and digoxin 125mcg  -Continue Eliquis 5mg twice  daily, ensuring no missed doses    HTN  -Lower normal 116/82 - stop hydralazine to allow room for diuresis  -Continue Entresto     Morbid obesity BMI 58  DYLAN on Bipap   DM type 2    Iron deficiency anemia   -Iron sat 10%; Hgb 11.8. On oral iron, likely poor absorption.  -Consider IV iron replacement if infusion clinic utilized         PLAN:     She declined IV infusion on 12/6. Weight is up again to 361 today. Resume metolazone 2.5mg today now that potassium is corrected. Continue potassium chloride 80mEq twice a day through tomorrow then reduce to 60mEq twice a day   Recommend IV infusion clinic early next week 12/17, I suspect her dry weight may be lower than 343.  Stop hydralazine   Continue other current medications  Follow up in 2-3 weeks           Latricia Carroll DNP, APRN, CNP  [facility]     Orders this Visit:  Orders Placed This Encounter   Procedures    Follow-Up with Cardiology CARLY     Orders Placed This Encounter   Medications    potassium chloride liv ER (KLOR-CON M20) 20 MEQ CR tablet     Sig: Take 3 tablets (60 mEq) by mouth 2 times daily.     Dispense:  360 tablet     Refill:  3     Keep on file.     Medications Discontinued During This Encounter   Medication Reason    hydrALAZINE (APRESOLINE) 50 MG tablet     potassium chloride liv ER (KLOR-CON M20) 20 MEQ CR tablet        Today's clinic visit entailed:  30 minutes spent by me on the date of the encounter doing chart review, review of outside records, review of test results, interpretation of tests, patient visit, documentation, discussion with other provider(s), and discussion with family, coordination of care with [facility] nurse team after visit.             Physical Exam:   Vitals: /82 (BP Location: Right arm, Patient Position: Sitting, Cuff Size: Adult Large)   Pulse 74   Resp 16   Wt (!) 163.7 kg (361 lb)   SpO2 99%   BMI 58.02 kg/m      Body mass index is 58.02 kg/m .    Vitals:    12/11/24 1414   Weight: (!)  163.7 kg (361 lb)       General :   Alert and oriented, in no acute distress.    Respiratory:   Respirations unlabored at rest, dyspnea with exertion. LS diminished without crackles or wheeze   Cardiovascular:   Rhythm is irregular. S1 and S2. Distant tones. JVP difficult to visualize given body habitus   GI: Abdomen is obese, bloated/semi firm, non tender   Extremities: Warm and dry, Trace ankle edema   Neurologic: Moves all extremities, non focal    Psych:  Appropriate             Medications:     Current Outpatient Medications   Medication Sig Dispense Refill    acetaminophen (TYLENOL) 325 MG tablet Take 2 tablets (650 mg) by mouth every 4 hours as needed for mild pain.      albuterol (PROAIR HFA/PROVENTIL HFA/VENTOLIN HFA) 108 (90 Base) MCG/ACT inhaler Inhale 2 puffs into the lungs every 6 hours as needed for shortness of breath, wheezing or cough      albuterol (PROVENTIL) (2.5 MG/3ML) 0.083% neb solution Take 1 vial (2.5 mg) by nebulization every 4 hours as needed for shortness of breath, wheezing or cough 90 mL 0    apixaban ANTICOAGULANT (ELIQUIS) 5 MG tablet Take 1 tablet (5 mg) by mouth 2 times daily. 60 tablet 0    atorvastatin (LIPITOR) 40 MG tablet Take 40 mg by mouth daily      digoxin (LANOXIN) 125 MCG tablet Take 1 tablet (125 mcg) by mouth daily. 30 tablet 0    Docusate Calcium (STOOL SOFTENER PO) Take 1 tablet by mouth daily as needed.      empagliflozin (JARDIANCE) 10 MG TABS tablet Take 1 tablet (10 mg) by mouth daily. 90 tablet 1    ferrous sulfate (FEROSUL) 325 (65 Fe) MG tablet Take 325 mg by mouth Every Mon, Wed, Fri Morning.      fluticasone (ARNUITY ELLIPTA) 200 MCG/ACT inhaler Inhale 1 puff into the lungs daily      furosemide (LASIX) 40 MG tablet Take 1 tablet (40 mg) by mouth 2 times daily. 180 tablet 1    glipiZIDE (GLUCOTROL XL) 2.5 MG 24 hr tablet Take 1 tablet (2.5 mg) by mouth daily. --- 10/12/2024 --- HOLD until follow up with primary care provider. ---      metFORMIN (GLUCOPHAGE)  500 MG tablet Take 500 mg by mouth 2 times daily (with meals)      metoprolol succinate ER (TOPROL XL) 100 MG 24 hr tablet Take 1 tablet (100 mg) by mouth 2 times daily. 60 tablet 0    Multiple Vitamin (MULTIVITAMIN OR) Take 1 tablet by mouth daily      nitroGLYCERIN (NITROSTAT) 0.4 MG SL tablet Place 1 tablet under the tongue every 5 minutes as needed for chest pain. 90 tablet 12    potassium chloride liv ER (KLOR-CON M20) 20 MEQ CR tablet Take 3 tablets (60 mEq) by mouth 2 times daily. 360 tablet 3    Respiratory Therapy Supplies (NEBULIZER COMPRESSOR) KIT 1 each 4 times daily 1 kit 0    Respiratory Therapy Supplies (NEBULIZER/ADULT MASK) KIT 1 each 4 times daily 1 kit 0    sacubitril-valsartan (ENTRESTO) 24-26 MG per tablet Take 1 tablet by mouth 2 times daily. 60 tablet 0    melatonin 3 MG tablet Take 1 tablet (3 mg) by mouth nightly as needed for sleep. (Patient not taking: Reported on 12/11/2024)      metolazone (ZAROXOLYN) 2.5 MG tablet Take 1 tablet or 2.5mg as needed as directed by [facility]/Cardiology ONLY (Patient not taking: Reported on 12/11/2024) 5 tablet 0       Family History   Problem Relation Age of Onset    Diabetes Mother     Breast Cancer Mother        Social History     Socioeconomic History    Marital status:      Spouse name: Not on file    Number of children: Not on file    Years of education: Not on file    Highest education level: Not on file   Occupational History    Not on file   Tobacco Use    Smoking status: Never    Smokeless tobacco: Never   Substance and Sexual Activity    Alcohol use: No    Drug use: No    Sexual activity: Yes   Other Topics Concern    Parent/sibling w/ CABG, MI or angioplasty before 65F 55M? Not Asked   Social History Narrative    Not on file     Social Drivers of Health     Financial Resource Strain: Low Risk  (10/1/2024)    Financial Resource Strain     Within the past 12 months, have you or your family members you live with been unable to get  utilities (heat, electricity) when it was really needed?: No   Food Insecurity: Low Risk  (10/1/2024)    Food Insecurity     Within the past 12 months, did you worry that your food would run out before you got money to buy more?: No     Within the past 12 months, did the food you bought just not last and you didn t have money to get more?: No   Transportation Needs: Low Risk  (10/1/2024)    Transportation Needs     Within the past 12 months, has lack of transportation kept you from medical appointments, getting your medicines, non-medical meetings or appointments, work, or from getting things that you need?: No   Physical Activity: Not on file   Stress: Not on file   Social Connections: Not on file   Interpersonal Safety: Low Risk  (10/1/2024)    Interpersonal Safety     Do you feel physically and emotionally safe where you currently live?: Yes     Within the past 12 months, have you been hit, slapped, kicked or otherwise physically hurt by someone?: No     Within the past 12 months, have you been humiliated or emotionally abused in other ways by your partner or ex-partner?: No   Housing Stability: Low Risk  (10/1/2024)    Housing Stability     Do you have housing? : Yes     Are you worried about losing your housing?: No            Past Medical History:     Past Medical History:   Diagnosis Date    (HFpEF) heart failure with preserved ejection fraction (H) 07/2024    Asthma     CAD (coronary artery disease)     Diverticulitis of intestine without perforation or abscess 02/17/2017    GERD (gastroesophageal reflux disease)     Gout of right foot 2015    Hypertension     Morbid obesity with BMI of 50.0-59.9, adult (H) 07/28/2024    Myocardial infarction, silent (H) 2005    Dx w silent Mi in [place] ~ 2005    Obesity     Primary osteoarthritis of both knees     Sleep apnea     uses CPAP    Type 2 diabetes mellitus (H)             Past Surgical History:     Past Surgical History:   Procedure Laterality Date    ANESTHESIA  CARDIOVERSION N/A 10/3/2024    Procedure: Anesthesia cardioversion;  Surgeon: GENERIC ANESTHESIA PROVIDER;  Location: [facility] OR    HYSTERECTOMY      TRANSESOPHAGEAL ECHOCARDIOGRAM INTRAOPERATIVE N/A 10/3/2024    Procedure: Transesophageal echocardiogram intraoperative;  Surgeon: GENERIC ANESTHESIA PROVIDER;  Location: [facility] OR            Allergies:   Patient has no known allergies.       Data Reviewed today:   Echocardiogram: 10/1/2024   Summary  1. The left ventricle is normal in size. The visual ejection fraction is  estimated at 50%.  2. The right ventricle is normal in structure, function and size.  3. No valve disease.  Echo 24 showed EF 60%.    NICLOA: 10/3/2024  Summary  The rhythm was atrial fibrillation.  There is moderate concentric left ventricular hypertrophy.  The visual ejection fraction is 30-35%.  The right ventricular systolic function is moderate to severely reduced.  There is mild-moderate biatrial enlargement.  There is mild (1+) mitral regurgitation.  There is a thrombus seen in the tip of left atrial appendage. Severe  spontaneous echo contrast present.    Stress testin24    The nuclear stress test is probably abnormal. Specificity reduced due to suboptimal study quality related to patient body habitus.    There is a small area of nontransmural infarction in the distal anteroseptal and apex segment(s) of the left ventricle associated with a small area of a mild degree of gloria-infarct ischemia.    Stress to rest cavity ratio is 1.02.  TID is absent.    Left ventricular function is low normal.    The left ventricular ejection fraction at rest is 55%.  The left ventricular ejection fraction at stress is 50%.    LV cavity size enlarged.    There is no prior study for comparison.        All laboratory data reviewed:    Recent Labs   Lab Test 24  1548 10/01/24  0024 24  1824 24  1008   TSH  --  2.85  --  3.13   NTBNP 720  --  1,523*  --        Lab Results   Component Value Date     WBC 8.1 10/10/2024    WBC 8.5 05/14/2019    RBC 4.43 10/10/2024    RBC 4.08 05/14/2019    HGB 10.7 (L) 10/10/2024    HGB 11.5 (L) 05/14/2019    HCT 35.8 10/10/2024    HCT 35.7 05/14/2019    MCV 81 10/10/2024    MCV 88 05/14/2019    MCH 24.2 (L) 10/10/2024    MCH 28.2 05/14/2019    MCHC 29.9 (L) 10/10/2024    MCHC 32.2 05/14/2019    RDW 15.4 (H) 10/10/2024    RDW 13.4 05/14/2019     10/10/2024     05/14/2019       Lab Results   Component Value Date     12/09/2024     03/23/2012    POTASSIUM 3.7 12/09/2024    POTASSIUM 3.5 09/30/2024    POTASSIUM 3.6 03/23/2012    CHLORIDE 102 12/09/2024    CHLORIDE 107 09/30/2024    CHLORIDE 103 03/23/2012    CO2 27 12/09/2024    CO2 34 (H) 09/30/2024    CO2 36 (H) 03/23/2012    ANIONGAP 11 12/09/2024    ANIONGAP 3 09/30/2024    ANIONGAP 5 (L) 03/23/2012     (H) 12/09/2024     (H) 10/12/2024     (H) 09/30/2024    GLC 99 03/23/2012    BUN 15.6 12/09/2024    BUN 15 09/30/2024    BUN 23 03/23/2012    CR 1.05 (H) 12/09/2024    CR 0.80 03/23/2012    GFRESTIMATED 56 (L) 12/09/2024    GFRESTIMATED 73 03/23/2012    GFRESTBLACK 89 03/23/2012    AVNI 9.7 12/09/2024    AVNI 9.6 03/23/2012      Lab Results   Component Value Date    AST 36 09/30/2024    AST 24 02/17/2012    ALT 28 09/30/2024    ALT 14 02/17/2012       The longitudinal plan of care for Agatha was addressed during this visit. Due to the added complexity in care, I will continue to support Agatha in the subsequent management of this condition(s) and with the ongoing continuity of care of this condition(s).    This note has been dictated using voice recognition software. Any grammatical, typographical, or context distortions are unintentional and inherent to the software.

## 2024-12-11 NOTE — PATIENT INSTRUCTIONS
"Call [facility] nurse for any questions or concerns Mon-Fri 8am-4pm  [phone]: Nurse number    [phone]: After Hours urgent Phone Number (choose option 2)      Today, we discussed the following:  Medication changes:   Take potassium 4 tablets twice a day through tomorrow then 3 tablets twice a day     Take metolazone 2.5mg tomorrow before you take the lasix    Stop hydralazine    If weight not down to 348 by Monday or your abdomen is still hard then I recommend the infusion clinic on Tuesday. Likely your dry weight may be lower than 343. We will hold a spot for you      Follow up:   2 weeks        Please, remember:   1.  Weigh yourself daily. Call if your weight is up > than 2 pounds in one day, or 5 pounds in one week; if you feel more short of breath or have worsening swelling in your legs or abdomen.  Bring a log of weights to your clinic visits.     2.  Follow the American Heart association diet: Eat a low sodium diet (less than 2,000mg or 2g of salt per day). Try to avoid \"fast food\".  Read food labels to know how much salt is in one serving. There is a lot of hidden salt in cheese, bread, sauces and salad dressings.  Diet is the broussard to loosing weight first - start with smaller portion sizes.  If you eat less salt, you will retain less fluid.     3.  Avoid alcohol, as this can worsen heart failure.     4.  Avoid NSAIDs as able (For example, Ibuprofen / aleve / advil / naprosen / diclofenac).    5.   Activity:  Aim for routine walking daily or moderate physical activity of 30 minutes, 4 times a week to start.   Take rest breaks to help conserve your energy.   If your legs swell during the day, lay down and elevate feet on pillows for 10 minutes twice a day; consider wearing knee high compression stockings 20-30 mmHg daily       Thank you for your visit with the [facility] today.   [facility] stands for [facility]. The [facility] clinic will teach and help you to " manage your heart failure and help to keep you out of the hospital.       Latricia Carroll [facility]

## 2024-12-11 NOTE — LETTER
12/11/2024    [facility] [address]  [place] [address]    RE: Agatha Rodriguez       Dear Colleague,     I had the pleasure of seeing Agatha Rodriguez in the [facility].        Cardiology Clinic Follow up     Agatha Rodriguez MRN# [id number]   YOB: 1950 Age: 74 year old     Primary cardiologist: Dr. Rainey (initial hospital consult)     Reason for visit: Post hospital follow up     History of presenting illness:    Agatha Rodriguez is a 74 year old female with past medical history significant for hypertension, type II diabetes (A1c 6.8%), morbid obesity BMI 58, and DYLAN compliant on bipap who was recently admitted on 9/30/2024-10/12/2024 with progressive dyspnea and palpitations, found to be in new Atrial flutter with RVR and acute HFpEF exacerbation. Her EF was noted to be 30% by NICOLA along with left atrial appendage thrombus, cardioversion was cancelled. She was started on anticoagulation, diuresed and started on GDMT. Plan for rate control for atleast 4 weeks and then repeat NICOLA and cardioversion if VIVIANE thrombus resolved and consider ischemia evaluation outpatient. She discharged on 10/12/24.    Seen last in cardiology follow up with myself 11/22/24. Weight up to 363 (343 upon arriving home from hospital).  Lasix increased to 40mg twice daily and hydralazine reduced to 25mg twice daily. Metolazone 2.5mg once a week added until lab follow up.  She lost weight down to reported 348 and then gained again, plan for infusion clinic 12/6 but declined. Had limited transportation for labs. Potassium down to 2.9 on recheck - was inadvertently rationing potassium.     Today, Agatha presents for follow up with her PCA. Her scale is out of batteries. Weight today up to 361, previously as low as 348. She had not restarted metolazone yet as awaiting lab draw today. She feels her breathing is better but her abdomen is bloated and she feels  heavier. Denies palpitations or chest pain. No edema in lower legs - all in abdomen. She is fatigued.            Assessment and Plan:     ASSESSMENT:    Acute likely combined systolic and diastolic heart failure  -Discrepant EF by TTE and NICOLA; NICOLA read EF 30-35% with moderate RVSF. Etiology under investigation, longstanding history of HTN, possible tachy mediated with history of AFL/AF with difficulty to control rates and carries risk factors for ischemia. Plan for optimization of volume status first, then consider NICOLA/CDV and then likely pursue coronary angiogram after 4 weeks to uninterrupted anticoagulation given prior abnormal stress test.   -Presenting back with ongoing abdominal bloating and likely 13-15+ lb above dry weight, ?abdominal ascites-consider abdominal US to see if paracentesis needed if unable to diurese otherwise. Suspect dry weight closer to 343 by prior home scale. Difficult exam given body habitus.   -Ran out of medications for a week after discharge initially. High risk for hospital readmission.   -Creat 1.05, K 3.7, BNP down to 720 (12/9/24)  -Resume metolazone 2.5mg tomorrow once a week for now  -Continue lasix 40mg twice daily  -Additional potassium chloride 20mEq with metolazone dose tomorrow  -Continue potassium chloride 60mEq twice daily  -Continue metoprolol Xl 100mg twice daily   -Stop hydralazine for now - /82 and fatigued   -Continue Entresto 24-26mg twice daily - can up titrate in follow up   -Continue Jardiance 10mg daily   -Consider spironolactone next   -We discussed plan for infusion clinic on Tuesday 12/17. Will work with RN team to check in on Monday with symptoms.     Atrial fibrillation  Left atrial appendage thrombus   -Rate controlled, continue rate control approach until diuresed appropriately prior to repeat NICOLA assessment - would schedule with general anesthesia given BMI and DYLAN risks. If VIVIANE thrombus resolved then cardioversion attempt reasonable given possibly  tachy-mediated CM however rhythm control may prove difficult long term with risk factors and mild-moderate biatrial enlargement.   -TSH is normal (10/2024)  -Continue metoprolol XL 100mg twice daily and digoxin 125mcg  -Continue Eliquis 5mg twice daily, ensuring no missed doses    HTN  -Lower normal 116/82 - stop hydralazine to allow room for diuresis  -Continue Entresto     Morbid obesity BMI 58  DYLAN on Bipap   DM type 2    Iron deficiency anemia   -Iron sat 10%; Hgb 11.8. On oral iron, likely poor absorption.  -Consider IV iron replacement if infusion clinic utilized         PLAN:     She declined IV infusion on 12/6. Weight is up again to 361 today. Resume metolazone 2.5mg today now that potassium is corrected. Continue potassium chloride 80mEq twice a day through tomorrow then reduce to 60mEq twice a day   Recommend IV infusion clinic early next week 12/17, I suspect her dry weight may be lower than 343.  Stop hydralazine   Continue other current medications  Follow up in 2-3 weeks           Latricia Carroll, ELAYNE, APRN, CNP  [facility]     Orders this Visit:  Orders Placed This Encounter   Procedures     Follow-Up with Cardiology CARLY     Orders Placed This Encounter   Medications     potassium chloride liv ER (KLOR-CON M20) 20 MEQ CR tablet     Sig: Take 3 tablets (60 mEq) by mouth 2 times daily.     Dispense:  360 tablet     Refill:  3     Keep on file.     Medications Discontinued During This Encounter   Medication Reason     hydrALAZINE (APRESOLINE) 50 MG tablet      potassium chloride liv ER (KLOR-CON M20) 20 MEQ CR tablet        Today's clinic visit entailed:  30 minutes spent by me on the date of the encounter doing chart review, review of outside records, review of test results, interpretation of tests, patient visit, documentation, discussion with other provider(s), and discussion with family, coordination of care with [facility] nurse team after visit.             Physical Exam:    Vitals: /82 (BP Location: Right arm, Patient Position: Sitting, Cuff Size: Adult Large)   Pulse 74   Resp 16   Wt (!) 163.7 kg (361 lb)   SpO2 99%   BMI 58.02 kg/m      Body mass index is 58.02 kg/m .    Vitals:    12/11/24 1414   Weight: (!) 163.7 kg (361 lb)       General :   Alert and oriented, in no acute distress.    Respiratory:   Respirations unlabored at rest, dyspnea with exertion. LS diminished without crackles or wheeze   Cardiovascular:   Rhythm is irregular. S1 and S2. Distant tones. JVP difficult to visualize given body habitus   GI: Abdomen is obese, bloated/semi firm, non tender   Extremities: Warm and dry, Trace ankle edema   Neurologic: Moves all extremities, non focal    Psych:  Appropriate             Medications:     Current Outpatient Medications   Medication Sig Dispense Refill     acetaminophen (TYLENOL) 325 MG tablet Take 2 tablets (650 mg) by mouth every 4 hours as needed for mild pain.       albuterol (PROAIR HFA/PROVENTIL HFA/VENTOLIN HFA) 108 (90 Base) MCG/ACT inhaler Inhale 2 puffs into the lungs every 6 hours as needed for shortness of breath, wheezing or cough       albuterol (PROVENTIL) (2.5 MG/3ML) 0.083% neb solution Take 1 vial (2.5 mg) by nebulization every 4 hours as needed for shortness of breath, wheezing or cough 90 mL 0     apixaban ANTICOAGULANT (ELIQUIS) 5 MG tablet Take 1 tablet (5 mg) by mouth 2 times daily. 60 tablet 0     atorvastatin (LIPITOR) 40 MG tablet Take 40 mg by mouth daily       digoxin (LANOXIN) 125 MCG tablet Take 1 tablet (125 mcg) by mouth daily. 30 tablet 0     Docusate Calcium (STOOL SOFTENER PO) Take 1 tablet by mouth daily as needed.       empagliflozin (JARDIANCE) 10 MG TABS tablet Take 1 tablet (10 mg) by mouth daily. 90 tablet 1     ferrous sulfate (FEROSUL) 325 (65 Fe) MG tablet Take 325 mg by mouth Every Mon, Wed, Fri Morning.       fluticasone (ARNUITY ELLIPTA) 200 MCG/ACT inhaler Inhale 1 puff into the lungs daily       furosemide  (LASIX) 40 MG tablet Take 1 tablet (40 mg) by mouth 2 times daily. 180 tablet 1     glipiZIDE (GLUCOTROL XL) 2.5 MG 24 hr tablet Take 1 tablet (2.5 mg) by mouth daily. --- 10/12/2024 --- HOLD until follow up with primary care provider. ---       metFORMIN (GLUCOPHAGE) 500 MG tablet Take 500 mg by mouth 2 times daily (with meals)       metoprolol succinate ER (TOPROL XL) 100 MG 24 hr tablet Take 1 tablet (100 mg) by mouth 2 times daily. 60 tablet 0     Multiple Vitamin (MULTIVITAMIN OR) Take 1 tablet by mouth daily       nitroGLYCERIN (NITROSTAT) 0.4 MG SL tablet Place 1 tablet under the tongue every 5 minutes as needed for chest pain. 90 tablet 12     potassium chloride liv ER (KLOR-CON M20) 20 MEQ CR tablet Take 3 tablets (60 mEq) by mouth 2 times daily. 360 tablet 3     Respiratory Therapy Supplies (NEBULIZER COMPRESSOR) KIT 1 each 4 times daily 1 kit 0     Respiratory Therapy Supplies (NEBULIZER/ADULT MASK) KIT 1 each 4 times daily 1 kit 0     sacubitril-valsartan (ENTRESTO) 24-26 MG per tablet Take 1 tablet by mouth 2 times daily. 60 tablet 0     melatonin 3 MG tablet Take 1 tablet (3 mg) by mouth nightly as needed for sleep. (Patient not taking: Reported on 12/11/2024)       metolazone (ZAROXOLYN) 2.5 MG tablet Take 1 tablet or 2.5mg as needed as directed by [facility] Clinic/Cardiology ONLY (Patient not taking: Reported on 12/11/2024) 5 tablet 0       Family History   Problem Relation Age of Onset     Diabetes Mother      Breast Cancer Mother        Social History     Socioeconomic History     Marital status:      Spouse name: Not on file     Number of children: Not on file     Years of education: Not on file     Highest education level: Not on file   Occupational History     Not on file   Tobacco Use     Smoking status: Never     Smokeless tobacco: Never   Substance and Sexual Activity     Alcohol use: No     Drug use: No     Sexual activity: Yes   Other Topics Concern     Parent/sibling w/ CABG, MI or  angioplasty before 65F 55M? Not Asked   Social History Narrative     Not on file     Social Drivers of Health     Financial Resource Strain: Low Risk  (10/1/2024)    Financial Resource Strain      Within the past 12 months, have you or your family members you live with been unable to get utilities (heat, electricity) when it was really needed?: No   Food Insecurity: Low Risk  (10/1/2024)    Food Insecurity      Within the past 12 months, did you worry that your food would run out before you got money to buy more?: No      Within the past 12 months, did the food you bought just not last and you didn t have money to get more?: No   Transportation Needs: Low Risk  (10/1/2024)    Transportation Needs      Within the past 12 months, has lack of transportation kept you from medical appointments, getting your medicines, non-medical meetings or appointments, work, or from getting things that you need?: No   Physical Activity: Not on file   Stress: Not on file   Social Connections: Not on file   Interpersonal Safety: Low Risk  (10/1/2024)    Interpersonal Safety      Do you feel physically and emotionally safe where you currently live?: Yes      Within the past 12 months, have you been hit, slapped, kicked or otherwise physically hurt by someone?: No      Within the past 12 months, have you been humiliated or emotionally abused in other ways by your partner or ex-partner?: No   Housing Stability: Low Risk  (10/1/2024)    Housing Stability      Do you have housing? : Yes      Are you worried about losing your housing?: No            Past Medical History:     Past Medical History:   Diagnosis Date     (HFpEF) heart failure with preserved ejection fraction (H) 07/2024     Asthma      CAD (coronary artery disease)      Diverticulitis of intestine without perforation or abscess 02/17/2017     GERD (gastroesophageal reflux disease)      Gout of right foot 2015     Hypertension      Morbid obesity with BMI of 50.0-59.9, adult (H)  2024     Myocardial infarction, silent (H) 2005    Dx w silent Mi in [place] ~ 2005     Obesity      Primary osteoarthritis of both knees      Sleep apnea     uses CPAP     Type 2 diabetes mellitus (H)             Past Surgical History:     Past Surgical History:   Procedure Laterality Date     ANESTHESIA CARDIOVERSION N/A 10/3/2024    Procedure: Anesthesia cardioversion;  Surgeon: GENERIC ANESTHESIA PROVIDER;  Location:  OR     HYSTERECTOMY       TRANSESOPHAGEAL ECHOCARDIOGRAM INTRAOPERATIVE N/A 10/3/2024    Procedure: Transesophageal echocardiogram intraoperative;  Surgeon: GENERIC ANESTHESIA PROVIDER;  Location:  OR            Allergies:   Patient has no known allergies.       Data Reviewed today:   Echocardiogram: 10/1/2024   Summary  1. The left ventricle is normal in size. The visual ejection fraction is  estimated at 50%.  2. The right ventricle is normal in structure, function and size.  3. No valve disease.  Echo 24 showed EF 60%.    NICOLA: 10/3/2024  Summary  The rhythm was atrial fibrillation.  There is moderate concentric left ventricular hypertrophy.  The visual ejection fraction is 30-35%.  The right ventricular systolic function is moderate to severely reduced.  There is mild-moderate biatrial enlargement.  There is mild (1+) mitral regurgitation.  There is a thrombus seen in the tip of left atrial appendage. Severe  spontaneous echo contrast present.    Stress testin24    The nuclear stress test is probably abnormal. Specificity reduced due to suboptimal study quality related to patient body habitus.     There is a small area of nontransmural infarction in the distal anteroseptal and apex segment(s) of the left ventricle associated with a small area of a mild degree of gloria-infarct ischemia.     Stress to rest cavity ratio is 1.02.  TID is absent.     Left ventricular function is low normal.     The left ventricular ejection fraction at rest is 55%.  The left ventricular ejection  fraction at stress is 50%.     LV cavity size enlarged.     There is no prior study for comparison.        All laboratory data reviewed:    Recent Labs   Lab Test 12/06/24  1548 10/01/24  0024 09/30/24  1824 07/28/24  1008   TSH  --  2.85  --  3.13   NTBNP 720  --  1,523*  --        Lab Results   Component Value Date    WBC 8.1 10/10/2024    WBC 8.5 05/14/2019    RBC 4.43 10/10/2024    RBC 4.08 05/14/2019    HGB 10.7 (L) 10/10/2024    HGB 11.5 (L) 05/14/2019    HCT 35.8 10/10/2024    HCT 35.7 05/14/2019    MCV 81 10/10/2024    MCV 88 05/14/2019    MCH 24.2 (L) 10/10/2024    MCH 28.2 05/14/2019    MCHC 29.9 (L) 10/10/2024    MCHC 32.2 05/14/2019    RDW 15.4 (H) 10/10/2024    RDW 13.4 05/14/2019     10/10/2024     05/14/2019       Lab Results   Component Value Date     12/09/2024     03/23/2012    POTASSIUM 3.7 12/09/2024    POTASSIUM 3.5 09/30/2024    POTASSIUM 3.6 03/23/2012    CHLORIDE 102 12/09/2024    CHLORIDE 107 09/30/2024    CHLORIDE 103 03/23/2012    CO2 27 12/09/2024    CO2 34 (H) 09/30/2024    CO2 36 (H) 03/23/2012    ANIONGAP 11 12/09/2024    ANIONGAP 3 09/30/2024    ANIONGAP 5 (L) 03/23/2012     (H) 12/09/2024     (H) 10/12/2024     (H) 09/30/2024    GLC 99 03/23/2012    BUN 15.6 12/09/2024    BUN 15 09/30/2024    BUN 23 03/23/2012    CR 1.05 (H) 12/09/2024    CR 0.80 03/23/2012    GFRESTIMATED 56 (L) 12/09/2024    GFRESTIMATED 73 03/23/2012    GFRESTBLACK 89 03/23/2012    AVNI 9.7 12/09/2024    AVNI 9.6 03/23/2012      Lab Results   Component Value Date    AST 36 09/30/2024    AST 24 02/17/2012    ALT 28 09/30/2024    ALT 14 02/17/2012       The longitudinal plan of care for Agatha was addressed during this visit. Due to the added complexity in care, I will continue to support Agatha in the subsequent management of this condition(s) and with the ongoing continuity of care of this condition(s).    This note has been dictated using voice recognition software.  Any grammatical, typographical, or context distortions are unintentional and inherent to the software.      Thank you for allowing me to participate in the care of your patient.      Sincerely,     FABIEN [facility]  cc:   Carli Nayak PA-C  [facility]  [address] [place] [address]

## 2024-12-16 ENCOUNTER — PATIENT OUTREACH (OUTPATIENT)
Dept: CARDIOLOGY | Facility: CLINIC | Age: 74
End: 2024-12-16
Payer: MEDICARE

## 2024-12-16 PROBLEM — I50.33 ACUTE ON CHRONIC HEART FAILURE WITH PRESERVED EJECTION FRACTION (HFPEF) (H): Status: ACTIVE | Noted: 2024-12-16

## 2024-12-16 NOTE — PROGRESS NOTES
"[facility]: Heart Failure Care Coordination   Pre-Diuretic Outreach     Situation/Background:      Chief Complaint   Patient presents with    CORE     Assessment for diuretic infusion     12/11/24 CORE visit: plan made for tentative diuretic infusion 12/17/24.     Assessment:      HF symptoms/indications for infusion: weight above goal, abdominal edema      Intervention/Plan:        Infusion Clinic date/time: 12/17/24 at 10 am  IV diuretic orders placed yes, per Anabel LARSEN orders below; also verbally reviewed with Anabel LARSEN re: orders for abdominal ultrasound to assess for ascites  Diuretic hold day of infusion clinic: hold oral lasix day of infusion  Infusion clinic instructions given:  reviewed with Agatha; she requests external catheter during diuresis; discussed need for 4 hours NPO needed prior to abdominal ultrasound--she said she'll \"try her best\"    Reminder to RN board to follow-up with Agatha 12/18/24.    Latricia Carroll APRN CNP Wagner, Anne Chatelle, RN  [facility] Card[facility]: Heart Failure Infusion Orders    Timeframe: 12/17/24    IV Push: Furosemide 80 mg IV    IV Drip Rate: Furosemide 10 mg/hour x 4 hours    Additional Diuretic Options: Leave Diuril and/or Metolazone PRN at discretion of Infusion Provider    Electrolyte Replacement Orders: High potassium replacement protocol    Follow-up plan: RN Care Team follow-up call 1-2 days post infusion    Future Appointments   Date Time Provider Department Center   12/17/2024 10:00 AM  CARE SUITES BED 1 [facility]   1/14/2025  2:40 PM Latricia Carroll APRN CNP [facility]      Nicole Ivan RN BSN   3:57 PM 12/16/24  Nurse line M-F 8a-4p: [phone]      "

## 2024-12-17 ENCOUNTER — HOSPITAL ENCOUNTER (OUTPATIENT)
Facility: CLINIC | Age: 74
Discharge: HOME | End: 2024-12-17
Admitting: INTERNAL MEDICINE
Payer: MEDICARE

## 2024-12-17 VITALS
WEIGHT: 293 LBS | SYSTOLIC BLOOD PRESSURE: 113 MMHG | BODY MASS INDEX: 56.63 KG/M2 | DIASTOLIC BLOOD PRESSURE: 81 MMHG | OXYGEN SATURATION: 98 % | RESPIRATION RATE: 16 BRPM

## 2024-12-17 DIAGNOSIS — I51.89 DIASTOLIC DYSFUNCTION: Primary | ICD-10-CM

## 2024-12-17 DIAGNOSIS — I50.32 CHRONIC HEART FAILURE WITH PRESERVED EJECTION FRACTION (HFPEF) (H): ICD-10-CM

## 2024-12-17 DIAGNOSIS — I50.33 ACUTE ON CHRONIC HEART FAILURE WITH PRESERVED EJECTION FRACTION (HFPEF) (H): ICD-10-CM

## 2024-12-17 LAB
BUN SERPL-MCNC: 22.9 MG/DL (ref 8–23)
BUN SERPL-MCNC: 23.3 MG/DL (ref 8–23)
CREAT SERPL-MCNC: 1.07 MG/DL (ref 0.51–0.95)
CREAT SERPL-MCNC: 1.08 MG/DL (ref 0.51–0.95)
EGFRCR SERPLBLD CKD-EPI 2021: 54 ML/MIN/1.73M2
EGFRCR SERPLBLD CKD-EPI 2021: 55 ML/MIN/1.73M2
ERYTHROCYTE [DISTWIDTH] IN BLOOD BY AUTOMATED COUNT: 18.6 % (ref 10–15)
HCT VFR BLD AUTO: 43 % (ref 35–47)
HGB BLD-MCNC: 13.2 G/DL (ref 11.7–15.7)
IRON BINDING CAPACITY (ROCHE): 299 UG/DL (ref 240–430)
IRON SATN MFR SERPL: 24 % (ref 15–46)
IRON SERPL-MCNC: 72 UG/DL (ref 37–145)
MAGNESIUM SERPL-MCNC: 2 MG/DL (ref 1.7–2.3)
MCH RBC QN AUTO: 25.8 PG (ref 26.5–33)
MCHC RBC AUTO-ENTMCNC: 30.7 G/DL (ref 31.5–36.5)
MCV RBC AUTO: 84 FL (ref 78–100)
PLATELET # BLD AUTO: 218 10E3/UL (ref 150–450)
POTASSIUM SERPL-SCNC: 3.4 MMOL/L (ref 3.4–5.3)
POTASSIUM SERPL-SCNC: 4.6 MMOL/L (ref 3.4–5.3)
RBC # BLD AUTO: 5.11 10E6/UL (ref 3.8–5.2)
SODIUM SERPL-SCNC: 139 MMOL/L (ref 135–145)
WBC # BLD AUTO: 6.9 10E3/UL (ref 4–11)

## 2024-12-17 PROCEDURE — 96376 TX/PRO/DX INJ SAME DRUG ADON: CPT

## 2024-12-17 PROCEDURE — 84520 ASSAY OF UREA NITROGEN: CPT | Performed by: NURSE PRACTITIONER

## 2024-12-17 PROCEDURE — 82565 ASSAY OF CREATININE: CPT | Performed by: NURSE PRACTITIONER

## 2024-12-17 PROCEDURE — 84295 ASSAY OF SERUM SODIUM: CPT | Performed by: NURSE PRACTITIONER

## 2024-12-17 PROCEDURE — 82565 ASSAY OF CREATININE: CPT

## 2024-12-17 PROCEDURE — 85048 AUTOMATED LEUKOCYTE COUNT: CPT

## 2024-12-17 PROCEDURE — 258N000003 HC RX IP 258 OP 636: Performed by: NURSE PRACTITIONER

## 2024-12-17 PROCEDURE — 250N000011 HC RX IP 250 OP 636: Performed by: NURSE PRACTITIONER

## 2024-12-17 PROCEDURE — 84520 ASSAY OF UREA NITROGEN: CPT

## 2024-12-17 PROCEDURE — 82728 ASSAY OF FERRITIN: CPT

## 2024-12-17 PROCEDURE — 999N000087 HC STATISTIC IV OP-OVERFLOW

## 2024-12-17 PROCEDURE — 96365 THER/PROPH/DIAG IV INF INIT: CPT

## 2024-12-17 PROCEDURE — 99215 OFFICE O/P EST HI 40 MIN: CPT | Performed by: NURSE PRACTITIONER

## 2024-12-17 PROCEDURE — 84132 ASSAY OF SERUM POTASSIUM: CPT

## 2024-12-17 PROCEDURE — 36415 COLL VENOUS BLD VENIPUNCTURE: CPT | Performed by: NURSE PRACTITIONER

## 2024-12-17 PROCEDURE — 85018 HEMOGLOBIN: CPT

## 2024-12-17 PROCEDURE — 83735 ASSAY OF MAGNESIUM: CPT | Performed by: NURSE PRACTITIONER

## 2024-12-17 PROCEDURE — 96374 THER/PROPH/DIAG INJ IV PUSH: CPT

## 2024-12-17 PROCEDURE — 84132 ASSAY OF SERUM POTASSIUM: CPT | Performed by: NURSE PRACTITIONER

## 2024-12-17 PROCEDURE — 250N000013 HC RX MED GY IP 250 OP 250 PS 637: Performed by: NURSE PRACTITIONER

## 2024-12-17 PROCEDURE — 83550 IRON BINDING TEST: CPT

## 2024-12-17 PROCEDURE — 36415 COLL VENOUS BLD VENIPUNCTURE: CPT

## 2024-12-17 PROCEDURE — 96366 THER/PROPH/DIAG IV INF ADDON: CPT

## 2024-12-17 RX ORDER — FUROSEMIDE 10 MG/ML
80 INJECTION INTRAMUSCULAR; INTRAVENOUS ONCE
Status: COMPLETED | OUTPATIENT
Start: 2024-12-17 | End: 2024-12-17

## 2024-12-17 RX ORDER — METOLAZONE 2.5 MG/1
2.5 TABLET ORAL WEEKLY
Qty: 4 TABLET | Refills: 0 | Status: SHIPPED | OUTPATIENT
Start: 2024-12-17

## 2024-12-17 RX ORDER — LIDOCAINE 40 MG/G
CREAM TOPICAL
Status: DISCONTINUED | OUTPATIENT
Start: 2024-12-17 | End: 2024-12-17 | Stop reason: HOSPADM

## 2024-12-17 RX ORDER — POTASSIUM CHLORIDE 1500 MG/1
40 TABLET, EXTENDED RELEASE ORAL ONCE
Status: COMPLETED | OUTPATIENT
Start: 2024-12-17 | End: 2024-12-17

## 2024-12-17 RX ADMIN — FUROSEMIDE 80 MG: 10 INJECTION, SOLUTION INTRAMUSCULAR; INTRAVENOUS at 10:54

## 2024-12-17 RX ADMIN — FUROSEMIDE 10 MG/HR: 10 INJECTION, SOLUTION INTRAMUSCULAR; INTRAVENOUS at 11:33

## 2024-12-17 RX ADMIN — POTASSIUM CHLORIDE 40 MEQ: 1500 TABLET, EXTENDED RELEASE ORAL at 15:28

## 2024-12-17 ASSESSMENT — ACTIVITIES OF DAILY LIVING (ADL)
ADLS_ACUITY_SCORE: 60

## 2024-12-17 NOTE — LETTER
[facility]  [address] [place] [address]  Phone: [phone]    December 17, 2024        Agatha Rodriguez  [address] [place] [address] [address]  [facility] [address]          To whom it may concern:    RE: Agatha Rodriguez    {[phone]}    Please contact me for questions or concerns.      Sincerely,      Non- Credentialed Prov*

## 2024-12-17 NOTE — PROGRESS NOTES
1000 Pt here for CHF Lasix infusion.     Pre weight - 161.934 kg.    1045 Pre labs drawn - see results.    1055     Lasix   80 mg bolus given & infusion intitiated.     K+ level 4.6 - - no replacement needed - per protocol.    Mg level 2.0 -  no replacement needed - per protocol.    ------------------------------------------------  1130 received report from Shiela CHAU RN who started Pt.Pt VSS and infusing w/o issue    1200 cont to infuse w/o difficulty. Pt sitting in cardiac chair. No complaints. Family  in room.    1325 Call received from CARLY Oliver in [facility].Informed to stop infusion early d/t Pt not holding Lasix in am    Repeat K+ level - 3.4. K replaced per RN managed protocol.    3680 gave detailed report to Naresh HOPKINS.

## 2024-12-17 NOTE — PROGRESS NOTES
Cardiology Progress Note    CORE DIURETIC INFUSION VISIT  Service Date: 12/17/2024  Primary Cardiology Team: Dr. Rainey (initial hospital consult) / myself     HPI:  I had the pleasure of seeing Agatha Rodriguez for an IV diuretic infusion visit today. She is a very pleasant 74 year old female with a past medical history notable for  hypertension, type II diabetes (A1c 6.8%), morbid obesity BMI 58, and DYLAN compliant on bipap who was recently admitted on 9/30/2024-10/12/2024 with progressive dyspnea and palpitations, found to be in new Atrial flutter with RVR and acute HFpEF exacerbation. Her EF was noted to be 30% by NICOLA along with left atrial appendage thrombus, cardioversion was cancelled. She was started on anticoagulation, diuresed and started on GDMT. Plan for rate control for atleast 4 weeks and then repeat NICOLA and cardioversion if VIVIANE thrombus resolved and consider ischemia evaluation outpatient.      She has been following with myself in the clinic for the past 3 weeks.  She had a significant 20 pound weight gain after leaving the hospital in October.  We increased her oral Lasix 40 mg twice daily and added metolazone 2.5 mg once a week.  She subsequently lost about 15 pounds then had issues with hypokalemia and difficulty with transportation to get to lab rechecks.  She was seen by myself last week in follow-up and her weight was up again to 361 pounds.  We again resumed metolazone.  She had ongoing abdominal bloating with NYHA class II-III symptoms and she was referred for diuretic infusion today.    Currently, Agatha is resting on the cart, she notes she was rushing this morning and accidentally took her Lasix despite advisement of holding that medication this morning.  Denies any dizziness currently, is fatigued at home.  Blood pressure 110/67.  She has not been up yet off the cart as she is using the pure wick for accurate urine output monitoring.  She is not sure if her abdomen feels better but on  exam it feels more soft.     Physical Exam:  Vitals: /67   Wt (!) 161.9 kg (357 lb)   BMI 57.37 kg/m     Wt Readings from Last 4 Encounters:   12/17/24 (!) 161.9 kg (357 lb)   12/11/24 (!) 163.7 kg (361 lb)   11/22/24 (!) 164.9 kg (363 lb 8 oz)   10/11/24 (!) 156.9 kg (345 lb 14.4 oz)     CONSTITUTIONAL: Alert and in no acute distress.  CARDIOVASCULAR:  IRR, S1, S2, JVD difficult to visualize with neck size  RESPIRATORY: Breathing non-labored. Lungs are clear to auscultation with no wheezes or crackles bilaterally.  GASTROINTESTINAL: Abdomen is obese, less hard than last week  EXTREMITIES: Trace LE edema.   NEUROPSYCHIATRIC: No gross focal deficits. Affect appropriate. Mentation normal.     ASSESSMENT:  Acute likely combined systolic and diastolic heart failure  -Discrepant EF by TTE and NICOLA; NICOLA read EF 30-35% with moderate RVSF. Etiology under investigation, longstanding history of HTN, possible tachy mediated with history of AFL/AF with difficulty to control rates and carries risk factors for ischemia. Plan for optimization of volume status first, then consider NICOLA/CDV and then likely pursue coronary angiogram after 4 weeks to uninterrupted anticoagulation given prior abnormal stress test.   -Gained 20 lbs after last hospitalization, then lost 15 then regained back last week with ongoing abdominal bloating and likely 13-15+ lb above dry weight  , ?abdominal ascites-consider abdominal US to see if paracentesis needed if unable to diurese otherwise. Suspect dry weight closer to 343 or less.. Difficult exam given body habitus.  -Weight last week 361 lbs - today 357 pre infusion  -Creatinine remains stable 1.07 this morning K 4.6  -She inadvertently took oral lasix 40mg this morning before infusion clinic  -Was given IV lasix 80mg x1 on arrival and IV infusion started 10mg/hour x4 hours  -At [facility] she has had 1700cc urine output, hasn't been up yet and /67  -Ensure checking post infusion weight    -Resume metolazone 2.5mg tomorrow once a week on Thursdays  -Continue lasix 40mg twice daily  -Additional potassium chloride 20mEq with metolazone dose on Thursdays   -Continue potassium chloride 60mEq twice daily  -Continue metoprolol Xl 100mg twice daily   -Continue Entresto 24-26mg twice daily - can up titrate in follow up   -Continue Jardiance 10mg daily   -Consider addition of spironolactone next; await labs. She is overwhelmed with any additional medication at this time    Atrial fibrillation  Left atrial appendage thrombus   -Rate controlled, continue rate control approach until diuresed appropriately prior to repeat NICOLA assessment - would schedule with general anesthesia given BMI and DYLAN risks. If VIVIANE thrombus resolved then cardioversion attempt reasonable given possibly tachy-mediated CM however rhythm control may prove difficult long term with risk factors and mild-moderate biatrial enlargement.   -TSH is normal (10/2024)  -Continue metoprolol XL 100mg twice daily and digoxin 125mcg  -Continue Eliquis 5mg twice daily, ensuring no missed doses    4.  HTN  -BP low normal/controlled, hydralazine was already discontinued. Continues on metoprolol and Entresto    5.  Iron deficiency anemia   -Iron sat 10%; Hgb 11.8. On oral iron, likely poor absorption.   -Improving, Iron sat up to 24% and Hgb normalized to 13.2. Ferritin pending. Continue oral replacement      Morbid obesity BMI 58  DYLAN on Bipap   DM type 2      PLAN:  - Given she inadvertently took oral lasix 40mg this morning and blood pressure 110/67 with brisk urine output so far at midpoint costa after IV lasix 80mg and IV lasix 10mg/hour, discussed with RN and we will check labs early at 2pm and stop IV lasix at that time.   -UO 2.5 L   -to hold oral lasix home dose this afternoon  -Resume once weekly metolazone 2.5mg on Thursday, continue lasix 40mg twice daily   -Continue potassium chloride 60mEq twice daily   -Adding on iron studies, consider  outpatient anemia/iron referral  -Ferritin lab pending; iron sat up to 24%  -Check weight prior discharge   -Continue current medications   -Follow up [facility] RN call as planned and clinic follow up ideally in 2-3 weeks, will put her on cancellation list, currently scheduled for January 14th.     Latricia Carroll [facility]       Please kindly note that this document was completed in part using Dragon voice recognition software. Although reviewed after completion, some word substitutions and typographical errors may occur. Please contact me if clarification is needed.     Orders this Visit:  Orders Placed This Encounter   Procedures    Urea nitrogen    Creatinine    Magnesium    Potassium    Sodium    CBC with platelets    Creatinine    Ferritin    Iron and iron binding capacity    Potassium    Urea nitrogen    EKG 12-lead, tracing only    Vital Signs    Pulse oximetry nursing    Weigh patient    Measure urine output    Patient Teaching: Heart Failure    Peripheral IV catheter    Activity Up ad shereen    Notify Provider    Notify Provider    May discharge when: other (specify in comments) IF Systolic blood pressure is greater than or equal to 90 mmHg AND patient is ambulatory without symptoms.    Discharge planning    Patient care order    Discharge Instructions    Discontinue IV and Saline Lock    I have reviewed and agree with all the recommendations and orders detailed in this document.    Fluid restriction 750 ML FLUID    Oxygen: Nasal cannula    Discharge patient     Orders Placed This Encounter   Medications    lidocaine 1 % 0.1-1 mL    lidocaine (LMX4) cream    sodium chloride (PF) 0.9% PF flush 3 mL    sodium chloride (PF) 0.9% PF flush 3 mL    furosemide (LASIX) injection 80 mg    furosemide (LASIX) 100 mg in sodium chloride 0.9 % 100 mL infusion    potassium chloride liv ER (KLOR-CON M20) CR tablet 40 mEq    metolazone (ZAROXOLYN) 2.5 MG tablet     Sig: Take 1 tablet (2.5 mg) by mouth  once a week. On Thursdays with additional potassium chloride 20mEq.     Dispense:  4 tablet     Refill:  0     Medications Discontinued During This Encounter   Medication Reason    metolazone (ZAROXOLYN) 2.5 MG tablet        CURRENT MEDICATIONS:  Medications Prior to Admission   Medication Sig Dispense Refill Last Dose/Taking    acetaminophen (TYLENOL) 325 MG tablet Take 2 tablets (650 mg) by mouth every 4 hours as needed for mild pain.       albuterol (PROAIR HFA/PROVENTIL HFA/VENTOLIN HFA) 108 (90 Base) MCG/ACT inhaler Inhale 2 puffs into the lungs every 6 hours as needed for shortness of breath, wheezing or cough       albuterol (PROVENTIL) (2.5 MG/3ML) 0.083% neb solution Take 1 vial (2.5 mg) by nebulization every 4 hours as needed for shortness of breath, wheezing or cough 90 mL 0     apixaban ANTICOAGULANT (ELIQUIS) 5 MG tablet Take 1 tablet (5 mg) by mouth 2 times daily. 60 tablet 0     atorvastatin (LIPITOR) 40 MG tablet Take 40 mg by mouth daily       digoxin (LANOXIN) 125 MCG tablet Take 1 tablet (125 mcg) by mouth daily. 30 tablet 0     Docusate Calcium (STOOL SOFTENER PO) Take 1 tablet by mouth daily as needed.       empagliflozin (JARDIANCE) 10 MG TABS tablet Take 1 tablet (10 mg) by mouth daily. 90 tablet 1     ferrous sulfate (FEROSUL) 325 (65 Fe) MG tablet Take 325 mg by mouth Every Mon, Wed, Fri Morning.       fluticasone (ARNUITY ELLIPTA) 200 MCG/ACT inhaler Inhale 1 puff into the lungs daily       furosemide (LASIX) 40 MG tablet Take 1 tablet (40 mg) by mouth 2 times daily. 180 tablet 1     glipiZIDE (GLUCOTROL XL) 2.5 MG 24 hr tablet Take 1 tablet (2.5 mg) by mouth daily. --- 10/12/2024 --- HOLD until follow up with primary care provider. ---       melatonin 3 MG tablet Take 1 tablet (3 mg) by mouth nightly as needed for sleep. (Patient not taking: Reported on 12/11/2024)       metFORMIN (GLUCOPHAGE) 500 MG tablet Take 500 mg by mouth 2 times daily (with meals)       metoprolol succinate ER  (TOPROL XL) 100 MG 24 hr tablet Take 1 tablet (100 mg) by mouth 2 times daily. 60 tablet 0     Multiple Vitamin (MULTIVITAMIN OR) Take 1 tablet by mouth daily       nitroGLYCERIN (NITROSTAT) 0.4 MG SL tablet Place 1 tablet under the tongue every 5 minutes as needed for chest pain. 90 tablet 12     potassium chloride liv ER (KLOR-CON M20) 20 MEQ CR tablet Take 3 tablets (60 mEq) by mouth 2 times daily. 360 tablet 3     Respiratory Therapy Supplies (NEBULIZER COMPRESSOR) KIT 1 each 4 times daily 1 kit 0     Respiratory Therapy Supplies (NEBULIZER/ADULT MASK) KIT 1 each 4 times daily 1 kit 0     sacubitril-valsartan (ENTRESTO) 24-26 MG per tablet Take 1 tablet by mouth 2 times daily. 60 tablet 0     [DISCONTINUED] metolazone (ZAROXOLYN) 2.5 MG tablet Take 1 tablet or 2.5mg as needed as directed by [facility] Clinic/Cardiology ONLY (Patient not taking: Reported on 12/11/2024) 5 tablet 0      Current Facility-Administered Medications   Medication Dose Route Frequency Provider Last Rate Last Admin       ALLERGIES   No Known Allergies    PAST MEDICAL HISTORY:  Past Medical History:   Diagnosis Date    (HFpEF) heart failure with preserved ejection fraction (H) 07/2024    Asthma     CAD (coronary artery disease)     Diverticulitis of intestine without perforation or abscess 02/17/2017    GERD (gastroesophageal reflux disease)     Gout of right foot 2015    Hypertension     Morbid obesity with BMI of 50.0-59.9, adult (H) 07/28/2024    Myocardial infarction, silent (H) 2005    Dx w silent Mi in [place] ~ 2005    Obesity     Primary osteoarthritis of both knees     Sleep apnea     uses CPAP    Type 2 diabetes mellitus (H)        PAST SURGICAL HISTORY:  Past Surgical History:   Procedure Laterality Date    ANESTHESIA CARDIOVERSION N/A 10/3/2024    Procedure: Anesthesia cardioversion;  Surgeon: GENERIC ANESTHESIA PROVIDER;  Location: [facility] OR    HYSTERECTOMY      TRANSESOPHAGEAL ECHOCARDIOGRAM INTRAOPERATIVE N/A 10/3/2024    Procedure:  Transesophageal echocardiogram intraoperative;  Surgeon: GENERIC ANESTHESIA PROVIDER;  Location:  OR       FAMILY HISTORY:  Family History   Problem Relation Age of Onset    Diabetes Mother     Breast Cancer Mother        SOCIAL HISTORY:  Social History     Socioeconomic History    Marital status:    Tobacco Use    Smoking status: Never    Smokeless tobacco: Never   Substance and Sexual Activity    Alcohol use: No    Drug use: No    Sexual activity: Yes     Social Drivers of Health     Financial Resource Strain: Low Risk  (10/1/2024)    Financial Resource Strain     Within the past 12 months, have you or your family members you live with been unable to get utilities (heat, electricity) when it was really needed?: No   Food Insecurity: Low Risk  (10/1/2024)    Food Insecurity     Within the past 12 months, did you worry that your food would run out before you got money to buy more?: No     Within the past 12 months, did the food you bought just not last and you didn t have money to get more?: No   Transportation Needs: Low Risk  (10/1/2024)    Transportation Needs     Within the past 12 months, has lack of transportation kept you from medical appointments, getting your medicines, non-medical meetings or appointments, work, or from getting things that you need?: No   Interpersonal Safety: Low Risk  (10/1/2024)    Interpersonal Safety     Do you feel physically and emotionally safe where you currently live?: Yes     Within the past 12 months, have you been hit, slapped, kicked or otherwise physically hurt by someone?: No     Within the past 12 months, have you been humiliated or emotionally abused in other ways by your partner or ex-partner?: No   Housing Stability: Low Risk  (10/1/2024)    Housing Stability     Do you have housing? : Yes     Are you worried about losing your housing?: No       Review of Systems:  Focused cardiovascular and respiratory review of systems is negative other than the symptoms  "noted above in the HPI.     Recent Lab Results:  LIPID RESULTS:  Lab Results   Component Value Date    CHOL 194 02/17/2012    HDL 49 (L) 02/17/2012     02/17/2012    TRIG 83 02/17/2012    CHOLHDLRATIO 4.0 02/17/2012       LIVER ENZYME RESULTS:  Lab Results   Component Value Date    AST 36 09/30/2024    AST 24 02/17/2012    ALT 28 09/30/2024    ALT 14 02/17/2012       CBC RESULTS:  Lab Results   Component Value Date    WBC 6.9 12/17/2024    WBC 8.5 05/14/2019    RBC 5.11 12/17/2024    RBC 4.08 05/14/2019    HGB 13.2 12/17/2024    HGB 11.5 (L) 05/14/2019    HCT 43.0 12/17/2024    HCT 35.7 05/14/2019    MCV 84 12/17/2024    MCV 88 05/14/2019    MCH 25.8 (L) 12/17/2024    MCH 28.2 05/14/2019    MCHC 30.7 (L) 12/17/2024    MCHC 32.2 05/14/2019    RDW 18.6 (H) 12/17/2024    RDW 13.4 05/14/2019     12/17/2024     05/14/2019       BMP RESULTS:  Lab Results   Component Value Date     12/17/2024     03/23/2012    POTASSIUM 3.4 12/17/2024    POTASSIUM 3.5 09/30/2024    POTASSIUM 3.6 03/23/2012    CHLORIDE 102 12/09/2024    CHLORIDE 107 09/30/2024    CHLORIDE 103 03/23/2012    CO2 27 12/09/2024    CO2 34 (H) 09/30/2024    CO2 36 (H) 03/23/2012    ANIONGAP 11 12/09/2024    ANIONGAP 3 09/30/2024    ANIONGAP 5 (L) 03/23/2012     (H) 12/09/2024     (H) 10/12/2024     (H) 09/30/2024    GLC 99 03/23/2012    BUN 22.9 12/17/2024    BUN 15 09/30/2024    BUN 23 03/23/2012    CR 1.08 (H) 12/17/2024    CR 0.80 03/23/2012    GFRESTIMATED 54 (L) 12/17/2024    GFRESTIMATED 73 03/23/2012    GFRESTBLACK 89 03/23/2012    AVNI 9.7 12/09/2024    AVNI 9.6 03/23/2012        A1C RESULTS:  No results found for: \"A1C\"    INR RESULTS:  Lab Results   Component Value Date    INR 1.21 (H) 10/02/2024            "

## 2024-12-17 NOTE — DISCHARGE SUMMARY
Care Suites Discharge Nursing Note    Patient Information  Name: Agatha Rodriguez  Age: 74 year old    Discharge Education:  Discharge instructions reviewed: Yes  Additional education/resources provided: AVS  Patient/patient representative verbalizes understanding: Yes  Patient discharging on new medications: No  Medication education completed: N/A    Discharge Plans:   Discharge location: home  Discharge ride contacted: Yes  Approximate discharge time: 1610    Discharge Criteria:  Discharge criteria met and vital signs stable: Yes    Patient Belongs:  Patient belongings returned to patient: Yes    Naresh Hernández RN

## 2024-12-17 NOTE — DISCHARGE INSTRUCTIONS
Discharge Instructions  We are giving you 40mEq of potassium before you leave.  Take additional 40mEq potassium this evening then normal dosing tomorrow of 60 twice a day.    HOLD oral furosemide/Lasix tablets this evening and resume normal dosing tomorrow (40mg twice daily)  Can take super water pill Metolazone 2.5mg only once a week on THURSDAY mornings with additional 20mEq of potassium.

## 2024-12-18 ENCOUNTER — PATIENT OUTREACH (OUTPATIENT)
Dept: CARDIOLOGY | Facility: CLINIC | Age: 74
End: 2024-12-18
Payer: MEDICARE

## 2024-12-18 ENCOUNTER — TELEPHONE (OUTPATIENT)
Dept: CARDIOLOGY | Facility: CLINIC | Age: 74
End: 2024-12-18
Payer: MEDICARE

## 2024-12-18 LAB — FERRITIN SERPL-MCNC: 51 NG/ML (ref 11–328)

## 2024-12-18 NOTE — PROGRESS NOTES
[facility]: Heart Failure Care Coordination   Post-Diuretic Outreach     Situation/Background:      Chief complaint: Clinic Care Coordination    Medication(s) administered: Furosemide (Lasix)      Date received: 12/17    Assessment:      Pre-diuretic weight: 357lbs   Post-diuretic weight: unsure   Day after diuretic weight: ____     Medication changes made: No    CHF assessment:       Patient s symptoms have _____ since diuretic administration.      Does patient have CardioMEMS?   No    Intervention/Plan:      Current goal weight:?343lbs   Current daily diuretic:? Resume once weekly metolazone 2.5mg on Thursday, continue lasix 40mg twice daily     Patient to follow up as scheduled.    Future Appointments   Date Time Provider Department Center   1/14/2025  2:40 PM Latricia Carroll APRN CNP [facility]   lbs

## 2025-01-02 ENCOUNTER — PATIENT OUTREACH (OUTPATIENT)
Dept: CARDIOLOGY | Facility: CLINIC | Age: 75
End: 2025-01-02
Payer: MEDICARE

## 2025-01-02 NOTE — PROGRESS NOTES
[facility]: Heart Failure Care Coordination   Follow-Up Outreach     Situation/Background:      Chief Complaint   Patient presents with    Clinic Care Coordination - Follow-up     [facility]- Cortisone injection     VM from pt noting she is going to have a Cortisone injection and wondered if any contraindications with her heart medicine.     Assessment:      Spoke with pt, she is not scheduled for injection yet, its just been recommended. Reviewed usually only contraindication from our end is potentially her blood thinner Eliquis, but usually the team doing the injection will give instructions if she should hold or not and for how long they prefer, then she should notify us to see if OK to hold per their request.     Intervention/Plan:      Pt will contact team doing Cortisone injection and see if they are requiring her to hold Eliquis. If so, she will ask how long they prefer and then let us know so we can talk to provider here to get OK or not. Pt stated understanding.     Future Appointments   Date Time Provider Department Center   1/14/2025  2:40 PM Latricia Carroll APRN [facility]     Helen Bhakta RN, BSN  01/02/25 at 12:55 PM

## 2025-01-08 DIAGNOSIS — I50.32 CHRONIC HEART FAILURE WITH PRESERVED EJECTION FRACTION (HFPEF) (H): Primary | ICD-10-CM

## 2025-01-14 ENCOUNTER — LAB (OUTPATIENT)
Dept: LAB | Facility: CLINIC | Age: 75
End: 2025-01-14
Payer: MEDICARE

## 2025-01-14 ENCOUNTER — OFFICE VISIT (OUTPATIENT)
Dept: CARDIOLOGY | Facility: CLINIC | Age: 75
End: 2025-01-14
Attending: NURSE PRACTITIONER
Payer: MEDICARE

## 2025-01-14 VITALS
SYSTOLIC BLOOD PRESSURE: 147 MMHG | BODY MASS INDEX: 57.28 KG/M2 | DIASTOLIC BLOOD PRESSURE: 82 MMHG | HEART RATE: 67 BPM | WEIGHT: 293 LBS | OXYGEN SATURATION: 99 % | RESPIRATION RATE: 16 BRPM

## 2025-01-14 DIAGNOSIS — R93.5 ABDOMINAL ULTRASOUND, ABNORMAL: Primary | ICD-10-CM

## 2025-01-14 DIAGNOSIS — E87.6 HYPOKALEMIA: ICD-10-CM

## 2025-01-14 DIAGNOSIS — I48.3 TYPICAL ATRIAL FLUTTER (H): ICD-10-CM

## 2025-01-14 DIAGNOSIS — I50.32 CHRONIC HEART FAILURE WITH PRESERVED EJECTION FRACTION (HFPEF) (H): ICD-10-CM

## 2025-01-14 LAB
ANION GAP SERPL CALCULATED.3IONS-SCNC: 10 MMOL/L (ref 7–15)
BUN SERPL-MCNC: 22.2 MG/DL (ref 8–23)
CALCIUM SERPL-MCNC: 9.8 MG/DL (ref 8.8–10.4)
CHLORIDE SERPL-SCNC: 99 MMOL/L (ref 98–107)
CREAT SERPL-MCNC: 1.11 MG/DL (ref 0.51–0.95)
EGFRCR SERPLBLD CKD-EPI 2021: 52 ML/MIN/1.73M2
GLUCOSE SERPL-MCNC: 189 MG/DL (ref 70–99)
HCO3 SERPL-SCNC: 31 MMOL/L (ref 22–29)
NT-PROBNP SERPL-MCNC: 1341 PG/ML (ref 0–900)
POTASSIUM SERPL-SCNC: 3.9 MMOL/L (ref 3.4–5.3)
SODIUM SERPL-SCNC: 140 MMOL/L (ref 135–145)

## 2025-01-14 PROCEDURE — 80048 BASIC METABOLIC PNL TOTAL CA: CPT

## 2025-01-14 PROCEDURE — 83880 ASSAY OF NATRIURETIC PEPTIDE: CPT

## 2025-01-14 PROCEDURE — 36415 COLL VENOUS BLD VENIPUNCTURE: CPT

## 2025-01-14 RX ORDER — ATORVASTATIN CALCIUM 40 MG/1
40 TABLET, FILM COATED ORAL DAILY
Qty: 90 TABLET | Refills: 3 | Status: SHIPPED | OUTPATIENT
Start: 2025-01-14

## 2025-01-14 RX ORDER — DIGOXIN 125 MCG
125 TABLET ORAL DAILY
Qty: 90 TABLET | Refills: 1 | Status: SHIPPED | OUTPATIENT
Start: 2025-01-14

## 2025-01-14 RX ORDER — POTASSIUM CHLORIDE 1500 MG/1
60 TABLET, EXTENDED RELEASE ORAL 2 TIMES DAILY
Qty: 360 TABLET | Refills: 3 | Status: SHIPPED | OUTPATIENT
Start: 2025-01-14

## 2025-01-14 RX ORDER — METOLAZONE 2.5 MG/1
2.5 TABLET ORAL
Qty: 8 TABLET | Refills: 1 | Status: SHIPPED | OUTPATIENT
Start: 2025-01-16

## 2025-01-14 NOTE — Clinical Note
1/14/2025    [facility] [address]  [place] [address]    RE: Agatha Rodriguez       Dear Colleague,     I had the pleasure of seeing Agatha Rodriguez in the [facility].        Cardiology Clinic Follow up     Agatha Rodriguez MRN# [id number]   YOB: 1950 Age: 74 year old     Primary cardiologist: Dr. Rainey (initial hospital consult)     Reason for visit: Post hospital follow up     History of presenting illness:    Agatha Rodriguez is a 74 year old female with past medical history significant for hypertension, type II diabetes (A1c 6.8%), morbid obesity BMI 58, and DYLAN compliant on bipap who was recently admitted on 9/30/2024-10/12/2024 with progressive dyspnea and palpitations, found to be in new Atrial flutter with RVR and acute HFpEF exacerbation. Her EF was noted to be 30% by NICOLA along with left atrial appendage thrombus, cardioversion was cancelled. She was started on anticoagulation, diuresed and started on GDMT. Plan for rate control for atleast 4 weeks and then repeat NICOLA and cardioversion if VIVIANE thrombus resolved and consider ischemia evaluation outpatient. She discharged on 10/12/24.    Seen last in cardiology follow up with myself 11/22/24. Weight up to 363 (343 upon arriving home from hospital).  Lasix increased to 40mg twice daily and hydralazine reduced to 25mg twice daily. Metolazone 2.5mg once a week added until lab follow up.  She lost weight down to reported 348 and then gained again, plan for infusion clinic 12/6 but declined. Had limited transportation for labs. Potassium down to 2.9 on recheck - was inadvertently rationing potassium.     Seen last in office with myself 12/11/24. Her scale is out of batteries. Weight today up to 361, previously as low as 348. She had not restarted metolazone yet as awaiting lab draw today. She feels her breathing is better but her abdomen is bloated and she feels heavier.  Denies palpitations or chest pain. No edema in lower legs - all in abdomen. She is fatigued.     Referred for IV infusion clinic 12/17/24.     Today, Agatha presents for follow up with her PCA.            Assessment and Plan:     ASSESSMENT:    Acute likely combined systolic and diastolic heart failure  -Discrepant EF by TTE and NICOLA; NICOLA read EF 30-35% with moderate RVSF. Etiology under investigation, longstanding history of HTN, possible tachy mediated with history of AFL/AF with difficulty to control rates and carries risk factors for ischemia. Plan for optimization of volume status first, then consider NICOLA/CDV and then likely pursue coronary angiogram after 4 weeks to uninterrupted anticoagulation given prior abnormal stress test.   -Presenting back with ongoing abdominal bloating and likely 13-15+ lb above dry weight, ?abdominal ascites-consider abdominal US to see if paracentesis needed if unable to diurese otherwise. Suspect dry weight closer to 343 by prior home scale. Difficult exam given body habitus.   -Ran out of medications for a week after discharge initially. High risk for hospital readmission.   -Creat 1.05, K 3.7, BNP down to 720 (12/9/24)  -Resume metolazone 2.5mg tomorrow once a week for now  -Continue lasix 40mg twice daily  -Additional potassium chloride 20mEq with metolazone dose tomorrow  -Continue potassium chloride 60mEq twice daily  -Continue metoprolol Xl 100mg twice daily   -Stop hydralazine for now - /82 and fatigued   -Continue Entresto 24-26mg twice daily - can up titrate in follow up   -Continue Jardiance 10mg daily   -Consider spironolactone next   -We discussed plan for infusion clinic on Tuesday 12/17. Will work with RN team to check in on Monday with symptoms.     Atrial fibrillation  Left atrial appendage thrombus   -Rate controlled, continue rate control approach until diuresed appropriately prior to repeat NICOLA assessment - would schedule with general anesthesia given BMI  and DYLAN risks. If VIVIANE thrombus resolved then cardioversion attempt reasonable given possibly tachy-mediated CM however rhythm control may prove difficult long term with risk factors and mild-moderate biatrial enlargement.   -TSH is normal (10/2024)  -Continue metoprolol XL 100mg twice daily and digoxin 125mcg  -Continue Eliquis 5mg twice daily, ensuring no missed doses    HTN  -Lower normal 116/82 - stop hydralazine to allow room for diuresis  -Continue Entresto     Morbid obesity BMI 58  DYLAN on Bipap   DM type 2    Iron deficiency anemia   -Iron sat 10%; Hgb 11.8. On oral iron, likely poor absorption.  -Consider IV iron replacement if infusion clinic utilized         PLAN:     She declined IV infusion on 12/6. Weight is up again to 361 today. Resume metolazone 2.5mg today now that potassium is corrected. Continue potassium chloride 80mEq twice a day through tomorrow then reduce to 60mEq twice a day   Recommend IV infusion clinic early next week 12/17, I suspect her dry weight may be lower than 343.  Stop hydralazine   Continue other current medications  Follow up in 2-3 weeks           Latricia Carroll, DNP, APRN, CNP  [facility]     Orders this Visit:  No orders of the defined types were placed in this encounter.    No orders of the defined types were placed in this encounter.    There are no discontinued medications.      Today's clinic visit entailed:  30 minutes spent by me on the date of the encounter doing chart review, review of outside records, review of test results, interpretation of tests, patient visit, documentation, discussion with other provider(s), and discussion with family, coordination of care with CORE nurse team after visit.             Physical Exam:   Vitals: BP (!) 147/82 (BP Location: Right arm, Patient Position: Sitting, Cuff Size: Adult Large)   Pulse 67   Resp 16   Wt (!) 161.7 kg (356 lb 6.4 oz)   SpO2 99%   BMI 57.28 kg/m      Body mass index is 57.28  kg/m .    Vitals:    01/14/25 1421   Weight: (!) 161.7 kg (356 lb 6.4 oz)       General :   Alert and oriented, in no acute distress.    Respiratory:   Respirations unlabored at rest, dyspnea with exertion. LS diminished without crackles or wheeze   Cardiovascular:   Rhythm is irregular. S1 and S2. Distant tones. JVP difficult to visualize given body habitus   GI: Abdomen is obese, bloated/semi firm, non tender   Extremities: Warm and dry, Trace ankle edema   Neurologic: Moves all extremities, non focal    Psych:  Appropriate             Medications:     Current Outpatient Medications   Medication Sig Dispense Refill    acetaminophen (TYLENOL) 325 MG tablet Take 2 tablets (650 mg) by mouth every 4 hours as needed for mild pain.      albuterol (PROAIR HFA/PROVENTIL HFA/VENTOLIN HFA) 108 (90 Base) MCG/ACT inhaler Inhale 2 puffs into the lungs every 6 hours as needed for shortness of breath, wheezing or cough      albuterol (PROVENTIL) (2.5 MG/3ML) 0.083% neb solution Take 1 vial (2.5 mg) by nebulization every 4 hours as needed for shortness of breath, wheezing or cough 90 mL 0    apixaban ANTICOAGULANT (ELIQUIS) 5 MG tablet Take 1 tablet (5 mg) by mouth 2 times daily. 60 tablet 0    atorvastatin (LIPITOR) 40 MG tablet Take 40 mg by mouth daily      digoxin (LANOXIN) 125 MCG tablet Take 1 tablet (125 mcg) by mouth daily. 30 tablet 0    Docusate Calcium (STOOL SOFTENER PO) Take 1 tablet by mouth daily as needed.      empagliflozin (JARDIANCE) 10 MG TABS tablet Take 1 tablet (10 mg) by mouth daily. 90 tablet 1    ferrous sulfate (FEROSUL) 325 (65 Fe) MG tablet Take 325 mg by mouth Every Mon, Wed, Fri Morning.      fluticasone (ARNUITY ELLIPTA) 200 MCG/ACT inhaler Inhale 1 puff into the lungs daily      furosemide (LASIX) 40 MG tablet Take 1 tablet (40 mg) by mouth 2 times daily. 180 tablet 1    glipiZIDE (GLUCOTROL XL) 2.5 MG 24 hr tablet Take 1 tablet (2.5 mg) by mouth daily. --- 10/12/2024 --- HOLD until follow up with  primary care provider. ---      metFORMIN (GLUCOPHAGE) 500 MG tablet Take 500 mg by mouth 2 times daily (with meals)      metolazone (ZAROXOLYN) 2.5 MG tablet Take 1 tablet (2.5 mg) by mouth once a week. On Thursdays with additional potassium chloride 20mEq. 4 tablet 0    metoprolol succinate ER (TOPROL XL) 100 MG 24 hr tablet Take 1 tablet (100 mg) by mouth 2 times daily. 60 tablet 0    Multiple Vitamin (MULTIVITAMIN OR) Take 1 tablet by mouth daily      nitroGLYCERIN (NITROSTAT) 0.4 MG SL tablet Place 1 tablet under the tongue every 5 minutes as needed for chest pain. 90 tablet 12    potassium chloride liv ER (KLOR-CON M20) 20 MEQ CR tablet Take 3 tablets (60 mEq) by mouth 2 times daily. 360 tablet 3    Respiratory Therapy Supplies (NEBULIZER COMPRESSOR) KIT 1 each 4 times daily 1 kit 0    Respiratory Therapy Supplies (NEBULIZER/ADULT MASK) KIT 1 each 4 times daily 1 kit 0    sacubitril-valsartan (ENTRESTO) 24-26 MG per tablet Take 1 tablet by mouth 2 times daily. 60 tablet 0    melatonin 3 MG tablet Take 1 tablet (3 mg) by mouth nightly as needed for sleep. (Patient not taking: Reported on 11/22/2024)         Family History   Problem Relation Age of Onset    Diabetes Mother     Breast Cancer Mother        Social History     Socioeconomic History    Marital status:      Spouse name: Not on file    Number of children: Not on file    Years of education: Not on file    Highest education level: Not on file   Occupational History    Not on file   Tobacco Use    Smoking status: Never    Smokeless tobacco: Never   Substance and Sexual Activity    Alcohol use: No    Drug use: No    Sexual activity: Yes   Other Topics Concern    Parent/sibling w/ CABG, MI or angioplasty before 65F 55M? Not Asked   Social History Narrative    Not on file     Social Drivers of Health     Financial Resource Strain: Low Risk  (10/1/2024)    Financial Resource Strain     Within the past 12 months, have you or your family members you  live with been unable to get utilities (heat, electricity) when it was really needed?: No   Food Insecurity: Low Risk  (10/1/2024)    Food Insecurity     Within the past 12 months, did you worry that your food would run out before you got money to buy more?: No     Within the past 12 months, did the food you bought just not last and you didn t have money to get more?: No   Transportation Needs: Low Risk  (10/1/2024)    Transportation Needs     Within the past 12 months, has lack of transportation kept you from medical appointments, getting your medicines, non-medical meetings or appointments, work, or from getting things that you need?: No   Physical Activity: Not on file   Stress: Not on file   Social Connections: Not on file   Interpersonal Safety: Low Risk  (10/1/2024)    Interpersonal Safety     Do you feel physically and emotionally safe where you currently live?: Yes     Within the past 12 months, have you been hit, slapped, kicked or otherwise physically hurt by someone?: No     Within the past 12 months, have you been humiliated or emotionally abused in other ways by your partner or ex-partner?: No   Housing Stability: Low Risk  (10/1/2024)    Housing Stability     Do you have housing? : Yes     Are you worried about losing your housing?: No            Past Medical History:     Past Medical History:   Diagnosis Date    (HFpEF) heart failure with preserved ejection fraction (H) 07/2024    Asthma     CAD (coronary artery disease)     Diverticulitis of intestine without perforation or abscess 02/17/2017    GERD (gastroesophageal reflux disease)     Gout of right foot 2015    Hypertension     Morbid obesity with BMI of 50.0-59.9, adult (H) 07/28/2024    Myocardial infarction, silent (H) 2005    Dx w silent Mi in [place] ~ 2005    Obesity     Primary osteoarthritis of both knees     Sleep apnea     uses CPAP    Type 2 diabetes mellitus (H)             Past Surgical History:     Past Surgical History:   Procedure  Laterality Date    ANESTHESIA CARDIOVERSION N/A 10/3/2024    Procedure: Anesthesia cardioversion;  Surgeon: GENERIC ANESTHESIA PROVIDER;  Location: [facility] OR    HYSTERECTOMY      TRANSESOPHAGEAL ECHOCARDIOGRAM INTRAOPERATIVE N/A 10/3/2024    Procedure: Transesophageal echocardiogram intraoperative;  Surgeon: GENERIC ANESTHESIA PROVIDER;  Location: [facility] OR            Allergies:   Patient has no known allergies.       Data Reviewed today:   Echocardiogram: 10/1/2024   Summary  1. The left ventricle is normal in size. The visual ejection fraction is  estimated at 50%.  2. The right ventricle is normal in structure, function and size.  3. No valve disease.  Echo 24 showed EF 60%.    NICOLA: 10/3/2024  Summary  The rhythm was atrial fibrillation.  There is moderate concentric left ventricular hypertrophy.  The visual ejection fraction is 30-35%.  The right ventricular systolic function is moderate to severely reduced.  There is mild-moderate biatrial enlargement.  There is mild (1+) mitral regurgitation.  There is a thrombus seen in the tip of left atrial appendage. Severe  spontaneous echo contrast present.    Stress testin24    The nuclear stress test is probably abnormal. Specificity reduced due to suboptimal study quality related to patient body habitus.    There is a small area of nontransmural infarction in the distal anteroseptal and apex segment(s) of the left ventricle associated with a small area of a mild degree of gloria-infarct ischemia.    Stress to rest cavity ratio is 1.02.  TID is absent.    Left ventricular function is low normal.    The left ventricular ejection fraction at rest is 55%.  The left ventricular ejection fraction at stress is 50%.    LV cavity size enlarged.    There is no prior study for comparison.        All laboratory data reviewed:    Recent Labs   Lab Test 24  1446 24  1548 10/01/24  0024 24  1824 24  1008   TSH  --   --  2.85  --  3.13   NTBNP  --  720  --   1,523*  --    IRON 72  --   --   --   --      --   --   --   --    IRONSAT 24  --   --   --   --    MICHELLE 51  --   --   --   --        Lab Results   Component Value Date    WBC 6.9 12/17/2024    WBC 8.5 05/14/2019    RBC 5.11 12/17/2024    RBC 4.08 05/14/2019    HGB 13.2 12/17/2024    HGB 11.5 (L) 05/14/2019    HCT 43.0 12/17/2024    HCT 35.7 05/14/2019    MCV 84 12/17/2024    MCV 88 05/14/2019    MCH 25.8 (L) 12/17/2024    MCH 28.2 05/14/2019    MCHC 30.7 (L) 12/17/2024    MCHC 32.2 05/14/2019    RDW 18.6 (H) 12/17/2024    RDW 13.4 05/14/2019     12/17/2024     05/14/2019       Lab Results   Component Value Date     12/17/2024     03/23/2012    POTASSIUM 3.4 12/17/2024    POTASSIUM 3.5 09/30/2024    POTASSIUM 3.6 03/23/2012    CHLORIDE 102 12/09/2024    CHLORIDE 107 09/30/2024    CHLORIDE 103 03/23/2012    CO2 27 12/09/2024    CO2 34 (H) 09/30/2024    CO2 36 (H) 03/23/2012    ANIONGAP 11 12/09/2024    ANIONGAP 3 09/30/2024    ANIONGAP 5 (L) 03/23/2012     (H) 12/09/2024     (H) 10/12/2024     (H) 09/30/2024    GLC 99 03/23/2012    BUN 22.9 12/17/2024    BUN 15 09/30/2024    BUN 23 03/23/2012    CR 1.08 (H) 12/17/2024    CR 0.80 03/23/2012    GFRESTIMATED 54 (L) 12/17/2024    GFRESTIMATED 73 03/23/2012    GFRESTBLACK 89 03/23/2012    AVNI 9.7 12/09/2024    AVNI 9.6 03/23/2012      Lab Results   Component Value Date    AST 36 09/30/2024    AST 24 02/17/2012    ALT 28 09/30/2024    ALT 14 02/17/2012       The longitudinal plan of care for Agatha was addressed during this visit. Due to the added complexity in care, I will continue to support Agatha in the subsequent management of this condition(s) and with the ongoing continuity of care of this condition(s).    This note has been dictated using voice recognition software. Any grammatical, typographical, or context distortions are unintentional and inherent to the software.      Thank you for allowing me to participate  in the care of your patient.      Sincerely,     FABIEN Oh CNP     [facility]  cc:   FABIEN Kat CNP  [id number]  [place] [address]

## 2025-01-14 NOTE — PROGRESS NOTES
Cardiology Clinic Follow up     Agatha Rodriguez MRN# [id number]   YOB: 1950 Age: 74 year old     Primary cardiologist: Dr. Rainey (initial hospital consult)     Reason for visit: Post hospital follow up     History of presenting illness:    Agatha Rodriguez is a 74 year old female with past medical history significant for hypertension, type II diabetes (A1c 6.8%), morbid obesity BMI 58, and DYLAN compliant on bipap who was recently admitted on 9/30/2024-10/12/2024 with progressive dyspnea and palpitations, found to be in new Atrial flutter with RVR and acute HFpEF exacerbation. Her EF was noted to be 30% by NICOLA along with left atrial appendage thrombus, cardioversion was cancelled. She was started on anticoagulation, diuresed and started on GDMT. Plan for rate control for atleast 4 weeks and then repeat NICOLA and cardioversion if VIVIANE thrombus resolved and consider ischemia evaluation outpatient. She discharged on 10/12/24.    Seen last in cardiology follow up with myself 11/22/24. Weight up to 363 (343 upon arriving home from hospital).  Lasix increased to 40mg twice daily and hydralazine reduced to 25mg twice daily. Metolazone 2.5mg once a week added until lab follow up.  She lost weight down to reported 348 and then gained again, plan for infusion clinic 12/6 but declined. Had limited transportation for labs. Potassium down to 2.9 on recheck - was inadvertently rationing potassium.     Seen last in office with myself 12/11/24. Her scale is out of batteries. Weight today up to 361, previously as low as 348. She had not restarted metolazone yet as awaiting lab draw today. She feels her breathing is better but her abdomen is bloated and she feels heavier. Denies palpitations or chest pain. No edema in lower legs - all in abdomen. She is fatigued.     Referred for IV infusion clinic 12/17/24.     Today, Agatha presents for follow up with her PCA. She thinks she is drinking atleast 40 ounces over her  fluid restriction. She gets thirsty.            Assessment and Plan:     ASSESSMENT:    Acute likely combined systolic and diastolic heart failure  -Discrepant EF by TTE and NICOLA; NICOLA read EF 30-35% with moderate RVSF. Etiology under investigation, longstanding history of HTN, possible tachy mediated with history of AFL/AF with difficulty to control rates and carries risk factors for ischemia. Plan for optimization of volume status first, then consider NICOLA/CDV and then likely pursue coronary angiogram after 4 weeks to uninterrupted anticoagulation given prior abnormal stress test.   -Presenting back with ongoing abdominal bloating and likely 13-15+ lb above dry weight, ?abdominal ascites-consider abdominal US to see if paracentesis needed if unable to diurese otherwise. Suspect dry weight closer to 343 by prior home scale. Difficult exam given body habitus.   -Ran out of medications for a week after discharge initially. High risk for hospital readmission.   -Creat 1.05, K 3.7, BNP down to 720 (12/9/24)  -Resume metolazone 2.5mg tomorrow once a week for now  -Continue lasix 40mg twice daily  -Additional potassium chloride 20mEq with metolazone dose tomorrow  -Continue potassium chloride 60mEq twice daily  -Continue metoprolol Xl 100mg twice daily   -Stop hydralazine for now - /82 and fatigued   -Continue Entresto 24-26mg twice daily - can up titrate in follow up   -Continue Jardiance 10mg daily   -Consider spironolactone next   -We discussed plan for infusion clinic on Tuesday 12/17. Will work with RN team to check in on Monday with symptoms.     Atrial fibrillation  Left atrial appendage thrombus   -Rate controlled, continue rate control approach until diuresed appropriately prior to repeat NICOLA assessment - would schedule with general anesthesia given BMI and DYLAN risks. If VIVIANE thrombus resolved then cardioversion attempt reasonable given possibly tachy-mediated CM however rhythm control may prove difficult long  term with risk factors and mild-moderate biatrial enlargement.   -TSH is normal (10/2024)  -Continue metoprolol XL 100mg twice daily and digoxin 125mcg  -Continue Eliquis 5mg twice daily, ensuring no missed doses    HTN  -Lower normal 116/82 - stop hydralazine to allow room for diuresis  -Continue Entresto     Morbid obesity BMI 58  DYLAN on Bipap   DM type 2    Iron deficiency anemia   -Iron sat 10%; Hgb 11.8. On oral iron, likely poor absorption.  -Consider IV iron replacement if infusion clinic utilized         PLAN:     She declined IV infusion on 12/6. Weight is up again to 361 today. Resume metolazone 2.5mg today now that potassium is corrected. Continue potassium chloride 80mEq twice a day through tomorrow then reduce to 60mEq twice a day   Recommend IV infusion clinic early next week 12/17, I suspect her dry weight may be lower than 343.  Stop hydralazine   Continue other current medications  Follow up in 2-3 weeks           Latricia Carroll, DNP, APRN, CNP  [facility]     Orders this Visit:  No orders of the defined types were placed in this encounter.    No orders of the defined types were placed in this encounter.    There are no discontinued medications.      Today's clinic visit entailed:  30 minutes spent by me on the date of the encounter doing chart review, review of outside records, review of test results, interpretation of tests, patient visit, documentation, discussion with other provider(s), and discussion with family, coordination of care with [facility] nurse team after visit.             Physical Exam:   Vitals: BP (!) 147/82 (BP Location: Right arm, Patient Position: Sitting, Cuff Size: Adult Large)   Pulse 67   Resp 16   Wt (!) 161.7 kg (356 lb 6.4 oz)   SpO2 99%   BMI 57.28 kg/m      Body mass index is 57.28 kg/m .    Vitals:    01/14/25 1421   Weight: (!) 161.7 kg (356 lb 6.4 oz)       General :   Alert and oriented, in no acute distress.    Respiratory:   Respirations  unlabored at rest, dyspnea with exertion. LS diminished without crackles or wheeze   Cardiovascular:   Rhythm is irregular. S1 and S2. Distant tones. JVP difficult to visualize given body habitus   GI: Abdomen is obese, bloated/semi firm, non tender   Extremities: Warm and dry, Trace ankle edema   Neurologic: Moves all extremities, non focal    Psych:  Appropriate             Medications:     Current Outpatient Medications   Medication Sig Dispense Refill    acetaminophen (TYLENOL) 325 MG tablet Take 2 tablets (650 mg) by mouth every 4 hours as needed for mild pain.      albuterol (PROAIR HFA/PROVENTIL HFA/VENTOLIN HFA) 108 (90 Base) MCG/ACT inhaler Inhale 2 puffs into the lungs every 6 hours as needed for shortness of breath, wheezing or cough      albuterol (PROVENTIL) (2.5 MG/3ML) 0.083% neb solution Take 1 vial (2.5 mg) by nebulization every 4 hours as needed for shortness of breath, wheezing or cough 90 mL 0    apixaban ANTICOAGULANT (ELIQUIS) 5 MG tablet Take 1 tablet (5 mg) by mouth 2 times daily. 60 tablet 0    atorvastatin (LIPITOR) 40 MG tablet Take 40 mg by mouth daily      digoxin (LANOXIN) 125 MCG tablet Take 1 tablet (125 mcg) by mouth daily. 30 tablet 0    Docusate Calcium (STOOL SOFTENER PO) Take 1 tablet by mouth daily as needed.      empagliflozin (JARDIANCE) 10 MG TABS tablet Take 1 tablet (10 mg) by mouth daily. 90 tablet 1    ferrous sulfate (FEROSUL) 325 (65 Fe) MG tablet Take 325 mg by mouth Every Mon, Wed, Fri Morning.      fluticasone (ARNUITY ELLIPTA) 200 MCG/ACT inhaler Inhale 1 puff into the lungs daily      furosemide (LASIX) 40 MG tablet Take 1 tablet (40 mg) by mouth 2 times daily. 180 tablet 1    glipiZIDE (GLUCOTROL XL) 2.5 MG 24 hr tablet Take 1 tablet (2.5 mg) by mouth daily. --- 10/12/2024 --- HOLD until follow up with primary care provider. ---      metFORMIN (GLUCOPHAGE) 500 MG tablet Take 500 mg by mouth 2 times daily (with meals)      metolazone (ZAROXOLYN) 2.5 MG tablet Take  1 tablet (2.5 mg) by mouth once a week. On Thursdays with additional potassium chloride 20mEq. 4 tablet 0    metoprolol succinate ER (TOPROL XL) 100 MG 24 hr tablet Take 1 tablet (100 mg) by mouth 2 times daily. 60 tablet 0    Multiple Vitamin (MULTIVITAMIN OR) Take 1 tablet by mouth daily      nitroGLYCERIN (NITROSTAT) 0.4 MG SL tablet Place 1 tablet under the tongue every 5 minutes as needed for chest pain. 90 tablet 12    potassium chloride liv ER (KLOR-CON M20) 20 MEQ CR tablet Take 3 tablets (60 mEq) by mouth 2 times daily. 360 tablet 3    Respiratory Therapy Supplies (NEBULIZER COMPRESSOR) KIT 1 each 4 times daily 1 kit 0    Respiratory Therapy Supplies (NEBULIZER/ADULT MASK) KIT 1 each 4 times daily 1 kit 0    sacubitril-valsartan (ENTRESTO) 24-26 MG per tablet Take 1 tablet by mouth 2 times daily. 60 tablet 0    melatonin 3 MG tablet Take 1 tablet (3 mg) by mouth nightly as needed for sleep. (Patient not taking: Reported on 11/22/2024)         Family History   Problem Relation Age of Onset    Diabetes Mother     Breast Cancer Mother        Social History     Socioeconomic History    Marital status:      Spouse name: Not on file    Number of children: Not on file    Years of education: Not on file    Highest education level: Not on file   Occupational History    Not on file   Tobacco Use    Smoking status: Never    Smokeless tobacco: Never   Substance and Sexual Activity    Alcohol use: No    Drug use: No    Sexual activity: Yes   Other Topics Concern    Parent/sibling w/ CABG, MI or angioplasty before 65F 55M? Not Asked   Social History Narrative    Not on file     Social Drivers of Health     Financial Resource Strain: Low Risk  (10/1/2024)    Financial Resource Strain     Within the past 12 months, have you or your family members you live with been unable to get utilities (heat, electricity) when it was really needed?: No   Food Insecurity: Low Risk  (10/1/2024)    Food Insecurity     Within the  past 12 months, did you worry that your food would run out before you got money to buy more?: No     Within the past 12 months, did the food you bought just not last and you didn t have money to get more?: No   Transportation Needs: Low Risk  (10/1/2024)    Transportation Needs     Within the past 12 months, has lack of transportation kept you from medical appointments, getting your medicines, non-medical meetings or appointments, work, or from getting things that you need?: No   Physical Activity: Not on file   Stress: Not on file   Social Connections: Not on file   Interpersonal Safety: Low Risk  (10/1/2024)    Interpersonal Safety     Do you feel physically and emotionally safe where you currently live?: Yes     Within the past 12 months, have you been hit, slapped, kicked or otherwise physically hurt by someone?: No     Within the past 12 months, have you been humiliated or emotionally abused in other ways by your partner or ex-partner?: No   Housing Stability: Low Risk  (10/1/2024)    Housing Stability     Do you have housing? : Yes     Are you worried about losing your housing?: No            Past Medical History:     Past Medical History:   Diagnosis Date    (HFpEF) heart failure with preserved ejection fraction (H) 07/2024    Asthma     CAD (coronary artery disease)     Diverticulitis of intestine without perforation or abscess 02/17/2017    GERD (gastroesophageal reflux disease)     Gout of right foot 2015    Hypertension     Morbid obesity with BMI of 50.0-59.9, adult (H) 07/28/2024    Myocardial infarction, silent (H) 2005    Dx w silent Mi in [place] ~ 2005    Obesity     Primary osteoarthritis of both knees     Sleep apnea     uses CPAP    Type 2 diabetes mellitus (H)             Past Surgical History:     Past Surgical History:   Procedure Laterality Date    ANESTHESIA CARDIOVERSION N/A 10/3/2024    Procedure: Anesthesia cardioversion;  Surgeon: GENERIC ANESTHESIA PROVIDER;  Location:  OR     HYSTERECTOMY      TRANSESOPHAGEAL ECHOCARDIOGRAM INTRAOPERATIVE N/A 10/3/2024    Procedure: Transesophageal echocardiogram intraoperative;  Surgeon: GENERIC ANESTHESIA PROVIDER;  Location: [facility] OR            Allergies:   Patient has no known allergies.       Data Reviewed today:   Echocardiogram: 10/1/2024   Summary  1. The left ventricle is normal in size. The visual ejection fraction is  estimated at 50%.  2. The right ventricle is normal in structure, function and size.  3. No valve disease.  Echo 24 showed EF 60%.    NICOLA: 10/3/2024  Summary  The rhythm was atrial fibrillation.  There is moderate concentric left ventricular hypertrophy.  The visual ejection fraction is 30-35%.  The right ventricular systolic function is moderate to severely reduced.  There is mild-moderate biatrial enlargement.  There is mild (1+) mitral regurgitation.  There is a thrombus seen in the tip of left atrial appendage. Severe  spontaneous echo contrast present.    Stress testin24    The nuclear stress test is probably abnormal. Specificity reduced due to suboptimal study quality related to patient body habitus.    There is a small area of nontransmural infarction in the distal anteroseptal and apex segment(s) of the left ventricle associated with a small area of a mild degree of gloria-infarct ischemia.    Stress to rest cavity ratio is 1.02.  TID is absent.    Left ventricular function is low normal.    The left ventricular ejection fraction at rest is 55%.  The left ventricular ejection fraction at stress is 50%.    LV cavity size enlarged.    There is no prior study for comparison.        All laboratory data reviewed:    Recent Labs   Lab Test 24  1446 24  1548 10/01/24  0024 24  1824 24  1008   TSH  --   --  2.85  --  3.13   NTBNP  --  720  --  1,523*  --    IRON 72  --   --   --   --      --   --   --   --    IRONSAT 24  --   --   --   --    MICHELLE 51  --   --   --   --        Lab Results    Component Value Date    WBC 6.9 12/17/2024    WBC 8.5 05/14/2019    RBC 5.11 12/17/2024    RBC 4.08 05/14/2019    HGB 13.2 12/17/2024    HGB 11.5 (L) 05/14/2019    HCT 43.0 12/17/2024    HCT 35.7 05/14/2019    MCV 84 12/17/2024    MCV 88 05/14/2019    MCH 25.8 (L) 12/17/2024    MCH 28.2 05/14/2019    MCHC 30.7 (L) 12/17/2024    MCHC 32.2 05/14/2019    RDW 18.6 (H) 12/17/2024    RDW 13.4 05/14/2019     12/17/2024     05/14/2019       Lab Results   Component Value Date     12/17/2024     03/23/2012    POTASSIUM 3.4 12/17/2024    POTASSIUM 3.5 09/30/2024    POTASSIUM 3.6 03/23/2012    CHLORIDE 102 12/09/2024    CHLORIDE 107 09/30/2024    CHLORIDE 103 03/23/2012    CO2 27 12/09/2024    CO2 34 (H) 09/30/2024    CO2 36 (H) 03/23/2012    ANIONGAP 11 12/09/2024    ANIONGAP 3 09/30/2024    ANIONGAP 5 (L) 03/23/2012     (H) 12/09/2024     (H) 10/12/2024     (H) 09/30/2024    GLC 99 03/23/2012    BUN 22.9 12/17/2024    BUN 15 09/30/2024    BUN 23 03/23/2012    CR 1.08 (H) 12/17/2024    CR 0.80 03/23/2012    GFRESTIMATED 54 (L) 12/17/2024    GFRESTIMATED 73 03/23/2012    GFRESTBLACK 89 03/23/2012    AVNI 9.7 12/09/2024    AVNI 9.6 03/23/2012      Lab Results   Component Value Date    AST 36 09/30/2024    AST 24 02/17/2012    ALT 28 09/30/2024    ALT 14 02/17/2012       The longitudinal plan of care for Agatha was addressed during this visit. Due to the added complexity in care, I will continue to support Agatha in the subsequent management of this condition(s) and with the ongoing continuity of care of this condition(s).    This note has been dictated using voice recognition software. Any grammatical, typographical, or context distortions are unintentional and inherent to the software.

## 2025-01-14 NOTE — PATIENT INSTRUCTIONS
"Call [facility] nurse for any questions or concerns Mon-Fri 8am-4pm  [phone]: Nurse number    [phone]: After Hours urgent Phone Number (choose option 2)      Today, we discussed the following:  Medication changes:     Take Metolazone 2.5mg on Monday and Thursday  (on those days take an additional potassium in morning and evening)      Labs  Stable today, potassium 3.9  Need more fluid to come off    Fluid restriction of less than 67 ounces per day    Schedule abdominal ultrasound to see if enough ascites to drain fluid off the abdomen with paracentesis    Follow up with Anabel in clinic in 2-3 weeks, if weight down another 6-10 lbs we will plan to set up for NICOLA and cardioversion if clot is resolved on imaging         Please, remember:   1.  Weigh yourself daily. Call if your weight is up > than 2 pounds in one day, or 5 pounds in one week; if you feel more short of breath or have worsening swelling in your legs or abdomen.  Bring a log of weights to your clinic visits.     2.  Follow the American Heart association diet: Eat a low sodium diet (less than 2,000mg or 2g of salt per day). Try to avoid \"fast food\".  Read food labels to know how much salt is in one serving. There is a lot of hidden salt in cheese, bread, sauces and salad dressings.  Diet is the broussard to loosing weight first - start with smaller portion sizes.  If you eat less salt, you will retain less fluid.     3.  Keep fluid to less than 67 ounces, ideally 64 ounces per day (try ice chips which is half the amount of water)     4.  Avoid NSAIDs as able (For example, Ibuprofen / aleve / advil / naprosen / diclofenac).    5.   Activity:  Aim for routine walking daily or moderate physical activity of 30 minutes, 4 times a week to start.   Take rest breaks to help conserve your energy.   If your legs swell during the day, lay down and elevate feet on pillows for 10 minutes twice a day; consider wearing knee high compression stockings 20-30 mmHg " daily       Thank you for your visit with the [facility] today.   [facility] stands for Cardiomy[facility] and Education. The [facility] clinic will teach and help you to manage your heart failure and help to keep you out of the hospital.       Latricia Carroll [facility]

## 2025-02-04 ENCOUNTER — HOSPITAL ENCOUNTER (OUTPATIENT)
Dept: ULTRASOUND IMAGING | Facility: CLINIC | Age: 75
Discharge: HOME | End: 2025-02-04
Attending: NURSE PRACTITIONER
Payer: MEDICARE

## 2025-02-04 DIAGNOSIS — I50.32 CHRONIC HEART FAILURE WITH PRESERVED EJECTION FRACTION (HFPEF) (H): ICD-10-CM

## 2025-02-04 PROCEDURE — 76705 ECHO EXAM OF ABDOMEN: CPT

## 2025-02-04 NOTE — RESULT ENCOUNTER NOTE
Please let Agatha know no significant abdominal fluid to tap on ultrasound.    Thanks.  Latricia Carroll CNP    [facility]

## 2025-02-17 ENCOUNTER — TELEPHONE (OUTPATIENT)
Dept: CARDIOLOGY | Facility: CLINIC | Age: 75
End: 2025-02-17
Payer: MEDICARE

## 2025-02-17 DIAGNOSIS — I50.32 CHRONIC HEART FAILURE WITH PRESERVED EJECTION FRACTION (HFPEF) (H): Primary | ICD-10-CM

## 2025-02-17 NOTE — TELEPHONE ENCOUNTER
----- Message from Latricia Carroll sent at 2/10/2025  9:35 PM CST -----  Regarding: Check in  Can we check in with Agatha and see how her fluid status has been with increased lasix dosing and if stable is she ready to proceed with NICOLA or does she want to see me back in clinic first? If scheduled before 3/7 wouldn't need another H&P.  NICOLA orders have not been placed yet.     Thanks!  Anabel

## 2025-02-17 NOTE — TELEPHONE ENCOUNTER
[facility]   Verbally reviewed with MAMADOU Tabor. Pt to take Metolazone 2.5mg today with an extra 20meq KCL, and then continue with scheduled dose and extra KCL on Thursday as scheduled. Recommends clinic visit with CARLY in a few weeks to reassess and discuss NICOLA again.     Called pt, reviewed instructions above. She verbalized back understanding. Offered CARLY visit with labs 3/7/25 with MAMADOU Tabor, pt accepted. Asked she call us next week if weights not trending back down after 2 doses of Metolazone this week. She noted understanding.     Future Appointments   Date Time Provider Department Center   3/7/2025  2:30 PM [facility]   3/7/2025  3:20 PM Latricia Carroll APRN CNP [facility]      Helen Bhakta RN, BSN  02/17/25 at 12:30 PM

## 2025-02-17 NOTE — TELEPHONE ENCOUNTER
"[facility]   Spoke with pt, unfortunately her weight has trended up about 8 lbs since 2/7, last 10 days. Her home weight on 2/7 when saw MAMADOU Tabor was 341 lbs, which was her low. Since then she has trended disha up to 349.8 lbs today. She notes only mild GREEN, not like it has been in past. Denies change in swelling, stable from when saw MAMADOU Tabor 2/7. Notes mild bloating. She has been eating a lot of cereal and milk and going well above 64 ounces of fluid per day with milk/juice/water. Pt had not been weighing daily either and notes \"I was surprised at weight today, it got away from me\".     She also noted she did not take Metolazone last week which was due Thursday, as she was out and about. She confirms she has been taking higher Lasix at 60mg BID. We discussed NICOLA but will get recommendations from MAMADOU Tabor first, as wanted her fluid stable when they do that. Currently no future appointments scheduled.     If MAMADOU Tabor want's pt to take Metolazone, she prefers today, as she will be out and about tomorrow.     Update to MAMADOU Tabor.     Current diuretics:    Lasix 60mg BID   Metolazone 2.5mg once weekly on Thursdays (skipped last week all together)   KCL 60meq BID    Helen Bhakta RN, BSN  02/17/25 at 11:31 AM     "

## 2025-03-07 ENCOUNTER — OFFICE VISIT (OUTPATIENT)
Dept: CARDIOLOGY | Facility: CLINIC | Age: 75
End: 2025-03-07
Payer: MEDICARE

## 2025-03-07 VITALS
RESPIRATION RATE: 16 BRPM | SYSTOLIC BLOOD PRESSURE: 126 MMHG | BODY MASS INDEX: 55.12 KG/M2 | WEIGHT: 293 LBS | HEART RATE: 75 BPM | DIASTOLIC BLOOD PRESSURE: 82 MMHG | OXYGEN SATURATION: 97 %

## 2025-03-07 DIAGNOSIS — I48.19 PERSISTENT ATRIAL FIBRILLATION (H): ICD-10-CM

## 2025-03-07 DIAGNOSIS — I50.43 ACUTE ON CHRONIC COMBINED SYSTOLIC AND DIASTOLIC CONGESTIVE HEART FAILURE (H): Primary | ICD-10-CM

## 2025-03-07 DIAGNOSIS — I51.3 THROMBUS OF LEFT ATRIAL APPENDAGE: ICD-10-CM

## 2025-03-07 DIAGNOSIS — I50.32 CHRONIC HEART FAILURE WITH PRESERVED EJECTION FRACTION (HFPEF) (H): ICD-10-CM

## 2025-03-07 PROCEDURE — 93000 ELECTROCARDIOGRAM COMPLETE: CPT | Performed by: NURSE PRACTITIONER

## 2025-03-07 PROCEDURE — 3074F SYST BP LT 130 MM HG: CPT | Performed by: NURSE PRACTITIONER

## 2025-03-07 PROCEDURE — 3079F DIAST BP 80-89 MM HG: CPT | Performed by: NURSE PRACTITIONER

## 2025-03-07 PROCEDURE — G2211 COMPLEX E/M VISIT ADD ON: HCPCS | Performed by: NURSE PRACTITIONER

## 2025-03-07 PROCEDURE — 99214 OFFICE O/P EST MOD 30 MIN: CPT | Performed by: NURSE PRACTITIONER

## 2025-03-07 RX ORDER — POTASSIUM CHLORIDE 1500 MG/1
20 TABLET, EXTENDED RELEASE ORAL
OUTPATIENT
Start: 2025-03-07

## 2025-03-07 RX ORDER — LIDOCAINE 40 MG/G
CREAM TOPICAL
OUTPATIENT
Start: 2025-03-07

## 2025-03-07 RX ORDER — FUROSEMIDE 40 MG/1
40 TABLET ORAL 2 TIMES DAILY
Qty: 180 TABLET | Refills: 1 | Status: SHIPPED | OUTPATIENT
Start: 2025-03-07 | End: 2025-03-20

## 2025-03-07 RX ORDER — MAGNESIUM SULFATE HEPTAHYDRATE 40 MG/ML
2 INJECTION, SOLUTION INTRAVENOUS
OUTPATIENT
Start: 2025-03-07

## 2025-03-07 RX ORDER — SODIUM CHLORIDE 9 MG/ML
30 INJECTION, SOLUTION INTRAVENOUS CONTINUOUS
OUTPATIENT
Start: 2025-03-07

## 2025-03-07 RX ORDER — POTASSIUM CHLORIDE 1500 MG/1
40 TABLET, EXTENDED RELEASE ORAL
OUTPATIENT
Start: 2025-03-07

## 2025-03-07 NOTE — PATIENT INSTRUCTIONS
"Call [facility] nurse for any questions or concerns Mon-Fri 8am-4pm  [phone]: Nurse number    [phone]: After Hours urgent Phone Number (choose option 2)      Today, we discussed the following:    Medication changes:   Increase lasix to 60mg twice daily     Continue other current medications    Ensure no missed doses of Eliquis    The morning of NICOLA / possible cardioversion do not take the digoxin.       Follow up:   We will plan for the transesophageal Echocardiogram and possible cardioversion next    If any recurrence of chest discomfort call the office - the next work up there to consider is a coronary angiogram to look at heart arteries (that test we would typically hold the blood thinner for 48 hours prior)    2-4 weeks after NICOLA / possible cardioversion with Dr. Rainey / EP if possible to review long term rhythm control     Follow up with Anabel as well for heart failure thereafter and to plan for further evaluation of heart arteries at some point (sooner if any recurrent chest pains)       Please, remember:   1.  Weigh yourself daily. Call if your weight is up > than 2 pounds in one day, or 5 pounds in one week; if you feel more short of breath or have worsening swelling in your legs or abdomen.  Bring a log of weights to your clinic visits.     2.  Follow the American Heart association diet: Eat a low sodium diet (less than 2,000mg or 2g of salt per day). Try to avoid \"fast food\".  Read food labels to know how much salt is in one serving. There is a lot of hidden salt in cheese, bread, sauces and salad dressings.   Keep fluid intake to less than 64 ounces a day.     3.   Activity:  Aim for routine walking daily or moderate physical activity of 30 minutes, 4 times a week to start.   Take rest breaks to help conserve your energy.   If your legs swell during the day, lay down and elevate feet on pillows for 10 minutes twice a day; consider wearing knee high compression stockings 20-30 mmHg daily   "       Latricia Carroll CNP  [facility]

## 2025-03-07 NOTE — PROGRESS NOTES
Cardiology Clinic Follow up     Agatha Rodriguez MRN# [id number]   YOB: 1950 Age: 74 year old     Primary cardiologist: Dr. Rainey (initial hospital consult 7/2024)     Reason for visit:  4 week follow up & H&P for possible NICOLA and cardioversion     History of presenting illness:    Agatha Rodriguez is a 74 year old female with past medical history significant for hypertension, type II diabetes (A1c 6.8%), morbid obesity BMI 58, and DYLAN compliant on bipap who was admitted on 9/30/2024-10/12/2024 with progressive dyspnea and palpitations, found to be in new Atrial flutter with RVR and acute HFpEF exacerbation. Her EF was noted to be 30% by NICOLA along with left atrial appendage thrombus, cardioversion was cancelled. She was started on anticoagulation, diuresed and started on GDMT. Plan for rate control for atleast 4 weeks and then repeat NICOLA and cardioversion if VIVIANE thrombus resolved and then consider ischemia evaluation outpatient. She discharged on 10/12/24. Since that time have been adjusting diuretics.     Initial post hopsital cardiology follow up with myself 11/22/24. Weight up to 363 (343 upon arriving home from hospital).  Lasix increased to 40mg twice daily and hydralazine reduced to 25mg twice daily. Metolazone 2.5mg once a week added until lab follow up.  She lost weight down to reported 348 and then gained again, plan for infusion clinic 12/6 but declined. Had limited transportation for labs. Potassium down to 2.9 on recheck - was inadvertently rationing potassium.     Seen last in office with myself 12/11/24. Her scale is out of batteries. Weight today up to 361, previously as low as 348. She had not restarted metolazone yet as awaiting lab draw today. She feels her breathing is better but her abdomen is bloated and she feels heavier. No edema in lower legs - all in abdomen. She is fatigued. Hydralazine stopped.     Referred for IV infusion clinic 12/17/24. Weight 357 lbs pre-infusion.  Treated with IV lasix 80mg x1 on arrival and IV infusion started 10mg/hour x4 hours. UO 2.5L. Resumed on metolazone 2.5mg weekly. Iron sat up to 24%.     Seen in office with myself 1/14/25. Clinic scale 356, home scale 351. On going abdominal fullness. Over drinking fluids at home by 40 ounces and winded with exertion. She was hopeful to get going with NICOLA and potential CDV.  Increase metolazone to 2.5mg twice a week with additional potassium. Encouraged fluid restriction. Abdominal US was negative for ascites.     Seen last with myself 2/7/25. Weight down to 346 lbs - down about 10 lbs in past 3 weeks. Home scale 341. Trying to cut back on fluid intake. Some leg cramping. Lasix increased to 60mg twice daily and metolazone reduced to once weekly. Plan to likely proceed with NICOLA in about 2 weeks.  Weight trended up 8 lbs about 10 days later with dietary indiscretion. Additional metolazone given.     Today, Agatha presents for follow up with her PCA and roommate.   Weight down again to 343 lbs. Home scale 339 on Tuesday, today 343.5. Has been working hard at fluid restriction, possible she had more sodium. Has had some chest discomfort off and off for the last week can happen at random     Patient is a [employer].            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 Etiology under investigation, longstanding history of HTN, possible tachy mediated with history of AFL/AF with difficulty to control rates while hospitalized initially in October though carries risk factors for ischemia. Plan for optimization of volume status first, then consider NICOLA/CDV and then likely pursue coronary angiogram after 4 weeks to avoid any uninterrupted anticoagulation given prior abnormal stress test previously.  -Suspect dry weight closer to 343 by prior home scale, down to 341 home  scale. Difficult exam given body habitus. Abdominal US negative for ascites. Still fluctuating volume status by 4 lbs easily due to increased oral liquid intake over fluid restriction.   -Difficulty with optimizing volume status over past few months, initially ran out of medications after hospitalization, difficulty with transportation and getting to office, initially declined IV infusion clinic. Successfully completed IV infusion on 12/17 with ~2.5L removed. Weight finally below 350 lbs over the last month.   -Labs stable.   -Continue metolazone 2.5mg once weekly Thursdays now that at goal weight  -Continue lasix to 60mg twice daily   -Continue potassium chloride 60mEq twice daily  -Continue metoprolol Xl 100mg twice daily   -Continue Entresto 24-26mg twice daily until EF reassessment   -Continue Jardiance 10mg daily   -Consider spironolactone next she didn't want to add more medications in prior visits     Atrial fibrillation/flutter  Left atrial appendage thrombus   -Remains in coarse AF, rate controlled, continue rate control approach until diuresed appropriately prior to repeat NICOLA assessment - would schedule with anesthesia given BMI and DYLAN risks. If VIVIANE thrombus resolved then cardioversion attempt reasonable given possibly tachy-mediated CM however rhythm control may prove difficult long term with risk factors and mild-moderate biatrial enlargement.   -We discussed Risks, Benefits and Indications of proceeding with transesophageal echocardiogram (NICOLA), including but not limited to use of conscious sedation, sore throat, esophageal injury/bleeding and discomfort. We discussed Risk, Benefits and Indication of proceeding with direct current cardioversion (DCCV), including but not limited to use of anesthesia, surface burns, [id number] arrhythmias requiring further treatment, discomfort and stroke.  The patient voiced understanding and understands that a  will be needed given the use of sedation.    -Note patient is a [employer] and will bring documentation given declines any blood products  -TSH is normal (10/2024)  -Continue metoprolol XL 100mg twice daily and digoxin 125mcg  -Continue Eliquis 5mg twice daily, ensuring no missed doses    HTN  -Controlled   -No longer on hydralazine with diuresis   -Continue Entresto, metoprolol     Morbid obesity BMI down from 58 to 55 with duresis. Consider future referral for GLP-1 medications   DYLAN on Bipap   DM type 2    Iron deficiency anemia   -Iron sat improved to 24% with replacement       PLAN:     Continue current medications  Can use additional metolazone 2.5mg weekly as needed if weight gain with additional potassium chloride 20mEq   Continue other current medications, no missed doses of Eliquis for next 3 weeks   Plan for NICOLA with anesthesia assistance given body size and DYLAN,  and if VIVIANE thrombus resolved then proceed with 1 attempt at cardioversion to see if LVEF normalizes and if she feels better given her preference. Many risk factors for Afib recurrence.   Hold Digoxin the morning of cardioversion.  Follow up again in about 4 weeks          Latricia Carroll, ELAYNE, APRN, CNP  [facility]     Orders this Visit:  Orders Placed This Encounter   Procedures    EKG 12-lead complete w/read - Clinics (performed today)     No orders of the defined types were placed in this encounter.    There are no discontinued medications.           Physical Exam:   Vitals: /82 (BP Location: Right arm, Patient Position: Chair, Cuff Size: Adult Large)   Pulse 75   Resp 16   Wt (!) 155.6 kg (343 lb)   SpO2 97%   BMI 55.12 kg/m      Body mass index is 55.12 kg/m .    Vitals:    03/07/25 1516   Weight: (!) 155.6 kg (343 lb)       General :   Alert and oriented, in no acute distress.    Respiratory:   Respirations unlabored at rest, dyspnea with exertion. LS clear.    Cardiovascular:   Rhythm is irregular. S1 and S2. Distant tones. JVP difficult to  visualize given body habitus   GI: Abdomen is obese but softer   Extremities: Warm and dry, 1+ soft ankle edema   Neurologic: Moves all extremities, non focal    Psych:  Appropriate             Medications:     Current Outpatient Medications   Medication Sig Dispense Refill    acetaminophen (TYLENOL) 325 MG tablet Take 2 tablets (650 mg) by mouth every 4 hours as needed for mild pain.      albuterol (PROAIR HFA/PROVENTIL HFA/VENTOLIN HFA) 108 (90 Base) MCG/ACT inhaler Inhale 2 puffs into the lungs every 6 hours as needed for shortness of breath, wheezing or cough.      albuterol (PROVENTIL) (2.5 MG/3ML) 0.083% neb solution Take 1 vial (2.5 mg) by nebulization every 4 hours as needed for shortness of breath, wheezing or cough 90 mL 0    apixaban ANTICOAGULANT (ELIQUIS) 5 MG tablet Take 1 tablet (5 mg) by mouth 2 times daily. Ensure no missed doses. 180 tablet 3    atorvastatin (LIPITOR) 40 MG tablet Take 1 tablet (40 mg) by mouth daily. 90 tablet 3    digoxin (LANOXIN) 125 MCG tablet Take 1 tablet (125 mcg) by mouth daily. 90 tablet 1    Docusate Calcium (STOOL SOFTENER PO) Take 1 tablet by mouth daily as needed.      empagliflozin (JARDIANCE) 10 MG TABS tablet Take 1 tablet (10 mg) by mouth daily. 90 tablet 3    ferrous sulfate (FEROSUL) 325 (65 Fe) MG tablet Take 325 mg by mouth Every Mon, Wed, Fri Morning.      fluticasone (ARNUITY ELLIPTA) 200 MCG/ACT inhaler Inhale 1 puff into the lungs daily      furosemide (LASIX) 20 MG tablet Take 1 tablet (20 mg) by mouth 2 times daily. In addition to 40mg tablets to total 60mg twice daily. 180 tablet 1    furosemide (LASIX) 40 MG tablet Take 1 tablet (40 mg) by mouth 2 times daily. In addition to 20mg tablet to total 60mg twice daily. 180 tablet 1    glipiZIDE (GLUCOTROL XL) 2.5 MG 24 hr tablet Take 1 tablet (2.5 mg) by mouth daily. --- 10/12/2024 --- HOLD until follow up with primary care provider. ---      magnesium oxide (MAG-OX) 400 MG tablet Take 1 tablet (400 mg) by  mouth daily. 90 tablet 1    metFORMIN (GLUCOPHAGE) 500 MG tablet Take 500 mg by mouth 2 times daily (with meals)      metolazone (ZAROXOLYN) 2.5 MG tablet Take 1 tablet (2.5 mg) by mouth once a week. On Thursdays.  Take additional potassium 20mEq that day. 12 tablet 1    metoprolol succinate ER (TOPROL XL) 100 MG 24 hr tablet Take 1 tablet (100 mg) by mouth 2 times daily. 180 tablet 3    Multiple Vitamin (MULTIVITAMIN OR) Take 1 tablet by mouth daily      potassium chloride liv ER (KLOR-CON M20) 20 MEQ CR tablet Take 3 tablets (60 mEq) by mouth 2 times daily. 360 tablet 3    sacubitril-valsartan (ENTRESTO) 24-26 MG per tablet Take 1 tablet by mouth 2 times daily. 180 tablet 3    nitroGLYCERIN (NITROSTAT) 0.4 MG SL tablet Place 1 tablet under the tongue every 5 minutes as needed for chest pain. (Patient not taking: Reported on 3/7/2025) 90 tablet 12    Respiratory Therapy Supplies (NEBULIZER COMPRESSOR) KIT 1 each 4 times daily 1 kit 0    Respiratory Therapy Supplies (NEBULIZER/ADULT MASK) KIT 1 each 4 times daily 1 kit 0       Family History   Problem Relation Age of Onset    Diabetes Mother     Breast Cancer Mother        Social History     Socioeconomic History    Marital status:      Spouse name: Not on file    Number of children: Not on file    Years of education: Not on file    Highest education level: Not on file   Occupational History    Not on file   Tobacco Use    Smoking status: Never    Smokeless tobacco: Never   Substance and Sexual Activity    Alcohol use: No    Drug use: No    Sexual activity: Yes   Other Topics Concern    Parent/sibling w/ CABG, MI or angioplasty before 65F 55M? Not Asked   Social History Narrative    Not on file     Social Drivers of Health     Financial Resource Strain: Low Risk  (10/1/2024)    Financial Resource Strain     Within the past 12 months, have you or your family members you live with been unable to get utilities (heat, electricity) when it was really needed?: No    Food Insecurity: Low Risk  (10/1/2024)    Food Insecurity     Within the past 12 months, did you worry that your food would run out before you got money to buy more?: No     Within the past 12 months, did the food you bought just not last and you didn t have money to get more?: No   Transportation Needs: Low Risk  (10/1/2024)    Transportation Needs     Within the past 12 months, has lack of transportation kept you from medical appointments, getting your medicines, non-medical meetings or appointments, work, or from getting things that you need?: No   Physical Activity: Not on file   Stress: Not on file   Social Connections: Not on file   Interpersonal Safety: Low Risk  (10/1/2024)    Interpersonal Safety     Do you feel physically and emotionally safe where you currently live?: Yes     Within the past 12 months, have you been hit, slapped, kicked or otherwise physically hurt by someone?: No     Within the past 12 months, have you been humiliated or emotionally abused in other ways by your partner or ex-partner?: No   Housing Stability: Low Risk  (10/1/2024)    Housing Stability     Do you have housing? : Yes     Are you worried about losing your housing?: No            Past Medical History:     Past Medical History:   Diagnosis Date    (HFpEF) heart failure with preserved ejection fraction (H) 07/2024    Asthma     CAD (coronary artery disease)     Diverticulitis of intestine without perforation or abscess 02/17/2017    GERD (gastroesophageal reflux disease)     Gout of right foot 2015    Hypertension     Morbid obesity with BMI of 50.0-59.9, adult (H) 07/28/2024    Myocardial infarction, silent (H) 2005    Dx w silent Mi in [place] ~ 2005    Obesity     Primary osteoarthritis of both knees     Sleep apnea     uses CPAP    Type 2 diabetes mellitus (H)             Past Surgical History:     Past Surgical History:   Procedure Laterality Date    ANESTHESIA CARDIOVERSION N/A 10/3/2024    Procedure: Anesthesia  cardioversion;  Surgeon: GENERIC ANESTHESIA PROVIDER;  Location: [facility] OR    HYSTERECTOMY      TRANSESOPHAGEAL ECHOCARDIOGRAM INTRAOPERATIVE N/A 10/3/2024    Procedure: Transesophageal echocardiogram intraoperative;  Surgeon: GENERIC ANESTHESIA PROVIDER;  Location: [facility] OR            Allergies:   Patient has no known allergies.       Data Reviewed today:   Echocardiogram: 10/1/2024   Summary  1. The left ventricle is normal in size. The visual ejection fraction is  estimated at 50%.  2. The right ventricle is normal in structure, function and size.  3. No valve disease.  Echo 24 showed EF 60%.    NICOLA: 10/3/2024  Summary  The rhythm was atrial fibrillation.  There is moderate concentric left ventricular hypertrophy.  The visual ejection fraction is 30-35%.  The right ventricular systolic function is moderate to severely reduced.  There is mild-moderate biatrial enlargement.  There is mild (1+) mitral regurgitation.  There is a thrombus seen in the tip of left atrial appendage. Severe  spontaneous echo contrast present.    Stress testin24    The nuclear stress test is probably abnormal. Specificity reduced due to suboptimal study quality related to patient body habitus.    There is a small area of nontransmural infarction in the distal anteroseptal and apex segment(s) of the left ventricle associated with a small area of a mild degree of gloria-infarct ischemia.    Stress to rest cavity ratio is 1.02.  TID is absent.    Left ventricular function is low normal.    The left ventricular ejection fraction at rest is 55%.  The left ventricular ejection fraction at stress is 50%.    LV cavity size enlarged.    There is no prior study for comparison.      All laboratory data reviewed:    Recent Labs   Lab Test 25  1359 24  1446 24  1548 10/01/24  0024 24  1824 24  1008   TSH  --   --   --  2.85  --  3.13   NTBNP 1,341*  --  720  --  1,523*  --    IRON  --  72  --   --   --   --    FEB  --   299  --   --   --   --    IRONSAT  --  24  --   --   --   --    MICHELLE  --  51  --   --   --   --        Lab Results   Component Value Date    WBC 6.9 12/17/2024    WBC 8.5 05/14/2019    RBC 5.11 12/17/2024    RBC 4.08 05/14/2019    HGB 13.2 12/17/2024    HGB 11.5 (L) 05/14/2019    HCT 43.0 12/17/2024    HCT 35.7 05/14/2019    MCV 84 12/17/2024    MCV 88 05/14/2019    MCH 25.8 (L) 12/17/2024    MCH 28.2 05/14/2019    MCHC 30.7 (L) 12/17/2024    MCHC 32.2 05/14/2019    RDW 18.6 (H) 12/17/2024    RDW 13.4 05/14/2019     12/17/2024     05/14/2019       Lab Results   Component Value Date     03/07/2025     03/23/2012    POTASSIUM 3.8 03/07/2025    POTASSIUM 3.5 09/30/2024    POTASSIUM 3.6 03/23/2012    CHLORIDE 102 03/07/2025    CHLORIDE 107 09/30/2024    CHLORIDE 103 03/23/2012    CO2 30 (H) 03/07/2025    CO2 34 (H) 09/30/2024    CO2 36 (H) 03/23/2012    ANIONGAP 10 03/07/2025    ANIONGAP 3 09/30/2024    ANIONGAP 5 (L) 03/23/2012     (H) 03/07/2025     (H) 10/12/2024     (H) 09/30/2024    GLC 99 03/23/2012    BUN 19.9 03/07/2025    BUN 15 09/30/2024    BUN 23 03/23/2012    CR 1.09 (H) 03/07/2025    CR 0.80 03/23/2012    GFRESTIMATED 53 (L) 03/07/2025    GFRESTIMATED 73 03/23/2012    GFRESTBLACK 89 03/23/2012    AVNI 9.8 03/07/2025    AVNI 9.6 03/23/2012      Lab Results   Component Value Date    AST 36 09/30/2024    AST 24 02/17/2012    ALT 28 09/30/2024    ALT 14 02/17/2012       The longitudinal plan of care for Agatha was addressed during this visit. Due to the added complexity in care, I will continue to support Agatha in the subsequent management of this condition(s) and with the ongoing continuity of care of this condition(s).    This note has been dictated using voice recognition software. Any grammatical, typographical, or context distortions are unintentional and inherent to the software.     Component Value Date     03/07/2025     03/23/2012    POTASSIUM 3.8 03/07/2025    POTASSIUM 3.5 09/30/2024    POTASSIUM 3.6 03/23/2012    CHLORIDE 102 03/07/2025    CHLORIDE 107 09/30/2024    CHLORIDE 103 03/23/2012    CO2 30 (H) 03/07/2025    CO2 34 (H) 09/30/2024    CO2 36 (H) 03/23/2012    ANIONGAP 10 03/07/2025    ANIONGAP 3 09/30/2024    ANIONGAP 5 (L) 03/23/2012     (H) 03/07/2025     (H) 10/12/2024     (H) 09/30/2024    GLC 99 03/23/2012    BUN 19.9 03/07/2025    BUN 15 09/30/2024    BUN 23 03/23/2012    CR 1.09 (H) 03/07/2025    CR 0.80 03/23/2012    GFRESTIMATED 53 (L) 03/07/2025    GFRESTIMATED 73 03/23/2012    GFRESTBLACK 89 03/23/2012    AVNI 9.8 03/07/2025    AVNI 9.6 03/23/2012      Lab Results   Component Value Date    AST 36 09/30/2024    AST 24 02/17/2012    ALT 28 09/30/2024    ALT 14 02/17/2012       The longitudinal plan of care for Agatha was addressed during this visit. Due to the added complexity in care, I will continue to support Agatha in the subsequent management of this condition(s) and with the ongoing continuity of care of this condition(s).    This note has been dictated using voice recognition software. Any grammatical, typographical, or context distortions are unintentional and inherent to the software.

## 2025-03-07 NOTE — Clinical Note
3/7/2025    [facility] [address]  [place] [address]    RE: Agatha Rodriguez       Dear Colleague,     I had the pleasure of seeing Agatha Rodriguez in the [facility].        Cardiology Clinic Follow up     Agatha Rodriguez MRN# [id number]   YOB: 1950 Age: 74 year old     Primary cardiologist: Dr. Rainey (initial hospital consult 7/2024)     Reason for visit:  4 week follow up    History of presenting illness:    Agatha Rodriguez is a 74 year old female with past medical history significant for hypertension, type II diabetes (A1c 6.8%), morbid obesity BMI 58, and DYLAN compliant on bipap who was recently admitted on 9/30/2024-10/12/2024 with progressive dyspnea and palpitations, found to be in new Atrial flutter with RVR and acute HFpEF exacerbation. Her EF was noted to be 30% by NICOLA along with left atrial appendage thrombus, cardioversion was cancelled. She was started on anticoagulation, diuresed and started on GDMT. Plan for rate control for atleast 4 weeks and then repeat NICOLA and cardioversion if VIVIANE thrombus resolved and then consider ischemia evaluation outpatient. She discharged on 10/12/24. Since that time have been adjusting diuretics.     Initial post hopsital cardiology follow up with myself 11/22/24. Weight up to 363 (343 upon arriving home from hospital).  Lasix increased to 40mg twice daily and hydralazine reduced to 25mg twice daily. Metolazone 2.5mg once a week added until lab follow up.  She lost weight down to reported 348 and then gained again, plan for infusion clinic 12/6 but declined. Had limited transportation for labs. Potassium down to 2.9 on recheck - was inadvertently rationing potassium.     Seen last in office with myself 12/11/24. Her scale is out of batteries. Weight today up to 361, previously as low as 348. She had not restarted metolazone yet as awaiting lab draw today. She feels her breathing is  better but her abdomen is bloated and she feels heavier. No edema in lower legs - all in abdomen. She is fatigued. Hydralazine stopped.     Referred for IV infusion clinic 12/17/24. Weight 357 lbs pre-infusion. Treated with IV lasix 80mg x1 on arrival and IV infusion started 10mg/hour x4 hours. UO 2.5L. Resumed on metolazone 2.5mg weekly. Iron sat up to 24%.     Seen in office with myself 1/14/25. Clinic scale 356, home scale 351. On going abdominal fullness. Over drinking fluids at home by 40 ounces and winded with exertion. She was hopeful to get going with NICOLA and potential CDV.  Increase metolazone to 2.5mg twice a week with additional potassium. Encouraged fluid restriction. Abdominal US was negative for ascites.     Seen last with myself 2/7/25. Weight down to 346 lbs - down about 10 lbs in past 3 weeks. Home scale 341. Trying to cut back on fluid intake. Some leg cramping. Lasix increased to 60mg twice daily and metolazone reduced to once weekly. Plan to likely proceed with NICOLA in about 2 weeks.  Weight trended up 8 lbs about 10 days later with dietary indiscretion.     Today, Agatha presents for follow up with her PCA and roommate.   ***      Patient is a [employer].            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 Etiology under investigation, longstanding history of HTN, possible tachy mediated with history of AFL/AF with difficulty to control rates while hospitalized initially in October though carries risk factors for ischemia. Plan for optimization of volume status first, then consider NICOLA/CDV and then likely pursue coronary angiogram after 4 weeks to avoid any uninterrupted anticoagulation given prior abnormal stress test previously.  -Suspect dry weight closer to 343 by prior home scale, down to 341 home scale. Difficult exam given body  habitus. Abdominal US negative for ascites. Still fluctuating volume status by 4 lbs easily due to increased oral liquid intake over fluid restriction.   -Difficulty with optimizing volume status over past few months, initially ran out of medications after hospitalization, difficulty with transportation and getting to office, initially declined IV infusion clinic. Successfully completed IV infusion on 12/17 with ~2.5L removed. Last visit in January remained 356 lbs.   -Labs last visit K 3.9, Creat 1.11.   -Continue metolazone 2.5mg, reduce to once weekly Thursdays now that at goal weight  -Labs today, if stable we can increase lasix slightly to help maintain volume status. Currently on lasix 40mg twice daily.  -Increase lasix to 60mg twice daily as needed after labs today. Check Mg level - she would like prescription form if possible.  -Continue potassium chloride 60mEq twice daily  -Continue metoprolol Xl 100mg twice daily   -Continue Entresto 24-26mg twice daily - can up titrate in follow up   -Continue Jardiance 10mg daily   -Consider spironolactone next she didn't want to add more medications in prior visits     Atrial fibrillation/flutter  Left atrial appendage thrombus   -Remains in AF, rate controlled, continue rate control approach until diuresed appropriately another 2 weeks,  prior to repeat NICOLA assessment - would schedule with anesthesia given BMI and DYLAN risks. If VIVIANE thrombus resolved then cardioversion attempt reasonable given possibly tachy-mediated CM however rhythm control may prove difficult long term with risk factors and mild-moderate biatrial enlargement.   -We discussed Risks, Benefits and Indications of proceeding with transesophageal echocardiogram (NICOLA), including but not limited to use of conscious sedation, sore throat, esophageal injury/bleeding and discomfort. We discussed Risk, Benefits and Indication of proceeding with direct current cardioversion (DCCV), including but not limited to  use of anesthesia, surface burns, [id number] arrhythmias requiring further treatment, discomfort and stroke.  The patient voiced understanding and understands that a  will be needed given the use of sedation.   -Note patient is a [employer] and will bring documentation given declines any blood products  -TSH is normal (10/2024)  -Continue metoprolol XL 100mg twice daily and digoxin 125mcg  -Continue Eliquis 5mg twice daily, ensuring no missed doses    HTN  -Controlled   -Continue Entresto     Morbid obesity BMI down from 58 to 55 with duresis. Consider future referral for GLP-1 medications   DYLAN on Bipap   DM type 2    Iron deficiency anemia   -Iron sat improved to 24% with replacement       PLAN:     Labs today BMP, Magnesium   Weight down 10 lbs in past 3 weeks  Recommend reducing metolazone to 2.5mg once a week now that at goal weight and given leg cramping, and instead can increase lasix to 60mg twice a day likely with better absorption. Could use additional metolazone 2.5mg as needed if weight gain in future as works very well for her.   Continue other current medications, no missed doses of Eliquis for 4 weeks  Once volume status stable plan for NICOLA with anesthesia assistance given body size and DYLAN,  and if VIVIANE thrombus resolved then proceed with 1 attempt at cardioversion to see if LVEF normalizes and if she feels better given her preference. Many risk factors for Afib recurrence. Hold Digoxin the morning of cardioversion.  Follow up in about 4 weeks          Latricia Carroll, DNP, APRN, CNP  [facility]     Orders this Visit:  No orders of the defined types were placed in this encounter.    No orders of the defined types were placed in this encounter.    There are no discontinued medications.           Physical Exam:   Vitals: /82 (BP Location: Right arm, Patient Position: Chair, Cuff Size: Adult Large)   Pulse 75   Resp 16   Wt (!) 155.6 kg (343 lb)   SpO2 97%    BMI 55.12 kg/m      Body mass index is 55.12 kg/m .    Vitals:    03/07/25 1516   Weight: (!) 155.6 kg (343 lb)       General :   Alert and oriented, in no acute distress.    Respiratory:   Respirations unlabored at rest, dyspnea with exertion. LS clear.    Cardiovascular:   Rhythm is irregular. S1 and S2. Distant tones. JVP difficult to visualize given body habitus   GI: Abdomen is obese but softer   Extremities: Warm and dry, 1+ soft ankle edema   Neurologic: Moves all extremities, non focal    Psych:  Appropriate             Medications:     Current Outpatient Medications   Medication Sig Dispense Refill    acetaminophen (TYLENOL) 325 MG tablet Take 2 tablets (650 mg) by mouth every 4 hours as needed for mild pain.      albuterol (PROAIR HFA/PROVENTIL HFA/VENTOLIN HFA) 108 (90 Base) MCG/ACT inhaler Inhale 2 puffs into the lungs every 6 hours as needed for shortness of breath, wheezing or cough.      albuterol (PROVENTIL) (2.5 MG/3ML) 0.083% neb solution Take 1 vial (2.5 mg) by nebulization every 4 hours as needed for shortness of breath, wheezing or cough 90 mL 0    apixaban ANTICOAGULANT (ELIQUIS) 5 MG tablet Take 1 tablet (5 mg) by mouth 2 times daily. Ensure no missed doses. 180 tablet 3    atorvastatin (LIPITOR) 40 MG tablet Take 1 tablet (40 mg) by mouth daily. 90 tablet 3    digoxin (LANOXIN) 125 MCG tablet Take 1 tablet (125 mcg) by mouth daily. 90 tablet 1    Docusate Calcium (STOOL SOFTENER PO) Take 1 tablet by mouth daily as needed.      empagliflozin (JARDIANCE) 10 MG TABS tablet Take 1 tablet (10 mg) by mouth daily. 90 tablet 3    ferrous sulfate (FEROSUL) 325 (65 Fe) MG tablet Take 325 mg by mouth Every Mon, Wed, Fri Morning.      fluticasone (ARNUITY ELLIPTA) 200 MCG/ACT inhaler Inhale 1 puff into the lungs daily      furosemide (LASIX) 20 MG tablet Take 1 tablet (20 mg) by mouth 2 times daily. In addition to 40mg tablets to total 60mg twice daily. 180 tablet 1    furosemide (LASIX) 40 MG tablet  Take 1 tablet (40 mg) by mouth 2 times daily. In addition to 20mg tablet to total 60mg twice daily. 180 tablet 1    glipiZIDE (GLUCOTROL XL) 2.5 MG 24 hr tablet Take 1 tablet (2.5 mg) by mouth daily. --- 10/12/2024 --- HOLD until follow up with primary care provider. ---      magnesium oxide (MAG-OX) 400 MG tablet Take 1 tablet (400 mg) by mouth daily. 90 tablet 1    metFORMIN (GLUCOPHAGE) 500 MG tablet Take 500 mg by mouth 2 times daily (with meals)      metolazone (ZAROXOLYN) 2.5 MG tablet Take 1 tablet (2.5 mg) by mouth once a week. On Thursdays.  Take additional potassium 20mEq that day. 12 tablet 1    metoprolol succinate ER (TOPROL XL) 100 MG 24 hr tablet Take 1 tablet (100 mg) by mouth 2 times daily. 180 tablet 3    Multiple Vitamin (MULTIVITAMIN OR) Take 1 tablet by mouth daily      potassium chloride liv ER (KLOR-CON M20) 20 MEQ CR tablet Take 3 tablets (60 mEq) by mouth 2 times daily. 360 tablet 3    sacubitril-valsartan (ENTRESTO) 24-26 MG per tablet Take 1 tablet by mouth 2 times daily. 180 tablet 3    nitroGLYCERIN (NITROSTAT) 0.4 MG SL tablet Place 1 tablet under the tongue every 5 minutes as needed for chest pain. (Patient not taking: Reported on 3/7/2025) 90 tablet 12    Respiratory Therapy Supplies (NEBULIZER COMPRESSOR) KIT 1 each 4 times daily 1 kit 0    Respiratory Therapy Supplies (NEBULIZER/ADULT MASK) KIT 1 each 4 times daily 1 kit 0       Family History   Problem Relation Age of Onset    Diabetes Mother     Breast Cancer Mother        Social History     Socioeconomic History    Marital status:      Spouse name: Not on file    Number of children: Not on file    Years of education: Not on file    Highest education level: Not on file   Occupational History    Not on file   Tobacco Use    Smoking status: Never    Smokeless tobacco: Never   Substance and Sexual Activity    Alcohol use: No    Drug use: No    Sexual activity: Yes   Other Topics Concern    Parent/sibling w/ CABG, MI or  angioplasty before 65F 55M? Not Asked   Social History Narrative    Not on file     Social Drivers of Health     Financial Resource Strain: Low Risk  (10/1/2024)    Financial Resource Strain     Within the past 12 months, have you or your family members you live with been unable to get utilities (heat, electricity) when it was really needed?: No   Food Insecurity: Low Risk  (10/1/2024)    Food Insecurity     Within the past 12 months, did you worry that your food would run out before you got money to buy more?: No     Within the past 12 months, did the food you bought just not last and you didn t have money to get more?: No   Transportation Needs: Low Risk  (10/1/2024)    Transportation Needs     Within the past 12 months, has lack of transportation kept you from medical appointments, getting your medicines, non-medical meetings or appointments, work, or from getting things that you need?: No   Physical Activity: Not on file   Stress: Not on file   Social Connections: Not on file   Interpersonal Safety: Low Risk  (10/1/2024)    Interpersonal Safety     Do you feel physically and emotionally safe where you currently live?: Yes     Within the past 12 months, have you been hit, slapped, kicked or otherwise physically hurt by someone?: No     Within the past 12 months, have you been humiliated or emotionally abused in other ways by your partner or ex-partner?: No   Housing Stability: Low Risk  (10/1/2024)    Housing Stability     Do you have housing? : Yes     Are you worried about losing your housing?: No            Past Medical History:     Past Medical History:   Diagnosis Date    (HFpEF) heart failure with preserved ejection fraction (H) 07/2024    Asthma     CAD (coronary artery disease)     Diverticulitis of intestine without perforation or abscess 02/17/2017    GERD (gastroesophageal reflux disease)     Gout of right foot 2015    Hypertension     Morbid obesity with BMI of 50.0-59.9, adult (H) 07/28/2024     Myocardial infarction, silent (H) 2005    Dx w silent Mi in [place] ~ 2005    Obesity     Primary osteoarthritis of both knees     Sleep apnea     uses CPAP    Type 2 diabetes mellitus (H)             Past Surgical History:     Past Surgical History:   Procedure Laterality Date    ANESTHESIA CARDIOVERSION N/A 10/3/2024    Procedure: Anesthesia cardioversion;  Surgeon: GENERIC ANESTHESIA PROVIDER;  Location: [facility] OR    HYSTERECTOMY      TRANSESOPHAGEAL ECHOCARDIOGRAM INTRAOPERATIVE N/A 10/3/2024    Procedure: Transesophageal echocardiogram intraoperative;  Surgeon: GENERIC ANESTHESIA PROVIDER;  Location: [facility] OR            Allergies:   Patient has no known allergies.       Data Reviewed today:   Echocardiogram: 10/1/2024   Summary  1. The left ventricle is normal in size. The visual ejection fraction is  estimated at 50%.  2. The right ventricle is normal in structure, function and size.  3. No valve disease.  Echo 24 showed EF 60%.    NICOLA: 10/3/2024  Summary  The rhythm was atrial fibrillation.  There is moderate concentric left ventricular hypertrophy.  The visual ejection fraction is 30-35%.  The right ventricular systolic function is moderate to severely reduced.  There is mild-moderate biatrial enlargement.  There is mild (1+) mitral regurgitation.  There is a thrombus seen in the tip of left atrial appendage. Severe  spontaneous echo contrast present.    Stress testin24    The nuclear stress test is probably abnormal. Specificity reduced due to suboptimal study quality related to patient body habitus.    There is a small area of nontransmural infarction in the distal anteroseptal and apex segment(s) of the left ventricle associated with a small area of a mild degree of gloria-infarct ischemia.    Stress to rest cavity ratio is 1.02.  TID is absent.    Left ventricular function is low normal.    The left ventricular ejection fraction at rest is 55%.  The left ventricular ejection fraction at stress is  50%.    LV cavity size enlarged.    There is no prior study for comparison.      All laboratory data reviewed:    Recent Labs   Lab Test 01/14/25  1359 12/17/24  1446 12/06/24  1548 10/01/24  0024 09/30/24  1824 07/28/24  1008   TSH  --   --   --  2.85  --  3.13   NTBNP 1,341*  --  720  --  1,523*  --    IRON  --  72  --   --   --   --    FEB  --  299  --   --   --   --    IRONSAT  --  24  --   --   --   --    MICHELLE  --  51  --   --   --   --        Lab Results   Component Value Date    WBC 6.9 12/17/2024    WBC 8.5 05/14/2019    RBC 5.11 12/17/2024    RBC 4.08 05/14/2019    HGB 13.2 12/17/2024    HGB 11.5 (L) 05/14/2019    HCT 43.0 12/17/2024    HCT 35.7 05/14/2019    MCV 84 12/17/2024    MCV 88 05/14/2019    MCH 25.8 (L) 12/17/2024    MCH 28.2 05/14/2019    MCHC 30.7 (L) 12/17/2024    MCHC 32.2 05/14/2019    RDW 18.6 (H) 12/17/2024    RDW 13.4 05/14/2019     12/17/2024     05/14/2019       Lab Results   Component Value Date     03/07/2025     03/23/2012    POTASSIUM 3.8 03/07/2025    POTASSIUM 3.5 09/30/2024    POTASSIUM 3.6 03/23/2012    CHLORIDE 102 03/07/2025    CHLORIDE 107 09/30/2024    CHLORIDE 103 03/23/2012    CO2 30 (H) 03/07/2025    CO2 34 (H) 09/30/2024    CO2 36 (H) 03/23/2012    ANIONGAP 10 03/07/2025    ANIONGAP 3 09/30/2024    ANIONGAP 5 (L) 03/23/2012     (H) 03/07/2025     (H) 10/12/2024     (H) 09/30/2024    GLC 99 03/23/2012    BUN 19.9 03/07/2025    BUN 15 09/30/2024    BUN 23 03/23/2012    CR 1.09 (H) 03/07/2025    CR 0.80 03/23/2012    GFRESTIMATED 53 (L) 03/07/2025    GFRESTIMATED 73 03/23/2012    GFRESTBLACK 89 03/23/2012    AVNI 9.8 03/07/2025    AVNI 9.6 03/23/2012      Lab Results   Component Value Date    AST 36 09/30/2024    AST 24 02/17/2012    ALT 28 09/30/2024    ALT 14 02/17/2012       The longitudinal plan of care for Agatha was addressed during this visit. Due to the added complexity in care, I will continue to support Agatha in the  subsequent management of this condition(s) and with the ongoing continuity of care of this condition(s).    This note has been dictated using voice recognition software. Any grammatical, typographical, or context distortions are unintentional and inherent to the software.      Thank you for allowing me to participate in the care of your patient.      Sincerely,     FABIEN Oh CNP     [facility]  cc:   FABIEN Kat CNP  [address]  [place] [address]

## 2025-03-10 ENCOUNTER — TELEPHONE (OUTPATIENT)
Dept: CARDIOLOGY | Facility: CLINIC | Age: 75
End: 2025-03-10
Payer: MEDICARE

## 2025-03-10 NOTE — TELEPHONE ENCOUNTER
Spoke with pt regarding need for Jardiance hold prior to NICOLA and DCCV. Last dose will be 3/31. Pt verbalized understanding and denied further questions. KELLIE Pitts

## 2025-03-10 NOTE — TELEPHONE ENCOUNTER
Called and left VM for pt to call back to discuss holding Jardiance prior to scheduled cardioversion. KELLIE Pitts

## 2025-03-20 DIAGNOSIS — I50.32 CHRONIC HEART FAILURE WITH PRESERVED EJECTION FRACTION (HFPEF) (H): ICD-10-CM

## 2025-03-20 RX ORDER — FUROSEMIDE 40 MG/1
40 TABLET ORAL 2 TIMES DAILY
Qty: 180 TABLET | Refills: 1 | Status: SHIPPED | OUTPATIENT
Start: 2025-03-20

## 2025-04-03 ENCOUNTER — TELEPHONE (OUTPATIENT)
Dept: CARDIOLOGY | Facility: CLINIC | Age: 75
End: 2025-04-03
Payer: MEDICARE

## 2025-04-03 NOTE — TELEPHONE ENCOUNTER
Another call placed to patient to review instructions and medication holds for scheduled NICOLA/DCCV.  Patient does not have my chart so unable to message her the data.  KELLIE Mata

## 2025-04-03 NOTE — TELEPHONE ENCOUNTER
Message left for patient to review instructions for scheduled NICOLA/Cardioversion on 4/4.  Waiting call back from patient.  KELLIE Mata

## 2025-04-04 ENCOUNTER — HOSPITAL ENCOUNTER (OUTPATIENT)
Dept: CARDIOLOGY | Facility: CLINIC | Age: 75
Discharge: HOME | End: 2025-04-04
Attending: NURSE PRACTITIONER | Admitting: NURSE PRACTITIONER
Payer: MEDICARE

## 2025-04-04 ENCOUNTER — HOSPITAL ENCOUNTER (OUTPATIENT)
Facility: CLINIC | Age: 75
Discharge: HOME | End: 2025-04-04
Admitting: INTERNAL MEDICINE
Payer: MEDICARE

## 2025-04-04 VITALS
WEIGHT: 293 LBS | SYSTOLIC BLOOD PRESSURE: 111 MMHG | RESPIRATION RATE: 17 BRPM | DIASTOLIC BLOOD PRESSURE: 54 MMHG | OXYGEN SATURATION: 99 % | HEART RATE: 62 BPM | TEMPERATURE: 98.5 F | BODY MASS INDEX: 47.09 KG/M2 | HEIGHT: 66 IN

## 2025-04-04 DIAGNOSIS — I48.19 PERSISTENT ATRIAL FIBRILLATION (H): ICD-10-CM

## 2025-04-04 DIAGNOSIS — I51.3 THROMBUS OF LEFT ATRIAL APPENDAGE: ICD-10-CM

## 2025-04-04 LAB
DIGOXIN SERPL-MCNC: <0.4 NG/ML (ref 0.6–1.2)
INR PPP: 1.44 (ref 0.85–1.15)
LVEF ECHO: NORMAL
MAGNESIUM SERPL-MCNC: 1.7 MG/DL (ref 1.7–2.3)
POTASSIUM SERPL-SCNC: 3.9 MMOL/L (ref 3.4–5.3)

## 2025-04-04 PROCEDURE — 93312 ECHO TRANSESOPHAGEAL: CPT | Mod: 26 | Performed by: INTERNAL MEDICINE

## 2025-04-04 PROCEDURE — 85610 PROTHROMBIN TIME: CPT

## 2025-04-04 PROCEDURE — 80162 ASSAY OF DIGOXIN TOTAL: CPT | Performed by: NURSE PRACTITIONER

## 2025-04-04 PROCEDURE — 93010 ELECTROCARDIOGRAM REPORT: CPT | Mod: XU | Performed by: INTERNAL MEDICINE

## 2025-04-04 PROCEDURE — 250N000013 HC RX MED GY IP 250 OP 250 PS 637: Performed by: NURSE PRACTITIONER

## 2025-04-04 PROCEDURE — 93320 DOPPLER ECHO COMPLETE: CPT

## 2025-04-04 PROCEDURE — 93005 ELECTROCARDIOGRAM TRACING: CPT

## 2025-04-04 PROCEDURE — 999N000010 HC STATISTIC ANES STAT CODE-CRNA PER MINUTE

## 2025-04-04 PROCEDURE — 250N000009 HC RX 250

## 2025-04-04 PROCEDURE — 93325 DOPPLER ECHO COLOR FLOW MAPG: CPT

## 2025-04-04 PROCEDURE — 258N000003 HC RX IP 258 OP 636: Performed by: NURSE PRACTITIONER

## 2025-04-04 PROCEDURE — 84132 ASSAY OF SERUM POTASSIUM: CPT | Performed by: NURSE PRACTITIONER

## 2025-04-04 PROCEDURE — 93320 DOPPLER ECHO COMPLETE: CPT | Mod: 26 | Performed by: INTERNAL MEDICINE

## 2025-04-04 PROCEDURE — 999N000184 HC STATISTIC TELEMETRY

## 2025-04-04 PROCEDURE — 83735 ASSAY OF MAGNESIUM: CPT | Performed by: NURSE PRACTITIONER

## 2025-04-04 PROCEDURE — 93325 DOPPLER ECHO COLOR FLOW MAPG: CPT | Mod: 26 | Performed by: INTERNAL MEDICINE

## 2025-04-04 PROCEDURE — 36415 COLL VENOUS BLD VENIPUNCTURE: CPT

## 2025-04-04 PROCEDURE — 999N000183 HC STATISTIC TEE INCLUDES SEDATION

## 2025-04-04 PROCEDURE — 92960 CARDIOVERSION ELECTRIC EXT: CPT | Performed by: INTERNAL MEDICINE

## 2025-04-04 PROCEDURE — 92960 CARDIOVERSION ELECTRIC EXT: CPT

## 2025-04-04 PROCEDURE — 999N000054 HC STATISTIC EKG NON-CHARGEABLE

## 2025-04-04 PROCEDURE — 370N000017 HC ANESTHESIA TECHNICAL FEE, PER MIN

## 2025-04-04 RX ORDER — POTASSIUM CHLORIDE 1500 MG/1
40 TABLET, EXTENDED RELEASE ORAL
Status: DISCONTINUED | OUTPATIENT
Start: 2025-04-04 | End: 2025-04-04 | Stop reason: HOSPADM

## 2025-04-04 RX ORDER — SODIUM CHLORIDE 9 MG/ML
30 INJECTION, SOLUTION INTRAVENOUS CONTINUOUS
Status: DISCONTINUED | OUTPATIENT
Start: 2025-04-04 | End: 2025-04-04 | Stop reason: HOSPADM

## 2025-04-04 RX ORDER — LIDOCAINE 40 MG/G
CREAM TOPICAL
Status: DISCONTINUED | OUTPATIENT
Start: 2025-04-04 | End: 2025-04-04 | Stop reason: HOSPADM

## 2025-04-04 RX ORDER — DEXTROSE MONOHYDRATE 25 G/50ML
9.5 INJECTION, SOLUTION INTRAVENOUS
Status: DISCONTINUED | OUTPATIENT
Start: 2025-04-04 | End: 2025-04-04 | Stop reason: HOSPADM

## 2025-04-04 RX ORDER — POTASSIUM CHLORIDE 1500 MG/1
20 TABLET, EXTENDED RELEASE ORAL
Status: COMPLETED | OUTPATIENT
Start: 2025-04-04 | End: 2025-04-04

## 2025-04-04 RX ORDER — MAGNESIUM SULFATE HEPTAHYDRATE 40 MG/ML
2 INJECTION, SOLUTION INTRAVENOUS
Status: DISCONTINUED | OUTPATIENT
Start: 2025-04-04 | End: 2025-04-04 | Stop reason: HOSPADM

## 2025-04-04 RX ADMIN — SODIUM CHLORIDE 30 ML/HR: 9 INJECTION, SOLUTION INTRAVENOUS at 12:27

## 2025-04-04 RX ADMIN — TOPICAL ANESTHETIC 1 ML: 200 SPRAY DENTAL; PERIODONTAL at 13:34

## 2025-04-04 RX ADMIN — POTASSIUM CHLORIDE 20 MEQ: 1500 TABLET, EXTENDED RELEASE ORAL at 12:25

## 2025-04-04 ASSESSMENT — ACTIVITIES OF DAILY LIVING (ADL)
ADLS_ACUITY_SCORE: 60

## 2025-04-04 NOTE — PRE-PROCEDURE
GENERAL PRE-PROCEDURE:   Procedure:  Transesophageal echocardiogram and cardioversion  Date/Time:  4/4/2025 1:35 PM    Verbal consent obtained?: Yes    Written consent obtained?: Yes    Risks and benefits: Risks, benefits and alternatives were discussed    Consent given by:  Patient  Patient states understanding of procedure being performed: Yes    Patient's understanding of procedure matches consent: Yes    Procedure consent matches procedure scheduled: Yes    Expected level of sedation:  Moderate  Appropriately NPO:  Yes  ASA Class:  2  Mallampati  :  Grade 2- soft palate, base of uvula, tonsillar pillars, and portion of posterior pharyngeal wall visible  Lungs:  Lungs clear with good breath sounds bilaterally  Heart:  A-fib  History & Physical reviewed:  History and physical reviewed and no updates needed  Statement of review:  I have reviewed the lab findings, diagnostic data, medications, and the plan for sedation

## 2025-04-04 NOTE — DISCHARGE INSTRUCTIONS
NICOLA  (Transesophageal Echocardiogram)  with Cardioversion Discharge Instructions    After you go home:    Have an adult stay with you for 6 hours.       For 24 hours - due to the sedation you received:  Relax and take it easy.  Do NOT make any important or legal decisions.  Do NOT drive or operate machines at home or at work.  Do NOT drink alcohol.    Diet:    You may resume your normal diet, but no scratchy foods for two days.  If your throat is sore, eat cold, bland or soft foods.  You may have heartburn if the tube used in the exam entered your stomach.  If so:   - Do not eat acidic and spicy foods.   - Do not eat three hours before bedtime.  Clear liquids are okay.   - When lying down, use two pillows to raise your head.    Medicines:    Take your medications, including blood thinners, unless your provider tells you not to.  If you have stopped any medicines, check with your provider about when to restart them.  You may take Tylenol (Acetaminophen) if your throat is sore.  You may take antacids if you have heartburn.      Follow Up Appointments:    Follow up with your cardiologist at [facility] of patient preference as instructed.  Follow up with your primary care provider as needed.    If you ve had a cardioversion:    The skin on your chest or back may feel tender for 48 hours.  If your skin is tender, you may:  Use a cold pack on the site. Never use ice directly on your skin. Use the cold pack for 20 minutes. Remove it for at least 30 minutes before re-using.  Apply 1% hydrocortisone cream to the skin (sold at drug stores)  Take Advil (Ibuprofen) or Tylenol (Acetaminophen).      Call the clinic if:    You have heartburn that is severe or lasts more than 72 hours.  You have a sore throat that feels worse after 72 hours.  You have shortness of breath, neck pain, chest pain, fever, chills, coughing up blood, or other unusual signs.  Call your cardiologist right away if you have an irregular heartbeat,  shortness of breath or feel dizzy.  Questions or concerns      [facility] [place]:    [phone] UM (7 days a week)    Or you can contact your provider via My Chart

## 2025-04-04 NOTE — PROGRESS NOTES
1215 A/O. Pt denies difficulty swallowing, reports sleep apnea. No dentures or loose teeth. NPO x 12+ hrs. All questions & concerns addressed. RENAE, Ntaalya, at bedside.    NICOLA: Pt tolerated NICOLA well. VSS. Anesthesia care per anesthesia team. See notes.    CDV: Pt tolerated well. CDV x 1 @ 200 joules. See rhythm strips.     110mg propofol given per anesthesia note.    Discharge/post procedure instructions given to pt & Natalya w/verbal understanding received.     1510 Pt discharged per w/c to private vehicle. All personal belongings sent with pt. Pt to be NPO until 1530. Both pt & Natalya informed. Verbal understanding received.

## 2025-04-04 NOTE — PROCEDURES
DC Cardioversion Procedure Note:    Informed consent obtained.  Pads placed in AP position.  Anesthesia used, please see their documentation.    Pre cardioversion NICOLA no VIVIANE thrombus.     Synchronized, biphasic 200J shock x 1 delivered and successful in converting atrial fibrillation to normal sinus rhythm without bradycardia or pauses.    No apparent complications.    Kavin Adamson MD, [facility]

## 2025-04-05 LAB
ATRIAL RATE - MUSE: 159 BPM
DIASTOLIC BLOOD PRESSURE - MUSE: NORMAL MMHG
INTERPRETATION ECG - MUSE: NORMAL
P AXIS - MUSE: NORMAL DEGREES
PR INTERVAL - MUSE: NORMAL MS
QRS DURATION - MUSE: 94 MS
QT - MUSE: 414 MS
QTC - MUSE: 474 MS
R AXIS - MUSE: -31 DEGREES
SYSTOLIC BLOOD PRESSURE - MUSE: NORMAL MMHG
T AXIS - MUSE: 66 DEGREES
VENTRICULAR RATE- MUSE: 79 BPM

## 2025-04-06 LAB
ATRIAL RATE - MUSE: 66 BPM
DIASTOLIC BLOOD PRESSURE - MUSE: NORMAL MMHG
INTERPRETATION ECG - MUSE: NORMAL
P AXIS - MUSE: 48 DEGREES
PR INTERVAL - MUSE: 280 MS
QRS DURATION - MUSE: 100 MS
QT - MUSE: 438 MS
QTC - MUSE: 459 MS
R AXIS - MUSE: -17 DEGREES
SYSTOLIC BLOOD PRESSURE - MUSE: NORMAL MMHG
T AXIS - MUSE: 39 DEGREES
VENTRICULAR RATE- MUSE: 66 BPM

## 2025-04-16 ENCOUNTER — PATIENT OUTREACH (OUTPATIENT)
Dept: CARDIOLOGY | Facility: CLINIC | Age: 75
End: 2025-04-16
Payer: MEDICARE

## 2025-04-16 NOTE — PROGRESS NOTES
[facility]      3/7/25 Office visit with Anabel Craroll  Plan:   Continue current medications - lasix 60mg twice daily with potassium replacement  Can use additional metolazone 2.5mg once weekly as needed if weight gain with additional potassium chloride 20mEq   Continue other current medications, to ensure no missed doses of Eliquis for next 3 weeks   Reviewed options of coronary angiogram first vs NICOLA/possible cardioversion. Agatha prefers to proceed with NICOLA first unless any recurrence of chest discomfort she will call the office  Plan for NICOLA with anesthesia assistance given body size and DYLAN,  and if VIVIANE thrombus resolved then proceed with attempt at cardioversion to see if LVEF normalizes and if she feels better given her preference. Many risk factors for Afib recurrence.   Hold Digoxin the morning of cardioversion and consider stopping in future   Follow up again in about 4 weeks to discuss findings from NICOLA/EF reassessment and discuss timing of possible coronary angiogram and EP referral for discussion of of possible long term rhythm control vs rate control if early recurrence     4/4/25 Successful cardioversion  Synchronized, biphasic 200J shock x 1 delivered and successful in converting atrial fibrillation to normal sinus rhythm without bradycardia or pauses.     4/16/25 Call from Agatha today  Agatha stated that she was having a lot of anxiety today.There are inspections happening at her apartment and she had to move her car and do a few other things that were hard for her. She said that she felt like her heart was racing like it was prior to her cardioversion. She reported doing some breathing exercises and resting to work on the anxiety which she said is helping. Agatha was not able to check her vitals. She is wondering if she should take her digoxin again.     Note: I will forward this information to Anabel Carroll for her review.    Future Appointments   Date Time Provider  Department Center   4/29/2025  2:00 PM Dee Dee Brown PA-C [facility]   5/30/2025  3:15 PM [facility]   5/30/2025  4:00 PM Latricia Carroll APRN CNP [facility]        Cecy MEEHANN, RN  11:15 AM   Nurse line [facility] 8am-4pm   [phone]

## 2025-04-21 NOTE — PROGRESS NOTES
[facility]     Latricia Carroll APRN CNP [place], Tess; [facility] Heart Core Nurse3 days ago       Sounds like she may be back in atrial fibrillation. How are her heart rates at rest? Does she have a pulse oximeter?  Okay for nurse visit. ER if worsening symptoms over the weekend.    Thanks, Anabel       Called Agatha to get an updated assessment, HR check. No answer. Left VM requesting call back. Will discuss option of sooner EP visit pending call back (there are some openings in [place] this week with CARLY Liyah Araujo).    Relevant meds:  Apixaban 5 mg   Metoprolol succinate 100 mg BID    Future Appointments   Date Time Provider Department Center   4/29/2025  2:00 PM Dee Dee Brown PA-C [facility]   5/30/2025  3:15 PM  LAB [facility]   5/30/2025  4:00 PM Latricia Carroll APRN CNP [facility]     Nicole Ivan RN BSN   11:48 AM 04/21/25  Nurse line M-F 8a-4p: [phone]

## 2025-04-29 ENCOUNTER — OFFICE VISIT (OUTPATIENT)
Dept: CARDIOLOGY | Facility: CLINIC | Age: 75
End: 2025-04-29
Attending: NURSE PRACTITIONER
Payer: MEDICARE

## 2025-04-29 VITALS
DIASTOLIC BLOOD PRESSURE: 83 MMHG | BODY MASS INDEX: 47.09 KG/M2 | SYSTOLIC BLOOD PRESSURE: 131 MMHG | WEIGHT: 293 LBS | OXYGEN SATURATION: 98 % | HEART RATE: 86 BPM | HEIGHT: 66 IN

## 2025-04-29 DIAGNOSIS — I51.3 THROMBUS OF LEFT ATRIAL APPENDAGE: ICD-10-CM

## 2025-04-29 DIAGNOSIS — I50.32 CHRONIC HEART FAILURE WITH PRESERVED EJECTION FRACTION (HFPEF) (H): ICD-10-CM

## 2025-04-29 DIAGNOSIS — I48.19 PERSISTENT ATRIAL FIBRILLATION (H): ICD-10-CM

## 2025-04-29 PROCEDURE — 99215 OFFICE O/P EST HI 40 MIN: CPT | Performed by: INTERNAL MEDICINE

## 2025-04-29 PROCEDURE — 3079F DIAST BP 80-89 MM HG: CPT | Performed by: INTERNAL MEDICINE

## 2025-04-29 PROCEDURE — 93000 ELECTROCARDIOGRAM COMPLETE: CPT | Performed by: INTERNAL MEDICINE

## 2025-04-29 PROCEDURE — 3075F SYST BP GE 130 - 139MM HG: CPT | Performed by: INTERNAL MEDICINE

## 2025-04-29 PROCEDURE — G2211 COMPLEX E/M VISIT ADD ON: HCPCS | Performed by: INTERNAL MEDICINE

## 2025-04-29 RX ORDER — METOLAZONE 2.5 MG/1
2.5 TABLET ORAL
Qty: 24 TABLET | Refills: 1 | Status: SHIPPED | OUTPATIENT
Start: 2025-05-01 | End: 2025-04-29

## 2025-04-29 RX ORDER — DIGOXIN 125 MCG
125 TABLET ORAL DAILY
Qty: 90 TABLET | Refills: 3 | Status: SHIPPED | OUTPATIENT
Start: 2025-04-29

## 2025-04-29 RX ORDER — METOLAZONE 2.5 MG/1
2.5 TABLET ORAL
Qty: 24 TABLET | Refills: 1 | Status: SHIPPED | OUTPATIENT
Start: 2025-05-01

## 2025-04-29 NOTE — LETTER
4/29/2025    [facility] [address]  [place] [address]    RE: Agatha Rodriguez       Dear Colleague,     I had the pleasure of seeing Agatha Rodriguez in the [facility].  EP cardiology Clinic Progress Note  Agatha Rodriguez MRN# [id number]   YOB: 1950 Age: 74 year old   Primary Cardiologist: Dr. Ocampo, Dr. Rainey (EP) Reason for visit: EP follow-up            Assessment and Plan:   Agatha Rodriguez is a very pleasant 74 year old female who is here today for EP follow-up.    1.  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  Increase metolazone to 2.5 mg twice weekly on Tuesdays and Thursdays.  Patient takes an additional 80 mEq of potassium with each dose of metolazone.  - Ischemic evaluation: Abnormal Lexiscan stress test 7/2024.  Recommend cardiac MRI with stress as previously recommended by Dr. Ocampo, will arrange.  - Guideline directed medical therapy:  - Beta blocker: Toprol- mg twice daily  - ACEI/ARB/ARNI: Entresto 24/26 mg twice daily  - Aldactone antagonist: None yet, consider in follow-up  - SGLT2 inhibitor: Jardiance 10 mg once daily  -Counseled patient on fluid and sodium restriction  2.  Persistent atrial fibrillation with intermittent RVR, status post NICOLA DCCV 4/2025 with recurrent atrial fibrillation a few weeks later.  Asymptomatic with controlled rates.  Options for management discussed, patient prefers rate control strategy which is not unreasonable.  Resume digoxin 125 mcg once daily.  3-day Zio patch monitor in 1 week to assess ventricular rate control.  3.  Left atrial appendage thrombus diagnosed 9/2024, now resolved.  Remains on Eliquis 5 mg twice daily for  cardioembolic risk reduction in the setting of elevated [id number] score of 5 (age x 1, female, HTN, DM, CM)  4.  Abnormal Lexiscan stress test 7/2024 with discharge recommendation from Dr. Ocampo to complete outpatient cardiac MRI with stress.  It does not appear this was ever pursued.  Discussion revisited today, patient will schedule.  No anginal symptoms.  5.  Hypertension, well-controlled  6.  Diabetes mellitus type 2  7.  Morbid obesity, BMI 57  8.  DYLAN on BiPAP    Changes today:   Resume digoxin 125 mcg once daily  Increase metolazone to 2.5 mg twice weekly on Tuesdays and Thursdays.  Continue to take an extra 80 mEq of potassium each day metolazone is taken.    Agatha presents to clinic today for routine postprocedure follow-up.  Unfortunately, she developed recurrent atrial fibrillation about 2 weeks after her cardioversion.  ECG completed in clinic today reveals atrial fibrillation with VR 90 bpm.  She denies any symptoms associated with her arrhythmia.  We discussed various rate and rhythm control options today.  After discussion, patient prefers to pursue a rate control strategy which is not unreasonable, especially in light of her morbid obesity and moderately dilated atria.  We will resume digoxin 125 mcg once daily and repeat a Zio patch monitor in 1 week to assess ventricular rate control.  She will remain on Eliquis 5 mg twice daily for cardioembolic risk reduction in setting of her elevated [id number] score of 5.  She is being seen in 1 month at which time we will complete a digoxin level.    Patient is volume overloaded in clinic today, up 10#from presumed dry weight of 339#.  Recommend increasing metolazone to 2.5 mg twice weekly on Tuesdays and Thursdays.  Patient will take extra potassium on days metolazone is taken.  BMP at time of follow-up in 1 month.  Encouraged ongoing monitoring of sodium and fluid intake, limiting sodium to 2000 mg daily and fluid to 2000 mL daily.    Patient had an  abnormal Lexiscan stress test completed in July 2024.  Dr. Ocampo recommended outpatient cardiac MRI with stress for further evaluation.  Patient will schedule this today.    Dee Dee Brown PA-C  [facility]  Pager: [phone]          History of Presenting Illness:    Agatha Rodriguez is a very pleasant 74 year old female with a history of hypertension, diabetes mellitus type 2, morbid obesity, DYLAN on BiPAP, mixed systolic and diastolic heart failure, and atrial arrhythmias.    Patient was admitted to [facility] 7/2024 with chest pain and shortness of breath.  Echocardiogram was completed that showed preserved LV function with LVEF 60 to 65%.  She was diuresed with improvement in her symptoms.  Given episodes of chest discomfort Lexiscan stress test was recommended and completed 7/30/2024.  This returned abnormal revealing a small area of nontransmural infarction in the distal anteroseptal and apex segments of the LV associated with a small area of mild degree of gloria-infarct ischemia.  An outpatient stress MRI was recommended for further evaluation by Dr. Ocampo.  It appears this was never pursued.    Patient was admitted to [facility] 9/30-10/12/2024 with progressive dyspnea and palpitations.  She was found to be in new atrial flutter RVR with acute decompensated HFpEF.  TTE during her hospitalization revealed LVEF 50%.  She underwent NICOLA which revealed LVEF 30-35% along with left atrial appendage thrombus.  As result, cardioversion was not performed.  Anticoagulation was initiated and patient was diuresed.  GDMT optimized as able.  Plan was for rate control x 4 weeks with repeat NICOLA DCCV should VIVIANE thrombus resolve.    Since, she has followed closely with my colleague Anabel Carroll NP, who has had challenges managing patient's volume status given ongoing dietary indiscretions, concerns related to medication compliance, and transportation limitations.  She  required IV diuretic infusion in December.    At the time of her most recent follow-up visit 3/7/2025, weight at home 339#, lowest to date.  NICOLA guided cardioversion was arranged and completed 4/4/2025.  NICOLA revealed LVEF 40 to 45%, moderately reduced RV systolic function, mild MR, and no evidence of left atrial appendage thrombus.  She underwent cardioversion with 200 J shock x 1 with successful conversion to normal sinus rhythm.  Her digoxin was discontinued postprocedure given intermittent sinus bradycardia.    Patient contacted the clinic 4/16/2025 complaining of heart racing sensation.  She has since been unreachable despite multiple attempts.    Patient is here today for follow-up.  Agatha reports recurrent atrial fibrillation about 2 weeks after her cardioversion.  She noticed some palpitations initially with her rapid heart rates but otherwise reports no change in symptoms when comparing sinus rhythm versus atrial fibrillation.  Her main complaints are related to volume overload including dyspnea with even minimal exertion, orthopnea, and an episode of PND a few nights ago.  She is trying to be better about restricting her fluids and watching her sodium in the diet but reports this has been difficult.    Weights at home have been running 348-350#.    She is taking all of her medications daily as prescribed but did take an extra dose of metolazone last week when she noticed her weight was up 20# from baseline/dry weight.      Social History       Social History     Socioeconomic History     Marital status:      Spouse name: Not on file     Number of children: Not on file     Years of education: Not on file     Highest education level: Not on file   Occupational History     Not on file   Tobacco Use     Smoking status: Never     Smokeless tobacco: Never   Substance and Sexual Activity     Alcohol use: No     Drug use: No     Sexual activity: Yes   Other Topics Concern     Parent/sibling w/ CABG, MI or  "angioplasty before 65F 55M? Not Asked   Social History Narrative     Not on file     Social Drivers of Health     Financial Resource Strain: Low Risk  (10/1/2024)    Financial Resource Strain      Within the past 12 months, have you or your family members you live with been unable to get utilities (heat, electricity) when it was really needed?: No   Food Insecurity: Low Risk  (10/1/2024)    Food Insecurity      Within the past 12 months, did you worry that your food would run out before you got money to buy more?: No      Within the past 12 months, did the food you bought just not last and you didn t have money to get more?: No   Transportation Needs: Low Risk  (10/1/2024)    Transportation Needs      Within the past 12 months, has lack of transportation kept you from medical appointments, getting your medicines, non-medical meetings or appointments, work, or from getting things that you need?: No   Physical Activity: Not on file   Stress: Not on file   Social Connections: Not on file   Interpersonal Safety: Low Risk  (4/4/2025)    Interpersonal Safety      Do you feel physically and emotionally safe where you currently live?: Yes      Within the past 12 months, have you been hit, slapped, kicked or otherwise physically hurt by someone?: No      Within the past 12 months, have you been humiliated or emotionally abused in other ways by your partner or ex-partner?: No   Housing Stability: Low Risk  (10/1/2024)    Housing Stability      Do you have housing? : Yes      Are you worried about losing your housing?: No            Review of Systems:   Please see HPI         Physical Exam:   Vitals: /83   Pulse 86   Ht 1.676 m (5' 6\")   Wt (!) 159.8 kg (352 lb 3.2 oz)   SpO2 98%   BMI 56.85 kg/m     Wt Readings from Last 4 Encounters:   04/04/25 (!) 158.8 kg (350 lb)   03/07/25 (!) 155.6 kg (343 lb)   02/07/25 (!) 156.9 kg (346 lb)   01/14/25 (!) 161.7 kg (356 lb 6.4 oz)     GEN: well nourished, in no acute " distress.  HEENT:  Pupils equal, round. Sclerae nonicteric.   NECK: Supple, no masses appreciated.  Unable to accurately assess JVP secondary to body habitus  C/V: Irregularly irregular rhythm, controlled rates  RESP: Respirations are unlabored. Clear to auscultation bilaterally without wheezing, rales, or rhonchi.  GI: Abdomen soft, nontender.  EXTREM: 2+ bilateral LE edema to thighs   NEURO: Alert and oriented, cooperative.  SKIN: Warm and dry.        Data:   LIPID RESULTS:  Lab Results   Component Value Date    CHOL 194 02/17/2012    HDL 49 (L) 02/17/2012     02/17/2012    TRIG 83 02/17/2012    CHOLHDLRATIO 4.0 02/17/2012     LIVER ENZYME RESULTS:  Lab Results   Component Value Date    AST 36 09/30/2024    AST 24 02/17/2012    ALT 28 09/30/2024    ALT 14 02/17/2012     CBC RESULTS:  Lab Results   Component Value Date    WBC 6.9 12/17/2024    WBC 8.5 05/14/2019    RBC 5.11 12/17/2024    RBC 4.08 05/14/2019    HGB 13.2 12/17/2024    HGB 11.5 (L) 05/14/2019    HCT 43.0 12/17/2024    HCT 35.7 05/14/2019    MCV 84 12/17/2024    MCV 88 05/14/2019    MCH 25.8 (L) 12/17/2024    MCH 28.2 05/14/2019    MCHC 30.7 (L) 12/17/2024    MCHC 32.2 05/14/2019    RDW 18.6 (H) 12/17/2024    RDW 13.4 05/14/2019     12/17/2024     05/14/2019     BMP RESULTS:  Lab Results   Component Value Date     03/07/2025     03/23/2012    POTASSIUM 3.9 04/04/2025    POTASSIUM 3.5 09/30/2024    POTASSIUM 3.6 03/23/2012    CHLORIDE 102 03/07/2025    CHLORIDE 107 09/30/2024    CHLORIDE 103 03/23/2012    CO2 30 (H) 03/07/2025    CO2 34 (H) 09/30/2024    CO2 36 (H) 03/23/2012    ANIONGAP 10 03/07/2025    ANIONGAP 3 09/30/2024    ANIONGAP 5 (L) 03/23/2012     (H) 03/07/2025     (H) 10/12/2024     (H) 09/30/2024    GLC 99 03/23/2012    BUN 19.9 03/07/2025    BUN 15 09/30/2024    BUN 23 03/23/2012    CR 1.09 (H) 03/07/2025    CR 0.80 03/23/2012    GFRESTIMATED 53 (L) 03/07/2025    GFRESTIMATED 73  "03/23/2012    GFRESTBLACK 89 03/23/2012    AVNI 9.8 03/07/2025    AVNI 9.6 03/23/2012      A1C RESULTS:  No results found for: \"A1C\"  INR RESULTS:  Lab Results   Component Value Date    INR 1.44 (H) 04/04/2025    INR 1.21 (H) 10/02/2024            Medications     Current Outpatient Medications   Medication Sig Dispense Refill     acetaminophen (TYLENOL) 325 MG tablet Take 2 tablets (650 mg) by mouth every 4 hours as needed for mild pain.       albuterol (PROAIR HFA/PROVENTIL HFA/VENTOLIN HFA) 108 (90 Base) MCG/ACT inhaler Inhale 2 puffs into the lungs every 6 hours as needed for shortness of breath, wheezing or cough.       albuterol (PROVENTIL) (2.5 MG/3ML) 0.083% neb solution Take 1 vial (2.5 mg) by nebulization every 4 hours as needed for shortness of breath, wheezing or cough 90 mL 0     apixaban ANTICOAGULANT (ELIQUIS) 5 MG tablet Take 1 tablet (5 mg) by mouth 2 times daily. Ensure no missed doses. 180 tablet 3     atorvastatin (LIPITOR) 40 MG tablet Take 1 tablet (40 mg) by mouth daily. 90 tablet 3     Docusate Calcium (STOOL SOFTENER PO) Take 1 tablet by mouth daily as needed.       empagliflozin (JARDIANCE) 10 MG TABS tablet Take 1 tablet (10 mg) by mouth daily. 90 tablet 3     ferrous sulfate (FEROSUL) 325 (65 Fe) MG tablet Take 325 mg by mouth Every Mon, Wed, Fri Morning.       fluticasone (ARNUITY ELLIPTA) 200 MCG/ACT inhaler Inhale 1 puff into the lungs daily       furosemide (LASIX) 20 MG tablet Take 1 tablet (20 mg) by mouth 2 times daily. In addition to 40mg tablets to total 60mg twice daily. 180 tablet 1     furosemide (LASIX) 40 MG tablet Take 1 tablet (40 mg) by mouth 2 times daily. In addition to 20mg tablet to total 60mg twice daily. 180 tablet 1     glipiZIDE (GLUCOTROL XL) 2.5 MG 24 hr tablet Take 1 tablet (2.5 mg) by mouth daily. --- 10/12/2024 --- HOLD until follow up with primary care provider. ---       magnesium oxide (MAG-OX) 400 MG tablet Take 1 tablet (400 mg) by mouth daily. 90 tablet 1 "     metFORMIN (GLUCOPHAGE) 500 MG tablet Take 500 mg by mouth 2 times daily (with meals)       metolazone (ZAROXOLYN) 2.5 MG tablet Take 1 tablet (2.5 mg) by mouth once a week. On Thursdays.  Take additional potassium 20mEq that day. 12 tablet 1     metoprolol succinate ER (TOPROL XL) 100 MG 24 hr tablet Take 1 tablet (100 mg) by mouth 2 times daily. 180 tablet 3     Multiple Vitamin (MULTIVITAMIN OR) Take 1 tablet by mouth daily       nitroGLYCERIN (NITROSTAT) 0.4 MG SL tablet Place 1 tablet under the tongue every 5 minutes as needed for chest pain. (Patient not taking: Reported on 3/7/2025) 90 tablet 12     potassium chloride liv ER (KLOR-CON M20) 20 MEQ CR tablet Take 3 tablets (60 mEq) by mouth 2 times daily. 360 tablet 3     Respiratory Therapy Supplies (NEBULIZER COMPRESSOR) KIT 1 each 4 times daily 1 kit 0     Respiratory Therapy Supplies (NEBULIZER/ADULT MASK) KIT 1 each 4 times daily 1 kit 0     sacubitril-valsartan (ENTRESTO) 24-26 MG per tablet Take 1 tablet by mouth 2 times daily. 180 tablet 3          Past Medical History     Past Medical History:   Diagnosis Date     (HFpEF) heart failure with preserved ejection fraction (H) 07/2024     Asthma      CAD (coronary artery disease)      Diverticulitis of intestine without perforation or abscess 02/17/2017     GERD (gastroesophageal reflux disease)      Gout of right foot 2015     Hypertension      Morbid obesity with BMI of 50.0-59.9, adult (H) 07/28/2024     Myocardial infarction, silent (H) 2005    Dx w silent Mi in [place] ~ 2005     Obesity      Primary osteoarthritis of both knees      Sleep apnea     uses CPAP     Type 2 diabetes mellitus (H)      Past Surgical History:   Procedure Laterality Date     ANESTHESIA CARDIOVERSION N/A 10/3/2024    Procedure: Anesthesia cardioversion;  Surgeon: GENERIC ANESTHESIA PROVIDER;  Location:  OR     ANESTHESIA CARDIOVERSION N/A 4/4/2025    Procedure: Anesthesia cardioversion;  Surgeon: GENERIC ANESTHESIA  PROVIDER;  Location: [facility] OR     HYSTERECTOMY       TRANSESOPHAGEAL ECHOCARDIOGRAM INTRAOPERATIVE N/A 10/3/2024    Procedure: Transesophageal echocardiogram intraoperative;  Surgeon: GENERIC ANESTHESIA PROVIDER;  Location: [facility] OR     Family History   Problem Relation Age of Onset     Diabetes Mother      Breast Cancer Mother             Allergies   Patient has no known allergies.    The longitudinal plan of care for the diagnosis(es)/condition(s) as documented were addressed during this visit. Due to the added complexity in care, I will continue to support Agatha in the subsequent management and with ongoing continuity of care.     45 minutes spent on the date of the encounter doing chart review, history and exam, documentation and further activities as noted above    [facility]  Pager: [phone]      Thank you for allowing me to participate in the care of your patient.      Sincerely,     [facility]  cc:   Latricia Carroll, APRN CNP  [address]  [place] [address]

## 2025-04-29 NOTE — PROGRESS NOTES
EP cardiology Clinic Progress Note  Agatha Rodriguez MRN# [id number]   YOB: 1950 Age: 74 year old   Primary Cardiologist: Dr. Ocampo, Dr. Rainey (EP) Reason for visit: EP follow-up            Assessment and Plan:   Agatha Rodriguez is a very pleasant 74 year old female who is here today for EP follow-up.    1.  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  Increase metolazone to 2.5 mg twice weekly on Tuesdays and Thursdays.  Patient takes an additional 80 mEq of potassium with each dose of metolazone.  - Ischemic evaluation: Abnormal Lexiscan stress test 7/2024.  Recommend cardiac MRI with stress as previously recommended by Dr. Ocampo, will arrange.  - Guideline directed medical therapy:  - Beta blocker: Toprol- mg twice daily  - ACEI/ARB/ARNI: Entresto 24/26 mg twice daily  - Aldactone antagonist: None yet, consider in follow-up  - SGLT2 inhibitor: Jardiance 10 mg once daily  -Counseled patient on fluid and sodium restriction  2.  Persistent atrial fibrillation with intermittent RVR, status post NICOLA DCCV 4/2025 with recurrent atrial fibrillation a few weeks later.  Asymptomatic with controlled rates.  Options for management discussed, patient prefers rate control strategy which is not unreasonable.  Resume digoxin 125 mcg once daily.  3-day Zio patch monitor in 1 week to assess ventricular rate control.  3.  Left atrial appendage thrombus diagnosed 9/2024, now resolved.  Remains on Eliquis 5 mg twice daily for cardioembolic risk reduction in the setting of elevated [id number] score of 5 (age x 1, female, HTN, DM, CM)  4.  Abnormal Lexiscan stress test 7/2024 with discharge recommendation from Dr. Ocampo to complete outpatient cardiac MRI with stress.  It does not  appear this was ever pursued.  Discussion revisited today, patient will schedule.  No anginal symptoms.  5.  Hypertension, well-controlled  6.  Diabetes mellitus type 2  7.  Morbid obesity, BMI 57  8.  DYLAN on BiPAP    Changes today:   Resume digoxin 125 mcg once daily  Increase metolazone to 2.5 mg twice weekly on Tuesdays and Thursdays.  Continue to take an extra 80 mEq of potassium each day metolazone is taken.    Agatha presents to clinic today for routine postprocedure follow-up.  Unfortunately, she developed recurrent atrial fibrillation about 2 weeks after her cardioversion.  ECG completed in clinic today reveals atrial fibrillation with VR 90 bpm.  She denies any symptoms associated with her arrhythmia.  We discussed various rate and rhythm control options today.  After discussion, patient prefers to pursue a rate control strategy which is not unreasonable, especially in light of her morbid obesity and moderately dilated atria.  We will resume digoxin 125 mcg once daily and repeat a Zio patch monitor in 1 week to assess ventricular rate control.  She will remain on Eliquis 5 mg twice daily for cardioembolic risk reduction in setting of her elevated [id number] score of 5.  She is being seen in 1 month at which time we will complete a digoxin level.    Patient is volume overloaded in clinic today, up 10#from presumed dry weight of 339#.  Recommend increasing metolazone to 2.5 mg twice weekly on Tuesdays and Thursdays.  Patient will take extra potassium on days metolazone is taken.  BMP at time of follow-up in 1 month.  Encouraged ongoing monitoring of sodium and fluid intake, limiting sodium to 2000 mg daily and fluid to 2000 mL daily.    Patient had an abnormal Lexiscan stress test completed in July 2024.  Dr. Ocampo recommended outpatient cardiac MRI with stress for further evaluation.  Patient will schedule this today.    Dee Dee Brown PA-C  [facility]  Pager: [phone]           History of Presenting Illness:    Agatha Rodriguez is a very pleasant 74 year old female with a history of hypertension, diabetes mellitus type 2, morbid obesity, DYLAN on BiPAP, mixed systolic and diastolic heart failure, and atrial arrhythmias.    Patient was admitted to [facility] 7/2024 with chest pain and shortness of breath.  Echocardiogram was completed that showed preserved LV function with LVEF 60 to 65%.  She was diuresed with improvement in her symptoms.  Given episodes of chest discomfort Lexiscan stress test was recommended and completed 7/30/2024.  This returned abnormal revealing a small area of nontransmural infarction in the distal anteroseptal and apex segments of the LV associated with a small area of mild degree of gloria-infarct ischemia.  An outpatient stress MRI was recommended for further evaluation by Dr. Ocampo.  It appears this was never pursued.    Patient was admitted to [facility] 9/30-10/12/2024 with progressive dyspnea and palpitations.  She was found to be in new atrial flutter RVR with acute decompensated HFpEF.  TTE during her hospitalization revealed LVEF 50%.  She underwent NICOLA which revealed LVEF 30-35% along with left atrial appendage thrombus.  As result, cardioversion was not performed.  Anticoagulation was initiated and patient was diuresed.  GDMT optimized as able.  Plan was for rate control x 4 weeks with repeat NICOLA DCCV should VIVIANE thrombus resolve.    Since, she has followed closely with my colleague Anabel Carroll NP, who has had challenges managing patient's volume status given ongoing dietary indiscretions, concerns related to medication compliance, and transportation limitations.  She required IV diuretic infusion in December.    At the time of her most recent follow-up visit 3/7/2025, weight at home 339#, lowest to date.  NICOLA guided cardioversion was arranged and completed 4/4/2025.  NICOLA revealed LVEF 40 to 45%, moderately reduced RV  systolic function, mild MR, and no evidence of left atrial appendage thrombus.  She underwent cardioversion with 200 J shock x 1 with successful conversion to normal sinus rhythm.  Her digoxin was discontinued postprocedure given intermittent sinus bradycardia.    Patient contacted the clinic 4/16/2025 complaining of heart racing sensation.  She has since been unreachable despite multiple attempts.    Patient is here today for follow-up.  Agatha reports recurrent atrial fibrillation about 2 weeks after her cardioversion.  She noticed some palpitations initially with her rapid heart rates but otherwise reports no change in symptoms when comparing sinus rhythm versus atrial fibrillation.  Her main complaints are related to volume overload including dyspnea with even minimal exertion, orthopnea, and an episode of PND a few nights ago.  She is trying to be better about restricting her fluids and watching her sodium in the diet but reports this has been difficult.    Weights at home have been running 348-350#.    She is taking all of her medications daily as prescribed but did take an extra dose of metolazone last week when she noticed her weight was up 20# from baseline/dry weight.      Social History       Social History     Socioeconomic History    Marital status:      Spouse name: Not on file    Number of children: Not on file    Years of education: Not on file    Highest education level: Not on file   Occupational History    Not on file   Tobacco Use    Smoking status: Never    Smokeless tobacco: Never   Substance and Sexual Activity    Alcohol use: No    Drug use: No    Sexual activity: Yes   Other Topics Concern    Parent/sibling w/ CABG, MI or angioplasty before 65F 55M? Not Asked   Social History Narrative    Not on file     Social Drivers of Health     Financial Resource Strain: Low Risk  (10/1/2024)    Financial Resource Strain     Within the past 12 months, have you or your family members you live  "with been unable to get utilities (heat, electricity) when it was really needed?: No   Food Insecurity: Low Risk  (10/1/2024)    Food Insecurity     Within the past 12 months, did you worry that your food would run out before you got money to buy more?: No     Within the past 12 months, did the food you bought just not last and you didn t have money to get more?: No   Transportation Needs: Low Risk  (10/1/2024)    Transportation Needs     Within the past 12 months, has lack of transportation kept you from medical appointments, getting your medicines, non-medical meetings or appointments, work, or from getting things that you need?: No   Physical Activity: Not on file   Stress: Not on file   Social Connections: Not on file   Interpersonal Safety: Low Risk  (4/4/2025)    Interpersonal Safety     Do you feel physically and emotionally safe where you currently live?: Yes     Within the past 12 months, have you been hit, slapped, kicked or otherwise physically hurt by someone?: No     Within the past 12 months, have you been humiliated or emotionally abused in other ways by your partner or ex-partner?: No   Housing Stability: Low Risk  (10/1/2024)    Housing Stability     Do you have housing? : Yes     Are you worried about losing your housing?: No            Review of Systems:   Please see HPI         Physical Exam:   Vitals: /83   Pulse 86   Ht 1.676 m (5' 6\")   Wt (!) 159.8 kg (352 lb 3.2 oz)   SpO2 98%   BMI 56.85 kg/m     Wt Readings from Last 4 Encounters:   04/04/25 (!) 158.8 kg (350 lb)   03/07/25 (!) 155.6 kg (343 lb)   02/07/25 (!) 156.9 kg (346 lb)   01/14/25 (!) 161.7 kg (356 lb 6.4 oz)     GEN: well nourished, in no acute distress.  HEENT:  Pupils equal, round. Sclerae nonicteric.   NECK: Supple, no masses appreciated.  Unable to accurately assess JVP secondary to body habitus  C/V: Irregularly irregular rhythm, controlled rates  RESP: Respirations are unlabored. Clear to auscultation bilaterally " "without wheezing, rales, or rhonchi.  GI: Abdomen soft, nontender.  EXTREM: 2+ bilateral LE edema to thighs   NEURO: Alert and oriented, cooperative.  SKIN: Warm and dry.        Data:   LIPID RESULTS:  Lab Results   Component Value Date    CHOL 194 02/17/2012    HDL 49 (L) 02/17/2012     02/17/2012    TRIG 83 02/17/2012    CHOLHDLRATIO 4.0 02/17/2012     LIVER ENZYME RESULTS:  Lab Results   Component Value Date    AST 36 09/30/2024    AST 24 02/17/2012    ALT 28 09/30/2024    ALT 14 02/17/2012     CBC RESULTS:  Lab Results   Component Value Date    WBC 6.9 12/17/2024    WBC 8.5 05/14/2019    RBC 5.11 12/17/2024    RBC 4.08 05/14/2019    HGB 13.2 12/17/2024    HGB 11.5 (L) 05/14/2019    HCT 43.0 12/17/2024    HCT 35.7 05/14/2019    MCV 84 12/17/2024    MCV 88 05/14/2019    MCH 25.8 (L) 12/17/2024    MCH 28.2 05/14/2019    MCHC 30.7 (L) 12/17/2024    MCHC 32.2 05/14/2019    RDW 18.6 (H) 12/17/2024    RDW 13.4 05/14/2019     12/17/2024     05/14/2019     BMP RESULTS:  Lab Results   Component Value Date     03/07/2025     03/23/2012    POTASSIUM 3.9 04/04/2025    POTASSIUM 3.5 09/30/2024    POTASSIUM 3.6 03/23/2012    CHLORIDE 102 03/07/2025    CHLORIDE 107 09/30/2024    CHLORIDE 103 03/23/2012    CO2 30 (H) 03/07/2025    CO2 34 (H) 09/30/2024    CO2 36 (H) 03/23/2012    ANIONGAP 10 03/07/2025    ANIONGAP 3 09/30/2024    ANIONGAP 5 (L) 03/23/2012     (H) 03/07/2025     (H) 10/12/2024     (H) 09/30/2024    GLC 99 03/23/2012    BUN 19.9 03/07/2025    BUN 15 09/30/2024    BUN 23 03/23/2012    CR 1.09 (H) 03/07/2025    CR 0.80 03/23/2012    GFRESTIMATED 53 (L) 03/07/2025    GFRESTIMATED 73 03/23/2012    GFRESTBLACK 89 03/23/2012    AVNI 9.8 03/07/2025    AVNI 9.6 03/23/2012      A1C RESULTS:  No results found for: \"A1C\"  INR RESULTS:  Lab Results   Component Value Date    INR 1.44 (H) 04/04/2025    INR 1.21 (H) 10/02/2024            Medications     Current Outpatient " Medications   Medication Sig Dispense Refill    acetaminophen (TYLENOL) 325 MG tablet Take 2 tablets (650 mg) by mouth every 4 hours as needed for mild pain.      albuterol (PROAIR HFA/PROVENTIL HFA/VENTOLIN HFA) 108 (90 Base) MCG/ACT inhaler Inhale 2 puffs into the lungs every 6 hours as needed for shortness of breath, wheezing or cough.      albuterol (PROVENTIL) (2.5 MG/3ML) 0.083% neb solution Take 1 vial (2.5 mg) by nebulization every 4 hours as needed for shortness of breath, wheezing or cough 90 mL 0    apixaban ANTICOAGULANT (ELIQUIS) 5 MG tablet Take 1 tablet (5 mg) by mouth 2 times daily. Ensure no missed doses. 180 tablet 3    atorvastatin (LIPITOR) 40 MG tablet Take 1 tablet (40 mg) by mouth daily. 90 tablet 3    Docusate Calcium (STOOL SOFTENER PO) Take 1 tablet by mouth daily as needed.      empagliflozin (JARDIANCE) 10 MG TABS tablet Take 1 tablet (10 mg) by mouth daily. 90 tablet 3    ferrous sulfate (FEROSUL) 325 (65 Fe) MG tablet Take 325 mg by mouth Every Mon, Wed, Fri Morning.      fluticasone (ARNUITY ELLIPTA) 200 MCG/ACT inhaler Inhale 1 puff into the lungs daily      furosemide (LASIX) 20 MG tablet Take 1 tablet (20 mg) by mouth 2 times daily. In addition to 40mg tablets to total 60mg twice daily. 180 tablet 1    furosemide (LASIX) 40 MG tablet Take 1 tablet (40 mg) by mouth 2 times daily. In addition to 20mg tablet to total 60mg twice daily. 180 tablet 1    glipiZIDE (GLUCOTROL XL) 2.5 MG 24 hr tablet Take 1 tablet (2.5 mg) by mouth daily. --- 10/12/2024 --- HOLD until follow up with primary care provider. ---      magnesium oxide (MAG-OX) 400 MG tablet Take 1 tablet (400 mg) by mouth daily. 90 tablet 1    metFORMIN (GLUCOPHAGE) 500 MG tablet Take 500 mg by mouth 2 times daily (with meals)      metolazone (ZAROXOLYN) 2.5 MG tablet Take 1 tablet (2.5 mg) by mouth once a week. On Thursdays.  Take additional potassium 20mEq that day. 12 tablet 1    metoprolol succinate ER (TOPROL XL) 100 MG 24  hr tablet Take 1 tablet (100 mg) by mouth 2 times daily. 180 tablet 3    Multiple Vitamin (MULTIVITAMIN OR) Take 1 tablet by mouth daily      nitroGLYCERIN (NITROSTAT) 0.4 MG SL tablet Place 1 tablet under the tongue every 5 minutes as needed for chest pain. (Patient not taking: Reported on 3/7/2025) 90 tablet 12    potassium chloride liv ER (KLOR-CON M20) 20 MEQ CR tablet Take 3 tablets (60 mEq) by mouth 2 times daily. 360 tablet 3    Respiratory Therapy Supplies (NEBULIZER COMPRESSOR) KIT 1 each 4 times daily 1 kit 0    Respiratory Therapy Supplies (NEBULIZER/ADULT MASK) KIT 1 each 4 times daily 1 kit 0    sacubitril-valsartan (ENTRESTO) 24-26 MG per tablet Take 1 tablet by mouth 2 times daily. 180 tablet 3          Past Medical History     Past Medical History:   Diagnosis Date    (HFpEF) heart failure with preserved ejection fraction (H) 07/2024    Asthma     CAD (coronary artery disease)     Diverticulitis of intestine without perforation or abscess 02/17/2017    GERD (gastroesophageal reflux disease)     Gout of right foot 2015    Hypertension     Morbid obesity with BMI of 50.0-59.9, adult (H) 07/28/2024    Myocardial infarction, silent (H) 2005    Dx w silent Mi in [place] ~ 2005    Obesity     Primary osteoarthritis of both knees     Sleep apnea     uses CPAP    Type 2 diabetes mellitus (H)      Past Surgical History:   Procedure Laterality Date    ANESTHESIA CARDIOVERSION N/A 10/3/2024    Procedure: Anesthesia cardioversion;  Surgeon: GENERIC ANESTHESIA PROVIDER;  Location:  OR    ANESTHESIA CARDIOVERSION N/A 4/4/2025    Procedure: Anesthesia cardioversion;  Surgeon: GENERIC ANESTHESIA PROVIDER;  Location:  OR    HYSTERECTOMY      TRANSESOPHAGEAL ECHOCARDIOGRAM INTRAOPERATIVE N/A 10/3/2024    Procedure: Transesophageal echocardiogram intraoperative;  Surgeon: GENERIC ANESTHESIA PROVIDER;  Location:  OR     Family History   Problem Relation Age of Onset    Diabetes Mother     Breast Cancer Mother              Allergies   Patient has no known allergies.    The longitudinal plan of care for the diagnosis(es)/condition(s) as documented were addressed during this visit. Due to the added complexity in care, I will continue to support Agatha in the subsequent management and with ongoing continuity of care.     45 minutes spent on the date of the encounter doing chart review, history and exam, documentation and further activities as noted above    Dee Dee Brown PA-C  [facility]  Pager: [phone]

## 2025-04-29 NOTE — PATIENT INSTRUCTIONS
Call the nurse for any questions or concerns at [phone].     Plan:  1. Medication changes:     Increase metolazone to 2.5 mg twice weekly. Take an extra 80 mEq of potassium on days you take metolazone.     Resume digoxin 125 mcg once daily.     2. I am going to mail you a heart monitor. Please place this on 5/6/2025 and wear it for 3 days, then mail it back. We will call you with the results.     3. CORE follow-up with Anabel in 1 month as scheduled (labs prior)    4. Cardiac MRI with stress in 2 months or so    -Scheduling phone number: [phone]    It was great seeing you today!    Dee Dee Brown PA-C  Physician Assistant  [facility]

## 2025-04-30 ENCOUNTER — ORDERS ONLY (AUTO-RELEASED) (OUTPATIENT)
Dept: CARDIOLOGY | Facility: CLINIC | Age: 75
End: 2025-04-30

## 2025-04-30 DIAGNOSIS — I48.19 PERSISTENT ATRIAL FIBRILLATION (H): ICD-10-CM

## 2025-06-06 ENCOUNTER — RESULTS FOLLOW-UP (OUTPATIENT)
Dept: CARDIOLOGY | Facility: CLINIC | Age: 75
End: 2025-06-06

## 2025-06-06 ENCOUNTER — HOSPITAL ENCOUNTER (OUTPATIENT)
Dept: CARDIOLOGY | Facility: CLINIC | Age: 75
Discharge: HOME | End: 2025-06-06
Payer: MEDICARE

## 2025-06-06 VITALS — DIASTOLIC BLOOD PRESSURE: 58 MMHG | HEART RATE: 76 BPM | SYSTOLIC BLOOD PRESSURE: 118 MMHG

## 2025-06-06 DIAGNOSIS — R60.0 BILATERAL LOWER EXTREMITY EDEMA: ICD-10-CM

## 2025-06-06 DIAGNOSIS — I10 BENIGN ESSENTIAL HYPERTENSION: ICD-10-CM

## 2025-06-06 DIAGNOSIS — R06.02 SHORTNESS OF BREATH: ICD-10-CM

## 2025-06-06 PROCEDURE — 93016 CV STRESS TEST SUPVJ ONLY: CPT | Performed by: INTERNAL MEDICINE

## 2025-06-06 PROCEDURE — 75563 CARD MRI W/STRESS IMG & DYE: CPT | Mod: 26 | Performed by: INTERNAL MEDICINE

## 2025-06-06 PROCEDURE — 255N000002 HC RX 255 OP 636: Performed by: INTERNAL MEDICINE

## 2025-06-06 PROCEDURE — 250N000011 HC RX IP 250 OP 636: Mod: JZ | Performed by: INTERNAL MEDICINE

## 2025-06-06 PROCEDURE — 93018 CV STRESS TEST I&R ONLY: CPT | Performed by: INTERNAL MEDICINE

## 2025-06-06 PROCEDURE — A9585 GADOBUTROL INJECTION: HCPCS | Performed by: INTERNAL MEDICINE

## 2025-06-06 PROCEDURE — 75563 CARD MRI W/STRESS IMG & DYE: CPT

## 2025-06-06 RX ORDER — CAFFEINE CITRATE 20 MG/ML
60 SOLUTION INTRAVENOUS
Status: ACTIVE | OUTPATIENT
Start: 2025-06-06 | End: 2025-06-06

## 2025-06-06 RX ORDER — LORAZEPAM 0.5 MG/1
0.5 TABLET ORAL EVERY 30 MIN PRN
Status: DISCONTINUED | OUTPATIENT
Start: 2025-06-06 | End: 2025-06-07 | Stop reason: HOSPADM

## 2025-06-06 RX ORDER — SODIUM CHLORIDE 9 MG/ML
INJECTION, SOLUTION INTRAVENOUS CONTINUOUS PRN
Status: ACTIVE | OUTPATIENT
Start: 2025-06-06 | End: 2025-06-06

## 2025-06-06 RX ORDER — AMINOPHYLLINE 25 MG/ML
50-100 INJECTION, SOLUTION INTRAVENOUS
Status: DISCONTINUED | OUTPATIENT
Start: 2025-06-06 | End: 2025-06-07 | Stop reason: HOSPADM

## 2025-06-06 RX ORDER — LIDOCAINE 40 MG/G
CREAM TOPICAL
Status: DISCONTINUED | OUTPATIENT
Start: 2025-06-06 | End: 2025-06-07 | Stop reason: HOSPADM

## 2025-06-06 RX ORDER — NITROGLYCERIN 0.4 MG/1
0.4 TABLET SUBLINGUAL EVERY 5 MIN PRN
Status: ACTIVE | OUTPATIENT
Start: 2025-06-06 | End: 2025-06-06

## 2025-06-06 RX ORDER — GADOBUTROL 604.72 MG/ML
40 INJECTION INTRAVENOUS ONCE
Status: COMPLETED | OUTPATIENT
Start: 2025-06-06 | End: 2025-06-06

## 2025-06-06 RX ORDER — REGADENOSON 0.08 MG/ML
0.4 INJECTION, SOLUTION INTRAVENOUS ONCE
Status: COMPLETED | OUTPATIENT
Start: 2025-06-06 | End: 2025-06-06

## 2025-06-06 RX ORDER — CAFFEINE 200 MG
200 TABLET ORAL
Status: ACTIVE | OUTPATIENT
Start: 2025-06-06 | End: 2025-06-06

## 2025-06-06 RX ORDER — DIAZEPAM 5 MG/1
5 TABLET ORAL EVERY 30 MIN PRN
Status: DISCONTINUED | OUTPATIENT
Start: 2025-06-06 | End: 2025-06-07 | Stop reason: HOSPADM

## 2025-06-06 RX ORDER — ALBUTEROL SULFATE 90 UG/1
2 INHALANT RESPIRATORY (INHALATION) EVERY 5 MIN PRN
Status: DISCONTINUED | OUTPATIENT
Start: 2025-06-06 | End: 2025-06-07 | Stop reason: HOSPADM

## 2025-06-06 RX ADMIN — GADOBUTROL 40 ML: 604.72 INJECTION INTRAVENOUS at 12:59

## 2025-06-06 RX ADMIN — REGADENOSON 0.4 MG: 0.08 INJECTION, SOLUTION INTRAVENOUS at 12:29

## 2025-06-08 DIAGNOSIS — I50.32 CHRONIC HEART FAILURE WITH PRESERVED EJECTION FRACTION (HFPEF) (H): Primary | ICD-10-CM

## 2025-06-10 ENCOUNTER — TELEPHONE (OUTPATIENT)
Dept: CARDIOLOGY | Facility: CLINIC | Age: 75
End: 2025-06-10
Payer: MEDICARE

## 2025-06-10 DIAGNOSIS — E87.6 HYPOKALEMIA: ICD-10-CM

## 2025-06-10 DIAGNOSIS — I50.32 CHRONIC HEART FAILURE WITH PRESERVED EJECTION FRACTION (HFPEF) (H): ICD-10-CM

## 2025-06-10 NOTE — TELEPHONE ENCOUNTER
Pt called wanting her prescription for her Potassium corrected so that she does not run out. Pt saw Dee Dee on 4/29 and it was recommended:     Diuretic regimen: Continue Lasix 60 mg twice daily.  Increase metolazone to 2.5 mg twice weekly on Tuesdays and Thursdays.  Patient takes an additional 80 mEq of potassium with each dose of metolazone.     Discussed this with pt to clarify what she is taking, but pt states that she is taking 5 potassium 20 meq twice a day not 4 tablets. Discussed that prior to the increase to 4 tablets it said 3 tablets per Anabel's order. Pt wants this writer to discuss with Anabel, because she is taking 5 bid on Tuesday and Thursday.  Will message Anabel for assistance.  Arlene

## 2025-06-13 NOTE — TELEPHONE ENCOUNTER
[facility]   There is some discrepancy in potassium dosing and prescriptions.      I am wondering if the confusion is in the mEq formulary?  Can we call Agatha and find out if her potassium tablets are 10's or 20's ?     The prescription I last filled was for potassium 3 tablets (60 mEq) by mouth 2 times daily (6 tabs total per day). And I think only an extra 20mEq on the metolazone days (80mEq) twice a day (4 tablets twice a day on metolazone days).     Last potassium level was stable at 3.9. .      Thanks,  Anabel         Called pt to verify what she has been taking. No answer, M for call back.     Future Appointments   Date Time Provider Department [place]   7/7/2025  3:00 PM [facility]   7/7/2025  4:00 PM Latricia Carroll APRN CNP [facility]      Helen Bhakta RN, BSN  06/13/25 at 2:58 PM   Phone# [phone]

## 2025-06-16 NOTE — TELEPHONE ENCOUNTER
[facility]   Spoke with patient, she confirms she has 20meq KCL tabs and has been taking 3 tablets BID as prescribed, but then on Tues and Thurs with Metolazone doses, she is taking 5 tablets BID and has been for the last few months. She reports issue is she is running out of KCL sooner than Rx because it only reads 3 tablets BID.     Asked pt to continue as she has been given labs stable. Will confirm ok with CParker, NP and send update rx to pharmacy. Pt just recieved for Rx for KCL 3 tablets BID, 360 tablets so has enough for a while, but will run out sooner given doesn't reflect increased dose when using Metolazone.     Update to MAMADOU Tabor.     Future Appointments   Date Time Provider Department Center   7/7/2025  3:00 PM [place] [facility]   7/7/2025  4:00 PM Latricia Carroll APRN CNP [facility]      Helen Bhakta RN, BSN  06/16/25 at 11:37 AM   Phone# [phone]

## 2025-06-17 RX ORDER — POTASSIUM CHLORIDE 1500 MG/1
60 TABLET, EXTENDED RELEASE ORAL 2 TIMES DAILY
Qty: 640 TABLET | Refills: 3 | Status: SHIPPED | OUTPATIENT
Start: 2025-06-17

## 2025-06-17 NOTE — TELEPHONE ENCOUNTER
"[facility]     \"Yes okay to prescribe as she has been taking given normal potassium level.     Thanks,  Anabel\"    Rx sent for 60 meq BID, with 100 meq BID on metolazone dose days.    Nicole Ivan RN BSN   11:40 AM 06/17/25  Nurse line M-F 8a-4p: [phone]          "

## 2025-07-07 ENCOUNTER — TELEPHONE (OUTPATIENT)
Dept: CARDIOLOGY | Facility: CLINIC | Age: 75
End: 2025-07-07

## 2025-07-07 NOTE — TELEPHONE ENCOUNTER
----- Message from Latricia Carroll sent at 7/5/2025 10:38 AM CDT -----  Regarding: Digoxin - please hold dose 7/7 AM - call patient  Can we see if we can catch Agatha before she takes her digoxin Monday AM?    Labs at 3pm so should just HOLD AM dose of Digoxin please!    Thanks!!  (No [employer] account to alert her!)  Anabel

## 2025-07-07 NOTE — TELEPHONE ENCOUNTER
[facility] -     Spoke with Agatha, she has not taken her morning medications. She stated she did not know that she had an appointment today. Reviewed both appointments for labs and follow-up with Anabel Carroll NP, see below. She is unsure if she will be able to make the appts but will work on a ride. I asked that she call us back and let us know if she can make it or not.     Future Appointments   Date Time Provider Department [place]   7/7/2025  3:00 PM  LAB [facility]   7/7/2025  4:00 PM [facility] Carroll [facility]     KELLIE Isabel   8:27 AM 7/7/2025    Nurse line M-F 8a-4p: [phone]

## 2025-07-07 NOTE — TELEPHONE ENCOUNTER
[facility] -      Pt left  calling to cancel her appt. She states she will call back to reschedule. Her appts for today have been cancelled.       Lucy Marte RN BAN   10:49 AM 7/7/2025    Nurse line M-F 8a-4p: [phone]

## (undated) RX ORDER — ALBUTEROL SULFATE 90 UG/1
AEROSOL, METERED RESPIRATORY (INHALATION)
Status: DISPENSED
Start: 2024-07-29

## (undated) RX ORDER — POTASSIUM CHLORIDE 1500 MG/1
TABLET, EXTENDED RELEASE ORAL
Status: DISPENSED
Start: 2025-04-04

## (undated) RX ORDER — REGADENOSON 0.08 MG/ML
INJECTION, SOLUTION INTRAVENOUS
Status: DISPENSED
Start: 2025-06-06

## (undated) RX ORDER — REGADENOSON 0.08 MG/ML
INJECTION, SOLUTION INTRAVENOUS
Status: DISPENSED
Start: 2024-07-29

## (undated) RX ORDER — POTASSIUM CHLORIDE 1500 MG/1
TABLET, EXTENDED RELEASE ORAL
Status: DISPENSED
Start: 2024-10-03

## (undated) RX ORDER — FUROSEMIDE 10 MG/ML
INJECTION INTRAMUSCULAR; INTRAVENOUS
Status: DISPENSED
Start: 2024-12-17

## (undated) RX ORDER — POTASSIUM CHLORIDE 1500 MG/1
TABLET, EXTENDED RELEASE ORAL
Status: DISPENSED
Start: 2024-12-17